# Patient Record
Sex: MALE | Race: WHITE | NOT HISPANIC OR LATINO | Employment: UNEMPLOYED | URBAN - METROPOLITAN AREA
[De-identification: names, ages, dates, MRNs, and addresses within clinical notes are randomized per-mention and may not be internally consistent; named-entity substitution may affect disease eponyms.]

---

## 2017-01-05 ENCOUNTER — ALLSCRIPTS OFFICE VISIT (OUTPATIENT)
Dept: OTHER | Facility: OTHER | Age: 42
End: 2017-01-05

## 2017-03-30 ENCOUNTER — HOSPITAL ENCOUNTER (OUTPATIENT)
Dept: NON INVASIVE DIAGNOSTICS | Facility: CLINIC | Age: 42
Discharge: HOME | End: 2017-03-30
Payer: MEDICARE

## 2017-03-30 ENCOUNTER — ALLSCRIPTS OFFICE VISIT (OUTPATIENT)
Dept: OTHER | Facility: OTHER | Age: 42
End: 2017-03-30

## 2017-03-30 ENCOUNTER — HOSPITAL ENCOUNTER (OUTPATIENT)
Dept: RADIOLOGY | Facility: CLINIC | Age: 42
Discharge: HOME | End: 2017-03-30
Payer: MEDICARE

## 2017-03-30 DIAGNOSIS — Z86.718 PERSONAL HISTORY OF OTHER VENOUS THROMBOSIS AND EMBOLISM: ICD-10-CM

## 2017-03-30 DIAGNOSIS — M79.601 PAIN OF RIGHT ARM: ICD-10-CM

## 2017-03-30 PROCEDURE — 73080 X-RAY EXAM OF ELBOW: CPT

## 2017-03-30 PROCEDURE — 93971 EXTREMITY STUDY: CPT

## 2017-03-31 ENCOUNTER — GENERIC CONVERSION - ENCOUNTER (OUTPATIENT)
Dept: OTHER | Facility: OTHER | Age: 42
End: 2017-03-31

## 2017-05-30 DIAGNOSIS — I82.409 ACUTE EMBOLISM AND THROMBOSIS OF DEEP VEIN OF LOWER EXTREMITY (HCC): ICD-10-CM

## 2017-05-30 DIAGNOSIS — Z86.718 PERSONAL HISTORY OF OTHER VENOUS THROMBOSIS AND EMBOLISM: ICD-10-CM

## 2017-08-09 ENCOUNTER — GENERIC CONVERSION - ENCOUNTER (OUTPATIENT)
Dept: OTHER | Facility: OTHER | Age: 42
End: 2017-08-09

## 2017-11-04 ENCOUNTER — ALLSCRIPTS OFFICE VISIT (OUTPATIENT)
Dept: OTHER | Facility: OTHER | Age: 42
End: 2017-11-04

## 2017-11-04 ENCOUNTER — APPOINTMENT (OUTPATIENT)
Dept: LAB | Facility: CLINIC | Age: 42
End: 2017-11-04
Payer: COMMERCIAL

## 2017-11-04 DIAGNOSIS — Z94.0 HISTORY OF KIDNEY TRANSPLANT: ICD-10-CM

## 2017-11-04 DIAGNOSIS — I10 ESSENTIAL (PRIMARY) HYPERTENSION: ICD-10-CM

## 2017-11-04 DIAGNOSIS — Z86.718 PERSONAL HISTORY OF OTHER VENOUS THROMBOSIS AND EMBOLISM (CODE): ICD-10-CM

## 2017-11-04 LAB
ALBUMIN SERPL BCP-MCNC: 3.5 G/DL (ref 3.5–5)
ALP SERPL-CCNC: 93 U/L (ref 46–116)
ALT SERPL W P-5'-P-CCNC: 27 U/L (ref 12–78)
ANION GAP SERPL CALCULATED.3IONS-SCNC: 8 MMOL/L (ref 4–13)
AST SERPL W P-5'-P-CCNC: 23 U/L (ref 5–45)
BACTERIA UR QL AUTO: ABNORMAL /HPF
BASOPHILS # BLD AUTO: 0.04 THOUSANDS/ΜL (ref 0–0.1)
BASOPHILS NFR BLD AUTO: 1 % (ref 0–1)
BILIRUB SERPL-MCNC: 0.37 MG/DL (ref 0.2–1)
BILIRUB UR QL STRIP: NEGATIVE
BUN SERPL-MCNC: 32 MG/DL (ref 5–25)
CALCIUM SERPL-MCNC: 9.2 MG/DL (ref 8.3–10.1)
CHLORIDE SERPL-SCNC: 106 MMOL/L (ref 100–108)
CHOLEST SERPL-MCNC: 207 MG/DL (ref 50–200)
CLARITY UR: CLEAR
CO2 SERPL-SCNC: 22 MMOL/L (ref 21–32)
COLOR UR: YELLOW
CREAT SERPL-MCNC: 1.49 MG/DL (ref 0.6–1.3)
EOSINOPHIL # BLD AUTO: 0.17 THOUSAND/ΜL (ref 0–0.61)
EOSINOPHIL NFR BLD AUTO: 4 % (ref 0–6)
ERYTHROCYTE [DISTWIDTH] IN BLOOD BY AUTOMATED COUNT: 12.2 % (ref 11.6–15.1)
EST. AVERAGE GLUCOSE BLD GHB EST-MCNC: 114 MG/DL
GFR SERPL CREATININE-BSD FRML MDRD: 56 ML/MIN/1.73SQ M
GLUCOSE P FAST SERPL-MCNC: 88 MG/DL (ref 65–99)
GLUCOSE UR STRIP-MCNC: NEGATIVE MG/DL
HBA1C MFR BLD: 5.6 % (ref 4.2–6.3)
HCT VFR BLD AUTO: 42.2 % (ref 36.5–49.3)
HDLC SERPL-MCNC: 50 MG/DL (ref 40–60)
HGB BLD-MCNC: 14.4 G/DL (ref 12–17)
HGB UR QL STRIP.AUTO: ABNORMAL
HYALINE CASTS #/AREA URNS LPF: ABNORMAL /LPF
INR PPP: 1.32 (ref 0.86–1.16)
KETONES UR STRIP-MCNC: NEGATIVE MG/DL
LDLC SERPL CALC-MCNC: 138 MG/DL (ref 0–100)
LEUKOCYTE ESTERASE UR QL STRIP: NEGATIVE
LYMPHOCYTES # BLD AUTO: 1.78 THOUSANDS/ΜL (ref 0.6–4.47)
LYMPHOCYTES NFR BLD AUTO: 40 % (ref 14–44)
MCH RBC QN AUTO: 32.8 PG (ref 26.8–34.3)
MCHC RBC AUTO-ENTMCNC: 34.1 G/DL (ref 31.4–37.4)
MCV RBC AUTO: 96 FL (ref 82–98)
MONOCYTES # BLD AUTO: 0.64 THOUSAND/ΜL (ref 0.17–1.22)
MONOCYTES NFR BLD AUTO: 15 % (ref 4–12)
NEUTROPHILS # BLD AUTO: 1.76 THOUSANDS/ΜL (ref 1.85–7.62)
NEUTS SEG NFR BLD AUTO: 40 % (ref 43–75)
NITRITE UR QL STRIP: NEGATIVE
NON-SQ EPI CELLS URNS QL MICRO: ABNORMAL /HPF
NRBC BLD AUTO-RTO: 0 /100 WBCS
PH UR STRIP.AUTO: 6 [PH] (ref 4.5–8)
PLATELET # BLD AUTO: 249 THOUSANDS/UL (ref 149–390)
PMV BLD AUTO: 11 FL (ref 8.9–12.7)
POTASSIUM SERPL-SCNC: 4.5 MMOL/L (ref 3.5–5.3)
PROT SERPL-MCNC: 7.7 G/DL (ref 6.4–8.2)
PROT UR STRIP-MCNC: ABNORMAL MG/DL
PROTHROMBIN TIME: 16.5 SECONDS (ref 12.1–14.4)
RBC # BLD AUTO: 4.39 MILLION/UL (ref 3.88–5.62)
RBC #/AREA URNS AUTO: ABNORMAL /HPF
SODIUM SERPL-SCNC: 136 MMOL/L (ref 136–145)
SP GR UR STRIP.AUTO: 1.02 (ref 1–1.03)
TRIGL SERPL-MCNC: 95 MG/DL
TSH SERPL DL<=0.05 MIU/L-ACNC: 2.11 UIU/ML (ref 0.36–3.74)
UROBILINOGEN UR QL STRIP.AUTO: 1 E.U./DL
WBC # BLD AUTO: 4.4 THOUSAND/UL (ref 4.31–10.16)
WBC #/AREA URNS AUTO: ABNORMAL /HPF

## 2017-11-04 PROCEDURE — 80061 LIPID PANEL: CPT

## 2017-11-04 PROCEDURE — 80197 ASSAY OF TACROLIMUS: CPT

## 2017-11-04 PROCEDURE — 85025 COMPLETE CBC W/AUTO DIFF WBC: CPT

## 2017-11-04 PROCEDURE — 80053 COMPREHEN METABOLIC PANEL: CPT

## 2017-11-04 PROCEDURE — 85610 PROTHROMBIN TIME: CPT

## 2017-11-04 PROCEDURE — 84443 ASSAY THYROID STIM HORMONE: CPT

## 2017-11-04 PROCEDURE — 81001 URINALYSIS AUTO W/SCOPE: CPT

## 2017-11-04 PROCEDURE — 36415 COLL VENOUS BLD VENIPUNCTURE: CPT

## 2017-11-04 PROCEDURE — 83036 HEMOGLOBIN GLYCOSYLATED A1C: CPT

## 2017-11-07 NOTE — PROGRESS NOTES
Assessment  1  Skin lesion (709 9) (L98 9)   2  Lower extremity deep venous thrombosis (453 40) (I82 409)   3  History of DVT of lower extremity (V12 51) (Z86 718)   4  Kidney replaced by transplant (V42 0) (Z94 0)   5  Hypertension (401 9) (I10)    Plan  History of DVT of lower extremity, Hypertension    · (1) PT WITH INR; Status:Active; Requested CBO:[id number];   Hypertension    · (1) CBC/PLT/DIFF; Status:Active; Requested HVH:[id number];    · (1) COMPREHENSIVE METABOLIC PANEL; Status:Active; Requested [id number];    · (1) HEMOGLOBIN A1C; Status:Active; Requested IDT:[id number];    · (1) LIPID PANEL, FASTING; Status:Active; Requested ICE:[id number];    · (1) TSH; Status:Active; Requested JKZ:[id number];    · (1) URINALYSIS w URINE C/S REFLEX (will reflex a microscopy if leukocytes, occult blood, or  nitrites are not within normal limits); Status:Active; Requested ZMW:06HLL1049;   Kidney replaced by transplant    · (1)  TACROLIMUS; Status:Active; Requested JXA:[id number];    · 1 - Teofilo Carrera MD  (Nephrology) Co-Management  *  Status: Active  Requested for: 72XLQ5651  Care Summary provided  : Yes   · Follow-up visit in 1 year Evaluation and Treatment  Follow-up  Status: Hold For - Scheduling   Requested for: 64MNA1276  Skin lesion    · 1 - Caitlyn Barba MD  (Dermatology) Co-Management  *  Status: Hold For - Scheduling  Requested  for: 47WJY3374  Care Summary provided  : Yes    Discussion/Summary  Discussion Summary:   Skin lesions on leg venous stasis versus psoriatic versus vasculitis  Will get screening labs referral to Dermatology Nephrology and follow up based on his lab reports  Medication SE Review and Pt Understands Tx: Possible side effects of new medications were reviewed with the patient/guardian today  The treatment plan was reviewed with the patient/guardian   The patient/guardian understands and agrees with the treatment plan      Chief Complaint  Chief Complaint Free Text Note Form: Skin lesions on legs      History of Present Illness  HPI: He has had two irritated itchy patches on left lower leg for at least 6 months  They are not getting worse  No pain or drainage  He now has insurance and he would like them checked out  He 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 chronically he checks his pro times at home  There is no documentation  He is status post kidney transplant the has been lost to follow-up from Nephrology he is on tacrolimus  He feels well he is working at a physical job he occasionally has some numbness of his hands but otherwise no complaints  No fever chills appetite and weight have been stable no dizziness syncope chest pain shortness palpitation nausea vomiting no trouble with urination bowel movements no bloody urine   Hypertension (Follow-Up): The patient presents for follow-up of hypertension secondary to renovascular disease  The patient states he has been doing well with his blood pressure control since the last visit  He has no comorbid illnesses  He has no significant interval events  Symptoms: The patient is currently asymptomatic  Blood pressure control has been good  Disease Management: the patient is doing well with his blood pressure goals  Review of Systems  Complete-Male:   Constitutional: No fever or chills, feels well, no tiredness, no recent weight gain or weight loss  Eyes: No complaints of eye pain, no red eyes, no discharge from eyes, no itchy eyes  ENT: no complaints of earache, no hearing loss, no nosebleeds, no nasal discharge, no sore throat, no hoarseness  Cardiovascular: No complaints of slow heart rate, no fast heart rate, no chest pain, no palpitations, no leg claudication, no lower extremity  Respiratory: No complaints of shortness of breath, no wheezing, no cough, no SOB on exertion, no orthopnea or PND  Gastrointestinal: No complaints of abdominal pain, no constipation, no nausea or vomiting, no diarrhea or bloody stools     Genitourinary: No complaints of dysuria, no incontinence, no hesitancy, no nocturia, no genital lesion, no testicular pain  Musculoskeletal: No complaints of arthralgia, no myalgias, no joint swelling or stiffness, no limb pain or swelling  Integumentary: as noted in HPI  Neurological: tingling-- and-- Intermittent of his hands, but-- as noted in HPI  Psychiatric: Is not suicidal, no sleep disturbances, no anxiety or depression, no change in personality, no emotional problems  Endocrine: No complaints of proptosis, no hot flashes, no muscle weakness, no erectile dysfunction, no deepening of the voice, no feelings of weakness  Hematologic/Lymphatic: No complaints of swollen glands, no swollen glands in the neck, does not bleed easily, no easy bruising  ROS Reviewed:   ROS reviewed  Active Problems  1  Acute URI (465 9) (J06 9)   2  Arm pain, right (729 5) (M79 601)   3  Depression (311) (F32 9)   4  History of DVT of lower extremity (V12 51) (Z86 718)   5  Hypertension (401 9) (I10)   6  Kidney replaced by transplant (V42 0) (Z94 0)   7  Left knee pain (719 46) (M25 562)   8  Lower extremity deep venous thrombosis (453 40) (I82 409)   9  Viral URI with cough (465 9) (J06 9,B97 89)    Past Medical History  1  History of kidney disease (V13 09) (R69 512)  Active Problems And Past Medical History Reviewed: The active problems and past medical history were reviewed and updated today  Surgical History  1  History of Renal Transplant  Surgical History Reviewed: The surgical history was reviewed and updated today  Family History  Father    1  Family history of Deep vein thrombophlebitis of leg  Grandfather    2  Family history of Deep vein thrombophlebitis of leg   3  Family history of cardiac disorder (V17 49) (Z82 49)   4  Family history of Malignant neoplasm of colon, unspecified part of colon  Family History Reviewed: The family history was reviewed and updated today         Social History   · Former smoker (G89 64) (X12 394)  Social History Reviewed: The social history was reviewed and updated today  Current Meds   1  Adderall XR 20 MG Oral Capsule Extended Release 24 Hour; TAKE 1 CAPSULE DAILY FOR ADHD; Therapy: (Recorded:18Oaa6853) to Recorded   2  DilTIAZem  MG Oral Capsule Extended Release 24 Hour; 1 BID; Last Rx:39Wky2724 Ordered   3  Mycophenolate Mofetil 250 MG Oral Capsule; 2 AM and 1 PM; Last Rx:86Cht4136 Ordered   4  Sertraline HCl - 100 MG Oral Tablet; TAKE 1 TABLET TWICE DAILY; Last Rx:24Mod0965 Ordered   5  Tacrolimus 1 MG Oral Capsule; 1 BID; Last Rx:35Ipo1385 Ordered   6  Vitamin D 2000 UNIT Oral Capsule; take 1 capsule daily; Therapy: (Recorded:14Fbk1527) to Recorded   7  Warfarin Sodium 2 MG Oral Tablet; TAKE 1 TABLET DAILY AS DIRECTED according to blood results    Requested for: 13UVP2408; Last Rx:14Otn3681 Ordered   8  Warfarin Sodium 5 MG Oral Tablet; TAKE ONE OR TWO TABLETS BY MOUTH DAILY; Therapy: 39SOG6823 to (Last Rx:69Brz2357)  Requested for: 52Wue2003 Ordered  Medication List Reviewed: The medication list was reviewed and updated today  Allergies  1  Penicillins    Vitals  Vital Signs    Recorded: 57GIO8962 09:37AM   Temperature 98 1 F, Oral   Heart Rate 78   Systolic 840   Diastolic 88   Height 5 ft 6 in   Weight 172 lb 8 oz   BMI Calculated 27 84   BSA Calculated 1 88   O2 Saturation 97     Physical Exam    Constitutional   General appearance: No acute distress, well appearing and well nourished  Eyes   Conjunctiva and lids: No swelling, erythema, or discharge  Pupils and irises: Equal, round and reactive to light  Ears, Nose, Mouth, and Throat   External inspection of ears and nose: Normal     Otoscopic examination: Tympanic membrance translucent with normal light reflex  Canals patent without erythema  Nasal mucosa, septum, and turbinates: Normal without edema or erythema  Oropharynx: Normal with no erythema, edema, exudate or lesions  Pulmonary   Respiratory effort: No increased work of breathing or signs of respiratory distress  Auscultation of lungs: Clear to auscultation, equal breath sounds bilaterally, no wheezes, no rales, no rhonci  Cardiovascular   Palpation of heart: Normal PMI, no thrills  Auscultation of heart: Normal rate and rhythm, normal S1 and S2, without murmurs  Examination of extremities for edema and/or varicosities: Normal     Carotid pulses: Normal     Abdomen   Abdomen: Non-tender, no masses  Liver and spleen: No hepatomegaly or splenomegaly  Lymphatic   Palpation of lymph nodes in neck: No lymphadenopathy  Musculoskeletal   Gait and station: Normal     Digits and nails: Normal without clubbing or cyanosis  Inspection/palpation of joints, bones, and muscles: Normal     Skin   Skin and subcutaneous tissue: Abnormal  -- Two well demarcated plaque-like lesions 3 centimeters posterior left lower leg with some scaling some redness some slight hemorrhagic appearance  Neurologic   Cranial nerves: Cranial nerves 2-12 intact  Reflexes: 2+ and symmetric  Sensation: No sensory loss      Psychiatric   Orientation to person, place and time: Normal     Mood and affect: Normal          Signatures   Electronically signed by : Uvaldo Vaughn, [facility]; Nov 4 2017 10:26AM EST                       (Author)    Electronically signed by : CONSTANTINE Chaidez ; Nov 6 2017 11:21AM EST

## 2017-11-08 LAB — TACROLIMUS BLD LC/MS/MS-MCNC: 1.6 NG/ML (ref 2–20)

## 2017-11-09 ENCOUNTER — GENERIC CONVERSION - ENCOUNTER (OUTPATIENT)
Dept: OTHER | Facility: OTHER | Age: 42
End: 2017-11-09

## 2017-11-14 ENCOUNTER — ALLSCRIPTS OFFICE VISIT (OUTPATIENT)
Dept: OTHER | Facility: OTHER | Age: 42
End: 2017-11-14

## 2017-11-15 NOTE — PROGRESS NOTES
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)   2  Screening for skin condition (V82 0) (Z13 89)   3  Acne vulgaris (706 1) (L70 0)   4  H/O immunosuppressive therapy (V87 46) (Z92 25)    Active Problems  Problems    1  Acute URI (465 9) (J06 9)   2  Arm pain, right (729 5) (M79 601)   3  Depression (311) (F32 9)   4  Flu vaccine need (V04 81) (Z23)   5  History of DVT of lower extremity (V12 51) (Z86 718)   6  Hypertension (401 9) (I10)   7  Kidney replaced by transplant (V42 0) (Z94 0)   8  Left knee pain (719 46) (M25 562)   9  Lower extremity deep venous thrombosis (453 40) (I82 409)   10  Skin lesion (709 9) (L98 9)   11  Viral URI with cough (465 9) (J06 9,B97 89)    Past Medical History  Problems    1  H/O immunosuppressive therapy (V87 46) (Z92 25)   2  History of kidney disease (V13 09) (F50 330)    The past medical history was reviewed  Surgical History  Problems    1  History of Renal Transplant    Surgical History reviewed      Family History  Father    1  Family history of Deep vein thrombophlebitis of leg  Grandfather    2  Family history of Deep vein thrombophlebitis of leg   3  Family history of cardiac disorder (V17 49) (Z82 49)   4  Family history of Malignant neoplasm of colon, unspecified part of colon  Family History Reviewed- Derm:   Family History was reviewed      Social History  Problems    · Former smoker (T06 63) (B39 823)  The social history was reviewed      Current Meds   1  Adderall XR 20 MG Oral Capsule Extended Release 24 Hour; TAKE 1 CAPSULE DAILY FOR ADHD; Therapy: (Recorded:99Map2262) to Recorded   2   DilTIAZem CD 120 MG Oral Capsule Extended Release 24 Hour; 1 BID; Last Rx:57Qow4901 Ordered   3  Mycophenolate Mofetil 250 MG Oral Capsule; 2 AM and 1 PM; Last Rx:12Ino0313 Ordered   4  Sertraline HCl - 100 MG Oral Tablet; TAKE 1 TABLET TWICE DAILY; Last Rx:42Ast8010 Ordered   5  Tacrolimus 1 MG Oral Capsule; 1 BID; Last Rx:69Zwt9264 Ordered   6  Vitamin D 2000 UNIT Oral Capsule; take 1 capsule daily; Therapy: (Recorded:83Twp6179) to Recorded   7  Warfarin Sodium 2 MG Oral Tablet; TAKE 1 TABLET DAILY AS DIRECTED according to blood results  Requested for: 98GJN6927; Last Rx:19Sou7577 Ordered   8  Warfarin Sodium 5 MG Oral Tablet; TAKE ONE OR TWO TABLETS BY MOUTH DAILY; Therapy: 19YRD1087 to (Last Rx:84Qqq1215)  Requested for: 38Kwr0241 Ordered    Review of Systems   Constitutional: Denies constitutional symptoms  Eyes: Denies eye symptoms  ENT:  denies ear symptoms, nasal symptoms, mouth or throat symptoms  Cardiovascular: Denies cardiovascular symptoms  Respiratory: Denies respiratory symptoms  Gastrointestinal: Denies gastrointestinal symptoms  Musculoskeletal: Denies musculoskeletal symptoms  Integumentary: Denies symptoms other than stated above  Neurological: Denies neurologic symptoms  Psychiatric: Denies psychiatric symptoms  Endocrine: Denies endocrine symptoms  Hematologic/Lymphatic: Denies hematologic symptoms  Allergies  Medication    1  Penicillins    Physical Exam   Constitutional  General appearance: Appears healthy and well developed  Lymphatic  No visible disturbance  Musculoskeletal  Digits and nails: No clubbing, cyanosis or edema  Cutaneous and nail exam normal    Skin  Scalp skin texture and hair distribution: Normal skin texture on scalp, normal hair distribution  Head: Normal turgor, no rashes, no lesions  Neck: Normal turgor, no rashes, no lesions  Chest: Normal turgor, no rashes, no lesions  Abdomen: Normal turgor, no rashes, no lesions     Back: Abnormal    Right upper extremity: Normal turgor, no rashes, no lesions  Left upper extremity: Normal turgor, no rashes, no lesions  Right lower extremity: Abnormal    Left lower extremity: Abnormal    Neuro/Psych  Alert and oriented x 3  Displays comfort and cooperation during encounterl  Affect is normal    Finding On his back is extensive erythematous papules inflammatory and occasional pustules  In addition on his left lower leg this scaling erythematous well-demarcated plaques noted with some scaling erythema peripherally nothing else remarkable on complete exam       Plan  Psoriasiform dermatitis    · Betamethasone Dipropionate Aug 0 05 % External Ointment; apply sparingly toaffected area(s) twice daily  lower leg   · Follow-up visit in 1 month Evaluation and Treatment  Follow-up  Status: Hold For -Scheduling  Requested for: 37HYK1709    Discussion/Summary   Assessment #1: Psoriasiform dermatitis  Care Plan:  Possibility of a contact reaction versus psoriasis will go ahead and treat this with topical steroids and re-evaluate in about a month patient advise not to use Neosporin on the area consider a use test of this in the future  Assessment #2: Acne  Care Plan:  Appears to probably related to his immunosuppressive therapy discussed use of benzoyl peroxide wash  Assessment #3: Screening for dermatologic disorders/history of immunosuppressive therapy  Care Plan:  No other concerns noted sun protection recommended yearly follow-up recommended  Future Appointments    Date/Time Provider Specialty Site   01/22/2018 09:30 AM CONSTANTINE Grant  Nephrology [address]   12/20/2017 09:05 AM CONSTANTINE Mckeon   Dermatology [facility]       Signatures   Electronically signed by : CONSTANTINE Mccain ; Nov 14 2017 12:07PM EST                       (Author)

## 2017-12-16 ENCOUNTER — HOSPITAL ENCOUNTER (INPATIENT)
Facility: HOSPITAL | Age: 42
LOS: 2 days | Discharge: HOME | DRG: 469 | End: 2017-12-19
Attending: EMERGENCY MEDICINE | Admitting: GENERAL PRACTICE
Payer: COMMERCIAL

## 2017-12-16 DIAGNOSIS — N18.9 ACUTE KIDNEY INJURY SUPERIMPOSED ON CKD (HCC): ICD-10-CM

## 2017-12-16 DIAGNOSIS — N39.0 UTI (URINARY TRACT INFECTION): Primary | ICD-10-CM

## 2017-12-16 DIAGNOSIS — Z94.0 TRANSPLANTED KIDNEY: ICD-10-CM

## 2017-12-16 DIAGNOSIS — N17.9 ACUTE KIDNEY INJURY SUPERIMPOSED ON CKD (HCC): ICD-10-CM

## 2017-12-16 DIAGNOSIS — N17.9 AKI (ACUTE KIDNEY INJURY) (HCC): ICD-10-CM

## 2017-12-16 DIAGNOSIS — N30.91 HEMORRHAGIC CYSTITIS: ICD-10-CM

## 2017-12-16 LAB
ANION GAP SERPL CALCULATED.3IONS-SCNC: 10 MMOL/L (ref 4–13)
BACTERIA UR QL AUTO: ABNORMAL /HPF
BASOPHILS # BLD AUTO: 0.06 THOUSANDS/ΜL (ref 0–0.1)
BASOPHILS NFR BLD AUTO: 1 % (ref 0–1)
BILIRUB UR QL STRIP: ABNORMAL
BUN SERPL-MCNC: 26 MG/DL (ref 5–25)
CALCIUM SERPL-MCNC: 9.6 MG/DL (ref 8.3–10.1)
CHLORIDE SERPL-SCNC: 103 MMOL/L (ref 100–108)
CLARITY UR: ABNORMAL
CO2 SERPL-SCNC: 23 MMOL/L (ref 21–32)
COLOR UR: ABNORMAL
CREAT SERPL-MCNC: 1.96 MG/DL (ref 0.6–1.3)
EOSINOPHIL # BLD AUTO: 0.22 THOUSAND/ΜL (ref 0–0.61)
EOSINOPHIL NFR BLD AUTO: 2 % (ref 0–6)
ERYTHROCYTE [DISTWIDTH] IN BLOOD BY AUTOMATED COUNT: 12 % (ref 11.6–15.1)
FINE GRAN CASTS URNS QL MICRO: ABNORMAL /LPF
GFR SERPL CREATININE-BSD FRML MDRD: 40 ML/MIN/1.73SQ M
GLUCOSE SERPL-MCNC: 93 MG/DL (ref 65–140)
GLUCOSE UR STRIP-MCNC: NEGATIVE MG/DL
HCT VFR BLD AUTO: 40.4 % (ref 36.5–49.3)
HGB BLD-MCNC: 13.4 G/DL (ref 12–17)
HGB UR QL STRIP.AUTO: ABNORMAL
INR PPP: 3.03 (ref 0.86–1.16)
KETONES UR STRIP-MCNC: ABNORMAL MG/DL
LEUKOCYTE ESTERASE UR QL STRIP: NEGATIVE
LYMPHOCYTES # BLD AUTO: 1.56 THOUSANDS/ΜL (ref 0.6–4.47)
LYMPHOCYTES NFR BLD AUTO: 16 % (ref 14–44)
MCH RBC QN AUTO: 31 PG (ref 26.8–34.3)
MCHC RBC AUTO-ENTMCNC: 33.2 G/DL (ref 31.4–37.4)
MCV RBC AUTO: 94 FL (ref 82–98)
MONOCYTES # BLD AUTO: 1.37 THOUSAND/ΜL (ref 0.17–1.22)
MONOCYTES NFR BLD AUTO: 14 % (ref 4–12)
NEUTROPHILS # BLD AUTO: 6.79 THOUSANDS/ΜL (ref 1.85–7.62)
NEUTS SEG NFR BLD AUTO: 68 % (ref 43–75)
NITRITE UR QL STRIP: POSITIVE
NON-SQ EPI CELLS URNS QL MICRO: ABNORMAL /HPF
NRBC BLD AUTO-RTO: 0 /100 WBCS
OTHER STN SPEC: ABNORMAL
PH UR STRIP.AUTO: 5.5 [PH] (ref 4.5–8)
PLATELET # BLD AUTO: 227 THOUSANDS/UL (ref 149–390)
PMV BLD AUTO: 9.5 FL (ref 8.9–12.7)
POTASSIUM SERPL-SCNC: 4 MMOL/L (ref 3.5–5.3)
PROT UR STRIP-MCNC: >=300 MG/DL
PROTHROMBIN TIME: 32.3 SECONDS (ref 12.1–14.4)
RBC # BLD AUTO: 4.32 MILLION/UL (ref 3.88–5.62)
RBC #/AREA URNS AUTO: ABNORMAL /HPF
SODIUM SERPL-SCNC: 136 MMOL/L (ref 136–145)
SP GR UR STRIP.AUTO: >=1.03 (ref 1–1.03)
UROBILINOGEN UR QL STRIP.AUTO: 0.2 E.U./DL
WBC # BLD AUTO: 10.02 THOUSAND/UL (ref 4.31–10.16)
WBC #/AREA URNS AUTO: ABNORMAL /HPF

## 2017-12-16 PROCEDURE — 96374 THER/PROPH/DIAG INJ IV PUSH: CPT

## 2017-12-16 PROCEDURE — 36415 COLL VENOUS BLD VENIPUNCTURE: CPT | Performed by: EMERGENCY MEDICINE

## 2017-12-16 PROCEDURE — 87086 URINE CULTURE/COLONY COUNT: CPT | Performed by: EMERGENCY MEDICINE

## 2017-12-16 PROCEDURE — 85025 COMPLETE CBC W/AUTO DIFF WBC: CPT | Performed by: EMERGENCY MEDICINE

## 2017-12-16 PROCEDURE — 85610 PROTHROMBIN TIME: CPT | Performed by: EMERGENCY MEDICINE

## 2017-12-16 PROCEDURE — 80048 BASIC METABOLIC PNL TOTAL CA: CPT | Performed by: EMERGENCY MEDICINE

## 2017-12-16 PROCEDURE — 81001 URINALYSIS AUTO W/SCOPE: CPT | Performed by: EMERGENCY MEDICINE

## 2017-12-16 RX ORDER — BETAMETHASONE DIPROPIONATE 0.5 MG/G
2 OINTMENT TOPICAL
COMMUNITY
Start: 2017-11-14 | End: 2019-06-17 | Stop reason: HOSPADM

## 2017-12-16 RX ORDER — CHOLECALCIFEROL (VITAMIN D3) 25 MCG
2000 CAPSULE ORAL DAILY
COMMUNITY

## 2017-12-16 RX ORDER — MYCOPHENOLATE MOFETIL 250 MG/1
250 CAPSULE ORAL 2 TIMES DAILY
COMMUNITY
End: 2018-07-06 | Stop reason: SDUPTHER

## 2017-12-16 RX ORDER — TACROLIMUS 1 MG/1
1 CAPSULE ORAL 2 TIMES DAILY
COMMUNITY
End: 2018-07-06 | Stop reason: SDUPTHER

## 2017-12-16 RX ORDER — SERTRALINE HYDROCHLORIDE 100 MG/1
100 TABLET, FILM COATED ORAL DAILY
COMMUNITY
Start: 2014-11-13 | End: 2018-06-04 | Stop reason: SDUPTHER

## 2017-12-16 RX ORDER — DILTIAZEM HYDROCHLORIDE 120 MG/1
120 TABLET, FILM COATED ORAL 2 TIMES DAILY
COMMUNITY
End: 2018-07-06 | Stop reason: SDUPTHER

## 2017-12-16 RX ORDER — DEXTROAMPHETAMINE SACCHARATE, AMPHETAMINE ASPARTATE MONOHYDRATE, DEXTROAMPHETAMINE SULFATE AND AMPHETAMINE SULFATE 5; 5; 5; 5 MG/1; MG/1; MG/1; MG/1
40 CAPSULE, EXTENDED RELEASE ORAL DAILY
COMMUNITY
End: 2018-10-08 | Stop reason: ALTCHOICE

## 2017-12-16 RX ORDER — WARFARIN SODIUM 5 MG/1
5 TABLET ORAL DAILY
COMMUNITY
Start: 2013-04-09 | End: 2018-03-01

## 2017-12-16 RX ADMIN — SODIUM CHLORIDE 1000 ML: 0.9 INJECTION, SOLUTION INTRAVENOUS at 23:39

## 2017-12-16 RX ADMIN — CEFTRIAXONE 1000 MG: 1 INJECTION, SOLUTION INTRAVENOUS at 23:36

## 2017-12-16 NOTE — LETTER
December 19, 2017     Patient: Luis Moore   YOB: 1975   Date of Visit: 12/16/2017       To Whom It May Concern: It is my medical opinion that Luis Moore may return to full duty immediately with no restrictions  If you have any questions or concerns, please don't hesitate to call           Sincerely,        Yessi Ferrara [facility] Internal Medicine

## 2017-12-17 ENCOUNTER — APPOINTMENT (INPATIENT)
Dept: CT IMAGING | Facility: HOSPITAL | Age: 42
DRG: 469 | End: 2017-12-17
Payer: COMMERCIAL

## 2017-12-17 PROBLEM — I10 HTN (HYPERTENSION): Status: ACTIVE | Noted: 2017-12-17

## 2017-12-17 PROBLEM — N18.9 ACUTE KIDNEY INJURY SUPERIMPOSED ON CKD (HCC): Status: ACTIVE | Noted: 2017-12-17

## 2017-12-17 PROBLEM — N39.0 UTI (URINARY TRACT INFECTION): Status: ACTIVE | Noted: 2017-12-17

## 2017-12-17 PROBLEM — Z94.0 HISTORY OF RENAL TRANSPLANT: Status: ACTIVE | Noted: 2017-12-17

## 2017-12-17 PROBLEM — N17.9 ACUTE KIDNEY INJURY SUPERIMPOSED ON CKD (HCC): Status: ACTIVE | Noted: 2017-12-17

## 2017-12-17 LAB
ANION GAP SERPL CALCULATED.3IONS-SCNC: 7 MMOL/L (ref 4–13)
APTT PPP: 60 SECONDS (ref 23–35)
BUN SERPL-MCNC: 23 MG/DL (ref 5–25)
CALCIUM SERPL-MCNC: 9.4 MG/DL (ref 8.3–10.1)
CHLORIDE SERPL-SCNC: 106 MMOL/L (ref 100–108)
CO2 SERPL-SCNC: 25 MMOL/L (ref 21–32)
CREAT SERPL-MCNC: 1.86 MG/DL (ref 0.6–1.3)
ERYTHROCYTE [DISTWIDTH] IN BLOOD BY AUTOMATED COUNT: 12.2 % (ref 11.6–15.1)
GFR SERPL CREATININE-BSD FRML MDRD: 43 ML/MIN/1.73SQ M
GLUCOSE SERPL-MCNC: 100 MG/DL (ref 65–140)
HCT VFR BLD AUTO: 40.3 % (ref 36.5–49.3)
HGB BLD-MCNC: 13.1 G/DL (ref 12–17)
INR PPP: 3.36 (ref 0.86–1.16)
MAGNESIUM SERPL-MCNC: 2.3 MG/DL (ref 1.6–2.6)
MCH RBC QN AUTO: 31.2 PG (ref 26.8–34.3)
MCHC RBC AUTO-ENTMCNC: 32.5 G/DL (ref 31.4–37.4)
MCV RBC AUTO: 96 FL (ref 82–98)
PLATELET # BLD AUTO: 201 THOUSANDS/UL (ref 149–390)
PMV BLD AUTO: 9.5 FL (ref 8.9–12.7)
POTASSIUM SERPL-SCNC: 3.6 MMOL/L (ref 3.5–5.3)
PROTHROMBIN TIME: 35.1 SECONDS (ref 12.1–14.4)
RBC # BLD AUTO: 4.2 MILLION/UL (ref 3.88–5.62)
SODIUM SERPL-SCNC: 138 MMOL/L (ref 136–145)
WBC # BLD AUTO: 7.12 THOUSAND/UL (ref 4.31–10.16)

## 2017-12-17 PROCEDURE — 85610 PROTHROMBIN TIME: CPT | Performed by: PHYSICIAN ASSISTANT

## 2017-12-17 PROCEDURE — 85027 COMPLETE CBC AUTOMATED: CPT | Performed by: PHYSICIAN ASSISTANT

## 2017-12-17 PROCEDURE — 83735 ASSAY OF MAGNESIUM: CPT | Performed by: PHYSICIAN ASSISTANT

## 2017-12-17 PROCEDURE — 36415 COLL VENOUS BLD VENIPUNCTURE: CPT | Performed by: PHYSICIAN ASSISTANT

## 2017-12-17 PROCEDURE — 85730 THROMBOPLASTIN TIME PARTIAL: CPT | Performed by: PHYSICIAN ASSISTANT

## 2017-12-17 PROCEDURE — 74176 CT ABD & PELVIS W/O CONTRAST: CPT

## 2017-12-17 PROCEDURE — 80048 BASIC METABOLIC PNL TOTAL CA: CPT | Performed by: PHYSICIAN ASSISTANT

## 2017-12-17 PROCEDURE — 99285 EMERGENCY DEPT VISIT HI MDM: CPT

## 2017-12-17 RX ORDER — DILTIAZEM HYDROCHLORIDE 60 MG/1
120 CAPSULE, EXTENDED RELEASE ORAL 2 TIMES DAILY
Status: DISCONTINUED | OUTPATIENT
Start: 2017-12-17 | End: 2017-12-19 | Stop reason: HOSPADM

## 2017-12-17 RX ORDER — ECHINACEA PURPUREA EXTRACT 125 MG
1 TABLET ORAL
Status: DISCONTINUED | OUTPATIENT
Start: 2017-12-17 | End: 2017-12-19 | Stop reason: HOSPADM

## 2017-12-17 RX ORDER — FLUTICASONE PROPIONATE 50 MCG
1 SPRAY, SUSPENSION (ML) NASAL DAILY
Status: DISCONTINUED | OUTPATIENT
Start: 2017-12-17 | End: 2017-12-19 | Stop reason: HOSPADM

## 2017-12-17 RX ORDER — ACETAMINOPHEN 325 MG/1
650 TABLET ORAL EVERY 6 HOURS PRN
Status: DISCONTINUED | OUTPATIENT
Start: 2017-12-17 | End: 2017-12-19 | Stop reason: HOSPADM

## 2017-12-17 RX ORDER — SODIUM CHLORIDE 9 MG/ML
100 INJECTION, SOLUTION INTRAVENOUS CONTINUOUS
Status: DISCONTINUED | OUTPATIENT
Start: 2017-12-17 | End: 2017-12-19

## 2017-12-17 RX ORDER — DEXTROAMPHETAMINE SACCHARATE, AMPHETAMINE ASPARTATE, DEXTROAMPHETAMINE SULFATE AND AMPHETAMINE SULFATE 2.5; 2.5; 2.5; 2.5 MG/1; MG/1; MG/1; MG/1
20 TABLET ORAL DAILY
Status: DISCONTINUED | OUTPATIENT
Start: 2017-12-17 | End: 2017-12-19 | Stop reason: HOSPADM

## 2017-12-17 RX ORDER — METOPROLOL TARTRATE 5 MG/5ML
5 INJECTION INTRAVENOUS EVERY 6 HOURS PRN
Status: DISCONTINUED | OUTPATIENT
Start: 2017-12-17 | End: 2017-12-19 | Stop reason: HOSPADM

## 2017-12-17 RX ORDER — MYCOPHENOLATE MOFETIL 250 MG/1
250 CAPSULE ORAL 2 TIMES DAILY
Status: DISCONTINUED | OUTPATIENT
Start: 2017-12-17 | End: 2017-12-18

## 2017-12-17 RX ORDER — TACROLIMUS 0.5 MG/1
1 CAPSULE ORAL 2 TIMES DAILY
Status: DISCONTINUED | OUTPATIENT
Start: 2017-12-17 | End: 2017-12-19 | Stop reason: HOSPADM

## 2017-12-17 RX ADMIN — CEFTRIAXONE 1000 MG: 1 INJECTION, SOLUTION INTRAVENOUS at 22:50

## 2017-12-17 RX ADMIN — MYCOPHENOLATE MOFETIL 250 MG: 250 CAPSULE ORAL at 03:12

## 2017-12-17 RX ADMIN — ACETAMINOPHEN 650 MG: 325 TABLET ORAL at 22:50

## 2017-12-17 RX ADMIN — SODIUM CHLORIDE 100 ML/HR: 0.9 INJECTION, SOLUTION INTRAVENOUS at 22:50

## 2017-12-17 RX ADMIN — MYCOPHENOLATE MOFETIL 250 MG: 250 CAPSULE ORAL at 15:48

## 2017-12-17 RX ADMIN — SERTRALINE HYDROCHLORIDE 100 MG: 100 TABLET ORAL at 08:34

## 2017-12-17 RX ADMIN — SODIUM CHLORIDE 75 ML/HR: 0.9 INJECTION, SOLUTION INTRAVENOUS at 02:34

## 2017-12-17 RX ADMIN — SODIUM CHLORIDE 100 ML/HR: 0.9 INJECTION, SOLUTION INTRAVENOUS at 13:17

## 2017-12-17 RX ADMIN — TACROLIMUS 1 MG: 0.5 CAPSULE ORAL at 15:48

## 2017-12-17 RX ADMIN — DILTIAZEM HYDROCHLORIDE 120 MG: 60 CAPSULE, EXTENDED RELEASE ORAL at 16:46

## 2017-12-17 RX ADMIN — DEXTROAMPHETAMINE SACCHARATE, AMPHETAMINE ASPARTATE, DEXTROAMPHETAMINE SULFATE, AND AMPHETAMINE SULFATE 20 MG: 2.5; 2.5; 2.5; 2.5 TABLET ORAL at 08:34

## 2017-12-17 RX ADMIN — FLUTICASONE PROPIONATE 1 SPRAY: 50 SPRAY, METERED NASAL at 10:29

## 2017-12-17 RX ADMIN — TACROLIMUS 1 MG: 0.5 CAPSULE ORAL at 03:12

## 2017-12-17 NOTE — PLAN OF CARE
CARDIOVASCULAR - ADULT     Maintains optimal cardiac output and hemodynamic stability Progressing     Absence of cardiac dysrhythmias or at baseline rhythm Progressing        GENITOURINARY - ADULT     Maintains or returns to baseline urinary function Progressing     Absence of urinary retention Progressing        INFECTION - ADULT     Absence or prevention of progression during hospitalization Progressing     Absence of fever/infection during neutropenic period Progressing        PAIN - ADULT     Verbalizes/displays adequate comfort level or baseline comfort level Progressing        RESPIRATORY - ADULT     Achieves optimal ventilation and oxygenation Progressing        SAFETY ADULT     Patient will remain free of falls Progressing     Maintain or return to baseline ADL function Progressing     Maintain or return mobility status to optimal level Progressing

## 2017-12-17 NOTE — ED PROVIDER NOTES
History  Chief Complaint   Patient presents with    Blood in Urine     Pt states he has had blood in urine that started yesterday  Pt denies any pain but states he had a kidney transplant in 2009  Pt also c/o cold symptoms that started this past Wednesday with possible fever  HPI    Prior to Admission Medications   Prescriptions Last Dose Informant Patient Reported? Taking? Cholecalciferol (VITAMIN D-3) 1000 units CAPS   Yes Yes   Sig: Take 2,000 Units by mouth daily   amphetamine-dextroamphetamine (ADDERALL XR, 20MG,) 20 MG 24 hr capsule   Yes Yes   Sig: Take 40 mg by mouth daily   betamethasone, augmented, (DIPROLENE) 0 05 % ointment   Yes Yes   Sig: Apply 2 application topically   diltiazem (CARDIZEM) 120 MG tablet   Yes Yes   Sig: Take 120 mg by mouth 2 (two) times a day   mycophenolate (CELLCEPT) 250 mg capsule   Yes Yes   Sig: Take 250 mg by mouth 2 (two) times a day Take 2 in the AM, 1 in the PM    sertraline (ZOLOFT) 100 mg tablet   Yes Yes   Sig: Take 100 mg by mouth daily   tacrolimus (PROGRAF) 1 mg capsule   Yes Yes   Sig: Take 1 mg by mouth 2 (two) times a day   warfarin (COUMADIN) 5 mg tablet   Yes Yes   Sig: Take 5 mg by mouth daily Pt takes up to 2 per day      Facility-Administered Medications: None       Past Medical History:   Diagnosis Date    ADD (attention deficit disorder)     Depression     Hypertension     Nephropathy, IgA        Past Surgical History:   Procedure Laterality Date    NEPHRECTOMY TRANSPLANTED ORGAN Left 2009       History reviewed  No pertinent family history  I have reviewed and agree with the history as documented      Social History   Substance Use Topics    Smoking status: Former Smoker    Smokeless tobacco: Never Used    Alcohol use Yes      Comment: Occasionally        Review of Systems    Physical Exam  ED Triage Vitals [12/16/17 2151]   Temperature Pulse Respirations Blood Pressure SpO2   98 6 °F (37 °C) 99 20 141/96 98 %      Temp Source Heart Rate Source Patient Position - Orthostatic VS BP Location FiO2 (%)   Oral Monitor Sitting Right arm --      Pain Score       3           Orthostatic Vital Signs  Vitals:    12/16/17 2151 12/16/17 2341   BP: 141/96 136/89   Pulse: 99 86   Patient Position - Orthostatic VS: Sitting Lying       Physical Exam   Constitutional: He is oriented to person, place, and time  He appears well-developed and well-nourished  No distress  HENT:   Head: Normocephalic and atraumatic  Nose: Rhinorrhea present  Mouth/Throat: Oropharynx is clear and moist    Eyes: Conjunctivae are normal  Pupils are equal, round, and reactive to light  Neck: Normal range of motion  No tracheal deviation present  Cardiovascular: Normal rate, regular rhythm, normal heart sounds and intact distal pulses  Pulmonary/Chest: Effort normal and breath sounds normal  No respiratory distress  Abdominal: Soft  Bowel sounds are normal  He exhibits no distension  There is no tenderness  There is no CVA tenderness  Neurological: He is alert and oriented to person, place, and time  GCS eye subscore is 4  GCS verbal subscore is 5  GCS motor subscore is 6  Skin: Skin is warm and dry  Psychiatric: He has a normal mood and affect  His behavior is normal    Nursing note and vitals reviewed        ED Medications  Medications   sodium chloride 0 9 % bolus 1,000 mL (1,000 mL Intravenous New Bag 12/16/17 2339)   cefTRIAXone (ROCEPHIN) IVPB (premix) 1,000 mg (1,000 mg Intravenous New Bag 12/16/17 2336)       Diagnostic Studies  Results Reviewed     Procedure Component Value Units Date/Time    Urine Microscopic [[id number]]  (Abnormal) Collected:  12/16/17 2231    Lab Status:  Final result Specimen:  Urine from Urine, Clean Catch Updated:  12/16/17 2308     RBC, UA 30-50 (A) /hpf      WBC, UA 20-30 (A) /hpf      Epithelial Cells None Seen /hpf      Bacteria, UA Moderate (A) /hpf      Fine granular casts 3-5 /lpf      OTHER OBSERVATIONS Yeast Cells Present Urine culture [[id number]] Collected:  12/16/17 2231    Lab Status: In process Specimen:  Urine from Urine, Clean Catch Updated:  12/16/17 2305    Protime-INR [[id number]]  (Abnormal) Collected:  12/16/17 2230    Lab Status:  Final result Specimen:  Blood from Arm, Right Updated:  12/16/17 2303     Protime 32 3 (H) seconds      INR 3 03 (H)    Basic metabolic panel [[id number]]  (Abnormal) Collected:  12/16/17 2230    Lab Status:  Final result Specimen:  Blood from Arm, Right Updated:  12/16/17 2257     Sodium 136 mmol/L      Potassium 4 0 mmol/L      Chloride 103 mmol/L      CO2 23 mmol/L      Anion Gap 10 mmol/L      BUN 26 (H) mg/dL      Creatinine 1 96 (H) mg/dL      Glucose 93 mg/dL      Calcium 9 6 mg/dL      eGFR 40 ml/min/1 73sq m     Narrative:         National Kidney Disease Education Program recommendations are as follows:  GFR calculation is accurate only with a steady state creatinine  Chronic Kidney disease less than 60 ml/min/1 73 sq  meters  Kidney failure less than 15 ml/min/1 73 sq  meters      UA w Reflex to Microscopic w Reflex to Culture [[id number]]  (Abnormal) Collected:  12/16/17 2231    Lab Status:  Final result Specimen:  Urine from Urine, Clean Catch Updated:  12/16/17 2248     Color, UA Nika     Clarity, UA Cloudy     Specific Gravity, UA >=1 030     pH, UA 5 5     Leukocytes, UA Negative     Nitrite, UA Positive (A)     Protein, UA >=300 (A) mg/dl      Glucose, UA Negative mg/dl      Ketones, UA Trace (A) mg/dl      Urobilinogen, UA 0 2 E U /dl      Bilirubin, UA Small (A)     Blood, UA Large (A)    CBC and differential [[id number]]  (Abnormal) Collected:  12/16/17 2230    Lab Status:  Final result Specimen:  Blood from Arm, Right Updated:  12/16/17 2248     WBC 10 02 Thousand/uL      RBC 4 32 Million/uL      Hemoglobin 13 4 g/dL      Hematocrit 40 4 %      MCV 94 fL      MCH 31 0 pg      MCHC 33 2 g/dL      RDW 12 0 %      MPV 9 5 fL      Platelets 424 Thousands/uL      nRBC 0 /100 WBCs Neutrophils Relative 68 %      Lymphocytes Relative 16 %      Monocytes Relative 14 (H) %      Eosinophils Relative 2 %      Basophils Relative 1 %      Neutrophils Absolute 6 79 Thousands/µL      Lymphocytes Absolute 1 56 Thousands/µL      Monocytes Absolute 1 37 (H) Thousand/µL      Eosinophils Absolute 0 22 Thousand/µL      Basophils Absolute 0 06 Thousands/µL                  No orders to display              Procedures  Procedures       Phone Contacts  ED Phone Contact    ED Course  ED Course                                MDM  Number of Diagnoses or Management Options  FRANCES (acute kidney injury) [facility]): new and requires workup  Hemorrhagic cystitis: new and requires workup  Transplanted kidney: new and requires workup  UTI (urinary tract infection): new and requires workup  Diagnosis management comments: This is a 59-year-old male who presents here today with hematuria  He states it started mildly yesterday and has been progressively worsening  He denies any blood clots, and states it is dark blood throughout his strain  He has no dysuria, frequency, decreased urine output, abdominal distension, sensation of being unable to fully empty his bladder  He had a nephrectomy in 2009 after he had IgA nephropathy from Henoch-Schonlein purpura, and has not had any complications since then  The transplant was done at [facility] in [facility], and he had followed by a nephrologist at [facility] for several years, with his last appointment about six months ago  He is scheduled to see a new nephrologist in this area in the near future, but does not remember who the doctor is  He states his baseline creatinine is about 1 5-1 6, with a GFR around 60  He states he has had several occasions of where he gets sick and develops hematuria  He states only on one occasion was due to a UTI, and says the other times he was told that he was dehydrated that cause the blood in his urine    He states usually he will get admitted overnight to get rehydrated  He has been blowing his nose frequently due to nasal congestion, and occasionally has a small amount of blood with that  He has no gross epistaxis  He has no other bleeding  He has a nonproductive cough but no trouble breathing  He has no fevers, nausea, vomiting, diarrhea, other infectious symptoms  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, and states his last INR was checked several weeks ago and was within the therapeutic range  ROS: Otherwise negative, unless stated as above  He is well-appearing, in no acute distress  His exam is unremarkable  Differential includes UTI with hemorrhagic cystitis, bleeding secondary to supratherapeutic INR, dehydration secondary to his viral URI and resulting underlying hematuria similar to prior episodes  I am not concerned about underlying pneumonia  We will check his urine and lab work to evaluate  Review of old records shows his GFR usually in the 50-60 range  His last tacrolimus level was checked 11/4 and was slightly low at 1 6  His last INR was checked the same date, and was subtherapeutic at 1 32  His urine is concerning for UTI  His creatinine and elevated from baseline  We will give him fluids and antibiotics, and admit him for further management  He has an allergy to penicillins, and says he gets a rash, but has taken cephalosporins before without complications  We will therefore treat him with Rocephin  I updated the patient on findings and plan of care, and he expresses understanding with this plan         Amount and/or Complexity of Data Reviewed  Clinical lab tests: reviewed and ordered  Decide to obtain previous medical records or to obtain history from someone other than the patient: yes  Review and summarize past medical records: yes  Discuss the patient with other providers: yes      CritCare Time    Disposition  Final diagnoses:   UTI (urinary tract infection)   Hemorrhagic cystitis   Transplanted kidney   FRANCES (acute kidney injury) ([facility] [address] )     Time reflects when diagnosis was documented in both MDM as applicable and the Disposition within this note     Time User Action Codes Description Comment    12/16/2017 11:14 PM Shen-Tesoriero Harpreet Screen Add [N39 0] UTI (urinary tract infection)     12/16/2017 11:14 PM Shen-Tesoriero Harpreet Screen Add [N30 91] Hemorrhagic cystitis     12/16/2017 11:14 PM Shen-Tesoriero Harpreet Screen Add [Z94 0] Transplanted kidney     12/16/2017 11:14 PM Shen-Tesoriero Harpreet Screen Add [N17 9] FRANCES (acute kidney injury) [facility])       ED Disposition     ED Disposition Condition Comment    Admit  Case was discussed with Matthew Sigala and the patient's admission status was agreed to be Admission Status: observation status to the service of Dr Ambar Brumfield  Follow-up Information    None       Patient's Medications   Discharge Prescriptions    No medications on file     No discharge procedures on file      ED Provider  Electronically Signed by           Betty Saravia MD  12/17/17 0666

## 2017-12-17 NOTE — H&P
History and Physical - [facility]    Patient Information: Nicolas Caba 38 y o  male MRN: [id number]  Unit/Bed#: ED 08 Encounter: [id number]  Admitting Physician: William Sahu PA-C  PCP: Antonio Ash MD  Date of Admission:  12/17/17    Assessment/Plan:    Hospital Problem List:     Principal Problem:    UTI (urinary tract infection)  Active Problems:    HTN (hypertension)    Acute kidney injury superimposed on CKD ([facility] [address] )    History of renal transplant      Plan for the Primary Problem(s):  · UTI with hx of renal transplant with FRANCES on CKD  · IV rocephin, awaiting UC, IVF hydration, avoid nephrotoxic meds, neph consult, neph diet    Plan for Additional Problems:   · HTN  · PRN lopressor, monitor vitals  · Hx of DVT  · Hold coumadin, hold given hematuria and INR is 3 03    VTE Prophylaxis: Pharmacologic VTE Prophylaxis contraindicated due to hematuria  / sequential compression device   Code Status: full  POLST: POLST form is not discussed and not completed at this time  Anticipated Length of Stay:  Patient will be admitted on an Inpatient basis with an anticipated length of stay of  > 2 midnights  Justification for Hospital Stay: IV abx and IVF    Total Time for Visit, including Counseling / Coordination of Care: 1 hour  Greater than 50% of this total time spent on direct patient counseling and coordination of care  Chief Complaint:   Hematuria x two days    History of Present Illness:    Nicolas Caba is a 38 y o  male who presents with PMHx of IgA nephropathy s/p renal transplant, DVT on coumadin, HTN presenting with hematuria x two days  Admits to nasal congestion as well  Denies any other systemic sx at this time  Review of Systems:    Review of Systems   Constitutional: Negative for chills and fever  HENT: Negative for congestion  Eyes: Negative for visual disturbance  Respiratory: Negative for cough, shortness of breath and wheezing      Cardiovascular: Negative for chest pain, palpitations and leg swelling  Gastrointestinal: Negative for abdominal distention, abdominal pain, constipation, diarrhea, nausea and vomiting  Genitourinary: Positive for hematuria  Musculoskeletal: Negative for gait problem  Skin: Negative for color change  Neurological: Negative for dizziness, seizures, syncope, weakness, light-headedness, numbness and headaches  Psychiatric/Behavioral: Negative for confusion  Past Medical and Surgical History:     Past Medical History:   Diagnosis Date    ADD (attention deficit disorder)     Depression     Hypertension     Nephropathy, IgA        Past Surgical History:   Procedure Laterality Date    NEPHRECTOMY TRANSPLANTED ORGAN Left 2009       Meds/Allergies:    Prior to Admission medications    Medication Sig Start Date End Date Taking? Authorizing Provider   amphetamine-dextroamphetamine (ADDERALL XR, 20MG,) 20 MG 24 hr capsule Take 40 mg by mouth daily   Yes Historical Provider, MD   betamethasone, augmented, (DIPROLENE) 0 05 % ointment Apply 2 application topically 26/70/09  Yes Historical Provider, MD   Cholecalciferol (VITAMIN D-3) 1000 units CAPS Take 2,000 Units by mouth daily   Yes Historical Provider, MD   diltiazem (CARDIZEM) 120 MG tablet Take 120 mg by mouth 2 (two) times a day   Yes Historical Provider, MD   mycophenolate (CELLCEPT) 250 mg capsule Take 250 mg by mouth 2 (two) times a day Take 2 in the AM, 1 in the PM    Yes Historical Provider, MD   sertraline (ZOLOFT) 100 mg tablet Take 100 mg by mouth daily 11/13/14  Yes Historical Provider, MD   tacrolimus (PROGRAF) 1 mg capsule Take 1 mg by mouth 2 (two) times a day   Yes Historical Provider, MD   warfarin (COUMADIN) 5 mg tablet Take 5 mg by mouth daily Pt takes up to 2 per day 4/9/13  Yes Historical Provider, MD     nurse med rec    Allergies:    Allergies   Allergen Reactions    Penicillins Rash       Social History:     Marital Status: /Civil Union   Occupation: unknown  Patient Pre-hospital Living Situation: unknown  Patient Pre-hospital Level of Mobility: full  Patient Pre-hospital Diet Restrictions: renal  Substance Use History:   History   Alcohol Use    Yes     Comment: Occasionally     History   Smoking Status    Former Smoker   Smokeless Tobacco    Never Used     History   Drug Use    Types: Marijuana       Family History:    History reviewed  No pertinent family history  Physical Exam:     Vitals:   Blood Pressure: 136/89 (12/16/17 2341)  Pulse: 86 (12/16/17 2341)  Temperature: 98 6 °F (37 °C) (12/16/17 2151)  Temp Source: Oral (12/16/17 2151)  Respirations: 20 (12/16/17 2341)  Height: 5' 6" (167 6 cm) (12/16/17 2151)  Weight - Scale: 77 1 kg (170 lb) (12/16/17 2151)  SpO2: 97 % (12/16/17 2341)    Physical Exam   Constitutional: He is oriented to person, place, and time  He appears well-developed and well-nourished  No distress  HENT:   Head: Normocephalic and atraumatic  Mouth/Throat: Oropharynx is clear and moist  No oropharyngeal exudate  Eyes: Conjunctivae are normal  Right eye exhibits no discharge  Left eye exhibits no discharge  No scleral icterus  Neck: Neck supple  Cardiovascular: Normal rate, regular rhythm, normal heart sounds and intact distal pulses  Exam reveals no gallop and no friction rub  No murmur heard  Pulmonary/Chest: Breath sounds normal  No respiratory distress  He has no wheezes  He has no rales  He exhibits no tenderness  Abdominal: Soft  Bowel sounds are normal  He exhibits no distension and no mass  There is no tenderness  There is no rebound and no guarding  Musculoskeletal: Normal range of motion  He exhibits no edema, tenderness or deformity  Neurological: He is alert and oriented to person, place, and time  No cranial nerve deficit  Coordination normal    Skin: Skin is warm and dry  No rash noted  He is not diaphoretic  No erythema  No pallor  Psychiatric: He has a normal mood and affect   His behavior is normal    Nursing note and vitals reviewed  Additional Data:     Lab Results: I have personally reviewed pertinent reports  Results from last 7 days  Lab Units 12/16/17  2230   WBC Thousand/uL 10 02   HEMOGLOBIN g/dL 13 4   HEMATOCRIT % 40 4   PLATELETS Thousands/uL 227   NEUTROS PCT % 68   LYMPHS PCT % 16   MONOS PCT % 14*   EOS PCT % 2       Results from last 7 days  Lab Units 12/16/17  2230   SODIUM mmol/L 136   POTASSIUM mmol/L 4 0   CHLORIDE mmol/L 103   CO2 mmol/L 23   BUN mg/dL 26*   CREATININE mg/dL 1 96*   CALCIUM mg/dL 9 6   GLUCOSE RANDOM mg/dL 93       Results from last 7 days  Lab Units 12/16/17  2230   INR  3 03*       Imaging: I have personally reviewed pertinent reports  No results found  EKG, Pathology, and Other Studies Reviewed on Admission:   · BMP, CBC, UA    Allscripts / Epic Records Reviewed: No     ** Please Note: This note has been constructed using a voice recognition system   **

## 2017-12-17 NOTE — PLAN OF CARE
Problem: CARDIOVASCULAR - ADULT  Goal: Maintains optimal cardiac output and hemodynamic stability  INTERVENTIONS:  - Monitor I/O, vital signs and rhythm  - Monitor for S/S and trends of decreased cardiac output i e  bleeding, hypotension  - Administer and titrate ordered vasoactive medications to optimize hemodynamic stability  - Assess quality of pulses, skin color and temperature  - Assess for signs of decreased coronary artery perfusion - ex   Angina  - Instruct patient to report change in severity of symptoms   Outcome: Progressing    Goal: Absence of cardiac dysrhythmias or at baseline rhythm  INTERVENTIONS:  - Continuous cardiac monitoring, monitor vital signs, obtain 12 lead EKG if indicated  - Administer antiarrhythmic and heart rate control medications as ordered  - Monitor electrolytes and administer replacement therapy as ordered   Outcome: Progressing      Problem: RESPIRATORY - ADULT  Goal: Achieves optimal ventilation and oxygenation  INTERVENTIONS:  - Assess for changes in respiratory status  - Assess for changes in mentation and behavior  - Position to facilitate oxygenation and minimize respiratory effort  - Oxygen administration by appropriate delivery method based on oxygen saturation (per order) or ABGs  - Initiate smoking cessation education as indicated  - Encourage broncho-pulmonary hygiene including cough, deep breathe, Incentive Spirometry  - Assess the need for suctioning and aspirate as needed  - Assess and instruct to report SOB or any respiratory difficulty  - Respiratory Therapy support as indicated   Outcome: Progressing      Problem: GENITOURINARY - ADULT  Goal: Maintains or returns to baseline urinary function  INTERVENTIONS:  - Assess urinary function  - Encourage oral fluids to ensure adequate hydration  - Administer IV fluids as ordered to ensure adequate hydration  - Administer ordered medications as needed  - Offer frequent toileting  - Follow urinary retention protocol if ordered   Outcome: Progressing    Goal: Absence of urinary retention  INTERVENTIONS:  - Assess patient's ability to void and empty bladder  - Monitor I/O  - Bladder scan as needed  - Discuss with physician/AP medications to alleviate retention as needed  - Discuss catheterization for long term situations as appropriate   Outcome: Progressing      Problem: PAIN - ADULT  Goal: Verbalizes/displays adequate comfort level or baseline comfort level  Interventions:  - Encourage patient to monitor pain and request assistance  - Assess pain using appropriate pain scale  - Administer analgesics based on type and severity of pain and evaluate response  - Implement non-pharmacological measures as appropriate and evaluate response  - Consider cultural and social influences on pain and pain management  - Notify physician/advanced practitioner if interventions unsuccessful or patient reports new pain   Outcome: Progressing      Problem: INFECTION - ADULT  Goal: Absence or prevention of progression during hospitalization  INTERVENTIONS:  - Assess and monitor for signs and symptoms of infection  - Monitor lab/diagnostic results  - Monitor all insertion sites, i e  indwelling lines, tubes, and drains  - Monitor endotracheal (as able) and nasal secretions for changes in amount and color  - [place] appropriate cooling/warming therapies per order  - Administer medications as ordered  - Instruct and encourage patient and family to use good hand hygiene technique  - Identify and instruct in appropriate isolation precautions for identified infection/condition   Outcome: Progressing    Goal: Absence of fever/infection during neutropenic period  INTERVENTIONS:  - Monitor WBC  - Implement neutropenic guidelines   Outcome: Progressing      Problem: SAFETY ADULT  Goal: Patient will remain free of falls  INTERVENTIONS:  - Assess patient frequently for physical needs  -  Identify cognitive and physical deficits and behaviors that affect risk of falls   -  [place] fall precautions as indicated by assessment   - Educate patient/family on patient safety including physical limitations  - Instruct patient to call for assistance with activity based on assessment  - Modify environment to reduce risk of injury  - Consider OT/PT consult to assist with strengthening/mobility   Outcome: Progressing    Goal: Maintain or return to baseline ADL function  INTERVENTIONS:  -  Assess patient's ability to carry out ADLs; assess patient's baseline for ADL function and identify physical deficits which impact ability to perform ADLs (bathing, care of mouth/teeth, toileting, grooming, dressing, etc )  - Assess/evaluate cause of self-care deficits   - Assess range of motion  - Assess patient's mobility; develop plan if impaired  - Assess patient's need for assistive devices and provide as appropriate  - Encourage maximum independence but intervene and supervise when necessary  ¯ Involve family in performance of ADLs  ¯ Assess for home care needs following discharge   ¯ Request OT consult to assist with ADL evaluation and planning for discharge  ¯ Provide patient education as appropriate   Outcome: Progressing    Goal: Maintain or return mobility status to optimal level  INTERVENTIONS:  - Assess patient's baseline mobility status (ambulation, transfers, stairs, etc )    - Identify cognitive and physical deficits and behaviors that affect mobility  - Identify mobility aids required to assist with transfers and/or ambulation (gait belt, sit-to-stand, lift, walker, cane, etc )  - [place] fall precautions as indicated by assessment  - Record patient progress and toleration of activity level on Mobility SBAR; progress patient to next Phase/Stage  - Instruct patient to call for assistance with activity based on assessment  - Request Rehabilitation consult to assist with strengthening/weightbearing, etc    Outcome: Progressing

## 2017-12-17 NOTE — CASE MANAGEMENT
[address] [facility] in the [facility] by Mohit Mayfield for 2017  Network Utilization Review Department  Phone: [phone]; Fax [phone]  ATTENTION: The Network Utilization Review Department is now centralized for our 7 Facilities  Make a note that we have a new phone and fax numbers for our Department  Please call with any questions or concerns to [phone] and carefully follow the prompts so that you are directed to the right person  All voicemails are confidential  Fax any determinations, approvals, denials, and requests for initial or continue stay review clinical to [phone]  Due to HIGH CALL volume, it would be easier if you could please send faxed requests to expedite your requests and in part, help us provide discharge notifications faster  Initial Clinical Review    Admission: Date/Time/Statement: Was observation on 12/16/17 @ [phone] and now INPATIENT on 12/17/17 @ 0107     Orders Placed This Encounter   Procedures    Inpatient Admission     Standing Status:   Standing     Number of Occurrences:   1     Order Specific Question:   Admitting Physician     Answer:   Ham Ahuja [[id number]]     Order Specific Question:   Level of Care     Answer:   Med Surg [16]     Order Specific Question:   Estimated length of stay     Answer:   More than 2 Midnights     Order Specific Question:   Certification     Answer:   I certify that inpatient services are medically necessary for this patient for a duration of greater than two midnights  See H&P and MD Progress Notes for additional information about the patient's course of treatment       ED: Date/Time/Mode of Arrival:   ED Arrival Information     Expected Arrival Acuity Means of Arrival Escorted By Service Admission Type    - 12/16/2017 21:30 Urgent Walk-In Self General Medicine Urgent    Arrival Complaint    BLOOD IN URINE        Chief Complaint:   Chief Complaint   Patient presents with    Blood in Urine     Pt states he has had blood in urine that started yesterday  Pt denies any pain but states he had a kidney transplant in 2009  Pt also c/o cold symptoms that started this past Wednesday with possible fever  History of Illness: Taisha Gross is a 38 y o  male who presents with PMHx of IgA nephropathy s/p renal transplant, DVT on coumadin, HTN presenting with hematuria x two days  Admits to nasal congestion as well  Denies any other systemic sx at this time  ED Vital Signs:   ED Triage Vitals [12/16/17 2151]   Temperature Pulse Respirations Blood Pressure SpO2   98 6 °F (37 °C) 99 20 141/96 98 %      Temp Source Heart Rate Source Patient Position - Orthostatic VS BP Location FiO2 (%)   Oral Monitor Sitting Right arm --      Pain Score       3        Wt Readings from Last 1 Encounters:   12/16/17 77 1 kg (170 lb)     Vital Signs (abnormal):   12/17/17 1341  --  89  18   141/111  100 %  None (Room air)  Lying   12/17/17 1316  --  79  18   143/103  98 %  None (Room air)  Lying   12/17/17 1315  --  79  --   143/103  99 %  --  --     Abnormal Labs:    BUN 26  Creat 1 96, 1 86  PT/INR 32 3/3 03, 35 1/3 36  Color, UA  Nika    Clarity, UA  Cloudy    Specific [place], UA 1 003 - 1 030 >=1 030    pH, UA 4 5 - 8 0 5 5    Leukocytes, UA Negative  Negative    Nitrite, UA Negative  Positive     Protein, UA Negative mg/dl >=300     Glucose, UA Negative mg/dl  Negative    Ketones, UA Negative mg/dl Trace     Urobilinogen, UA 0 2, 1 0 E U /dl E U /dl 0 2    Bilirubin, UA Negative Small     Blood, UA Negative Large       Urine culture pending    Diagnostic Test Results:     CT abd, pelvis -    1  Prominent left lower quadrant renal transplant with mild perinephric inflammatory changes  No hydronephrosis or calculi, findings concerning for pyelonephritis  2   Atrophic native kidneys with nonobstructing 3 mm left midpole calculus    3   Mild bladder wall thickening possible sequela of cystitis      ED Treatment: Medication Administration from 12/16/2017 2130 to 12/17/2017 1353    Date/Time Order Dose Route Action   12/16/2017 2339 sodium chloride 0 9 % bolus 1,000 mL 1,000 mL Intravenous New Bag   12/16/2017 2336 cefTRIAXone (ROCEPHIN) IVPB (premix) 1,000 mg 1,000 mg Intravenous New Bag   12/17/2017 0834 amphetamine-dextroamphetamine (ADDERALL) tablet 20 mg 20 mg Oral Given   12/17/2017 0312 mycophenolate (CELLCEPT) capsule 250 mg 250 mg Oral Given   12/17/2017 0834 sertraline (ZOLOFT) tablet 100 mg 100 mg Oral Given   12/17/2017 0312 tacrolimus (PROGRAF) capsule 1 mg 1 mg Oral Given   12/17/2017 1317 sodium chloride 0 9 % infusion 100 mL/hr Intravenous New Bag   12/17/2017 1110 sodium chloride 0 9 % infusion 100 mL/hr Intravenous Rate/Dose Change   12/17/2017 0234 sodium chloride 0 9 % infusion 75 mL/hr Intravenous New Bag   12/17/2017 1029 fluticasone (FLONASE) 50 mcg/act nasal spray 1 spray 1 spray Each Nare Given        Past Medical/Surgical History: Active Ambulatory Problems     Diagnosis Date Noted    No Active Ambulatory Problems     Resolved Ambulatory Problems     Diagnosis Date Noted    No Resolved Ambulatory Problems     Past Medical History:   Diagnosis Date    ADD (attention deficit disorder)     Depression     Hypertension     Nephropathy, IgA      Admitting Diagnosis: Blood in urine [R31 9]    Age/Sex: 38 y o  male    Assessment/Plan: Principal Problem:    UTI (urinary tract infection)  Active Problems:    HTN (hypertension)    Acute kidney injury superimposed on CKD (HCC)    History of renal transplant      Plan for the Primary Problem(s):  · UTI with hx of renal transplant with FRANCES on CKD  ? IV rocephin, awaiting UC, IVF hydration, avoid nephrotoxic meds, neph consult, neph diet     Plan for Additional Problems:   · HTN  ? PRN lopressor, monitor vitals  · Hx of DVT  ?  Hold coumadin, hold given hematuria and INR is 3 03     VTE Prophylaxis: Pharmacologic VTE Prophylaxis contraindicated due to hematuria  / sequential compression device   Code Status: full  Anticipated Length of Stay:  Patient will be admitted on an Inpatient basis with an anticipated length of stay of  > 2 midnights     Justification for Hospital Stay: IV abx and IVF    Admission Orders:  Scheduled Meds:   amphetamine-dextroamphetamine 20 mg Oral Daily   cefTRIAXone 1,000 mg Intravenous Q24H   diltiazem 120 mg Oral BID   fluticasone 1 spray Each Nare Daily   mycophenolate 250 mg Oral BID   sertraline 100 mg Oral Daily   tacrolimus 1 mg Oral BID     Continuous Infusions:   sodium chloride 100 mL/hr Last Rate: 100 mL/hr (12/17/17 1317)     PRN Meds:   acetaminophen    metoprolol    sodium chloride     SCDs  Diet renal no dialysis  Cons Nephrology

## 2017-12-18 PROBLEM — I12.9 HYPERTENSIVE CKD (CHRONIC KIDNEY DISEASE): Status: ACTIVE | Noted: 2017-12-18

## 2017-12-18 PROBLEM — N18.30 STAGE 3 CHRONIC KIDNEY DISEASE (HCC): Status: ACTIVE | Noted: 2017-12-18

## 2017-12-18 LAB
ALBUMIN SERPL BCP-MCNC: 2.9 G/DL (ref 3.5–5)
ALP SERPL-CCNC: 92 U/L (ref 46–116)
ALT SERPL W P-5'-P-CCNC: 59 U/L (ref 12–78)
ANION GAP SERPL CALCULATED.3IONS-SCNC: 8 MMOL/L (ref 4–13)
AST SERPL W P-5'-P-CCNC: 66 U/L (ref 5–45)
BACTERIA UR CULT: NORMAL
BACTERIA UR QL AUTO: ABNORMAL /HPF
BILIRUB SERPL-MCNC: 0.3 MG/DL (ref 0.2–1)
BILIRUB UR QL STRIP: NEGATIVE
BUN SERPL-MCNC: 19 MG/DL (ref 5–25)
CALCIUM SERPL-MCNC: 9.4 MG/DL (ref 8.3–10.1)
CHLORIDE SERPL-SCNC: 107 MMOL/L (ref 100–108)
CLARITY UR: ABNORMAL
CO2 SERPL-SCNC: 25 MMOL/L (ref 21–32)
COLOR UR: YELLOW
CREAT SERPL-MCNC: 1.41 MG/DL (ref 0.6–1.3)
CREAT UR-MCNC: 89.2 MG/DL
ERYTHROCYTE [DISTWIDTH] IN BLOOD BY AUTOMATED COUNT: 12 % (ref 11.6–15.1)
GFR SERPL CREATININE-BSD FRML MDRD: 60 ML/MIN/1.73SQ M
GLUCOSE SERPL-MCNC: 106 MG/DL (ref 65–140)
GLUCOSE UR STRIP-MCNC: NEGATIVE MG/DL
HCT VFR BLD AUTO: 43.5 % (ref 36.5–49.3)
HGB BLD-MCNC: 14.2 G/DL (ref 12–17)
HGB UR QL STRIP.AUTO: ABNORMAL
INR PPP: 2.47 (ref 0.86–1.16)
KETONES UR STRIP-MCNC: NEGATIVE MG/DL
LEUKOCYTE ESTERASE UR QL STRIP: NEGATIVE
MCH RBC QN AUTO: 31.4 PG (ref 26.8–34.3)
MCHC RBC AUTO-ENTMCNC: 32.6 G/DL (ref 31.4–37.4)
MCV RBC AUTO: 96 FL (ref 82–98)
MICROALBUMIN UR-MCNC: 587 MG/L (ref 0–20)
MICROALBUMIN/CREAT 24H UR: 658 MG/G CREATININE (ref 0–30)
NITRITE UR QL STRIP: NEGATIVE
NON-SQ EPI CELLS URNS QL MICRO: ABNORMAL /HPF
PH UR STRIP.AUTO: 6 [PH] (ref 4.5–8)
PLATELET # BLD AUTO: 206 THOUSANDS/UL (ref 149–390)
PMV BLD AUTO: 9.4 FL (ref 8.9–12.7)
POTASSIUM SERPL-SCNC: 3.9 MMOL/L (ref 3.5–5.3)
PROT SERPL-MCNC: 7.3 G/DL (ref 6.4–8.2)
PROT UR STRIP-MCNC: ABNORMAL MG/DL
PROTHROMBIN TIME: 27.5 SECONDS (ref 12.1–14.4)
RBC # BLD AUTO: 4.52 MILLION/UL (ref 3.88–5.62)
RBC #/AREA URNS AUTO: ABNORMAL /HPF
SODIUM 24H UR-SCNC: 162 MOL/L
SODIUM SERPL-SCNC: 140 MMOL/L (ref 136–145)
SP GR UR STRIP.AUTO: 1.02 (ref 1–1.03)
UROBILINOGEN UR QL STRIP.AUTO: 0.2 E.U./DL
UUN 24H UR-MCNC: 519 MG/DL
WBC # BLD AUTO: 6.14 THOUSAND/UL (ref 4.31–10.16)
WBC #/AREA URNS AUTO: ABNORMAL /HPF

## 2017-12-18 PROCEDURE — 84300 ASSAY OF URINE SODIUM: CPT | Performed by: INTERNAL MEDICINE

## 2017-12-18 PROCEDURE — 85610 PROTHROMBIN TIME: CPT | Performed by: INTERNAL MEDICINE

## 2017-12-18 PROCEDURE — 85027 COMPLETE CBC AUTOMATED: CPT | Performed by: INTERNAL MEDICINE

## 2017-12-18 PROCEDURE — 82570 ASSAY OF URINE CREATININE: CPT | Performed by: INTERNAL MEDICINE

## 2017-12-18 PROCEDURE — 81001 URINALYSIS AUTO W/SCOPE: CPT | Performed by: INTERNAL MEDICINE

## 2017-12-18 PROCEDURE — 84540 ASSAY OF URINE/UREA-N: CPT | Performed by: INTERNAL MEDICINE

## 2017-12-18 PROCEDURE — 80053 COMPREHEN METABOLIC PANEL: CPT | Performed by: INTERNAL MEDICINE

## 2017-12-18 PROCEDURE — 82043 UR ALBUMIN QUANTITATIVE: CPT | Performed by: INTERNAL MEDICINE

## 2017-12-18 RX ORDER — MYCOPHENOLATE MOFETIL 250 MG/1
500 CAPSULE ORAL DAILY
Status: DISCONTINUED | OUTPATIENT
Start: 2017-12-19 | End: 2017-12-19 | Stop reason: HOSPADM

## 2017-12-18 RX ORDER — BUTALBITAL, ACETAMINOPHEN AND CAFFEINE 50; 325; 40 MG/1; MG/1; MG/1
1 TABLET ORAL ONCE
Status: COMPLETED | OUTPATIENT
Start: 2017-12-18 | End: 2017-12-18

## 2017-12-18 RX ORDER — MYCOPHENOLATE MOFETIL 250 MG/1
250 CAPSULE ORAL
Status: DISCONTINUED | OUTPATIENT
Start: 2017-12-18 | End: 2017-12-19 | Stop reason: HOSPADM

## 2017-12-18 RX ADMIN — SERTRALINE HYDROCHLORIDE 100 MG: 100 TABLET ORAL at 08:38

## 2017-12-18 RX ADMIN — SODIUM CHLORIDE 100 ML/HR: 0.9 INJECTION, SOLUTION INTRAVENOUS at 18:20

## 2017-12-18 RX ADMIN — TACROLIMUS 1 MG: 0.5 CAPSULE ORAL at 07:01

## 2017-12-18 RX ADMIN — TACROLIMUS 1 MG: 0.5 CAPSULE ORAL at 17:24

## 2017-12-18 RX ADMIN — SODIUM CHLORIDE 100 ML/HR: 0.9 INJECTION, SOLUTION INTRAVENOUS at 08:39

## 2017-12-18 RX ADMIN — CEFTRIAXONE 1000 MG: 1 INJECTION, SOLUTION INTRAVENOUS at 22:00

## 2017-12-18 RX ADMIN — DEXTROAMPHETAMINE SACCHARATE, AMPHETAMINE ASPARTATE, DEXTROAMPHETAMINE SULFATE, AND AMPHETAMINE SULFATE 20 MG: 2.5; 2.5; 2.5; 2.5 TABLET ORAL at 08:38

## 2017-12-18 RX ADMIN — MYCOPHENOLATE MOFETIL 250 MG: 250 CAPSULE ORAL at 07:01

## 2017-12-18 RX ADMIN — DILTIAZEM HYDROCHLORIDE 120 MG: 60 CAPSULE, EXTENDED RELEASE ORAL at 07:02

## 2017-12-18 RX ADMIN — BUTALBITAL, ACETAMINOPHEN, AND CAFFEINE 1 TABLET: 50; 325; 40 TABLET ORAL at 07:01

## 2017-12-18 RX ADMIN — MYCOPHENOLATE MOFETIL 250 MG: 250 CAPSULE ORAL at 22:00

## 2017-12-18 RX ADMIN — FLUTICASONE PROPIONATE 1 SPRAY: 50 SPRAY, METERED NASAL at 09:43

## 2017-12-18 RX ADMIN — DILTIAZEM HYDROCHLORIDE 120 MG: 60 CAPSULE, EXTENDED RELEASE ORAL at 17:24

## 2017-12-18 NOTE — ASSESSMENT & PLAN NOTE
· Nephrology following  · Continue home regiment of immunosuppression medication  · Per nephrology pending tacrolimus, urine lytes, monitor I&O's

## 2017-12-18 NOTE — CONSULTS
REFERRING PHYSICIAN: Lis Gregorio PA-C    REASON FOR THE REFERRAL / CONSULTATION:  Further management of immunosuppression medication in renal transplant  DATE OF CONSULTATION / SERVICE:  12/18/2017    ADMISSION DIAGNOSIS: UTI (urinary tract infection)     CHIEF COMPLAINT     I have blood in my urine    HPI     This is a 55-year-old male with a past medical history of renal transplant admitted with gross hematuria which started 2 days before admission  Nephrology were consulted for further management of renal transplant and immunosuppression medications  Patient had a past medical history of IgA nephropathy leading to ESRD and has received 9 months of peritoneal dialysis before he received living unrelated renal transplant in 2009  Marietta Black Patient was also been followed by nephrologist at [facility] and was last seen on 5/24/2017  Upon review of old medical record patient's baseline creatinine is also between 1 4-1 5  Patient said that he is taking tacrolimus 1 mg PO BID along with  mg in the morning and 250 at night  Patient is not on prednisone/steroid  Patient was admitted with gross hematuria and was diagnosed with UTI on admission and currently also receiving ceftriaxone  Patient also a history of hypertension which is well controlled with diltiazem  Patient also a history of ADHD and also receiving Adderall    Patient currently denies nausea, vomiting, SOB, chest pain, abdominal pain, constipation, diarrhea or rash today      PAST MEDICAL HISTORY     Past Medical History:   Diagnosis Date    ADD (attention deficit disorder)     Depression     Hypertension     Nephropathy, IgA        PAST SURGICAL HISTORY     Past Surgical History:   Procedure Laterality Date    NEPHRECTOMY TRANSPLANTED ORGAN Left 2009       ALLERGIES     Allergies   Allergen Reactions    Penicillins Rash       SOCIAL HISTORY     History   Alcohol Use    Yes     Comment: Occasionally     History   Drug Use    Types: Marijuana     History   Smoking Status    Former Smoker    Years: 4 00   Smokeless Tobacco    Never Used       FAMILY HISTORY     History reviewed  No pertinent family history  CURRENT MEDICATIONS       Current Facility-Administered Medications:     acetaminophen (TYLENOL) tablet 650 mg, 650 mg, Oral, Q6H PRN, JEREMY Perez-C, 650 mg at 12/17/17 2250    amphetamine-dextroamphetamine (ADDERALL) tablet 20 mg, 20 mg, Oral, Daily, Vedia Hand Labar, PA-C, 20 mg at 12/18/17 8712    cefTRIAXone (ROCEPHIN) IVPB (premix) 1,000 mg, 1,000 mg, Intravenous, Q24H, William Sahu PA-C, Last Rate: 100 mL/hr at 12/17/17 2250, 1,000 mg at 12/17/17 2250    diltiazem (CARDIZEM SR) 12 hr capsule 120 mg, 120 mg, Oral, BID, Vedia Hand Labar, PA-C, 120 mg at 12/18/17 0702    fluticasone (FLONASE) 50 mcg/act nasal spray 1 spray, 1 spray, Each Nare, Daily, Shireen Mohan MD, 1 spray at 12/18/17 0943    metoprolol (LOPRESSOR) injection 5 mg, 5 mg, Intravenous, Q6H PRN, William Sahu PA-C    mycophenolate (CELLCEPT) capsule 250 mg, 250 mg, Oral, HS, MD Barney Peterson ON 12/19/2017] mycophenolate (CELLCEPT) capsule 500 mg, 500 mg, Oral, Daily, Radha Helms MD    sertraline (ZOLOFT) tablet 100 mg, 100 mg, Oral, Daily, Vedia Hand Labar, PA-C, 100 mg at 12/18/17 1681    sodium chloride (OCEAN) 0 65 % nasal spray 1 spray, 1 spray, Each Nare, Q1H PRN, Shireen Mohan MD    sodium chloride 0 9 % infusion, 100 mL/hr, Intravenous, Continuous, Shireen Mohan MD, Last Rate: 100 mL/hr at 12/18/17 0839, 100 mL/hr at 12/18/17 0839    tacrolimus (PROGRAF) capsule 1 mg, 1 mg, Oral, BID, Vedia Hand Labar, PA-C, 1 mg at 12/18/17 1492    REVIEW OF SYSTEMS     Complete 10 point review of systems were obtained and discussed in length with the patient  Complete review of systems were negative / unremarkable except mentioned in the HPI section       LAB RESULTS          Results from last 7 days  Lab Units 12/18/17  1512 12/17/17  0516 12/16/17  2230   WBC Thousand/uL 6 14 7 12 10 02   HEMOGLOBIN g/dL 14 2 13 1 13 4   HEMATOCRIT % 43 5 40 3 40 4   PLATELETS Thousands/uL 206 201 227   SODIUM mmol/L 140 138 136   POTASSIUM mmol/L 3 9 3 6 4 0   CHLORIDE mmol/L 107 106 103   CO2 mmol/L 25 25 23   BUN mg/dL 19 23 26*   CREATININE mg/dL 1 41* 1 86* 1 96*   EGFR ml/min/1 73sq m 60 43 40   CALCIUM mg/dL 9 4 9 4 9 6   MAGNESIUM mg/dL  --  2 3  --    ALBUMIN g/dL 2 9*  --   --    TOTAL PROTEIN g/dL 7 3  --   --    GLUCOSE RANDOM mg/dL 106 100 93       I have personally reviewed the old medical records and patient's previously known baseline creatinine level is ~ 1 4-1 5    RADIOLOGY RESULTS     Results for orders placed during the hospital encounter of 12/16/17   CT abdomen pelvis wo contrast:  I have personally reviewed the images of CT scan along with radiology report    Narrative CT ABDOMEN AND PELVIS WITHOUT IV CONTRAST    INDICATION:  Hematuria with history of renal transplant  COMPARISON: None  TECHNIQUE:  CT examination of the abdomen and pelvis was performed without intravenous contrast   Reformatted images were created in axial, sagittal, and coronal planes  Radiation dose length product (DLP) for this visit:  607 mGy-cm   This examination, like all CT scans performed in the [facility], was performed utilizing techniques to minimize radiation dose exposure, including the use of iterative   reconstruction and automated exposure control  Enteric contrast was administered  FINDINGS:    ABDOMEN    LOWER CHEST:  No significant abnormalities identified in the lower chest     LIVER/BILIARY TREE:  Unremarkable  GALLBLADDER:  No calcified gallstones  No pericholecystic inflammatory change  SPLEEN:  Unremarkable  PANCREAS:  Unremarkable  ADRENAL GLANDS:  Unremarkable  KIDNEYS/URETERS:  Atrophy of the native kidneys identified with no hydronephrosis or perinephric collections    Nonobstructing 3 mm calculus in the left mid pole identified as are several acquired left-sided renal cysts  Transplant kidney in the left lower quadrant is prominent in size with no hydronephrosis or radiopaque calculi  There are however mild inflammatory changes surrounding the kidney for which pyelonephritis should be excluded  STOMACH AND BOWEL:  Unremarkable  APPENDIX:  No findings to suggest appendicitis  ABDOMINOPELVIC CAVITY:  No ascites or free intraperitoneal air  No lymphadenopathy  VESSELS:  Unremarkable for patient's age  PELVIS    REPRODUCTIVE ORGANS:  Unremarkable for patient's age  URINARY BLADDER:  Mild bladder wall thickening with no significant pericystic inflammation  ABDOMINAL WALL/INGUINAL REGIONS:  Tiny fat-containing inguinal hernias with postoperative changes in the ventral abdominal wall and left lower quadrant  OSSEOUS STRUCTURES:  Multilevel spinal dysraphism of the thoracolumbar spine and sacrum  No destructive lytic or blastic lesions  Impression 1  Prominent left lower quadrant renal transplant with mild perinephric inflammatory changes  No hydronephrosis or calculi, findings concerning for pyelonephritis  2   Atrophic native kidneys with nonobstructing 3 mm left midpole calculus  3   Mild bladder wall thickening possible sequela of cystitis        Workstation performed: [id number]         OBJECTIVE     Current Weight: Weight - Scale: 77 1 kg (170 lb)  Vitals:    12/18/17 0500   BP: 131/94   Pulse: 75   Resp: 18   Temp: 97 7 °F (36 5 °C)   SpO2: 98%       Intake/Output Summary (Last 24 hours) at 12/18/17 1105  Last data filed at 12/17/17 2245   Gross per 24 hour   Intake           1782 5 ml   Output                0 ml   Net           1782 5 ml       PHYSICAL EXAMINATION     GEN:  Awake and not in acute distress  RS:  Bilateral equal air entry, no wheezing  CVS:  S1-S2 heard  Abdomen:  Soft, nontender, non distended, positive bowel sounds  Extremities:  No edema, skin is warm on touch  CNS:  Awake and follows commands  HEENT:  Pink conjunctiva, no JVD, head is atraumatic  Psychiatric:  Normal mood and affect, not suicidal  Musculoskeletal:  No gross bony deformity seen in hands  Lymph Node:  No palpable cervical lymphadenopathy    PLAN / RECOMMENDATIONS      1  Renal transplant  Patient has received living unrelated renal transplant in 2009 and previously followed by nephrologist at [facility]  Currently patient's renal function is at baseline and will plan to continue home regimen of immunosuppression medication including tacrolimus 1 mg PO BID along with  mg in the morning and 250 mg at night  Plan to check tacrolimus level with a m  Labs  2   CKD stage 2-3  Upon review of old medical records patient's baseline creatinine is between 1 4-1 5  Current creatinine is 1 41 which is at baseline  Patient was admitted with UTI and pending final results of urine culture  Will check urine lytes today to evaluate proteinuria  Continue transplant immunosuppression medications as mentioned able  Plan to recheck renal function again in the morning  Follow strict I's & O's     3   Hypertension in chronic kidney disease and renal transplant  Overnight patient's blood pressure has remained controlled and at goal and plan to monitor hypertension with diltiazem 120 mg PO BID today  Thank you for the consultation to participate in patient's care  I have personally discussed my plan with the referring physician  Avis Hernandez MD  Nephrology  12/18/2017        Portions of the record may have been created with voice recognition software  Occasional wrong word or "sound a like" substitutions may have occurred due to the inherent limitations of voice recognition software  Read the chart carefully and recognize, using context, where substitutions have occurred

## 2017-12-18 NOTE — PROGRESS NOTES
Progress Note - Ben Free 1975, 38 y o  male MRN: [id number]    Unit/Bed#: -01 Encounter: [id number]    Primary Care Provider: Erika Camacho MD   Date and time admitted to hospital: 2017  9:46 PM    History of renal transplant   Assessment & Plan    · Nephrology following  · Continue home regiment of immunosuppression medication  · Per nephrology pending tacrolimus level, urine lytes, monitor I&O's  Acute kidney injury superimposed on CKD [facility])   Assessment & Plan    · Noted on admission creatinine 1 86  · Review of records, creatinine appears to be at baseline today  · Nephrology following  HTN (hypertension)   Assessment & Plan    · Blood pressure is acceptable at this time  · Continue diltiazem  Continue to monitor  * UTI (urinary tract infection)   Assessment & Plan    · Present on admission  Possible cystitis, ?pyelonephitis  · Continue Rocephin pending urine culture and sensitivity  VTE Pharmacologic Prophylaxis:   Pharmacologic: Contraindicated with hematuria  Mechanical: Mechanical VTE prophylaxis in place  Patient Centered Rounds: I have performed bedside rounds with nursing staff today  Discussions with Specialists or Other Care Team Provider: Neprology, PN, CM  Education and Discussions with Family / Patient: Patient  Time Spent for Care: 45 minutes  More than 50% of total time spent on counseling and coordination of care as described above  Current Length of Stay: 1 day(s)  Current Patient Status: Inpatient   Certification Statement: The patient will continue to require additional inpatient hospital stay due to Further management for UTI  Pending urine culture  Discharge Plan: 24 hours  Code Status: Level 1 - Full Code    Subjective:  Denies fever chills       Objective:   Vitals:   Temp (24hrs), Av 9 °F (36 6 °C), Min:97 7 °F (36 5 °C), Max:98 2 °F (36 8 °C)    HR:  [75-90] 75  Resp:  [18] 18  BP: (122-132)/(82-94) 131/94  SpO2:  [95 %-98 %] 98 %  Body mass index is 27 44 kg/m²  Input and Output Summary (last 24 hours): Intake/Output Summary (Last 24 hours) at 12/18/17 1455  Last data filed at 12/18/17 1300   Gross per 24 hour   Intake           2402 5 ml   Output              550 ml   Net           1852 5 ml       Physical Exam:     Physical Exam   Constitutional: He is oriented to person, place, and time  He appears well-developed and well-nourished  HENT:   Head: Normocephalic and atraumatic  Neck: Normal range of motion  Neck supple  Cardiovascular: Normal rate, regular rhythm and normal heart sounds  No murmur heard  Pulmonary/Chest: Effort normal and breath sounds normal  No respiratory distress  He exhibits no tenderness  Abdominal: Soft  Bowel sounds are normal  There is no tenderness (suprabubic mild tenderness  )  Musculoskeletal: Normal range of motion  He exhibits no edema or deformity  Neurological: He is alert and oriented to person, place, and time  Skin: Skin is warm and dry  Psychiatric: He has a normal mood and affect  Nursing note and vitals reviewed  Additional Data:   Labs:    Results from last 7 days  Lab Units 12/18/17  0510  12/16/17  2230   WBC Thousand/uL 6 14  < > 10 02   HEMOGLOBIN g/dL 14 2  < > 13 4   HEMATOCRIT % 43 5  < > 40 4   PLATELETS Thousands/uL 206  < > 227   NEUTROS PCT %  --   --  68   LYMPHS PCT %  --   --  16   MONOS PCT %  --   --  14*   EOS PCT %  --   --  2   < > = values in this interval not displayed  Results from last 7 days  Lab Units 12/18/17  0510   SODIUM mmol/L 140   POTASSIUM mmol/L 3 9   CHLORIDE mmol/L 107   CO2 mmol/L 25   BUN mg/dL 19   CREATININE mg/dL 1 41*   CALCIUM mg/dL 9 4   TOTAL PROTEIN g/dL 7 3   BILIRUBIN TOTAL mg/dL 0 30   ALK PHOS U/L 92   ALT U/L 59   AST U/L 66*   GLUCOSE RANDOM mg/dL 106       Results from last 7 days  Lab Units 12/18/17  0510   INR  2 47*       * I Have Reviewed All Lab Data Listed Above    * Additional Pertinent Lab Tests Reviewed: Rudyingapril 66 Admission Reviewed    Imaging:    Imaging Reports Reviewed Today Include:     Cultures:   Blood Culture: No results found for: BLOODCX  Urine Culture:   Lab Results   Component Value Date    URINECX No Growth <1000 cfu/mL 12/16/2017     Sputum Culture: No components found for: SPUTUMCX  Wound Culture: No results found for: WOUNDCULT    Last 24 Hours Medication List:     amphetamine-dextroamphetamine 20 mg Oral Daily   cefTRIAXone 1,000 mg Intravenous Q24H   diltiazem 120 mg Oral BID   fluticasone 1 spray Each Nare Daily   mycophenolate 250 mg Oral HS   [START ON 12/19/2017] mycophenolate 500 mg Oral Daily   sertraline 100 mg Oral Daily   tacrolimus 1 mg Oral BID        Today, Patient Was Seen By: JONATAN Das    ** Please Note: Dragon 360 Dictation voice to text software may have been used in the creation of this document   **

## 2017-12-18 NOTE — ASSESSMENT & PLAN NOTE
· Noted on admission creatinine 1 86  · Review of records the creatinine appears to be at baseline  · Nephrology following

## 2017-12-19 VITALS
BODY MASS INDEX: 27.32 KG/M2 | TEMPERATURE: 97.5 F | HEIGHT: 66 IN | OXYGEN SATURATION: 95 % | WEIGHT: 170 LBS | HEART RATE: 64 BPM | RESPIRATION RATE: 18 BRPM | DIASTOLIC BLOOD PRESSURE: 85 MMHG | SYSTOLIC BLOOD PRESSURE: 126 MMHG

## 2017-12-19 PROBLEM — Z86.718 HISTORY OF DVT (DEEP VEIN THROMBOSIS): Chronic | Status: ACTIVE | Noted: 2017-12-19

## 2017-12-19 LAB
ANION GAP SERPL CALCULATED.3IONS-SCNC: 9 MMOL/L (ref 4–13)
BASOPHILS # BLD AUTO: 0.06 THOUSANDS/ΜL (ref 0–0.1)
BASOPHILS NFR BLD AUTO: 1 % (ref 0–1)
BUN SERPL-MCNC: 16 MG/DL (ref 5–25)
CALCIUM SERPL-MCNC: 9.6 MG/DL (ref 8.3–10.1)
CHLORIDE SERPL-SCNC: 106 MMOL/L (ref 100–108)
CO2 SERPL-SCNC: 25 MMOL/L (ref 21–32)
CREAT SERPL-MCNC: 1.29 MG/DL (ref 0.6–1.3)
EOSINOPHIL # BLD AUTO: 0.31 THOUSAND/ΜL (ref 0–0.61)
EOSINOPHIL NFR BLD AUTO: 4 % (ref 0–6)
ERYTHROCYTE [DISTWIDTH] IN BLOOD BY AUTOMATED COUNT: 11.9 % (ref 11.6–15.1)
GFR SERPL CREATININE-BSD FRML MDRD: 67 ML/MIN/1.73SQ M
GLUCOSE SERPL-MCNC: 108 MG/DL (ref 65–140)
HCT VFR BLD AUTO: 43.8 % (ref 36.5–49.3)
HGB BLD-MCNC: 14.5 G/DL (ref 12–17)
INR PPP: 1.47 (ref 0.86–1.16)
LYMPHOCYTES # BLD AUTO: 1.65 THOUSANDS/ΜL (ref 0.6–4.47)
LYMPHOCYTES NFR BLD AUTO: 22 % (ref 14–44)
MCH RBC QN AUTO: 31.3 PG (ref 26.8–34.3)
MCHC RBC AUTO-ENTMCNC: 33.1 G/DL (ref 31.4–37.4)
MCV RBC AUTO: 94 FL (ref 82–98)
MONOCYTES # BLD AUTO: 0.79 THOUSAND/ΜL (ref 0.17–1.22)
MONOCYTES NFR BLD AUTO: 10 % (ref 4–12)
NEUTROPHILS # BLD AUTO: 4.8 THOUSANDS/ΜL (ref 1.85–7.62)
NEUTS SEG NFR BLD AUTO: 63 % (ref 43–75)
NRBC BLD AUTO-RTO: 0 /100 WBCS
PLATELET # BLD AUTO: 228 THOUSANDS/UL (ref 149–390)
PMV BLD AUTO: 9.4 FL (ref 8.9–12.7)
POTASSIUM SERPL-SCNC: 3.7 MMOL/L (ref 3.5–5.3)
PROTHROMBIN TIME: 18.2 SECONDS (ref 12.1–14.4)
RBC # BLD AUTO: 4.64 MILLION/UL (ref 3.88–5.62)
SODIUM SERPL-SCNC: 140 MMOL/L (ref 136–145)
TACROLIMUS BLD-MCNC: 4.2 NG/ML (ref 2–20)
WBC # BLD AUTO: 7.63 THOUSAND/UL (ref 4.31–10.16)

## 2017-12-19 PROCEDURE — 80048 BASIC METABOLIC PNL TOTAL CA: CPT | Performed by: INTERNAL MEDICINE

## 2017-12-19 PROCEDURE — 85610 PROTHROMBIN TIME: CPT | Performed by: INTERNAL MEDICINE

## 2017-12-19 PROCEDURE — 80197 ASSAY OF TACROLIMUS: CPT | Performed by: INTERNAL MEDICINE

## 2017-12-19 PROCEDURE — 85025 COMPLETE CBC W/AUTO DIFF WBC: CPT | Performed by: INTERNAL MEDICINE

## 2017-12-19 RX ORDER — CEPHALEXIN 500 MG/1
500 CAPSULE ORAL EVERY 6 HOURS SCHEDULED
Status: DISCONTINUED | OUTPATIENT
Start: 2017-12-19 | End: 2017-12-19

## 2017-12-19 RX ORDER — CIPROFLOXACIN 500 MG/1
500 TABLET, FILM COATED ORAL EVERY 12 HOURS SCHEDULED
Qty: 8 TABLET | Refills: 0 | Status: SHIPPED | OUTPATIENT
Start: 2017-12-19 | End: 2017-12-19

## 2017-12-19 RX ORDER — CIPROFLOXACIN 500 MG/1
500 TABLET, FILM COATED ORAL EVERY 12 HOURS SCHEDULED
Status: DISCONTINUED | OUTPATIENT
Start: 2017-12-19 | End: 2017-12-19 | Stop reason: HOSPADM

## 2017-12-19 RX ORDER — CIPROFLOXACIN 500 MG/1
500 TABLET, FILM COATED ORAL EVERY 12 HOURS SCHEDULED
Qty: 8 TABLET | Refills: 0 | Status: SHIPPED | OUTPATIENT
Start: 2017-12-19 | End: 2017-12-23

## 2017-12-19 RX ORDER — BUTALBITAL, ACETAMINOPHEN AND CAFFEINE 50; 325; 40 MG/1; MG/1; MG/1
1 TABLET ORAL ONCE
Status: COMPLETED | OUTPATIENT
Start: 2017-12-19 | End: 2017-12-19

## 2017-12-19 RX ADMIN — FLUTICASONE PROPIONATE 1 SPRAY: 50 SPRAY, METERED NASAL at 09:06

## 2017-12-19 RX ADMIN — MYCOPHENOLATE MOFETIL 500 MG: 250 CAPSULE ORAL at 09:06

## 2017-12-19 RX ADMIN — DEXTROAMPHETAMINE SACCHARATE, AMPHETAMINE ASPARTATE, DEXTROAMPHETAMINE SULFATE, AND AMPHETAMINE SULFATE 20 MG: 2.5; 2.5; 2.5; 2.5 TABLET ORAL at 09:05

## 2017-12-19 RX ADMIN — BUTALBITAL, ACETAMINOPHEN, AND CAFFEINE 1 TABLET: 50; 325; 40 TABLET ORAL at 05:01

## 2017-12-19 RX ADMIN — SODIUM CHLORIDE 100 ML/HR: 0.9 INJECTION, SOLUTION INTRAVENOUS at 05:02

## 2017-12-19 RX ADMIN — SERTRALINE HYDROCHLORIDE 100 MG: 100 TABLET ORAL at 09:06

## 2017-12-19 RX ADMIN — TACROLIMUS 1 MG: 0.5 CAPSULE ORAL at 11:43

## 2017-12-19 RX ADMIN — DILTIAZEM HYDROCHLORIDE 120 MG: 60 CAPSULE, EXTENDED RELEASE ORAL at 06:54

## 2017-12-19 NOTE — ASSESSMENT & PLAN NOTE
· Patient's renal function is baseline, creatinine on discharge 1 29    Patient likely has hypertensive CKD, with a history of renal transplant due to IgA nephropathy from Henoch-Schonlein purpura diagnosis at age 14

## 2017-12-19 NOTE — PROGRESS NOTES
Progress Note - Sharan Aguirre 1975, 38 y o  male MRN: [id number]    Unit/Bed#: -01 Encounter: [id number]    Primary Care Provider: Chaz Hernandez MD   Date and time admitted to hospital: 12/16/2017  9:46 PM        * UTI (urinary tract infection)   Assessment & Plan    · Present on admission with hematuria  Imaging showing bladder wall thickening consistent with cystitis, and possible pyelonephritis of the transplanted kidney on the left  Patient has responded well to IV ceftriaxone  Discussed with Nephrology, okay to complete course with ciprofloxacin x7 days total treatment  · Urine culture negative  · Urinalysis (+) blood, protein, nitrites, protein, bacteria, yeast         Stage 3 chronic kidney disease   Assessment & Plan    · Patient's renal function is baseline, creatinine on discharge 1 29  Patient likely has hypertensive CKD, with a history of renal transplant due to IgA nephropathy from Henoch-Schonlein purpura diagnosis at age 14        HTN (hypertension)   Assessment & Plan    · Blood pressure is controlled at this time, continue home diltiazem on discharge        History of renal transplant   Assessment & Plan    · Patient is immunosuppressed at baseline due to renal transplant, which occurred in 2009  Tacrolimus level is pending, Health follow up on the level with his PCP        History of DVT (deep vein thrombosis)   Assessment & Plan    · Patient is on chronic anticoagulation in the form of Coumadin  He had slightly supratherapeutic INR on admission, Coumadin was held  He has not had any curtis hematuria, hemoglobin/hematocrit have remained stable  This point, do recommend restarting his Coumadin, he generally takes 10 mg daily  He is able to check INR at home, and will check this on Thursday to follow up with Dr Agustin Phan on results  Advised on potential interaction with ciprofloxacin              Consultations During Hospital Stay:  · Nephrology    Procedures Performed:     · CT abdomen pelvis 12/17:  1  Prominent left lower quadrant renal transplant with mild perinephric inflammatory changes  No hydronephrosis or calculi, findings concerning for pyelonephritis  2   Atrophic native kidneys with nonobstructing 3 mm left midpole calculus  3   Mild bladder wall thickening possible sequela of cystitis  · Urine chemistry:  Creatinine 89 2, microalbumin/creatinine ratio 658, random urine microalbumin 587, urine sodium 162, urine urea nitrogen 519   · Urine culture (-), Urinalysis  Results for Luna Herrera (MRN [id number]) as of 12/19/2017 15:13   Ref  Range 12/16/2017 22:31 12/18/2017 11:24   Color, UA Unknown Nika Yellow   Clarity, UA Unknown Cloudy Cloudy   Specific Gravity, UA Latest Ref Range: 1 003 - 1 030  >=1 030 1 025   Glucose, UA Latest Ref Range: Negative mg/dl Negative Negative   Ketones, UA Latest Ref Range: Negative mg/dl Trace (A) Negative   Blood, UA Latest Ref Range: Negative  Large (A) Large (A)   Nitrite, UA Latest Ref Range: Negative  Positive (A) Negative   Leukocytes, UA Latest Ref Range: Negative  Negative Negative   pH, UA Latest Ref Range: 4 5 - 8 0  5 5 6 0   Protein, UA Latest Ref Range: Negative mg/dl >=300 (A) 100 (2+) (A)   Bilirubin, UA Latest Ref Range: Negative  Small (A) Negative   Urobilinogen, UA Latest Ref Range: 0 2, 1 0 E U /dl E U /dl 0 2 0 2   RBC, UA Latest Ref Range: None Seen, 0-5 /hpf 30-50 (A) Innumerable (A)   WBC, UA Latest Ref Range: None Seen, 0-5, 5-55, 5-65 /hpf 20-30 (A) 4-10 (A)   Bacteria, UA Latest Ref Range: None Seen, Occasional /hpf Moderate (A) None Seen   Fine granular casts Latest Units: /lpf 3-5    OTHER OBSERVATIONS Unknown Yeast Cells Present    ·     Significant Findings / Test Results:     · As above    Incidental Findings:   · As above     Test Results Pending at Discharge (will require follow up):    · Tacrolimus level     Outpatient Tests Requested:  · INR on Thursday 12/21  · BMP per Nephrology/renal transplant doctor    Complications:  None    Reason for Admission:  Hematuria    Hospital Course:     Nicolas Caba is a 38 y o  male patient who originally presented to the hospital on 12/16/2017 due to reported hematuria x2 days  Patient reports recent upper respiratory symptoms which started Wednesday 12/13  By Friday he reports he developed hematuria, in which he reports his urine was dark brown  He denies any fevers or chills, flank pain, suprapubic discomfort, nephrotoxin agents  Patient was admitted for the above reason, please see the above for details of each diagnosis, workup, and plan  He responded well IV fluids and IV ceftriaxone  He received 3 days of IV ceftriaxone will complete 4 days total with oral ciprofloxacin  I did recommend repeat urinalysis about 3-4 days after he has completed course of and weeks to ensure complete resolution of hematuria  Patient will follow up with Dr Marla Magana for INR check on Thursday  He has an appointment to establish care with a local nephrologist, Dr Kyree De La Cruz in January  He will continue follow up with his renal transplant physician  All the patient's questions and concerns were answered to the best my ability at time of discharge  He is discharged in stable condition and comfortable with the above plan  Condition at Discharge: good     Discharge Day Visit / Exam:     Subjective:  Patient seen sitting up in bed, appearing comfortable  He reports no complaints at this time, denies further episodes of hematuria  Denies any episodes of flank pain, suprapubic discomfort, dysuria, or other systemic urologic symptoms  He does report continued congestion which was ongoing since 12/13    Vitals: Blood Pressure: 126/85 (12/19/17 0700)  Pulse: 64 (12/19/17 0700)  Temperature: 97 5 °F (36 4 °C) (12/19/17 0700)  Temp Source: Oral (12/19/17 0700)  Respirations: 18 (12/19/17 0700)  Height: 5' 6" (167 6 cm) (12/16/17 2151)  Weight - Scale: 77 1 kg (170 lb) (12/16/17 2151)  SpO2: 95 % (12/19/17 0700)  Exam:   Physical Exam   Constitutional: Vital signs are normal  He appears well-developed and well-nourished  No distress  Cardiovascular: Normal rate, regular rhythm, S1 normal, S2 normal, normal heart sounds and intact distal pulses  No murmur heard  Pulmonary/Chest: Effort normal and breath sounds normal  No respiratory distress  He has no wheezes  He has no rhonchi  He has no rales  Abdominal: Soft  Bowel sounds are normal  He exhibits mass (Left lower quadrant, consistent with transplanted kidney)  He exhibits no distension  There is no tenderness  There is no CVA tenderness  Musculoskeletal: He exhibits no edema  Skin: Skin is warm and dry  He is not diaphoretic  Nursing note and vitals reviewed  Discussion with Family:  No family members present my evaluation, he politely declined to have me call his wife    Discharge instructions/Information to patient and family:   See after visit summary for information provided to patient and family  Provisions for Follow-Up Care:  See after visit summary for information related to follow-up care and any pertinent home health orders  Disposition:     Home    For Discharges to [facility] SNF:   · Not Applicable to this Patient - Not Applicable to this Patient    Planned Readmission:  None     Discharge Statement:  I spent approximately 25 minutes discharging the patient  This time was spent on the day of discharge  I had direct contact with the patient on the day of discharge  Greater than 50% of the total time was spent examining patient, answering all patient questions, arranging and discussing plan of care with patient as well as directly providing post-discharge instructions  Additional time then spent on discharge activities  Discharge Medications:  See after visit summary for reconciled discharge medications provided to patient and family        ** Please Note: This note has been constructed using a voice recognition system **

## 2017-12-19 NOTE — PROGRESS NOTES
NEPHROLOGY PROGRESS NOTE    Patient: Germania Taylor               Sex: male          DOA: 12/16/2017  9:46 PM   YOB: 1975        Age:  38 y o         LOS:  LOS: 2 days     REASON FOR THE CONSULTATION:  Further management of immunosuppression medication in renal transplant  HPI      This is a 53-year-old male initially admitted for further management of UTI  SUBJECTIVE     - breathing has remained stable overnight and also found to be afebrile and nonoliguric  Patient denies nausea / vomiting / headache / dizziness / SOB / chest pain today    - Reviewed last 24 hrs events     CURRENT MEDICATIONS       Current Facility-Administered Medications:     acetaminophen (TYLENOL) tablet 650 mg, 650 mg, Oral, Q6H PRN, Ollie Pdailla PA-C, 650 mg at 12/17/17 2250    amphetamine-dextroamphetamine (ADDERALL) tablet 20 mg, 20 mg, Oral, Daily, Femi Christian PA-C, 20 mg at 12/19/17 0905    ciprofloxacin (CIPRO) tablet 500 mg, 500 mg, Oral, Q12H BRIEN, Natacha Alvarez PA-C    diltiazem (CARDIZEM SR) 12 hr capsule 120 mg, 120 mg, Oral, BID, Ollie Padilla PA-C, 120 mg at 12/19/17 0654    fluticasone (FLONASE) 50 mcg/act nasal spray 1 spray, 1 spray, Each Nare, Daily, Bessie Varela MD, 1 spray at 12/19/17 0906    metoprolol (LOPRESSOR) injection 5 mg, 5 mg, Intravenous, Q6H PRN, Ollie Padilla PA-C    mycophenolate (CELLCEPT) capsule 250 mg, 250 mg, Oral, HS, Bjorn Portillo MD, 250 mg at 12/18/17 2200    mycophenolate (CELLCEPT) capsule 500 mg, 500 mg, Oral, Daily, Bjorn Portillo MD, 500 mg at 12/19/17 8288    sertraline (ZOLOFT) tablet 100 mg, 100 mg, Oral, Daily, Ollie Padilla PA-C, 100 mg at 12/19/17 7659    sodium chloride (OCEAN) 0 65 % nasal spray 1 spray, 1 spray, Each Nare, Q1H PRN, Bessie Varela MD    tacrolimus (PROGRAF) capsule 1 mg, 1 mg, Oral, BID, Femi Christian PA-C, 1 mg at 12/19/17 1143    OBJECTIVE     Current Weight: Weight - Scale: 77 1 kg (170 lb)  Vitals:    12/19/17 0700 BP: 126/85   Pulse: 64   Resp: 18   Temp: 97 5 °F (36 4 °C)   SpO2: 95%       Intake/Output Summary (Last 24 hours) at 12/19/17 1542  Last data filed at 12/19/17 1359   Gross per 24 hour   Intake          2638 33 ml   Output             2750 ml   Net          -111 67 ml       PHYSICAL EXAMINATION     GEN:  Awake and not in acute distress  RS:  Bilateral equal air entry, no wheezing  CVS:  S1-S2 heard  Abdomen:  Soft, nontender, positive bowel sounds  Extremities:  No edema, skin is warm on touch  CNS:  Awake and follows commands  HEENT:  Pink conjunctiva, no JVD, head is atraumatic  Psychiatric:  Not suicidal  Musculoskeletal:  No gross bony deformity seen in hands  Lymph Node:  No palpable cervical lymphadenopathy    LAB RESULTS       Results from last 7 days  Lab Units 12/19/17  0432 12/18/17  0510 12/17/17  0516 12/16/17  2230   WBC Thousand/uL 7 63 6 14 7 12 10 02   HEMOGLOBIN g/dL 14 5 14 2 13 1 13 4   HEMATOCRIT % 43 8 43 5 40 3 40 4   PLATELETS Thousands/uL 228 206 201 227   SODIUM mmol/L 140 140 138 136   POTASSIUM mmol/L 3 7 3 9 3 6 4 0   CHLORIDE mmol/L 106 107 106 103   CO2 mmol/L 25 25 25 23   BUN mg/dL 16 19 23 26*   CREATININE mg/dL 1 29 1 41* 1 86* 1 96*   EGFR ml/min/1 73sq m 67 60 43 40   CALCIUM mg/dL 9 6 9 4 9 4 9 6   MAGNESIUM mg/dL  --   --  2 3  --    ALBUMIN g/dL  --  2 9*  --   --    TOTAL PROTEIN g/dL  --  7 3  --   --    GLUCOSE RANDOM mg/dL 108 106 100 93       I have personally reviewed the old medical records and patient's previously known baseline creatinine level is ~ 1 4-1 5    RADIOLOGY RESULTS      Results for orders placed during the hospital encounter of 12/16/17   CT abdomen pelvis wo contrast    Narrative CT ABDOMEN AND PELVIS WITHOUT IV CONTRAST    INDICATION:  Hematuria with history of renal transplant  COMPARISON: None      TECHNIQUE:  CT examination of the abdomen and pelvis was performed without intravenous contrast   Reformatted images were created in axial, sagittal, and coronal planes  Radiation dose length product (DLP) for this visit:  607 mGy-cm   This examination, like all CT scans performed in the [facility], was performed utilizing techniques to minimize radiation dose exposure, including the use of iterative   reconstruction and automated exposure control  Enteric contrast was administered  FINDINGS:    ABDOMEN    LOWER CHEST:  No significant abnormalities identified in the lower chest     LIVER/BILIARY TREE:  Unremarkable  GALLBLADDER:  No calcified gallstones  No pericholecystic inflammatory change  SPLEEN:  Unremarkable  PANCREAS:  Unremarkable  ADRENAL GLANDS:  Unremarkable  KIDNEYS/URETERS:  Atrophy of the native kidneys identified with no hydronephrosis or perinephric collections  Nonobstructing 3 mm calculus in the left mid pole identified as are several acquired left-sided renal cysts  Transplant kidney in the left lower quadrant is prominent in size with no hydronephrosis or radiopaque calculi  There are however mild inflammatory changes surrounding the kidney for which pyelonephritis should be excluded  STOMACH AND BOWEL:  Unremarkable  APPENDIX:  No findings to suggest appendicitis  ABDOMINOPELVIC CAVITY:  No ascites or free intraperitoneal air  No lymphadenopathy  VESSELS:  Unremarkable for patient's age  PELVIS    REPRODUCTIVE ORGANS:  Unremarkable for patient's age  URINARY BLADDER:  Mild bladder wall thickening with no significant pericystic inflammation  ABDOMINAL WALL/INGUINAL REGIONS:  Tiny fat-containing inguinal hernias with postoperative changes in the ventral abdominal wall and left lower quadrant  OSSEOUS STRUCTURES:  Multilevel spinal dysraphism of the thoracolumbar spine and sacrum  No destructive lytic or blastic lesions  Impression 1  Prominent left lower quadrant renal transplant with mild perinephric inflammatory changes    No hydronephrosis or calculi, findings concerning for pyelonephritis  2   Atrophic native kidneys with nonobstructing 3 mm left midpole calculus  3   Mild bladder wall thickening possible sequela of cystitis  Workstation performed: [id number]         PLAN / RECOMMENDATIONS      1  Renal transplant  Patient has received living unrelated renal transplant in 2009 and previously followed by nephrologist at [facility]  Clara Sheppard Patient's renal function overnight has remained at baseline and will continue tacrolimus 1 mg PO BID along with  mg in the morning and 250 at night  Pending tacrolimus level from today morning  2   CKD stage 2-3  Upon review of old medical record patient's baseline creatinine between 1 4-1 5  Overnight patient was found to be nonoliguric and renal function has remained stable with current creatinine of 1 29  Patient's current renal function is below to previously known baseline creatinine  Continue immunosuppression medication as mentioned above  Monitor renal function and strict Is&Os while in the hospital     3   Hypertension and chronic kidney disease and renal transplant  Overnight patient's blood pressure has remained controlled and at goal and plan to monitor hypertension with diltiazem 120 mg PO BID today  4   Dysuria  Patient was admitted with dysuria     Urine culture showed no growth so far  Patient has received ceftriaxone on admission  Since patient has renal transplant we will treat as a complicated urinary tract infection and will consider total 7 day course of antibiotic  Okay to discharge patient on oral Cipro 500 mg PO BID  Clara Sheppard Disposition:  Stable from renal standpoint for discharge    Plan was also d/w Fernando Denise MD  Nephrology  12/19/2017        Portions of the record may have been created with voice recognition software  Occasional wrong word or "sound a like" substitutions may have occurred due to the inherent limitations of voice recognition software   Read the chart carefully and recognize, using context, where substitutions have occurred

## 2017-12-19 NOTE — ASSESSMENT & PLAN NOTE
· Patient is on chronic anticoagulation in the form of Coumadin  He had slightly supratherapeutic INR on admission, Coumadin was held  He has not had any curtis hematuria, hemoglobin/hematocrit have remained stable  This point, do recommend restarting his Coumadin, he generally takes 10 mg daily  He is able to check INR at home, and will check this on Thursday to follow up with Dr Lionel Moreno on results  Advised on potential interaction with ciprofloxacin

## 2017-12-19 NOTE — DISCHARGE INSTRUCTIONS
Please complete your medications as directed even if you feel better sooner than expected  If you feel you develop an allergic reaction, seek evaluation by a health care provider  Please be aware that ciprofloxacin does interact with Coumadin, and consider coulee cause supratherapeutic INR  For this reason I do recommend that you have a repeat INR check on Thursday 12/21

## 2017-12-19 NOTE — DISCHARGE SUMMARY
Please see progress note from earlier today 12/19/2017 which is discharge summary  Discharge Summary - [address] [facility]    Patient Information: Terrance Junior 38 y o  male MRN: [id number]  Unit/Bed#: -01 Encounter: [id number]    Discharging Physician / Practitioner: Dneisha Mejia PA-C  PCP: Parth Crow MD  Admission Date: 12/16/2017  Discharge Date: 12/19/17      * UTI (urinary tract infection)   Assessment & Plan     · Present on admission with hematuria  Imaging showing bladder wall thickening consistent with cystitis, and possible pyelonephritis of the transplanted kidney on the left  Patient has responded well to IV ceftriaxone  Discussed with Nephrology, okay to complete course with ciprofloxacin x7 days total treatment  · Urine culture negative  · Urinalysis (+) blood, protein, nitrites, protein, bacteria, yeast        Stage 3 chronic kidney disease   Assessment & Plan     · Patient's renal function is baseline, creatinine on discharge 1 29  Patient likely has hypertensive CKD, with a history of renal transplant due to IgA nephropathy from Henoch-Schonlein purpura diagnosis at age 14       HTN (hypertension)   Assessment & Plan     · Blood pressure is controlled at this time, continue home diltiazem on discharge       History of renal transplant   Assessment & Plan     · Patient is immunosuppressed at baseline due to renal transplant, which occurred in 2009  Tacrolimus level is pending, Health follow up on the level with his PCP       History of DVT (deep vein thrombosis)   Assessment & Plan     · Patient is on chronic anticoagulation in the form of Coumadin  He had slightly supratherapeutic INR on admission, Coumadin was held  He has not had any curtis hematuria, hemoglobin/hematocrit have remained stable  This point, do recommend restarting his Coumadin, he generally takes 10 mg daily    He is able to check INR at home, and will check this on Thursday to follow up with Dr Laney Elise on results  Advised on potential interaction with ciprofloxacin              Consultations During Hospital Stay:  · Nephrology     Procedures Performed:      · CT abdomen pelvis 12/17:  1   Prominent left lower quadrant renal transplant with mild perinephric inflammatory changes   No hydronephrosis or calculi, findings concerning for pyelonephritis  2   Atrophic native kidneys with nonobstructing 3 mm left midpole calculus  3   Mild bladder wall thickening possible sequela of cystitis  · Urine chemistry:  Creatinine 89 2, microalbumin/creatinine ratio 658, random urine microalbumin 587, urine sodium 162, urine urea nitrogen 519   · Urine culture (-), Urinalysis  Results for Constantin Mcneil (MRN [id number]) as of 12/19/2017 15:13    Ref   Range 12/16/2017 22:31 12/18/2017 11:24   Color, UA Unknown Nika Yellow   Clarity, UA Unknown Cloudy Cloudy   Specific Gravity, UA Latest Ref Range: 1 003 - 1 030  >=1 030 1 025   Glucose, UA Latest Ref Range: Negative mg/dl Negative Negative   Ketones, UA Latest Ref Range: Negative mg/dl Trace (A) Negative   Blood, UA Latest Ref Range: Negative  Large (A) Large (A)   Nitrite, UA Latest Ref Range: Negative  Positive (A) Negative   Leukocytes, UA Latest Ref Range: Negative  Negative Negative   pH, UA Latest Ref Range: 4 5 - 8 0  5 5 6 0   Protein, UA Latest Ref Range: Negative mg/dl >=300 (A) 100 (2+) (A)   Bilirubin, UA Latest Ref Range: Negative  Small (A) Negative   Urobilinogen, UA Latest Ref Range: 0 2, 1 0 E U /dl E U /dl 0 2 0 2   RBC, UA Latest Ref Range: None Seen, 0-5 /hpf 30-50 (A) Innumerable (A)   WBC, UA Latest Ref Range: None Seen, 0-5, 5-55, 5-65 /hpf 20-30 (A) 4-10 (A)   Bacteria, UA Latest Ref Range: None Seen, Occasional /hpf Moderate (A) None Seen   Fine granular casts Latest Units: /lpf 3-5     OTHER OBSERVATIONS Unknown Yeast Cells Present     ·       Significant Findings / Test Results:      · As above     Incidental Findings:   · As above      Test Results Pending at Discharge (will require follow up): · Tacrolimus level     Outpatient Tests Requested:  · INR on Thursday 12/21  · BMP per Nephrology/renal transplant doctor     Complications:  None     Reason for Admission:  Hematuria     Hospital Course:      Germania Taylor is a 38 y o  male patient who originally presented to the hospital on 12/16/2017 due to reported hematuria x2 days  Patient reports recent upper respiratory symptoms which started Wednesday 12/13  By Friday he reports he developed hematuria, in which he reports his urine was dark brown  He denies any fevers or chills, flank pain, suprapubic discomfort, nephrotoxin agents      Patient was admitted for the above reason, please see the above for details of each diagnosis, workup, and plan  He responded well IV fluids and IV ceftriaxone  He received 3 days of IV ceftriaxone will complete 4 days total with oral ciprofloxacin  I did recommend repeat urinalysis about 3-4 days after he has completed course of and weeks to ensure complete resolution of hematuria      Patient will follow up with Dr Charles Veliz for INR check on Thursday  He has an appointment to establish care with a local nephrologist, Dr Igor Luke in January  He will continue follow up with his renal transplant physician      All the patient's questions and concerns were answered to the best my ability at time of discharge  He is discharged in stable condition and comfortable with the above plan      Condition at Discharge: good      Discharge Day Visit / Exam:      Subjective:  Patient seen sitting up in bed, appearing comfortable  He reports no complaints at this time, denies further episodes of hematuria  Denies any episodes of flank pain, suprapubic discomfort, dysuria, or other systemic urologic symptoms  He does report continued congestion which was ongoing since 12/13    Vitals: Blood Pressure: 126/85 (12/19/17 0700)  Pulse: 64 (12/19/17 0700)  Temperature: 97 5 °F (36 4 °C) (12/19/17 0700)  Temp Source: Oral (12/19/17 0700)  Respirations: 18 (12/19/17 0700)  Height: 5' 6" (167 6 cm) (12/16/17 2151)  Weight - Scale: 77 1 kg (170 lb) (12/16/17 2151)  SpO2: 95 % (12/19/17 0700)  Exam:   Physical Exam   Constitutional: Vital signs are normal  He appears well-developed and well-nourished  No distress  Cardiovascular: Normal rate, regular rhythm, S1 normal, S2 normal, normal heart sounds and intact distal pulses  No murmur heard  Pulmonary/Chest: Effort normal and breath sounds normal  No respiratory distress  He has no wheezes  He has no rhonchi  He has no rales  Abdominal: Soft  Bowel sounds are normal  He exhibits mass (Left lower quadrant, consistent with transplanted kidney)  He exhibits no distension  There is no tenderness  There is no CVA tenderness  Musculoskeletal: He exhibits no edema  Skin: Skin is warm and dry  He is not diaphoretic  Nursing note and vitals reviewed         Discussion with Family:  No family members present my evaluation, he politely declined to have me call his wife     Discharge instructions/Information to patient and family:   See after visit summary for information provided to patient and family        Provisions for Follow-Up Care:  See after visit summary for information related to follow-up care and any pertinent home health orders        Disposition:      Home     For Discharges to [facility] SNF:   · Not Applicable to this Patient - Not Applicable to this Patient     Planned Readmission:  None     Discharge Statement:  I spent approximately 25 minutes discharging the patient  This time was spent on the day of discharge  I had direct contact with the patient on the day of discharge  Greater than 50% of the total time was spent examining patient, answering all patient questions, arranging and discussing plan of care with patient as well as directly providing post-discharge instructions    Additional time then spent on discharge activities      Discharge Medications:  See after visit summary for reconciled discharge medications provided to patient and family        ** Please Note: This note has been constructed using a voice recognition system **

## 2017-12-19 NOTE — ASSESSMENT & PLAN NOTE
· Present on admission with hematuria  Imaging showing bladder wall thickening consistent with cystitis, and possible pyelonephritis of the transplanted kidney on the left  Patient has responded well to IV ceftriaxone  Discussed with Nephrology, okay to complete course with ciprofloxacin x7 days total treatment  · Urine culture negative    · Urinalysis (+) blood, protein, nitrites, protein, bacteria, yeast

## 2017-12-19 NOTE — ASSESSMENT & PLAN NOTE
· Patient is immunosuppressed at baseline due to renal transplant, which occurred in 2009    Tacrolimus level is pending, Health follow up on the level with his PCP

## 2017-12-20 ENCOUNTER — ALLSCRIPTS OFFICE VISIT (OUTPATIENT)
Dept: OTHER | Facility: OTHER | Age: 42
End: 2017-12-20

## 2017-12-20 ENCOUNTER — GENERIC CONVERSION - ENCOUNTER (OUTPATIENT)
Dept: OTHER | Facility: OTHER | Age: 42
End: 2017-12-20

## 2017-12-21 NOTE — PROGRESS NOTES
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 Derm:      Assessment #1: Psoriasiform dermatitis/stasis dermatitis  Care Plan:      Overall improved continue same treatment may need to consider support stockings if this becomes more of an issue  Assessment #2: Herpes simplex infection  Care Plan:      Hopefully will resolve without intervention at this time if it continues not to heal patient is to call if he continues to get frequent outbreaks probably need to consider suppressive therapy in the face of his immunosuppression  Assessment #3: Acne  Care Plan:      Again not advised him to use benzoyl peroxide wash will re-evaluate as needed  Assessment #4: Screening for dermatologic disorders  Care Plan: Will continue to monitor him yearly because of history of immunosuppression no other problems noted at this time  Chief Complaint   Chief Complaint Free Text Note Form: follow up for stasis dermatitis      History of Present Illness   HPI: 75-year-old male with history of psoriasiform dermatitis and stasis issues with history of kidney transplant presents for follow-up  Patient notes improvement with the leg with a topical steroids given to him previously  However patient has been in the hospital for several days with a urinary tract infection and is also developing cold sores which he does get intermittently   Has not treated the acne as we discussed previously was not sure will we had told him what to use      Review of Systems   Complete Male Dermatology [address] [place] [address] 14- Est Patient:      Constitutional: Denies constitutional symptoms  Eyes: Denies eye symptoms  ENT:  denies ear symptoms, nasal symptoms, mouth or throat symptoms  Cardiovascular: Denies cardiovascular symptoms  Respiratory: Denies respiratory symptoms  Gastrointestinal: Denies gastrointestinal symptoms  Musculoskeletal: Denies musculoskeletal symptoms  Integumentary: Denies skin, hair and nail symptoms  Neurological: Denies neurologic symptoms  Psychiatric: Denies psychiatric symptoms  Endocrine: Denies endocrine symptoms  Hematologic/Lymphatic: Denies hematologic symptoms  Active Problems   1  Acne vulgaris (706 1) (L70 0)   2  Acute URI (465 9) (J06 9)   3  Arm pain, right (729 5) (M79 601)   4  Depression (311) (F32 9)   5  Flu vaccine need (V04 81) (Z23)   6  History of DVT of lower extremity (V12 51) (Z86 718)   7  Hypertension (401 9) (I10)   8  Kidney replaced by transplant (V42 0) (Z94 0)   9  Left knee pain (719 46) (M25 562)   10  Lower extremity deep venous thrombosis (453 40) (I82 409)   11  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)   12  Screening for skin condition (V82 0) (Z13 89)   13  Skin lesion (709 9) (L98 9)   14  Viral URI with cough (465 9) (J06 9,B97 89)    Past Medical History   1  H/O immunosuppressive therapy (V87 46) (Z92 25)   2  History of kidney disease (V13 09) (H16 606)  Past Medical History Reviewed- Derm:    The past medical history was reviewed  Surgical History   1  History of Renal Transplant  Surgical History Reviewed [address] [place] [address] [address]- Derm:    Surgical History reviewed      Family History   Father    1  Family history of Deep vein thrombophlebitis of leg  Grandfather    2  Family history of Deep vein thrombophlebitis of leg   3  Family history of cardiac disorder (V17 49) (Z82 49)   4  Family history of Malignant neoplasm of colon, unspecified part of colon  Family History Reviewed- Derm:    Family History was reviewed      Social History    · Former smoker (B10 21) (A33 171)  Social History Reviewed [facility]- Derm: The social history was reviewed      Current Meds    1  Adderall XR 20 MG Oral Capsule Extended Release 24 Hour; TAKE 1 CAPSULE DAILY     FOR ADHD; Therapy: (Recorded:64Nry5799) to Recorded   2  Betamethasone Dipropionate Aug 0 05 % External Ointment; apply sparingly to affected     area(s) twice daily  lower leg; Therapy: 13BWE1588 to (Last Rx:14Nov2017)  Requested for: 59XRZ7087 Ordered   3  DilTIAZem  MG Oral Capsule Extended Release 24 Hour; 1 BID; Last     Rx:87Sin1046 Ordered   4  Mycophenolate Mofetil 250 MG Oral Capsule; 2 AM and 1 PM; Last Rx:36Adj0081     Ordered   5  Sertraline HCl - 100 MG Oral Tablet; TAKE 1 TABLET TWICE DAILY; Last Rx:61Nyl4996     Ordered   6  Tacrolimus 1 MG Oral Capsule; 1 BID; Last Rx:24Ijj5248 Ordered   7  Vitamin D 2000 UNIT Oral Capsule; take 1 capsule daily; Therapy: (Recorded:06Ypm6828) to Recorded   8  Warfarin Sodium 2 MG Oral Tablet; TAKE 1 TABLET DAILY AS DIRECTED according to     blood results  Requested for: 06ZIP3494; Last Rx:29Evx8485 Ordered   9  Warfarin Sodium 5 MG Oral Tablet; TAKE ONE OR TWO TABLETS BY MOUTH DAILY; Therapy: 86IER6587 to (Last Rx:95Amo0874)  Requested for: 58Hkm4252 Ordered  Medication List Reviewed: The medication list was reviewed and updated today  Allergies   1  Penicillins    Physical Exam        Constitutional      General appearance: Appears healthy and well developed  Lymphatic      No visible disturbance  Musculoskeletal      Digits and nails: No clubbing, cyanosis or edema  Cutaneous and nail exam normal        Skin      Scalp skin texture and hair distribution: Normal skin texture on scalp, normal hair distribution         Head: Abnormal        Neck: Normal turgor, no rashes, no lesions  Chest: Normal turgor, no rashes, no lesions  Back: Abnormal        Right lower extremity: Abnormal        Left lower extremity: Abnormal        Neuro/Psych      Alert and oriented x 3  Displays comfort and cooperation during encounterl  Affect is normal        Finding Scaling erythema markedly diminished still a lot of hemosiderin pigmentation noted crusted areas noted on the lips papules pustules noted on the back nothing else remarkable at this point  Future Appointments      Date/Time Provider Specialty Site   01/22/2018 09:30 AM CONSTANTINE Barrera  Nephrology [address]   12/27/2018 09:05 AM CONSTANTINE Hong   Dermatology [facility]     Signatures    Electronically signed by : CONSTANTINE Pedraza ; Dec 20 2017  9:01AM EST                       (Author)

## 2018-01-08 NOTE — CASE MANAGEMENT
Notification of Discharge  This is a Notification of Discharge from our facility [address]  Please be advised that this patient has been discharge from our facility  Below you will find the admission and discharge date and time including the patients disposition  PRESENTATION DATE: 12/16/2017  9:46 PM  IP ADMISSION DATE: 12/17/17 0107  DISCHARGE DATE: 12/19/2017  4:50 PM  DISPOSITION: Home/Self Care    [facility] in the [facility] by Mohit Mayfield for 2017  Network Utilization Review Department  Phone: [phone]; Fax [phone]  ATTENTION: The Network Utilization Review Department is now centralized for our 7 Facilities  Make a note that we have a new phone and fax numbers for our Department  Please call with any questions or concerns to [phone] and carefully follow the prompts so that you are directed to the right person  All voicemails are confidential  Fax any determinations, approvals, denials, and requests for initial or continue stay review clinical to [phone]  Due to HIGH CALL volume, it would be easier if you could please send faxed requests to expedite your requests and in part, help us provide discharge notifications faster

## 2018-01-13 VITALS
OXYGEN SATURATION: 97 % | SYSTOLIC BLOOD PRESSURE: 112 MMHG | TEMPERATURE: 98.1 F | HEART RATE: 97 BPM | DIASTOLIC BLOOD PRESSURE: 90 MMHG | WEIGHT: 173.5 LBS

## 2018-01-13 NOTE — RESULT NOTES
Verified Results  * XR ELBOW 3+ VIEW RIGHT [id number] 02:17PM Tia Blank Order Number: [id number]     Test Name Result Flag Reference   XR ELBOW 3+ VW RIGHT (Report)     RIGHT ELBOW     INDICATION: Right elbow pain  COMPARISON: None     VIEWS: 4     IMAGES: 3     FINDINGS:     There is no acute fracture or dislocation  There is no joint effusion  No degenerative changes  No lytic or blastic lesions are seen  Soft tissues are unremarkable  IMPRESSION:     No acute osseous abnormality         Workstation performed: [id number]     Signed by:   Chelsey Coleman MD   3/31/17

## 2018-01-14 VITALS
HEART RATE: 96 BPM | BODY MASS INDEX: 27.8 KG/M2 | TEMPERATURE: 98.1 F | DIASTOLIC BLOOD PRESSURE: 94 MMHG | HEIGHT: 66 IN | WEIGHT: 173 LBS | SYSTOLIC BLOOD PRESSURE: 128 MMHG | OXYGEN SATURATION: 98 %

## 2018-01-14 VITALS
WEIGHT: 172.5 LBS | OXYGEN SATURATION: 97 % | BODY MASS INDEX: 27.72 KG/M2 | SYSTOLIC BLOOD PRESSURE: 126 MMHG | TEMPERATURE: 98.1 F | DIASTOLIC BLOOD PRESSURE: 88 MMHG | HEIGHT: 66 IN | HEART RATE: 78 BPM

## 2018-01-22 ENCOUNTER — ALLSCRIPTS OFFICE VISIT (OUTPATIENT)
Dept: OTHER | Facility: OTHER | Age: 43
End: 2018-01-22

## 2018-01-23 NOTE — MISCELLANEOUS
History of Present Illness  TCM Communication [facility]: The patient is being contacted for follow-up after hospitalization  Hospital records were reviewed  He was hospitalized at [facility]  The date of admission: 12/16/17, date of discharge: 12/19/17  Diagnosis: hematuria  He was discharged to home  Medications reviewed and updated today  He scheduled a follow up appointment  Communication performed and completed by      Active Problems    1  Acne vulgaris (706 1) (L70 0)   2  Acute URI (465 9) (J06 9)   3  Arm pain, right (729 5) (M79 601)   4  Depression (311) (F32 9)   5  Flu vaccine need (V04 81) (Z23)   6  Herpes simplex type 1 infection (054 9) (B00 9)   7  History of DVT of lower extremity (V12 51) (Z86 718)   8  Hypertension (401 9) (I10)   9  Kidney replaced by transplant (V42 0) (Z94 0)   10  Left knee pain (719 46) (M25 562)   11  Lower extremity deep venous thrombosis (453 40) (I82 409)   12  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)   13  Screening for skin condition (V82 0) (Z13 89)   14  Skin lesion (709 9) (L98 9)   15  Viral URI with cough (465 9) (J06 9,B97 89)    Past Medical History    1  H/O immunosuppressive therapy (V87 46) (Z92 25)   2  History of kidney disease (V13 09) (T18 896)    Surgical History    1  History of Renal Transplant    Family History  Father    1  Family history of Deep vein thrombophlebitis of leg  Grandfather    2  Family history of Deep vein thrombophlebitis of leg   3  Family history of cardiac disorder (V17 49) (Z82 49)   4  Family history of Malignant neoplasm of colon, unspecified part of colon    Social History    · Former smoker (M30 07) (J79 796)    Current Meds   1  Adderall XR 20 MG Oral Capsule Extended Release 24 Hour   (Amphetamine-Dextroamphet ER); TAKE 1 CAPSULE DAILY FOR ADHD; Therapy: (Recorded:54Ytb8949) to Recorded   2  Benzoyl Peroxide Wash 10 % External Liquid; [address] AFFECTED AREA WITH CLEANSER   ONCE DAILY;    Therapy: 21Fcd4838 to (Last Rx:97Wrs5632) Ordered   3  Betamethasone Dipropionate Aug 0 05 % External Ointment; apply sparingly to affected   area(s) twice daily  lower leg; Therapy: 92WWB0609 to (Last Rx:96Lsz8999)  Requested for: 36Jtc1463 Ordered   4  DilTIAZem  MG Oral Capsule Extended Release 24 Hour; 1 BID; Last   Rx:78Izt9209 Ordered   5  Mycophenolate Mofetil 250 MG Oral Capsule; 2 AM and 1 PM; Last Rx:63Khs7880   Ordered   6  Sertraline HCl - 100 MG Oral Tablet; TAKE 1 TABLET TWICE DAILY; Last Rx:40Tln2485   Ordered   7  Tacrolimus 1 MG Oral Capsule; 1 BID; Last Rx:34Mxx3694 Ordered   8  Vitamin D 2000 UNIT Oral Capsule; take 1 capsule daily; Therapy: (Recorded:39Kid2051) to Recorded   9  Warfarin Sodium 2 MG Oral Tablet (Coumadin); TAKE 1 TABLET DAILY AS   DIRECTED according to blood results  Requested for: 09HHL1018; Last Rx:19Uso5113   Ordered   10  Warfarin Sodium 5 MG Oral Tablet; TAKE ONE OR TWO TABLETS BY MOUTH DAILY; Therapy: 13UWX6786 to (Last Rx:69Zol3166)  Requested for: 99Utw2209 Ordered    Allergies    1  Penicillins    Future Appointments    Date/Time Provider Specialty Site   01/22/2018 09:30 AM CONSTANTINE Powell  Nephrology  [address] [facility]   02/26/2018 08:45 AM Óscar Castano MD Urology [address] [facility]   12/27/2018 09:05 AM CONSTANTINE Sanchez   Dermatology [facility]     Signatures   Electronically signed by : CONSTANTINE Gongora ; Dec 26 2017 12:41PM EST

## 2018-01-24 ENCOUNTER — ANTICOAG VISIT (OUTPATIENT)
Dept: INTERNAL MEDICINE CLINIC | Facility: CLINIC | Age: 43
End: 2018-01-24

## 2018-01-24 NOTE — MISCELLANEOUS
Provider Comments  Provider Comments:   1/22/18 PATIENT MISSED HIS APPOINTMENT    Kyung [facility] 239FF      Signatures   Electronically signed by : CONSTANTINE Evangelista ; Jan 22 2018  9:57AM EST                       (Author)

## 2018-03-01 ENCOUNTER — HOSPITAL ENCOUNTER (OUTPATIENT)
Dept: NON INVASIVE DIAGNOSTICS | Facility: CLINIC | Age: 43
Discharge: HOME | End: 2018-03-01
Payer: COMMERCIAL

## 2018-03-01 ENCOUNTER — OFFICE VISIT (OUTPATIENT)
Dept: INTERNAL MEDICINE CLINIC | Facility: CLINIC | Age: 43
End: 2018-03-01
Payer: COMMERCIAL

## 2018-03-01 ENCOUNTER — TELEPHONE (OUTPATIENT)
Dept: INTERNAL MEDICINE CLINIC | Facility: CLINIC | Age: 43
End: 2018-03-01

## 2018-03-01 VITALS
HEART RATE: 87 BPM | OXYGEN SATURATION: 98 % | DIASTOLIC BLOOD PRESSURE: 88 MMHG | HEIGHT: 66 IN | WEIGHT: 184.8 LBS | SYSTOLIC BLOOD PRESSURE: 102 MMHG | TEMPERATURE: 97 F | BODY MASS INDEX: 29.7 KG/M2

## 2018-03-01 DIAGNOSIS — Z86.718 HISTORY OF DVT (DEEP VEIN THROMBOSIS): Chronic | ICD-10-CM

## 2018-03-01 DIAGNOSIS — M79.605 LEG PAIN, POSTERIOR, LEFT: ICD-10-CM

## 2018-03-01 DIAGNOSIS — M79.605 LEG PAIN, POSTERIOR, LEFT: Primary | ICD-10-CM

## 2018-03-01 DIAGNOSIS — I82.442 ACUTE DEEP VEIN THROMBOSIS (DVT) OF TIBIAL VEIN OF LEFT LOWER EXTREMITY (HCC): ICD-10-CM

## 2018-03-01 PROCEDURE — 99214 OFFICE O/P EST MOD 30 MIN: CPT | Performed by: PHYSICIAN ASSISTANT

## 2018-03-01 PROCEDURE — 93970 EXTREMITY STUDY: CPT

## 2018-03-01 NOTE — PROGRESS NOTES
Assessment/Plan:      Left leg pain with history of DVT in a patient who was not taking his Coumadin  Patient will be sent for stat Doppler of the left leg  Depending on the result patient should either restart his Coumadin or he will  Possibly be started on  Lovenox bridge  the case was discussed with Dr Segundo Summers who agrees with the assessment and plan    No problem-specific Assessment & Plan notes found for this encounter  Diagnoses and all orders for this visit:    Leg pain, posterior, left  -     VAS lower limb venous duplex study, unilateral/limited; Future          Subjective:      Patient ID: Yolanda Arriaga is a 38 y o  male  Pt comes in with progressively worsening leg pain in his left calf  He takes Coumadin for a DVT, but for the last 1-2 weeks hasn't been taking it  He admits he hasn't been very compliant  He usually checks his INR at home and hasn't done that either  He has bneen having some loose stools for the last 3-4 days  He had n/v   Earlier in the week but this has resolved  Knee Pain      Urticaria   Associated symptoms include diarrhea  Pertinent negatives include no cough, fever, shortness of breath or vomiting  The following portions of the patient's history were reviewed and updated as appropriate: allergies, current medications, past family history, past medical history, past social history, past surgical history and problem list     Review of Systems   Constitutional: Negative for appetite change and fever  Respiratory: Negative for cough, chest tightness and shortness of breath  Cardiovascular: Positive for leg swelling  Negative for chest pain and palpitations  Posterior left calf pain   Gastrointestinal: Positive for diarrhea  Negative for abdominal pain, nausea and vomiting  Musculoskeletal: Positive for gait problem           Objective:      /88   Pulse 87   Temp (!) 97 °F (36 1 °C) (Tympanic)   Ht 5' 6" (1 676 m)   Wt 83 8 kg (184 lb 12 8 oz)   SpO2 98%   BMI 29 83 kg/m²          Physical Exam   Constitutional: He appears well-developed and well-nourished  HENT:   Head: Normocephalic and atraumatic  Right Ear: External ear normal    Left Ear: External ear normal    Cardiovascular: Normal rate, regular rhythm and normal heart sounds  Pulmonary/Chest: Effort normal and breath sounds normal  No respiratory distress  Abdominal: Soft  Bowel sounds are normal  There is no tenderness  Musculoskeletal:   Patient has tenderness to palpation over the posterior left calf, Homans sign is positive

## 2018-03-01 NOTE — TELEPHONE ENCOUNTER
diana spoke w/ dr Nikki Pinto and she said pt needs to be seen today    I contacted pt and notified, he was transferred to [facility] to schedule w/ either dr Nikki Pinto or a PA

## 2018-03-01 NOTE — TELEPHONE ENCOUNTER
Pt called and said he stopped his coumadin 1-2 weeks ago due to insurance change and switching jobs he wasn't able to fill    Today he started w/ left lower leg pain and swelling, giving message to dr Josselin Paige (gave to [facility]) please advise

## 2018-03-02 ENCOUNTER — TELEPHONE (OUTPATIENT)
Dept: INTERNAL MEDICINE CLINIC | Facility: CLINIC | Age: 43
End: 2018-03-02

## 2018-03-02 PROCEDURE — 93970 EXTREMITY STUDY: CPT | Performed by: SURGERY

## 2018-03-02 NOTE — TELEPHONE ENCOUNTER
Spoke w/ pt, he said pharm said they wouldn't have it until today    He said he is going to pick it up today

## 2018-03-02 NOTE — TELEPHONE ENCOUNTER
pt called and said that [facility] is going in and out with there power was considered if it was ok to wait until tomorrow to get Xarelto but In the task Quimbyruben Schroeder said he should not be without an blood thinner over the weekend, so I told pt to go to pharmacy whenever they have there power back

## 2018-03-02 NOTE — TELEPHONE ENCOUNTER
Please call him and find out if it's definitely covered (or call his pharm) AND if he's going to get this today? He shouldn't be without blood thinner over the weekend    B/c if it's not covered  We need to start him on Coumadin

## 2018-03-05 ENCOUNTER — ANTICOAG VISIT (OUTPATIENT)
Dept: INTERNAL MEDICINE CLINIC | Facility: CLINIC | Age: 43
End: 2018-03-05

## 2018-03-05 ENCOUNTER — TELEPHONE (OUTPATIENT)
Dept: INTERNAL MEDICINE CLINIC | Facility: CLINIC | Age: 43
End: 2018-03-05

## 2018-03-05 DIAGNOSIS — I82.409 DEEP VEIN THROMBOSIS (DVT) OF LOWER EXTREMITY, UNSPECIFIED CHRONICITY, UNSPECIFIED LATERALITY, UNSPECIFIED VEIN (HCC): Primary | ICD-10-CM

## 2018-03-05 NOTE — TELEPHONE ENCOUNTER
PATIENT WAS HER LAST WEEK AND DIAGNOSISED WITH A DVT  WANTS TO KNOW HOW HE MADE OUT WITH HIS MEDICATION  XARELTO NOT APPROVED, SO STARTED THE COUMADIN   TAKES 10MG PER DAY   PLEASE CALL HIM WITH QUESTIONS

## 2018-03-06 ENCOUNTER — APPOINTMENT (OUTPATIENT)
Dept: LAB | Facility: HOSPITAL | Age: 43
End: 2018-03-06
Payer: COMMERCIAL

## 2018-03-06 ENCOUNTER — ANTICOAG VISIT (OUTPATIENT)
Dept: INTERNAL MEDICINE CLINIC | Facility: CLINIC | Age: 43
End: 2018-03-06

## 2018-03-06 ENCOUNTER — TELEPHONE (OUTPATIENT)
Dept: INTERNAL MEDICINE CLINIC | Facility: CLINIC | Age: 43
End: 2018-03-06

## 2018-03-06 DIAGNOSIS — I82.409 DEEP VEIN THROMBOSIS (DVT) OF LOWER EXTREMITY, UNSPECIFIED CHRONICITY, UNSPECIFIED LATERALITY, UNSPECIFIED VEIN (HCC): ICD-10-CM

## 2018-03-06 LAB
INR PPP: 2.3 (ref 0.86–1.16)
INR PPP: 2.3 (ref 0.86–1.16)
PROTHROMBIN TIME: 26 SECONDS (ref 12.1–14.4)

## 2018-03-06 PROCEDURE — 85610 PROTHROMBIN TIME: CPT

## 2018-03-06 PROCEDURE — 36415 COLL VENOUS BLD VENIPUNCTURE: CPT

## 2018-03-06 NOTE — TELEPHONE ENCOUNTER
----- Message from Cordell Zhang MD sent at 3/6/2018  4:41 PM EST -----  Please inform Ifra to let pt know to cont same dose of warfarin and rpt inr in 2 weeks

## 2018-03-22 ENCOUNTER — TELEPHONE (OUTPATIENT)
Dept: INTERNAL MEDICINE CLINIC | Facility: CLINIC | Age: 43
End: 2018-03-22

## 2018-03-22 ENCOUNTER — APPOINTMENT (OUTPATIENT)
Dept: LAB | Facility: HOSPITAL | Age: 43
End: 2018-03-22
Payer: COMMERCIAL

## 2018-03-22 ENCOUNTER — ANTICOAG VISIT (OUTPATIENT)
Dept: INTERNAL MEDICINE CLINIC | Facility: CLINIC | Age: 43
End: 2018-03-22

## 2018-03-22 DIAGNOSIS — Z79.01 ANTICOAGULATED: Primary | ICD-10-CM

## 2018-03-22 DIAGNOSIS — I82.409 ACUTE EMBOLISM AND THROMBOSIS OF DEEP VEIN OF LOWER EXTREMITY (HCC): ICD-10-CM

## 2018-03-22 DIAGNOSIS — Z86.718 PERSONAL HISTORY OF OTHER VENOUS THROMBOSIS AND EMBOLISM: ICD-10-CM

## 2018-03-22 LAB
INR PPP: 3.91 (ref 0.86–1.16)
PROTHROMBIN TIME: 39.6 SECONDS (ref 12.1–14.4)

## 2018-03-22 PROCEDURE — 36415 COLL VENOUS BLD VENIPUNCTURE: CPT

## 2018-03-22 PROCEDURE — 85610 PROTHROMBIN TIME: CPT

## 2018-03-22 NOTE — TELEPHONE ENCOUNTER
----- Message from Soledad Mota PA-C sent at 3/22/2018  6:56 PM EDT -----  Hold Coumadin recheck March 24

## 2018-03-23 ENCOUNTER — TELEPHONE (OUTPATIENT)
Dept: INTERNAL MEDICINE CLINIC | Facility: CLINIC | Age: 43
End: 2018-03-23

## 2018-03-23 NOTE — TELEPHONE ENCOUNTER
Pt recently had a blood clot in his leg about a month ago  Saw dr Gayle Crowell regarding this  Needs form filled out saying that he is ok to go back to work since he is feeling better  Wants to go back to work on Monday if possible  would a PA be able to sign this?  Please advise     Call pt when form ready or if any questions   [phone]

## 2018-03-23 NOTE — TELEPHONE ENCOUNTER
Did someone write him a work note to say he should not work until a specific time? I do not see a work note in his chart anywhere  Please ask him if Dr Palak Alexander asked him not to work until some prescribed timeframe    He may need a visit for this

## 2018-03-26 ENCOUNTER — OFFICE VISIT (OUTPATIENT)
Dept: INTERNAL MEDICINE CLINIC | Facility: CLINIC | Age: 43
End: 2018-03-26
Payer: COMMERCIAL

## 2018-03-26 ENCOUNTER — TELEPHONE (OUTPATIENT)
Dept: INTERNAL MEDICINE CLINIC | Facility: CLINIC | Age: 43
End: 2018-03-26

## 2018-03-26 ENCOUNTER — LAB (OUTPATIENT)
Dept: LAB | Facility: HOSPITAL | Age: 43
End: 2018-03-26
Payer: COMMERCIAL

## 2018-03-26 VITALS
SYSTOLIC BLOOD PRESSURE: 110 MMHG | HEART RATE: 76 BPM | DIASTOLIC BLOOD PRESSURE: 90 MMHG | RESPIRATION RATE: 14 BRPM | BODY MASS INDEX: 29.06 KG/M2 | HEIGHT: 66 IN | WEIGHT: 180.8 LBS

## 2018-03-26 DIAGNOSIS — Z79.01 ANTICOAGULATED: ICD-10-CM

## 2018-03-26 DIAGNOSIS — I82.419 DEEP VEIN THROMBOSIS (DVT) OF FEMORAL VEIN, UNSPECIFIED CHRONICITY, UNSPECIFIED LATERALITY (HCC): Primary | ICD-10-CM

## 2018-03-26 LAB
INR PPP: 1.19 (ref 0.86–1.16)
PROTHROMBIN TIME: 15.3 SECONDS (ref 12.1–14.4)

## 2018-03-26 PROCEDURE — 99213 OFFICE O/P EST LOW 20 MIN: CPT | Performed by: PHYSICIAN ASSISTANT

## 2018-03-26 PROCEDURE — 36415 COLL VENOUS BLD VENIPUNCTURE: CPT

## 2018-03-26 PROCEDURE — 85610 PROTHROMBIN TIME: CPT

## 2018-03-26 RX ORDER — WARFARIN SODIUM 10 MG/1
TABLET ORAL
Status: ON HOLD | COMMUNITY
End: 2019-06-21 | Stop reason: SDUPTHER

## 2018-03-26 NOTE — TELEPHONE ENCOUNTER
----- Message from Fidel Macedo PA-C sent at 3/26/2018  1:41 PM EDT -----  Coumadin 10 mg today and tomorrow that 9 mg per day recheck on April 30

## 2018-03-26 NOTE — PROGRESS NOTES
Assessment/Plan:  He feels fine his form was filled out so he can go back to work       Diagnoses and all orders for this visit:    Deep vein thrombosis (DVT) of femoral vein, unspecified chronicity, unspecified laterality ([facility] [address] )    Other orders  -     warfarin (COUMADIN) 10 mg tablet; Take by mouth daily              Subjective:      Patient ID: Cesar Vasquez is a 38 y o  male  He had leg pain a month ago  Found to have recurrent DVT  He has a previous history of DVT and poor compliance with Coumadin  He no longer has leg pain he is taking his medication he needs a paper filled out so he can go back to work  History of renal transplant followed by Nephrology doing well        The following portions of the patient's history were reviewed and updated as appropriate:   He has a past medical history of ADD (attention deficit disorder); Depression; H/O immunosuppressive therapy; History of kidney disease; Hypertension; and Nephropathy, IgA ,   does not have any pertinent problems on file  ,   has a past surgical history that includes Nephrectomy transplanted organ (Left, 2009)  ,  family history includes Deep vein thrombosis in his father and paternal grandfather; Transient ischemic attack in his mother  ,   reports that he has quit smoking  He quit after 4 00 years of use  He has never used smokeless tobacco  He reports that he drinks alcohol  He reports that he uses drugs, including Marijuana  ,  is allergic to penicillins     Current Outpatient Prescriptions   Medication Sig Dispense Refill    betamethasone, augmented, (DIPROLENE) 0 05 % ointment Apply 2 application topically      Cholecalciferol (VITAMIN D-3) 1000 units CAPS Take 2,000 Units by mouth daily      diltiazem (CARDIZEM) 120 MG tablet Take 120 mg by mouth 2 (two) times a day      mycophenolate (CELLCEPT) 250 mg capsule Take 250 mg by mouth 2 (two) times a day Take 2 in the AM, 1 in the PM       rivaroxaban (XARELTO) 15 mg tablet 1 tab BID for 21 days, then 20 mg daily 42 tablet 0    rivaroxaban (XARELTO) 20 mg tablet Start 20 mg daily after you have taken 15 mg Bid for 21 days 30 tablet 5    sertraline (ZOLOFT) 100 mg tablet Take 100 mg by mouth daily      tacrolimus (PROGRAF) 1 mg capsule Take 1 mg by mouth 2 (two) times a day      warfarin (COUMADIN) 10 mg tablet Take by mouth daily      amphetamine-dextroamphetamine (ADDERALL XR, 20MG,) 20 MG 24 hr capsule Take 40 mg by mouth daily      Benzoyl Peroxide (benzoyl peroxide) 10 % LIQD Apply topically daily       No current facility-administered medications for this visit  Review of Systems   Constitutional: Negative for activity change, appetite change, chills, diaphoresis, fatigue, fever and unexpected weight change  HENT: Negative for congestion, dental problem, drooling, ear discharge, ear pain, facial swelling, hearing loss, nosebleeds, postnasal drip, rhinorrhea, sinus pain, sinus pressure, sneezing, sore throat, tinnitus, trouble swallowing and voice change  Eyes: Negative for photophobia, pain, discharge, redness, itching and visual disturbance  Respiratory: Negative for apnea, cough, choking, chest tightness, shortness of breath, wheezing and stridor  Cardiovascular: Negative for chest pain, palpitations and leg swelling  Gastrointestinal: Negative for abdominal distention, abdominal pain, anal bleeding, blood in stool, constipation, diarrhea, nausea, rectal pain and vomiting  Endocrine: Negative for cold intolerance, heat intolerance, polydipsia, polyphagia and polyuria  Genitourinary: Negative for decreased urine volume, difficulty urinating, dysuria, enuresis, flank pain, frequency, genital sores, hematuria and urgency  Musculoskeletal: Negative for arthralgias, back pain, gait problem, joint swelling, myalgias, neck pain and neck stiffness  Skin: Negative for color change, pallor, rash and wound     Neurological: Negative for dizziness, tremors, seizures, syncope, facial asymmetry, speech difficulty, weakness, light-headedness, numbness and headaches  Hematological: Negative for adenopathy  Does not bruise/bleed easily  Psychiatric/Behavioral: Negative for agitation, behavioral problems, confusion, decreased concentration, dysphoric mood, hallucinations, self-injury, sleep disturbance and suicidal ideas  The patient is not nervous/anxious and is not hyperactive  Objective:  Vitals:    03/26/18 0803   BP: 110/90   BP Location: Left arm   Patient Position: Sitting   Pulse: 76   Resp: 14   Weight: 82 kg (180 lb 12 8 oz)   Height: 5' 6" (1 676 m)     Body mass index is 29 18 kg/m²  Physical Exam   Constitutional: He is oriented to person, place, and time  He appears well-developed  HENT:   Head: Normocephalic  Right Ear: External ear normal    Left Ear: External ear normal    Mouth/Throat: Oropharynx is clear and moist    Eyes: Conjunctivae are normal  Pupils are equal, round, and reactive to light  Neck: Neck supple  No thyromegaly present  Cardiovascular: Normal rate, regular rhythm, normal heart sounds and intact distal pulses  Pulmonary/Chest: Effort normal and breath sounds normal    Abdominal: Soft  Bowel sounds are normal    Musculoskeletal: Normal range of motion  Neurological: He is alert and oriented to person, place, and time  He has normal reflexes  Skin: Skin is warm and dry

## 2018-03-26 NOTE — TELEPHONE ENCOUNTER
----- Message from Martha Pedraza PA-C sent at 3/26/2018  1:41 PM EDT -----  Coumadin 10 mg today and tomorrow that 9 mg per day recheck on April 30

## 2018-04-12 DIAGNOSIS — I82.4Y9 DEEP VEIN THROMBOSIS (DVT) OF PROXIMAL LOWER EXTREMITY, UNSPECIFIED CHRONICITY, UNSPECIFIED LATERALITY (HCC): Primary | ICD-10-CM

## 2018-04-12 RX ORDER — WARFARIN SODIUM 5 MG/1
TABLET ORAL
Qty: 90 TABLET | Refills: 0 | Status: SHIPPED | OUTPATIENT
Start: 2018-04-12 | End: 2018-05-24 | Stop reason: SDUPTHER

## 2018-05-24 DIAGNOSIS — I82.4Y9 DEEP VEIN THROMBOSIS (DVT) OF PROXIMAL LOWER EXTREMITY, UNSPECIFIED CHRONICITY, UNSPECIFIED LATERALITY (HCC): ICD-10-CM

## 2018-05-24 RX ORDER — WARFARIN SODIUM 5 MG/1
TABLET ORAL
Qty: 90 TABLET | Refills: 0 | Status: SHIPPED | OUTPATIENT
Start: 2018-05-24 | End: 2018-07-25 | Stop reason: SDUPTHER

## 2018-06-04 DIAGNOSIS — F41.9 ANXIETY: Primary | ICD-10-CM

## 2018-06-04 RX ORDER — SERTRALINE HYDROCHLORIDE 100 MG/1
100 TABLET, FILM COATED ORAL DAILY
Qty: 7 TABLET | Refills: 0 | Status: SHIPPED | OUTPATIENT
Start: 2018-06-04 | End: 2018-06-13 | Stop reason: SDUPTHER

## 2018-06-04 NOTE — TELEPHONE ENCOUNTER
Pt cld stating he is in between physcs right now and has an appt with his new on 6/13 and is out of his ZOLOFT  He wants to know if Dr Ulises Antony can presribe a weeks supply for him because the old dr will not due to not seeing him in over 6 months  Please call and advise [phone]

## 2018-06-07 ENCOUNTER — HOSPITAL ENCOUNTER (EMERGENCY)
Facility: HOSPITAL | Age: 43
Discharge: HOME | End: 2018-06-07
Attending: EMERGENCY MEDICINE | Admitting: EMERGENCY MEDICINE
Payer: COMMERCIAL

## 2018-06-07 ENCOUNTER — APPOINTMENT (EMERGENCY)
Dept: RADIOLOGY | Facility: HOSPITAL | Age: 43
End: 2018-06-07
Payer: COMMERCIAL

## 2018-06-07 VITALS
RESPIRATION RATE: 18 BRPM | SYSTOLIC BLOOD PRESSURE: 163 MMHG | HEIGHT: 66 IN | OXYGEN SATURATION: 98 % | TEMPERATURE: 98 F | BODY MASS INDEX: 28.93 KG/M2 | WEIGHT: 180 LBS | HEART RATE: 78 BPM | DIASTOLIC BLOOD PRESSURE: 116 MMHG

## 2018-06-07 DIAGNOSIS — S68.119A FINGERTIP AMPUTATION: Primary | ICD-10-CM

## 2018-06-07 LAB
INR PPP: 1.37 (ref 0.86–1.17)
PROTHROMBIN TIME: 16.7 SECONDS (ref 11.8–14.2)

## 2018-06-07 PROCEDURE — 36415 COLL VENOUS BLD VENIPUNCTURE: CPT | Performed by: EMERGENCY MEDICINE

## 2018-06-07 PROCEDURE — 73130 X-RAY EXAM OF HAND: CPT

## 2018-06-07 PROCEDURE — 85610 PROTHROMBIN TIME: CPT | Performed by: EMERGENCY MEDICINE

## 2018-06-07 PROCEDURE — 99284 EMERGENCY DEPT VISIT MOD MDM: CPT

## 2018-06-07 RX ORDER — ACETAMINOPHEN 325 MG/1
650 TABLET ORAL EVERY 6 HOURS PRN
Qty: 30 TABLET | Refills: 0 | Status: SHIPPED | OUTPATIENT
Start: 2018-06-07 | End: 2018-06-12

## 2018-06-07 RX ORDER — CLINDAMYCIN HYDROCHLORIDE 150 MG/1
450 CAPSULE ORAL EVERY 6 HOURS SCHEDULED
Qty: 63 CAPSULE | Refills: 0 | Status: SHIPPED | OUTPATIENT
Start: 2018-06-07 | End: 2018-06-14

## 2018-06-07 RX ORDER — ACETAMINOPHEN 325 MG/1
975 TABLET ORAL ONCE
Status: COMPLETED | OUTPATIENT
Start: 2018-06-07 | End: 2018-06-07

## 2018-06-07 RX ORDER — CLINDAMYCIN HYDROCHLORIDE 150 MG/1
450 CAPSULE ORAL EVERY 6 HOURS SCHEDULED
Qty: 63 CAPSULE | Refills: 0 | Status: SHIPPED | OUTPATIENT
Start: 2018-06-07 | End: 2018-06-07

## 2018-06-07 RX ADMIN — ACETAMINOPHEN 975 MG: 325 TABLET ORAL at 18:39

## 2018-06-07 NOTE — DISCHARGE INSTRUCTIONS
Finger Amputation   WHAT YOU NEED TO KNOW:   Finger amputation is when part of a finger is removed from your hand  DISCHARGE INSTRUCTIONS:   Follow up with your bone or hand specialist within 2 days:  Write down any questions you have so you remember to ask them in your follow-up visits  How to care for your injury:  Follow your treatment plan to help prevent an infection or further tissue loss  · Wound care: If you do not see a bone or hand specialist within 2 days, you will need to change your bandage and clean your wound as directed  · Splints: You may need to wear a splint to support your finger while it heals  Wear the splint as directed  Medicines:   · Pain medicine: You may be given pain medicine to take away or decrease pain  Do not wait until the pain is severe before you take your medicine  · Antibiotics: This medicine will help fight or prevent an infection  Take your antibiotics until they are gone, even if you feel better  · Take your medicine as directed:  Call your bone or hand specialist if you think your medicine is not helping or if you have side effects  Tell him if you are taking any vitamins, herbs, or other medicines  Keep a list of the medicines you take  Include the medicine given to you today  Bring the list or the pill bottles to follow-up visits  Return to the emergency department if:   · Your wound drains pus (thick white or yellow fluid)  · Your wound starts to bleed and does not stop even after you apply pressure  · Your wound bleeds more than usual     · Your pain is severe even after you take your pain medicine  · You have a fever  © 2017 [address] [employer] Information is for End User's use only and may not be sold, redistributed or otherwise used for commercial purposes  All illustrations and images included in CareNotes® are the copyrighted property of [employer]  or [employer]it [employer]    The above information is an  only  It is not intended as medical advice for individual conditions or treatments  Talk to your doctor, nurse or pharmacist before following any medical regimen to see if it is safe and effective for you

## 2018-06-07 NOTE — ED NOTES
Left hand:  3rd finger tip severed, 4th finger tip lacerated  Bleeding controlled at present  Patient has 4 sutures to left thumb from previous laceration injury yesterday       Uriel Voss RN  06/07/18 4449

## 2018-06-07 NOTE — ED PROVIDER NOTES
History  Chief Complaint   Patient presents with    Hand Injury - Major     circular saw to left hand, pt on coumadin     HPI    Patient is a pleasant 40-year-old male that reports to the emergency department with a finger tip amputation of the left middle finger  He did this with a circular saw  Patient does take Coumadin  No other injuries  Bleeding is currently controlled  This is down to the bone  He is up-to-date on tetanus and had the shot yesterday  Medical decision makin-year-old male, injury to left middle finger, will image  Prior to Admission Medications   Prescriptions Last Dose Informant Patient Reported? Taking?    Benzoyl Peroxide (benzoyl peroxide) 10 % LIQD  Self Yes No   Sig: Apply topically daily   Cholecalciferol (VITAMIN D-3) 1000 units CAPS  Self Yes No   Sig: Take 2,000 Units by mouth daily   amphetamine-dextroamphetamine (ADDERALL XR, 20MG,) 20 MG 24 hr capsule  Self Yes No   Sig: Take 40 mg by mouth daily   betamethasone, augmented, (DIPROLENE) 0 05 % ointment  Self Yes No   Sig: Apply 2 application topically   diltiazem (CARDIZEM) 120 MG tablet  Self Yes No   Sig: Take 120 mg by mouth 2 (two) times a day   mycophenolate (CELLCEPT) 250 mg capsule  Self Yes No   Sig: Take 250 mg by mouth 2 (two) times a day Take 2 in the AM, 1 in the PM    rivaroxaban (XARELTO) 15 mg tablet  Self No No   Si tab BID for 21 days, then 20 mg daily   rivaroxaban (XARELTO) 20 mg tablet  Self No No   Sig: Start 20 mg daily after you have taken 15 mg Bid for 21 days   tacrolimus (PROGRAF) 1 mg capsule  Self Yes No   Sig: Take 1 mg by mouth 2 (two) times a day   warfarin (COUMADIN) 10 mg tablet  Self Yes No   Sig: Take by mouth daily   warfarin (COUMADIN) 5 mg tablet   No No   Sig: TAKE ONE OR TWO TABLETS BY MOUTH DAILY      Facility-Administered Medications: None       Past Medical History:   Diagnosis Date    ADD (attention deficit disorder)     Depression     H/O immunosuppressive therapy     History of kidney disease     Hypertension     Nephropathy, IgA        Past Surgical History:   Procedure Laterality Date    NEPHRECTOMY TRANSPLANTED ORGAN Left 2009       Family History   Problem Relation Age of Onset    Deep vein thrombosis Father     Deep vein thrombosis Paternal Grandfather     Transient ischemic attack Mother      I have reviewed and agree with the history as documented  Social History   Substance Use Topics    Smoking status: Former Smoker     Years: 4 00    Smokeless tobacco: Never Used    Alcohol use No      Comment: Occasionally        Review of Systems   Musculoskeletal: Positive for arthralgias  Skin: Positive for wound  All other systems reviewed and are negative  Physical Exam  Physical Exam   Constitutional: He is oriented to person, place, and time  He appears well-developed and well-nourished  HENT:   Head: Normocephalic and atraumatic  Eyes: EOM are normal  Pupils are equal, round, and reactive to light  Neck: Normal range of motion  Neck supple  Cardiovascular: Normal rate, regular rhythm and normal heart sounds  No murmur heard  Pulmonary/Chest: Effort normal and breath sounds normal  No respiratory distress  He has no wheezes  Abdominal: Soft  Bowel sounds are normal  He exhibits no distension  There is no tenderness  Musculoskeletal: Normal range of motion  He exhibits tenderness and deformity  He exhibits no edema  Neurological: He is alert and oriented to person, place, and time  No cranial nerve deficit  Coordination normal    Skin: Skin is warm and dry  He is not diaphoretic  No erythema  Psychiatric: He has a normal mood and affect  His behavior is normal    Nursing note and vitals reviewed        Vital Signs  ED Triage Vitals [06/07/18 1736]   Temperature Pulse Respirations Blood Pressure SpO2   98 °F (36 7 °C) 87 18 152/94 96 %      Temp Source Heart Rate Source Patient Position - Orthostatic VS BP Location FiO2 (%)   Oral Monitor Sitting Right arm --      Pain Score       6           Vitals:    06/07/18 1736 06/07/18 1846   BP: 152/94 (!) 163/116   Pulse: 87 78   Patient Position - Orthostatic VS: Sitting Sitting       Visual Acuity      ED Medications  Medications   acetaminophen (TYLENOL) tablet 975 mg (975 mg Oral Given 6/7/18 1839)       Diagnostic Studies  Results Reviewed     Procedure Component Value Units Date/Time    Protime-INR [[id number]]  (Abnormal) Collected:  06/07/18 1844    Lab Status:  Final result Specimen:  Blood from Arm, Right Updated:  06/07/18 1911     Protime 16 7 (H) seconds      INR 1 37 (H)                 XR hand 3+ vw left   Final Result by Balta Urias DO (06/08 9953)      Traumatic partial amputation of the distal phalanx of the 3rd finger  Workstation performed: [id number]                    Procedures  Procedures       Phone Contacts  ED Phone Contact    ED Course  ED Course as of Palomo 15 2018   Thu Jun 07, 2018   1900 Spoke with Jorge Redman, will splint the finger, keflex, followup with ora MATHIAS  CritCare Time    Disposition  Final diagnoses:   Fingertip amputation     Time reflects when diagnosis was documented in both MDM as applicable and the Disposition within this note     Time User Action Codes Description Comment    6/7/2018  7:02 PM Santana Sevilla Fingertip amputation       ED Disposition     ED Disposition Condition Comment    Discharge  Terrance Junior discharge to home/self care      Condition at discharge: Good        Follow-up Information     Follow up With Specialties Details Juan Ross MD Orthopedic Surgery In 2 days  [address]  [phone]            Discharge Medication List as of 6/7/2018  7:08 PM      START taking these medications    Details   acetaminophen (TYLENOL) 325 mg tablet Take 2 tablets (650 mg total) by mouth every 6 (six) hours as needed for mild pain for up to 5 days, Starting Thu 6/7/2018, Until Tue 6/12/2018, Print      clindamycin (CLEOCIN) 150 mg capsule Take 3 capsules (450 mg total) by mouth every 6 (six) hours for 7 days, Starting Thu 6/7/2018, Until Thu 6/14/2018, Normal         CONTINUE these medications which have NOT CHANGED    Details   amphetamine-dextroamphetamine (ADDERALL XR, 20MG,) 20 MG 24 hr capsule Take 40 mg by mouth daily, Historical Med      Benzoyl Peroxide (benzoyl peroxide) 10 % LIQD Apply topically daily, Starting Wed 12/20/2017, Historical Med      betamethasone, augmented, (DIPROLENE) 0 05 % ointment Apply 2 application topically, Starting Tue 11/14/2017, Historical Med      Cholecalciferol (VITAMIN D-3) 1000 units CAPS Take 2,000 Units by mouth daily, Historical Med      diltiazem (CARDIZEM) 120 MG tablet Take 120 mg by mouth 2 (two) times a day, Historical Med      mycophenolate (CELLCEPT) 250 mg capsule Take 250 mg by mouth 2 (two) times a day Take 2 in the AM, 1 in the PM , Historical Med      !! rivaroxaban (XARELTO) 15 mg tablet 1 tab BID for 21 days, then 20 mg daily, Normal      !! rivaroxaban (XARELTO) 20 mg tablet Start 20 mg daily after you have taken 15 mg Bid for 21 days, Normal      tacrolimus (PROGRAF) 1 mg capsule Take 1 mg by mouth 2 (two) times a day, Historical Med      !! warfarin (COUMADIN) 10 mg tablet Take by mouth daily, Historical Med      !! warfarin (COUMADIN) 5 mg tablet TAKE ONE OR TWO TABLETS BY MOUTH DAILY, Normal      sertraline (ZOLOFT) 100 mg tablet Take 1 tablet (100 mg total) by mouth daily, Starting Mon 6/4/2018, Normal       !! - Potential duplicate medications found  Please discuss with provider  No discharge procedures on file      ED Provider  Electronically Signed by           Kasi Newton MD  06/15/18 2018

## 2018-06-12 ENCOUNTER — OFFICE VISIT (OUTPATIENT)
Dept: OBGYN CLINIC | Facility: HOSPITAL | Age: 43
End: 2018-06-12
Payer: COMMERCIAL

## 2018-06-12 VITALS
WEIGHT: 189 LBS | DIASTOLIC BLOOD PRESSURE: 105 MMHG | SYSTOLIC BLOOD PRESSURE: 140 MMHG | HEART RATE: 86 BPM | HEIGHT: 66 IN | BODY MASS INDEX: 30.37 KG/M2

## 2018-06-12 DIAGNOSIS — S68.119A TRAUMATIC AMPUTATION OF FINGERTIP, INITIAL ENCOUNTER: Primary | ICD-10-CM

## 2018-06-12 PROCEDURE — 99203 OFFICE O/P NEW LOW 30 MIN: CPT | Performed by: ORTHOPAEDIC SURGERY

## 2018-06-12 NOTE — PROGRESS NOTES
38 y o male presenting to the office for follow up on partial amputation of his left middle finger distal phalanx  Patient states that he was using a table saw in an unsafe manner when this occurred  He has been doing daily dressing changes, and notes improvement in appearance since the injury  He also has a slight injury to the long finger, which he has a bandage covering  He states that the left thumb was hurt using a chainsaw on 6/6/18 and was evaluated and stitched at [facility]  He is currently on abx therapy  He denies any other acute or associated complaints       Review of Systems  Review of systems negative unless otherwise specified in HPI    Past Medical History  Past Medical History:   Diagnosis Date    ADD (attention deficit disorder)     Depression     H/O immunosuppressive therapy     History of kidney disease     Hypertension     Nephropathy, IgA        Past Surgical History  Past Surgical History:   Procedure Laterality Date    NEPHRECTOMY TRANSPLANTED ORGAN Left 2009       Current Medications  Current Outpatient Prescriptions on File Prior to Visit   Medication Sig Dispense Refill    acetaminophen (TYLENOL) 325 mg tablet Take 2 tablets (650 mg total) by mouth every 6 (six) hours as needed for mild pain for up to 5 days 30 tablet 0    amphetamine-dextroamphetamine (ADDERALL XR, 20MG,) 20 MG 24 hr capsule Take 40 mg by mouth daily      Benzoyl Peroxide (benzoyl peroxide) 10 % LIQD Apply topically daily      betamethasone, augmented, (DIPROLENE) 0 05 % ointment Apply 2 application topically      Cholecalciferol (VITAMIN D-3) 1000 units CAPS Take 2,000 Units by mouth daily      clindamycin (CLEOCIN) 150 mg capsule Take 3 capsules (450 mg total) by mouth every 6 (six) hours for 7 days 63 capsule 0    diltiazem (CARDIZEM) 120 MG tablet Take 120 mg by mouth 2 (two) times a day      mycophenolate (CELLCEPT) 250 mg capsule Take 250 mg by mouth 2 (two) times a day Take 2 in the AM, 1 in the PM       rivaroxaban (XARELTO) 15 mg tablet 1 tab BID for 21 days, then 20 mg daily 42 tablet 0    rivaroxaban (XARELTO) 20 mg tablet Start 20 mg daily after you have taken 15 mg Bid for 21 days 30 tablet 5    sertraline (ZOLOFT) 100 mg tablet Take 1 tablet (100 mg total) by mouth daily 7 tablet 0    tacrolimus (PROGRAF) 1 mg capsule Take 1 mg by mouth 2 (two) times a day      warfarin (COUMADIN) 10 mg tablet Take by mouth daily      warfarin (COUMADIN) 5 mg tablet TAKE ONE OR TWO TABLETS BY MOUTH DAILY 90 tablet 0     No current facility-administered medications on file prior to visit  Recent Labs [facility])    0  Lab Value Date/Time   HCT 43 8 12/19/2017 0432   HGB 14 5 12/19/2017 0432   WBC 7 63 12/19/2017 0432   INR 1 37 (H) 06/07/2018 1844   GLUCOSE 108 12/19/2017 0432   HGBA1C 5 6 11/04/2017 1023         Physical exam  · General: Awake, Alert, Oriented  · Eyes: Pupils equal, round and reactive to light  · Heart: regular rate and rhythm  · Lungs: No audible wheezing  · Abdomen: soft  left  Long finger  · Skin/incision/alignment/Palpation: partial amputation of the distal phalanx of the long finger without involvement of the nail bed  The exposed tissue is vascularized and there is no appearance of necrotic tissue or erythema  · Range of motion: patient has normal ROM in the fingers and thumb  · Sensation: intact diffusely  · Motor: intact diffusely  · Good capillary refill    Imaging  None needed    Procedure  Application of xeroform dressing    Assessment/Plan:   42 y o male with partial amputation of left long finger distal phalanx  - continue with xeroform dressings at this time  - finish abx therapy  - follow up in 1 week for re-evaluation

## 2018-06-13 DIAGNOSIS — F41.9 ANXIETY: ICD-10-CM

## 2018-06-13 RX ORDER — SERTRALINE HYDROCHLORIDE 100 MG/1
100 TABLET, FILM COATED ORAL DAILY
Qty: 14 TABLET | Refills: 0 | Status: SHIPPED | OUTPATIENT
Start: 2018-06-13 | End: 2019-06-17 | Stop reason: HOSPADM

## 2018-06-13 NOTE — TELEPHONE ENCOUNTER
DR Niru Vogt   OFFICE  CALLED  CAN'T BE  SEEN  TILL NEXT  WEEK  ONLY WORKING ONCE  A  WEEK  WANTS  TO KNOW  IF    [address]  GIVE  HIM ANOTHER  RX  FOR  ZOLOFT 100 MG ONCE  A  DAY  WANTS  TO KNOW IF  WE  CAN  GIVE  HIM ANOTHER  WK    TILL HIS  APPT   [facility]  [place]  ANY QUESTIONS  CALL PT   ALSO  DR Niru Vogt OFFICE   [id number] [address]

## 2018-06-21 ENCOUNTER — OFFICE VISIT (OUTPATIENT)
Dept: OBGYN CLINIC | Facility: CLINIC | Age: 43
End: 2018-06-21
Payer: COMMERCIAL

## 2018-06-21 VITALS
BODY MASS INDEX: 29.89 KG/M2 | SYSTOLIC BLOOD PRESSURE: 128 MMHG | DIASTOLIC BLOOD PRESSURE: 88 MMHG | HEIGHT: 66 IN | WEIGHT: 186 LBS | HEART RATE: 76 BPM

## 2018-06-21 DIAGNOSIS — S68.119S: Primary | ICD-10-CM

## 2018-06-21 PROCEDURE — 99213 OFFICE O/P EST LOW 20 MIN: CPT | Performed by: ORTHOPAEDIC SURGERY

## 2018-06-21 NOTE — PROGRESS NOTES
38 y o male presents to the office for follow up of partial amputation of his distal phalanx left long finger due to a table saw on 06/07/2018  Review of Systems  Review of systems negative unless otherwise specified in HPI    Past Medical History  Past Medical History:   Diagnosis Date    ADD (attention deficit disorder)     Depression     H/O immunosuppressive therapy     History of kidney disease     Hypertension     Nephropathy, IgA        Past Surgical History  Past Surgical History:   Procedure Laterality Date    NEPHRECTOMY TRANSPLANTED ORGAN Left 2009       Current Medications  Current Outpatient Prescriptions on File Prior to Visit   Medication Sig Dispense Refill    amphetamine-dextroamphetamine (ADDERALL XR, 20MG,) 20 MG 24 hr capsule Take 40 mg by mouth daily      Benzoyl Peroxide (benzoyl peroxide) 10 % LIQD Apply topically daily      betamethasone, augmented, (DIPROLENE) 0 05 % ointment Apply 2 application topically      Cholecalciferol (VITAMIN D-3) 1000 units CAPS Take 2,000 Units by mouth daily      diltiazem (CARDIZEM) 120 MG tablet Take 120 mg by mouth 2 (two) times a day      mycophenolate (CELLCEPT) 250 mg capsule Take 250 mg by mouth 2 (two) times a day Take 2 in the AM, 1 in the PM       rivaroxaban (XARELTO) 15 mg tablet 1 tab BID for 21 days, then 20 mg daily 42 tablet 0    rivaroxaban (XARELTO) 20 mg tablet Start 20 mg daily after you have taken 15 mg Bid for 21 days 30 tablet 5    sertraline (ZOLOFT) 100 mg tablet Take 1 tablet (100 mg total) by mouth daily 14 tablet 0    tacrolimus (PROGRAF) 1 mg capsule Take 1 mg by mouth 2 (two) times a day      warfarin (COUMADIN) 10 mg tablet Take by mouth daily      warfarin (COUMADIN) 5 mg tablet TAKE ONE OR TWO TABLETS BY MOUTH DAILY 90 tablet 0     No current facility-administered medications on file prior to visit          Recent Labs [facility])    0  Lab Value Date/Time   HCT 43 8 12/19/2017 0432 HGB 14 5 12/19/2017 0432   WBC 7 63 12/19/2017 0432   INR 1 37 (H) 06/07/2018 1844   GLUCOSE 108 12/19/2017 0432   HGBA1C 5 6 11/04/2017 1023         Physical exam  · General: Awake, Alert, Oriented  · Eyes: Pupils equal, round and reactive to light  · Heart: regular rate and rhythm  · Lungs: No audible wheezing  · Abdomen: soft  left hand      Imaging      Procedure      Assessment/Plan:   38 y o male with partial amputation of distal phalanx of left long finger

## 2018-06-22 ENCOUNTER — TELEPHONE (OUTPATIENT)
Dept: INTERNAL MEDICINE CLINIC | Facility: CLINIC | Age: 43
End: 2018-06-22

## 2018-06-24 PROBLEM — S68.119S: Status: ACTIVE | Noted: 2018-06-24

## 2018-06-27 ENCOUNTER — OFFICE VISIT (OUTPATIENT)
Dept: NEPHROLOGY | Facility: CLINIC | Age: 43
End: 2018-06-27
Payer: COMMERCIAL

## 2018-06-27 ENCOUNTER — TELEPHONE (OUTPATIENT)
Dept: INTERNAL MEDICINE CLINIC | Facility: CLINIC | Age: 43
End: 2018-06-27

## 2018-06-27 VITALS
TEMPERATURE: 97.8 F | BODY MASS INDEX: 29.89 KG/M2 | HEIGHT: 66 IN | DIASTOLIC BLOOD PRESSURE: 90 MMHG | SYSTOLIC BLOOD PRESSURE: 120 MMHG | RESPIRATION RATE: 16 BRPM | HEART RATE: 88 BPM | WEIGHT: 186 LBS

## 2018-06-27 DIAGNOSIS — N18.9 ACUTE KIDNEY INJURY SUPERIMPOSED ON CKD (HCC): ICD-10-CM

## 2018-06-27 DIAGNOSIS — N17.9 ACUTE KIDNEY INJURY SUPERIMPOSED ON CKD (HCC): Primary | ICD-10-CM

## 2018-06-27 DIAGNOSIS — N18.30 STAGE 3 CHRONIC KIDNEY DISEASE (HCC): ICD-10-CM

## 2018-06-27 DIAGNOSIS — N17.9 ACUTE KIDNEY INJURY SUPERIMPOSED ON CKD (HCC): ICD-10-CM

## 2018-06-27 DIAGNOSIS — Z86.718 HISTORY OF DVT (DEEP VEIN THROMBOSIS): Chronic | ICD-10-CM

## 2018-06-27 DIAGNOSIS — I10 ESSENTIAL HYPERTENSION: ICD-10-CM

## 2018-06-27 DIAGNOSIS — N18.9 ACUTE KIDNEY INJURY SUPERIMPOSED ON CKD (HCC): Primary | ICD-10-CM

## 2018-06-27 DIAGNOSIS — Z94.0 HISTORY OF RENAL TRANSPLANT: Primary | ICD-10-CM

## 2018-06-27 PROCEDURE — 99244 OFF/OP CNSLTJ NEW/EST MOD 40: CPT | Performed by: INTERNAL MEDICINE

## 2018-06-27 NOTE — ASSESSMENT & PLAN NOTE
As per last blood test in December kidney function is quite well with GFR of 64  Tacrolimus to was normal also  I will repeat blood test at this point  Discussed everything at length with him  Advised to avoid any nephrotoxic medicine    Hydration also discussed with him

## 2018-06-27 NOTE — PROGRESS NOTES
NEPHROLOGY OFFICE CONSULT  Sharan Aguirre 38 y o  male MRN: [id number]    Encounter: [id number] 6/27/2018    REASON FOR VISIT: Sharan Aguirre is a 38 y o male who was referred by Chaz Hernandez MD for evaluation of Consult and Kidney Transplant    HPI:    Therese Eaton came in today for evaluation and management of kidney transplant  24-year-old gentleman was a kidney transplant 2009  He had IgA nephropathy since he was 15  He remains related stable until 2008  He went on dialysis in home peritoneal dialysis but he got transplant within 9 months from his brother  He did very well  He does get recurrent UTI  In December he was hospitalized with dehydration  He was seen by Dr Joy Valerio at that time hydration improved him  He does not have any more blood test after that    He is feeling quite well  No acute complaint today        REVIEW OF SYSTEMS:    Review of Systems   Constitutional: Negative for activity change, appetite change, fatigue and unexpected weight change  HENT: Negative for congestion, ear discharge, sinus pain and sinus pressure  Eyes: Negative for photophobia, pain and visual disturbance  Respiratory: Negative for apnea, choking, chest tightness, shortness of breath and wheezing  Cardiovascular: Positive for leg swelling  Negative for chest pain and palpitations  Gastrointestinal: Negative for abdominal distention, abdominal pain, blood in stool, diarrhea, nausea and vomiting  Endocrine: Negative for heat intolerance and polyphagia  Genitourinary: Negative for difficulty urinating, flank pain and urgency  Musculoskeletal: Positive for arthralgias  Negative for back pain, gait problem, joint swelling, neck pain and neck stiffness  Skin: Negative for color change and wound  Allergic/Immunologic: Negative for food allergies and immunocompromised state  Neurological: Negative for seizures, facial asymmetry and weakness  Hematological: Negative for adenopathy  Does not bruise/bleed easily  Psychiatric/Behavioral: Negative for self-injury and suicidal ideas           PAST MEDICAL HISTORY:  Past Medical History:   Diagnosis Date    ADD (attention deficit disorder)     Depression     [facility]     H/O immunosuppressive therapy     History of kidney disease     Hypertension     Nephropathy, IgA        PAST SURGICAL HISTORY:  Past Surgical History:   Procedure Laterality Date    NEPHRECTOMY TRANSPLANTED ORGAN         SOCIAL HISTORY:  History   Alcohol Use No     Comment: Occasionally     History   Drug Use    Types: Marijuana     History   Smoking Status    Former Smoker    Years: 4 00   Smokeless Tobacco    Never Used       FAMILY HISTORY:  Family History   Problem Relation Age of Onset    Deep vein thrombosis Father     Deep vein thrombosis Paternal Grandfather     Transient ischemic attack Mother        MEDICATIONS:    Current Outpatient Prescriptions:     Cholecalciferol (VITAMIN D-3) 1000 units CAPS, Take 2,000 Units by mouth daily, Disp: , Rfl:     diltiazem (CARDIZEM) 120 MG tablet, Take 120 mg by mouth 2 (two) times a day, Disp: , Rfl:     mycophenolate (CELLCEPT) 250 mg capsule, Take 250 mg by mouth 2 (two) times a day Take 2 in the AM, 1 in the PM , Disp: , Rfl:     sertraline (ZOLOFT) 100 mg tablet, Take 1 tablet (100 mg total) by mouth daily, Disp: 14 tablet, Rfl: 0    tacrolimus (PROGRAF) 1 mg capsule, Take 1 mg by mouth 2 (two) times a day, Disp: , Rfl:     warfarin (COUMADIN) 5 mg tablet, TAKE ONE OR TWO TABLETS BY MOUTH DAILY, Disp: 90 tablet, Rfl: 0    amphetamine-dextroamphetamine (ADDERALL XR, 20MG,) 20 MG 24 hr capsule, Take 40 mg by mouth daily, Disp: , Rfl:     Benzoyl Peroxide (benzoyl peroxide) 10 % LIQD, Apply topically daily, Disp: , Rfl:     betamethasone, augmented, (DIPROLENE) 0 05 % ointment, Apply 2 application topically, Disp: , Rfl:     rivaroxaban (XARELTO) 15 mg tablet, 1 tab BID for 21 days, then 20 mg daily, Disp: 42 tablet, Rfl: 0    rivaroxaban (XARELTO) 20 mg tablet, Start 20 mg daily after you have taken 15 mg Bid for 21 days, Disp: 30 tablet, Rfl: 5    warfarin (COUMADIN) 10 mg tablet, Take by mouth daily, Disp: , Rfl:     PHYSICAL EXAM:  Vitals:    06/27/18 1420   BP: 120/90   BP Location: Right arm   Patient Position: Sitting   Pulse: 88   Resp: 16   Temp: 97 8 °F (36 6 °C)   TempSrc: Oral   Weight: 84 4 kg (186 lb)   Height: 5' 6" (1 676 m)     Body mass index is 30 02 kg/m²  Physical Exam   Constitutional: He is oriented to person, place, and time  He appears well-developed  No distress  HENT:   Head: Normocephalic  Mouth/Throat: Oropharynx is clear and moist    Eyes: Conjunctivae are normal  No scleral icterus  Neck: Neck supple  No JVD present  Cardiovascular: Normal rate and normal heart sounds  Pulmonary/Chest: Effort normal  He has no wheezes  Abdominal: Soft  There is no tenderness  Musculoskeletal: Normal range of motion  He exhibits no edema  Neurological: He is alert and oriented to person, place, and time  Skin: Skin is warm  No rash noted  Psychiatric: He has a normal mood and affect  His behavior is normal        LAB RESULTS:  Results for orders placed or performed during the hospital encounter of 06/07/18   Protime-INR   Result Value Ref Range    Protime 16 7 (H) 11 8 - 14 2 seconds    INR 1 37 (H) 0 86 - 1 17       ASSESSMENT and PLAN:    History of renal transplant  As per last blood test in December kidney function is quite well with GFR of 64  Tacrolimus to was normal also  I will repeat blood test at this point  Discussed everything at length with him  Advised to avoid any nephrotoxic medicine  Hydration also discussed with him    History of DVT (deep vein thrombosis)  He does have recurrent DVT and on Coumadin at this point    He may have family history as his father also had a history of DVT    HTN (hypertension)  Blood pressure is reasonably well controlled with diastolic in range of 90  This is his 1st visit and I will continue to monitor him    I will see him back in 3 months again will repeat blood test now and in 3 months  He is quite aware of what need to be done and I discussed that with him again  Thank you very much for the consultation I will monitor the patient with you      Portions of the record may have been created with voice recognition software  Occasional wrong word or "sound a like" substitutions may have occurred due to the inherent limitations of voice recognition software  Read the chart carefully and recognize, using context, where substitutions have occurred  If you have any questions, please contact the dictating provider

## 2018-06-27 NOTE — ASSESSMENT & PLAN NOTE
He does have recurrent DVT and on Coumadin at this point    He may have family history as his father also had a history of DVT

## 2018-06-27 NOTE — LETTER
June 27, 2018     Cole Figueroa MD  [address] [place] [id number]    Patient: Mercedes Romero   YOB: 1975   Date of Visit: 6/27/2018       Dear Dr Lionel Moreno:    Thank you for referring Mercedes Romero to me for evaluation  Below are my notes for this consultation  If you have questions, please do not hesitate to call me  I look forward to following your patient along with you  Sincerely,        Sheila Alejo MD        CC: No Recipients  Sheila Alejo MD  6/27/2018  3:30 PM  Sign at close encounter  [address] [facility] 38 y o  male MRN: [id number]    Encounter: [id number] 6/27/2018    REASON FOR VISIT: Mercedes Romero is a 38 y o male who was referred by Cole Figueroa MD for evaluation of Consult and Kidney Transplant    HPI:    Brandy Cornell came in today for evaluation and management of kidney transplant  39-year-old gentleman was a kidney transplant 2009  He had IgA nephropathy since he was 15  He remains related stable until 2008  He went on dialysis in home peritoneal dialysis but he got transplant within 9 months from his brother  He did very well  He does get recurrent UTI  In December he was hospitalized with dehydration  He was seen by Dr Fredy Ramon at that time hydration improved him  He does not have any more blood test after that    He is feeling quite well  No acute complaint today        REVIEW OF SYSTEMS:    Review of Systems   Constitutional: Negative for activity change, appetite change, fatigue and unexpected weight change  HENT: Negative for congestion, ear discharge, sinus pain and sinus pressure  Eyes: Negative for photophobia, pain and visual disturbance  Respiratory: Negative for apnea, choking, chest tightness, shortness of breath and wheezing  Cardiovascular: Positive for leg swelling  Negative for chest pain and palpitations  Gastrointestinal: Negative for abdominal distention, abdominal pain, blood in stool, diarrhea, nausea and vomiting  Endocrine: Negative for heat intolerance and polyphagia  Genitourinary: Negative for difficulty urinating, flank pain and urgency  Musculoskeletal: Positive for arthralgias  Negative for back pain, gait problem, joint swelling, neck pain and neck stiffness  Skin: Negative for color change and wound  Allergic/Immunologic: Negative for food allergies and immunocompromised state  Neurological: Negative for seizures, facial asymmetry and weakness  Hematological: Negative for adenopathy  Does not bruise/bleed easily  Psychiatric/Behavioral: Negative for self-injury and suicidal ideas           PAST MEDICAL HISTORY:  Past Medical History:   Diagnosis Date    ADD (attention deficit disorder)     Depression     [facility])     H/O immunosuppressive therapy     History of kidney disease     Hypertension     Nephropathy, IgA        PAST SURGICAL HISTORY:  Past Surgical History:   Procedure Laterality Date    NEPHRECTOMY TRANSPLANTED ORGAN         SOCIAL HISTORY:  History   Alcohol Use No     Comment: Occasionally     History   Drug Use    Types: Marijuana     History   Smoking Status    Former Smoker    Years: 4 00   Smokeless Tobacco    Never Used       FAMILY HISTORY:  Family History   Problem Relation Age of Onset    Deep vein thrombosis Father     Deep vein thrombosis Paternal Grandfather     Transient ischemic attack Mother        MEDICATIONS:    Current Outpatient Prescriptions:     Cholecalciferol (VITAMIN D-3) 1000 units CAPS, Take 2,000 Units by mouth daily, Disp: , Rfl:     diltiazem (CARDIZEM) 120 MG tablet, Take 120 mg by mouth 2 (two) times a day, Disp: , Rfl:     mycophenolate (CELLCEPT) 250 mg capsule, Take 250 mg by mouth 2 (two) times a day Take 2 in the AM, 1 in the PM , Disp: , Rfl:     sertraline (ZOLOFT) 100 mg tablet, Take 1 tablet (100 mg total) by mouth daily, Disp: 14 tablet, Rfl: 0    tacrolimus (PROGRAF) 1 mg capsule, Take 1 mg by mouth 2 (two) times a day, Disp: , Rfl:     warfarin (COUMADIN) 5 mg tablet, TAKE ONE OR TWO TABLETS BY MOUTH DAILY, Disp: 90 tablet, Rfl: 0    amphetamine-dextroamphetamine (ADDERALL XR, 20MG,) 20 MG 24 hr capsule, Take 40 mg by mouth daily, Disp: , Rfl:     Benzoyl Peroxide (benzoyl peroxide) 10 % LIQD, Apply topically daily, Disp: , Rfl:     betamethasone, augmented, (DIPROLENE) 0 05 % ointment, Apply 2 application topically, Disp: , Rfl:     rivaroxaban (XARELTO) 15 mg tablet, 1 tab BID for 21 days, then 20 mg daily, Disp: 42 tablet, Rfl: 0    rivaroxaban (XARELTO) 20 mg tablet, Start 20 mg daily after you have taken 15 mg Bid for 21 days, Disp: 30 tablet, Rfl: 5    warfarin (COUMADIN) 10 mg tablet, Take by mouth daily, Disp: , Rfl:     PHYSICAL EXAM:  Vitals:    06/27/18 1420   BP: 120/90   BP Location: Right arm   Patient Position: Sitting   Pulse: 88   Resp: 16   Temp: 97 8 °F (36 6 °C)   TempSrc: Oral   Weight: 84 4 kg (186 lb)   Height: 5' 6" (1 676 m)     Body mass index is 30 02 kg/m²  Physical Exam   Constitutional: He is oriented to person, place, and time  He appears well-developed  No distress  HENT:   Head: Normocephalic  Mouth/Throat: Oropharynx is clear and moist    Eyes: Conjunctivae are normal  No scleral icterus  Neck: Neck supple  No JVD present  Cardiovascular: Normal rate and normal heart sounds  Pulmonary/Chest: Effort normal  He has no wheezes  Abdominal: Soft  There is no tenderness  Musculoskeletal: Normal range of motion  He exhibits no edema  Neurological: He is alert and oriented to person, place, and time  Skin: Skin is warm  No rash noted  Psychiatric: He has a normal mood and affect   His behavior is normal        LAB RESULTS:  Results for orders placed or performed during the hospital encounter of 06/07/18   Protime-INR   Result Value Ref Range    Protime 16 7 (H) 11 8 - 14 2 seconds    INR 1 37 (H) 0 86 - 1 17       ASSESSMENT and PLAN:    History of renal transplant  As per last blood test in December kidney function is quite well with GFR of 64  Tacrolimus to was normal also  I will repeat blood test at this point  Discussed everything at length with him  Advised to avoid any nephrotoxic medicine  Hydration also discussed with him    History of DVT (deep vein thrombosis)  He does have recurrent DVT and on Coumadin at this point  He may have family history as his father also had a history of DVT    HTN (hypertension)  Blood pressure is reasonably well controlled with diastolic in range of 90  This is his 1st visit and I will continue to monitor him    I will see him back in 3 months again will repeat blood test now and in 3 months  He is quite aware of what need to be done and I discussed that with him again  Thank you very much for the consultation I will monitor the patient with you      Portions of the record may have been created with voice recognition software  Occasional wrong word or "sound a like" substitutions may have occurred due to the inherent limitations of voice recognition software  Read the chart carefully and recognize, using context, where substitutions have occurred  If you have any questions, please contact the dictating provider

## 2018-06-27 NOTE — ASSESSMENT & PLAN NOTE
Blood pressure is reasonably well controlled with diastolic in range of 90    This is his 1st visit and I will continue to monitor him

## 2018-06-27 NOTE — TELEPHONE ENCOUNTER
Patient has an appt today and he needs an order for Dr Pricila Neville Nephrologist  To be put into the system  Appt today @ 2:00

## 2018-07-05 ENCOUNTER — OFFICE VISIT (OUTPATIENT)
Dept: OBGYN CLINIC | Facility: HOSPITAL | Age: 43
End: 2018-07-05
Payer: COMMERCIAL

## 2018-07-05 VITALS
WEIGHT: 182.4 LBS | HEIGHT: 66 IN | BODY MASS INDEX: 29.32 KG/M2 | DIASTOLIC BLOOD PRESSURE: 92 MMHG | HEART RATE: 64 BPM | SYSTOLIC BLOOD PRESSURE: 128 MMHG

## 2018-07-05 DIAGNOSIS — S68.119S: Primary | ICD-10-CM

## 2018-07-05 PROCEDURE — 99211 OFF/OP EST MAY X REQ PHY/QHP: CPT | Performed by: PHYSICIAN ASSISTANT

## 2018-07-06 ENCOUNTER — TELEPHONE (OUTPATIENT)
Dept: NEPHROLOGY | Facility: CLINIC | Age: 43
End: 2018-07-06

## 2018-07-06 DIAGNOSIS — I10 ESSENTIAL HYPERTENSION: ICD-10-CM

## 2018-07-06 DIAGNOSIS — Z94.0 KIDNEY TRANSPLANTED: Primary | ICD-10-CM

## 2018-07-06 NOTE — TELEPHONE ENCOUNTER
RUSTY NEEDS REFILLS FOR; MYCOPHENOLATE 250 MG; TAKE; 2 TABLETS IN THE AM AND 1 TABLET IN THE PM; 90 DAY SUPPLY; TACROLIMUS 1 MG; TAKE 1 CAPSULE IN THE AM AND 1 CAPSULE IN THE PM; 90 DAY SUPPLY; DITIAZEM 120 MG; TAKE 1 TABLET IN THE AM AND 1 TABLET IN THE PM; 90 DAY SUPPLY' SEND TO [facility] IN [place] [phone]

## 2018-07-09 RX ORDER — TACROLIMUS 1 MG/1
1 CAPSULE ORAL 2 TIMES DAILY
Qty: 60 CAPSULE | Refills: 5 | Status: SHIPPED | OUTPATIENT
Start: 2018-07-09 | End: 2018-12-31 | Stop reason: SDUPTHER

## 2018-07-09 RX ORDER — MYCOPHENOLATE MOFETIL 250 MG/1
250 CAPSULE ORAL 2 TIMES DAILY
Qty: 60 CAPSULE | Refills: 5 | Status: SHIPPED | OUTPATIENT
Start: 2018-07-09 | End: 2018-07-10 | Stop reason: SDUPTHER

## 2018-07-09 RX ORDER — DILTIAZEM HYDROCHLORIDE 120 MG/1
120 TABLET, FILM COATED ORAL 2 TIMES DAILY
Qty: 60 TABLET | Refills: 5 | Status: SHIPPED | OUTPATIENT
Start: 2018-07-09 | End: 2018-12-31 | Stop reason: SDUPTHER

## 2018-07-10 DIAGNOSIS — Z94.0 KIDNEY TRANSPLANTED: ICD-10-CM

## 2018-07-10 RX ORDER — MYCOPHENOLATE MOFETIL 250 MG/1
CAPSULE ORAL
Qty: 60 CAPSULE | Refills: 0 | Status: SHIPPED | OUTPATIENT
Start: 2018-07-10 | End: 2018-08-02 | Stop reason: SDUPTHER

## 2018-07-10 NOTE — PROGRESS NOTES
38 y o male presenting to the office for 1 month follow-up status post partial amputation of his left long finger distal phalanx  Patient has been protecting the distal aspect of his finger tip while he does his regular work activities  He denies any wound dehiscence, drainage, erythema, or any concerns at this time  He denies any pain in the finger tip at this time  Patient denies any other acute or associated complaints  Review of Systems  Review of systems negative unless otherwise specified in HPI    Past Medical History  Past Medical History:   Diagnosis Date    ADD (attention deficit disorder)     Depression     [facility]     H/O immunosuppressive therapy     History of kidney disease     Hypertension     Nephropathy, IgA        Past Surgical History  Past Surgical History:   Procedure Laterality Date    NEPHRECTOMY TRANSPLANTED ORGAN         Current Medications  Current Outpatient Prescriptions on File Prior to Visit   Medication Sig Dispense Refill    amphetamine-dextroamphetamine (ADDERALL XR, 20MG,) 20 MG 24 hr capsule Take 40 mg by mouth daily      Benzoyl Peroxide (benzoyl peroxide) 10 % LIQD Apply topically daily      betamethasone, augmented, (DIPROLENE) 0 05 % ointment Apply 2 application topically      Cholecalciferol (VITAMIN D-3) 1000 units CAPS Take 2,000 Units by mouth daily      rivaroxaban (XARELTO) 15 mg tablet 1 tab BID for 21 days, then 20 mg daily 42 tablet 0    rivaroxaban (XARELTO) 20 mg tablet Start 20 mg daily after you have taken 15 mg Bid for 21 days 30 tablet 5    sertraline (ZOLOFT) 100 mg tablet Take 1 tablet (100 mg total) by mouth daily 14 tablet 0    warfarin (COUMADIN) 10 mg tablet Take by mouth daily      warfarin (COUMADIN) 5 mg tablet TAKE ONE OR TWO TABLETS BY MOUTH DAILY 90 tablet 0     No current facility-administered medications on file prior to visit          Recent Labs [facility])    0  Lab Value Date/Time   HCT 43 8 12/19/2017 0432   HGB 14 5 12/19/2017 0432   WBC 7 63 12/19/2017 0432   INR 1 37 (H) 06/07/2018 1844   GLUCOSE 108 12/19/2017 0432   HGBA1C 5 6 11/04/2017 1023         Physical exam  · General: Awake, Alert, Oriented  · Eyes: Pupils equal, round and reactive to light  · Heart: regular rate and rhythm  · Lungs: No audible wheezing  · Abdomen: soft  left hand  · Skin/incision/alignment/Palpation:  Slight sensitivity to palpation of the amputation site  Patient has well-healing amputation site at this time  There is no extra granular tissue, ingrown nail appearance, erythema, or drainage    · Range of motion:  Patient has full range of motion left long finger  · Sensation:  Patient has full sensation even at amputation site  · Motor:  Full Motor capabilities in the left long  · Good capillary refill    Imaging  None needed    Procedure  None needed    Assessment/Plan:   42 y o male 4 weeks status post partial amputation of the left long finger distal phalanx  -patient to continue to protect the until everything has fully healed scar down  -patient to maintain nail care to prevent any ingrown nails  -patient follow-up in 4 weeks for repeat evaluation

## 2018-07-25 DIAGNOSIS — I82.4Y9 DEEP VEIN THROMBOSIS (DVT) OF PROXIMAL LOWER EXTREMITY, UNSPECIFIED CHRONICITY, UNSPECIFIED LATERALITY (HCC): ICD-10-CM

## 2018-07-25 RX ORDER — WARFARIN SODIUM 5 MG/1
TABLET ORAL
Qty: 90 TABLET | Refills: 0 | Status: SHIPPED | OUTPATIENT
Start: 2018-07-25 | End: 2018-08-25 | Stop reason: SDUPTHER

## 2018-07-26 ENCOUNTER — OFFICE VISIT (OUTPATIENT)
Dept: OBGYN CLINIC | Facility: CLINIC | Age: 43
End: 2018-07-26
Payer: COMMERCIAL

## 2018-07-26 VITALS — DIASTOLIC BLOOD PRESSURE: 76 MMHG | HEART RATE: 87 BPM | SYSTOLIC BLOOD PRESSURE: 111 MMHG

## 2018-07-26 DIAGNOSIS — S68.119S: Primary | ICD-10-CM

## 2018-07-26 PROCEDURE — 99213 OFFICE O/P EST LOW 20 MIN: CPT | Performed by: ORTHOPAEDIC SURGERY

## 2018-07-26 NOTE — PROGRESS NOTES
38 y o male 6 week follow-up for left long finger distal phalanx partial amputation  He has been doing well since last visit  There is no drainage  He has been performing nail care   He does endorse some sensitivity to the distal aspect of the long finger distal phalanx    Review of Systems  Review of systems negative unless otherwise specified in HPI    Past Medical History  Past Medical History:   Diagnosis Date    ADD (attention deficit disorder)     Depression     [facility] [address] [facility]     H/O immunosuppressive therapy     History of kidney disease     Hypertension     Nephropathy, IgA        Past Surgical History  Past Surgical History:   Procedure Laterality Date    NEPHRECTOMY TRANSPLANTED ORGAN         Current Medications  Current Outpatient Prescriptions on File Prior to Visit   Medication Sig Dispense Refill    amphetamine-dextroamphetamine (ADDERALL XR, 20MG,) 20 MG 24 hr capsule Take 40 mg by mouth daily      Benzoyl Peroxide (benzoyl peroxide) 10 % LIQD Apply topically daily      betamethasone, augmented, (DIPROLENE) 0 05 % ointment Apply 2 application topically      Cholecalciferol (VITAMIN D-3) 1000 units CAPS Take 2,000 Units by mouth daily      diltiazem (CARDIZEM) 120 MG tablet Take 1 tablet (120 mg total) by mouth 2 (two) times a day 60 tablet 5    mycophenolate (CELLCEPT) 250 mg capsule Take 2 in the AM, 1 in the PM  60 capsule 0    rivaroxaban (XARELTO) 15 mg tablet 1 tab BID for 21 days, then 20 mg daily 42 tablet 0    rivaroxaban (XARELTO) 20 mg tablet Start 20 mg daily after you have taken 15 mg Bid for 21 days 30 tablet 5    sertraline (ZOLOFT) 100 mg tablet Take 1 tablet (100 mg total) by mouth daily 14 tablet 0    tacrolimus (PROGRAF) 1 mg capsule Take 1 capsule (1 mg total) by mouth 2 (two) times a day 60 capsule 5    warfarin (COUMADIN) 10 mg tablet Take by mouth daily      warfarin (COUMADIN) 5 mg tablet TAKE ONE OR TWO TABLETS BY MOUTH DAILY 90 tablet 0     No current facility-administered medications on file prior to visit  Recent Labs [facility])    0  Lab Value Date/Time   HCT 43 8 12/19/2017 0432   HGB 14 5 12/19/2017 0432   WBC 7 63 12/19/2017 0432   INR 1 37 (H) 06/07/2018 1844   GLUCOSE 108 12/19/2017 0432   HGBA1C 5 6 11/04/2017 1023         Physical exam  · General: Awake, Alert, Oriented  · Eyes: Pupils equal, round and reactive to light  · Heart: regular rate and rhythm  · Lungs: No audible wheezing  · Abdomen: soft  Left left upper extremity  FDS and FDP of the left long finger is intact  Sensation is intact to the radial and ulnar aspect of the long finger  There is well-healing over the distal phalanx    Imaging  No new imaging    Procedure  None    Assessment/Plan:   38 y o male left hand long finger distal phalanx partial amputation that is healing well  Plan is as follows:    Weightbearing as tolerated left hand  Continue desensitization exercises  Follow-up p r n

## 2018-08-02 DIAGNOSIS — Z94.0 KIDNEY TRANSPLANTED: ICD-10-CM

## 2018-08-02 RX ORDER — MYCOPHENOLATE MOFETIL 250 MG/1
CAPSULE ORAL
Qty: 60 CAPSULE | Refills: 0 | Status: SHIPPED | OUTPATIENT
Start: 2018-08-02 | End: 2018-08-27 | Stop reason: SDUPTHER

## 2018-08-25 DIAGNOSIS — I82.4Y9 DEEP VEIN THROMBOSIS (DVT) OF PROXIMAL LOWER EXTREMITY, UNSPECIFIED CHRONICITY, UNSPECIFIED LATERALITY (HCC): ICD-10-CM

## 2018-08-27 DIAGNOSIS — Z94.0 KIDNEY TRANSPLANTED: ICD-10-CM

## 2018-08-27 RX ORDER — WARFARIN SODIUM 5 MG/1
TABLET ORAL
Qty: 90 TABLET | Refills: 0 | Status: SHIPPED | OUTPATIENT
Start: 2018-08-27 | End: 2018-09-24 | Stop reason: SDUPTHER

## 2018-08-27 RX ORDER — MYCOPHENOLATE MOFETIL 250 MG/1
CAPSULE ORAL
Qty: 60 CAPSULE | Refills: 1 | Status: SHIPPED | OUTPATIENT
Start: 2018-08-27 | End: 2018-10-22 | Stop reason: SDUPTHER

## 2018-09-14 ENCOUNTER — TELEPHONE (OUTPATIENT)
Dept: INTERNAL MEDICINE CLINIC | Facility: CLINIC | Age: 43
End: 2018-09-14

## 2018-09-14 NOTE — TELEPHONE ENCOUNTER
Called wanted to let us know that the company has not received a INR for this pt 4/30/2017 wants to know if the pt is still using this for his at home INR   [phone]([id number])

## 2018-09-21 ENCOUNTER — OFFICE VISIT (OUTPATIENT)
Dept: INTERNAL MEDICINE CLINIC | Facility: CLINIC | Age: 43
End: 2018-09-21
Payer: COMMERCIAL

## 2018-09-21 VITALS
SYSTOLIC BLOOD PRESSURE: 130 MMHG | HEIGHT: 66 IN | HEART RATE: 82 BPM | WEIGHT: 176.6 LBS | DIASTOLIC BLOOD PRESSURE: 110 MMHG | TEMPERATURE: 97.4 F | BODY MASS INDEX: 28.38 KG/M2 | OXYGEN SATURATION: 96 %

## 2018-09-21 DIAGNOSIS — I82.4Z1 DEEP VEIN THROMBOSIS (DVT) OF DISTAL VEIN OF RIGHT LOWER EXTREMITY, UNSPECIFIED CHRONICITY (HCC): ICD-10-CM

## 2018-09-21 DIAGNOSIS — I10 ESSENTIAL HYPERTENSION: ICD-10-CM

## 2018-09-21 DIAGNOSIS — L50.9 HIVES: ICD-10-CM

## 2018-09-21 DIAGNOSIS — L08.9 SKIN INFECTION: ICD-10-CM

## 2018-09-21 DIAGNOSIS — I12.9 HYPERTENSIVE CHRONIC KIDNEY DISEASE, UNSPECIFIED CKD STAGE: Primary | ICD-10-CM

## 2018-09-21 PROBLEM — I82.409 DVT (DEEP VENOUS THROMBOSIS) (HCC): Status: ACTIVE | Noted: 2018-09-21

## 2018-09-21 PROCEDURE — 99214 OFFICE O/P EST MOD 30 MIN: CPT | Performed by: PHYSICIAN ASSISTANT

## 2018-09-21 PROCEDURE — 3008F BODY MASS INDEX DOCD: CPT | Performed by: PHYSICIAN ASSISTANT

## 2018-09-21 RX ORDER — DOXYCYCLINE HYCLATE 100 MG/1
100 CAPSULE ORAL EVERY 12 HOURS SCHEDULED
Qty: 20 CAPSULE | Refills: 0 | Status: SHIPPED | OUTPATIENT
Start: 2018-09-21 | End: 2018-10-01

## 2018-09-21 NOTE — PROGRESS NOTES
Assessment/Plan:  Scabbed wound left leg appears to be a resolving insect bite  Slightly raised not tender slightly indurated will cover for MRSA check labs on him 2 week follow-up       Diagnoses and all orders for this visit:    Hypertensive chronic kidney disease, unspecified CKD stage  -     doxycycline hyclate (VIBRAMYCIN) 100 mg capsule; Take 1 capsule (100 mg total) by mouth every 12 (twelve) hours for 10 days  -     CBC and differential; Future  -     Protime-INR; Future  -     Comprehensive metabolic panel; Future  -     Hemoglobin A1C; Future  -     Lipid panel; Future  -     TSH, 3rd generation; Future  -     UA w Reflex to Microscopic w Reflex to Culture    Essential hypertension  -     doxycycline hyclate (VIBRAMYCIN) 100 mg capsule; Take 1 capsule (100 mg total) by mouth every 12 (twelve) hours for 10 days  -     CBC and differential; Future  -     Protime-INR; Future  -     Comprehensive metabolic panel; Future  -     Hemoglobin A1C; Future  -     Lipid panel; Future  -     TSH, 3rd generation; Future  -     UA w Reflex to Microscopic w Reflex to Culture    Deep vein thrombosis (DVT) of distal vein of right lower extremity, unspecified chronicity (HCC)  -     doxycycline hyclate (VIBRAMYCIN) 100 mg capsule; Take 1 capsule (100 mg total) by mouth every 12 (twelve) hours for 10 days  -     CBC and differential; Future  -     Protime-INR; Future  -     Comprehensive metabolic panel; Future  -     Hemoglobin A1C; Future  -     Lipid panel; Future  -     TSH, 3rd generation; Future  -     UA w Reflex to Microscopic w Reflex to Culture    Skin infection  -     doxycycline hyclate (VIBRAMYCIN) 100 mg capsule; Take 1 capsule (100 mg total) by mouth every 12 (twelve) hours for 10 days  -     CBC and differential; Future  -     Protime-INR; Future  -     Comprehensive metabolic panel; Future  -     Hemoglobin A1C; Future  -     Lipid panel; Future  -     TSH, 3rd generation;  Future  -     UA w Reflex to Microscopic w Reflex to Culture    Hives        No problem-specific Assessment & Plan notes found for this encounter  Subjective:      Patient ID: Sony Payne is a 38 y o  male  He has had several reddened spots on his lower legs 1 in his left his medial shin which is a bit low larger with some surrounding redness and a necrotic center he recalls no injury or sting  No itchiness no fever no drainage  He has a history of kidney transplant on anti-rejection drugs he normally feels well active ambulatory working every day doing physical work with no problems has a history of a chronically swollen left leg previous DVT on Coumadin        The following portions of the patient's history were reviewed and updated as appropriate:   He has a past medical history of ADD (attention deficit disorder); Depression; DVT (deep venous thrombosis) ([address] ); H/O immunosuppressive therapy; History of kidney disease; Hypertension; and Nephropathy, IgA ,   does not have any pertinent problems on file  ,   has a past surgical history that includes Nephrectomy transplanted organ ,  family history includes Deep vein thrombosis in his father and paternal grandfather; Transient ischemic attack in his mother  ,   reports that he has quit smoking  He quit after 4 00 years of use  He has never used smokeless tobacco  He reports that he uses drugs, including Marijuana  He reports that he does not drink alcohol ,  is allergic to penicillins     Current Outpatient Prescriptions   Medication Sig Dispense Refill    Cholecalciferol (VITAMIN D-3) 1000 units CAPS Take 2,000 Units by mouth daily      diltiazem (CARDIZEM) 120 MG tablet Take 1 tablet (120 mg total) by mouth 2 (two) times a day 60 tablet 5    mycophenolate (CELLCEPT) 250 mg capsule Take 2 in the AM, 1 in the PM  60 capsule 1    sertraline (ZOLOFT) 100 mg tablet Take 1 tablet (100 mg total) by mouth daily 14 tablet 0    tacrolimus (PROGRAF) 1 mg capsule Take 1 capsule (1 mg total) by mouth 2 (two) times a day 60 capsule 5    warfarin (COUMADIN) 10 mg tablet Take by mouth daily      warfarin (COUMADIN) 5 mg tablet TAKE ONE OR TWO TABLETS BY MOUTH DAILY 90 tablet 0    amphetamine-dextroamphetamine (ADDERALL XR, 20MG,) 20 MG 24 hr capsule Take 40 mg by mouth daily      Benzoyl Peroxide (benzoyl peroxide) 10 % LIQD Apply topically daily      betamethasone, augmented, (DIPROLENE) 0 05 % ointment Apply 2 application topically      doxycycline hyclate (VIBRAMYCIN) 100 mg capsule Take 1 capsule (100 mg total) by mouth every 12 (twelve) hours for 10 days 20 capsule 0     No current facility-administered medications for this visit  Review of Systems   Constitutional: Negative for activity change, appetite change, chills, diaphoresis, fatigue, fever and unexpected weight change  HENT: Negative for congestion, dental problem, drooling, ear discharge, ear pain, facial swelling, hearing loss, nosebleeds, postnasal drip, rhinorrhea, sinus pain, sinus pressure, sneezing, sore throat, tinnitus, trouble swallowing and voice change  Eyes: Negative for photophobia, pain, discharge, redness, itching and visual disturbance  Respiratory: Negative for apnea, cough, choking, chest tightness, shortness of breath, wheezing and stridor  Cardiovascular: Positive for leg swelling  Negative for chest pain and palpitations  Gastrointestinal: Negative for abdominal distention, abdominal pain, anal bleeding, blood in stool, constipation, diarrhea, nausea, rectal pain and vomiting  Endocrine: Negative for cold intolerance, heat intolerance, polydipsia, polyphagia and polyuria  Genitourinary: Negative for decreased urine volume, difficulty urinating, dysuria, enuresis, flank pain, frequency, genital sores, hematuria and urgency  Musculoskeletal: Negative for arthralgias, back pain, gait problem, joint swelling, myalgias, neck pain and neck stiffness  Skin: Positive for wound   Negative for color change, pallor and rash  Neurological: Negative for dizziness, tremors, seizures, syncope, facial asymmetry, speech difficulty, weakness, light-headedness, numbness and headaches  Hematological: Negative for adenopathy  Does not bruise/bleed easily  Psychiatric/Behavioral: Negative for agitation, behavioral problems, confusion, decreased concentration, dysphoric mood, hallucinations, self-injury, sleep disturbance and suicidal ideas  The patient is not nervous/anxious and is not hyperactive  Objective:  Vitals:    09/21/18 1127   BP: (!) 130/110   BP Location: Left arm   Patient Position: Sitting   Pulse: 82   Temp: (!) 97 4 °F (36 3 °C)   TempSrc: Tympanic   SpO2: 96%   Weight: 80 1 kg (176 lb 9 6 oz)   Height: 5' 6" (1 676 m)     Body mass index is 28 5 kg/m²  Physical Exam   Constitutional: He is oriented to person, place, and time  He appears well-developed  HENT:   Head: Normocephalic  Right Ear: External ear normal    Left Ear: External ear normal    Mouth/Throat: Oropharynx is clear and moist    Eyes: Conjunctivae are normal  Pupils are equal, round, and reactive to light  Neck: Neck supple  No thyromegaly present  Cardiovascular: Normal rate, regular rhythm, normal heart sounds and intact distal pulses  Pulmonary/Chest: Effort normal and breath sounds normal  No respiratory distress  He exhibits no tenderness  Abdominal: Soft  Bowel sounds are normal  He exhibits no distension and no mass  Musculoskeletal: Normal range of motion  He exhibits edema (1+ left)  Neurological: He is alert and oriented to person, place, and time  He has normal reflexes  Skin: Skin is warm and dry  No rash noted  There is erythema (His several 1 cm dark reddened spots on his lower legs with 1 larger 1 3 cm diameter of redness slightly indurated in the center with a scab)  No pallor

## 2018-09-24 DIAGNOSIS — I82.4Y9 DEEP VEIN THROMBOSIS (DVT) OF PROXIMAL LOWER EXTREMITY, UNSPECIFIED CHRONICITY, UNSPECIFIED LATERALITY (HCC): ICD-10-CM

## 2018-09-24 RX ORDER — WARFARIN SODIUM 5 MG/1
TABLET ORAL
Qty: 90 TABLET | Refills: 0 | Status: SHIPPED | OUTPATIENT
Start: 2018-09-24 | End: 2018-10-23 | Stop reason: SDUPTHER

## 2018-09-26 ENCOUNTER — TELEPHONE (OUTPATIENT)
Dept: INTERNAL MEDICINE CLINIC | Facility: CLINIC | Age: 43
End: 2018-09-26

## 2018-09-26 ENCOUNTER — TRANSCRIBE ORDERS (OUTPATIENT)
Dept: LAB | Facility: HOSPITAL | Age: 43
End: 2018-09-26

## 2018-09-26 ENCOUNTER — APPOINTMENT (OUTPATIENT)
Dept: LAB | Facility: HOSPITAL | Age: 43
End: 2018-09-26
Attending: INTERNAL MEDICINE
Payer: COMMERCIAL

## 2018-09-26 DIAGNOSIS — I82.4Z1 DEEP VEIN THROMBOSIS (DVT) OF DISTAL VEIN OF RIGHT LOWER EXTREMITY, UNSPECIFIED CHRONICITY (HCC): ICD-10-CM

## 2018-09-26 DIAGNOSIS — Z94.0 KIDNEY REPLACED BY TRANSPLANT: Primary | ICD-10-CM

## 2018-09-26 DIAGNOSIS — I12.9 HYPERTENSIVE CHRONIC KIDNEY DISEASE, UNSPECIFIED CKD STAGE: ICD-10-CM

## 2018-09-26 DIAGNOSIS — Z94.0 HISTORY OF RENAL TRANSPLANT: ICD-10-CM

## 2018-09-26 DIAGNOSIS — L08.9 SKIN INFECTION: ICD-10-CM

## 2018-09-26 DIAGNOSIS — I10 ESSENTIAL HYPERTENSION: ICD-10-CM

## 2018-09-26 DIAGNOSIS — Z94.0 KIDNEY REPLACED BY TRANSPLANT: ICD-10-CM

## 2018-09-26 LAB
ALBUMIN SERPL BCP-MCNC: 3.4 G/DL (ref 3.5–5)
ALP SERPL-CCNC: 85 U/L (ref 46–116)
ALT SERPL W P-5'-P-CCNC: 23 U/L (ref 12–78)
ANION GAP SERPL CALCULATED.3IONS-SCNC: 5 MMOL/L (ref 4–13)
AST SERPL W P-5'-P-CCNC: 19 U/L (ref 5–45)
BACTERIA UR QL AUTO: ABNORMAL /HPF
BASOPHILS # BLD AUTO: 0.06 THOUSANDS/ΜL (ref 0–0.1)
BASOPHILS NFR BLD AUTO: 1 % (ref 0–1)
BILIRUB SERPL-MCNC: 0.5 MG/DL (ref 0.2–1)
BILIRUB UR QL STRIP: NEGATIVE
BUN SERPL-MCNC: 22 MG/DL (ref 5–25)
CALCIUM SERPL-MCNC: 9.4 MG/DL (ref 8.3–10.1)
CHLORIDE SERPL-SCNC: 108 MMOL/L (ref 100–108)
CHOLEST SERPL-MCNC: 191 MG/DL (ref 50–200)
CLARITY UR: CLEAR
CO2 SERPL-SCNC: 26 MMOL/L (ref 21–32)
COLOR UR: YELLOW
CREAT SERPL-MCNC: 1.39 MG/DL (ref 0.6–1.3)
CREAT UR-MCNC: 274 MG/DL
EOSINOPHIL # BLD AUTO: 0.19 THOUSAND/ΜL (ref 0–0.61)
EOSINOPHIL NFR BLD AUTO: 3 % (ref 0–6)
ERYTHROCYTE [DISTWIDTH] IN BLOOD BY AUTOMATED COUNT: 12.7 % (ref 11.6–15.1)
EST. AVERAGE GLUCOSE BLD GHB EST-MCNC: 117 MG/DL
GFR SERPL CREATININE-BSD FRML MDRD: 61 ML/MIN/1.73SQ M
GLUCOSE P FAST SERPL-MCNC: 93 MG/DL (ref 65–99)
GLUCOSE UR STRIP-MCNC: NEGATIVE MG/DL
HBA1C MFR BLD: 5.7 % (ref 4.2–6.3)
HCT VFR BLD AUTO: 45 % (ref 36.5–49.3)
HDLC SERPL-MCNC: 34 MG/DL (ref 40–60)
HGB BLD-MCNC: 14.9 G/DL (ref 12–17)
HGB UR QL STRIP.AUTO: ABNORMAL
IMM GRANULOCYTES # BLD AUTO: 0.02 THOUSAND/UL (ref 0–0.2)
IMM GRANULOCYTES NFR BLD AUTO: 0 % (ref 0–2)
INR PPP: 1.4 (ref 0.86–1.17)
KETONES UR STRIP-MCNC: NEGATIVE MG/DL
LDLC SERPL CALC-MCNC: 121 MG/DL (ref 0–100)
LEUKOCYTE ESTERASE UR QL STRIP: NEGATIVE
LYMPHOCYTES # BLD AUTO: 1.51 THOUSANDS/ΜL (ref 0.6–4.47)
LYMPHOCYTES NFR BLD AUTO: 25 % (ref 14–44)
MCH RBC QN AUTO: 31.7 PG (ref 26.8–34.3)
MCHC RBC AUTO-ENTMCNC: 33.1 G/DL (ref 31.4–37.4)
MCV RBC AUTO: 96 FL (ref 82–98)
MONOCYTES # BLD AUTO: 0.55 THOUSAND/ΜL (ref 0.17–1.22)
MONOCYTES NFR BLD AUTO: 9 % (ref 4–12)
NEUTROPHILS # BLD AUTO: 3.83 THOUSANDS/ΜL (ref 1.85–7.62)
NEUTS SEG NFR BLD AUTO: 62 % (ref 43–75)
NITRITE UR QL STRIP: NEGATIVE
NON-SQ EPI CELLS URNS QL MICRO: ABNORMAL /HPF
NONHDLC SERPL-MCNC: 157 MG/DL
NRBC BLD AUTO-RTO: 0 /100 WBCS
PH UR STRIP.AUTO: 5.5 [PH] (ref 4.5–8)
PHOSPHATE SERPL-MCNC: 1.9 MG/DL (ref 2.7–4.5)
PLATELET # BLD AUTO: 224 THOUSANDS/UL (ref 149–390)
PMV BLD AUTO: 10.2 FL (ref 8.9–12.7)
POTASSIUM SERPL-SCNC: 4.5 MMOL/L (ref 3.5–5.3)
PROT SERPL-MCNC: 7.4 G/DL (ref 6.4–8.2)
PROT UR STRIP-MCNC: ABNORMAL MG/DL
PROT UR-MCNC: 81 MG/DL
PROT/CREAT UR: 0.3 MG/G{CREAT} (ref 0–0.1)
PROTHROMBIN TIME: 17 SECONDS (ref 11.8–14.2)
PTH-INTACT SERPL-MCNC: 98.1 PG/ML (ref 18.4–80.1)
RBC # BLD AUTO: 4.7 MILLION/UL (ref 3.88–5.62)
RBC #/AREA URNS AUTO: ABNORMAL /HPF
SODIUM SERPL-SCNC: 139 MMOL/L (ref 136–145)
SP GR UR STRIP.AUTO: >=1.03 (ref 1–1.03)
TACROLIMUS BLD-MCNC: 1.3 NG/ML (ref 2–20)
TRIGL SERPL-MCNC: 178 MG/DL
TSH SERPL DL<=0.05 MIU/L-ACNC: 1.55 UIU/ML (ref 0.36–3.74)
UROBILINOGEN UR QL STRIP.AUTO: 0.2 E.U./DL
WBC # BLD AUTO: 6.16 THOUSAND/UL (ref 4.31–10.16)
WBC #/AREA URNS AUTO: ABNORMAL /HPF

## 2018-09-26 PROCEDURE — 82570 ASSAY OF URINE CREATININE: CPT | Performed by: INTERNAL MEDICINE

## 2018-09-26 PROCEDURE — 84156 ASSAY OF PROTEIN URINE: CPT | Performed by: INTERNAL MEDICINE

## 2018-09-26 PROCEDURE — 80197 ASSAY OF TACROLIMUS: CPT

## 2018-09-26 PROCEDURE — 84443 ASSAY THYROID STIM HORMONE: CPT

## 2018-09-26 PROCEDURE — 85610 PROTHROMBIN TIME: CPT

## 2018-09-26 PROCEDURE — 80061 LIPID PANEL: CPT

## 2018-09-26 PROCEDURE — 83970 ASSAY OF PARATHORMONE: CPT

## 2018-09-26 PROCEDURE — 85025 COMPLETE CBC W/AUTO DIFF WBC: CPT

## 2018-09-26 PROCEDURE — 36415 COLL VENOUS BLD VENIPUNCTURE: CPT

## 2018-09-26 PROCEDURE — 84100 ASSAY OF PHOSPHORUS: CPT

## 2018-09-26 PROCEDURE — 80053 COMPREHEN METABOLIC PANEL: CPT

## 2018-09-26 PROCEDURE — 83036 HEMOGLOBIN GLYCOSYLATED A1C: CPT

## 2018-09-26 PROCEDURE — 81001 URINALYSIS AUTO W/SCOPE: CPT | Performed by: PHYSICIAN ASSISTANT

## 2018-09-26 NOTE — TELEPHONE ENCOUNTER
Pt's INR is 1 40   Need to ask pt how he is taking his coumadin or is he holding the coumadin for procedure, etc    LVM asking pt to return call

## 2018-09-27 ENCOUNTER — TELEPHONE (OUTPATIENT)
Dept: INTERNAL MEDICINE CLINIC | Facility: CLINIC | Age: 43
End: 2018-09-27

## 2018-09-27 NOTE — TELEPHONE ENCOUNTER
I called pt yesterday and again today, no ans    LVM asking pt to return call to discuss whether or not he is taking coumadin and if yes to confirm his dosing   Also mentioned that level may not be therapeutic

## 2018-09-28 ENCOUNTER — ANTICOAG VISIT (OUTPATIENT)
Dept: INTERNAL MEDICINE CLINIC | Facility: CLINIC | Age: 43
End: 2018-09-28

## 2018-09-28 NOTE — PROGRESS NOTES
Called pt per dr Kaye Oliveira to increase the dose from 10mg to 12 5mg and recheck on Friday 10/05/2018

## 2018-09-28 NOTE — TELEPHONE ENCOUNTER
Please tell him to increase the dose of the warfarin from 10 mg to 12 5 mg daily and to repeat the INR in 1 week

## 2018-10-02 ENCOUNTER — TELEPHONE (OUTPATIENT)
Dept: INTERNAL MEDICINE CLINIC | Facility: CLINIC | Age: 43
End: 2018-10-02

## 2018-10-05 ENCOUNTER — OFFICE VISIT (OUTPATIENT)
Dept: INTERNAL MEDICINE CLINIC | Facility: CLINIC | Age: 43
End: 2018-10-05
Payer: COMMERCIAL

## 2018-10-05 ENCOUNTER — ANTICOAG VISIT (OUTPATIENT)
Dept: INTERNAL MEDICINE CLINIC | Facility: CLINIC | Age: 43
End: 2018-10-05

## 2018-10-05 ENCOUNTER — APPOINTMENT (OUTPATIENT)
Dept: LAB | Facility: CLINIC | Age: 43
End: 2018-10-05
Payer: COMMERCIAL

## 2018-10-05 ENCOUNTER — TELEPHONE (OUTPATIENT)
Dept: INTERNAL MEDICINE CLINIC | Facility: CLINIC | Age: 43
End: 2018-10-05

## 2018-10-05 VITALS
HEIGHT: 66 IN | HEART RATE: 69 BPM | DIASTOLIC BLOOD PRESSURE: 92 MMHG | BODY MASS INDEX: 28.12 KG/M2 | OXYGEN SATURATION: 96 % | WEIGHT: 175 LBS | SYSTOLIC BLOOD PRESSURE: 128 MMHG

## 2018-10-05 DIAGNOSIS — N18.9 ACUTE KIDNEY INJURY SUPERIMPOSED ON CKD (HCC): ICD-10-CM

## 2018-10-05 DIAGNOSIS — I82.4Y9 DEEP VEIN THROMBOSIS (DVT) OF PROXIMAL LOWER EXTREMITY, UNSPECIFIED CHRONICITY, UNSPECIFIED LATERALITY (HCC): ICD-10-CM

## 2018-10-05 DIAGNOSIS — I10 ESSENTIAL HYPERTENSION: ICD-10-CM

## 2018-10-05 DIAGNOSIS — N17.9 ACUTE KIDNEY INJURY SUPERIMPOSED ON CKD (HCC): ICD-10-CM

## 2018-10-05 DIAGNOSIS — Z23 NEED FOR INFLUENZA VACCINATION: ICD-10-CM

## 2018-10-05 DIAGNOSIS — D69.9 ANTICOAGULANT DISORDER (HCC): Primary | ICD-10-CM

## 2018-10-05 DIAGNOSIS — L08.9 SKIN INFECTION: Primary | ICD-10-CM

## 2018-10-05 DIAGNOSIS — D69.9 ANTICOAGULANT DISORDER (HCC): ICD-10-CM

## 2018-10-05 LAB
INR PPP: 1.35 (ref 0.86–1.17)
PROTHROMBIN TIME: 16.8 SECONDS (ref 11.8–14.2)

## 2018-10-05 PROCEDURE — 85610 PROTHROMBIN TIME: CPT

## 2018-10-05 PROCEDURE — 90471 IMMUNIZATION ADMIN: CPT

## 2018-10-05 PROCEDURE — 99214 OFFICE O/P EST MOD 30 MIN: CPT | Performed by: PHYSICIAN ASSISTANT

## 2018-10-05 PROCEDURE — 90662 IIV NO PRSV INCREASED AG IM: CPT

## 2018-10-05 PROCEDURE — 36415 COLL VENOUS BLD VENIPUNCTURE: CPT

## 2018-10-05 RX ORDER — DOXYCYCLINE HYCLATE 100 MG/1
100 CAPSULE ORAL EVERY 12 HOURS SCHEDULED
Qty: 20 CAPSULE | Refills: 0 | Status: SHIPPED | OUTPATIENT
Start: 2018-10-05 | End: 2018-10-15

## 2018-10-05 NOTE — PROGRESS NOTES
Assessment/Plan:  His skin lesion left lower leg is slowly healing  Still appears to be a venomous bug bite  Redness is smaller and demarcated central scab remains induration is less 10 more days of doxycycline blood pressure is under control I reviewed all of his labs he seeing Nephrology in 2 weeks  His Coumadin is being monitored  He feels well he is active working every day       Diagnoses and all orders for this visit:    Skin infection  -     doxycycline hyclate (VIBRAMYCIN) 100 mg capsule; Take 1 capsule (100 mg total) by mouth every 12 (twelve) hours for 10 days    Essential hypertension    Deep vein thrombosis (DVT) of proximal lower extremity, unspecified chronicity, unspecified laterality (HCC)    Acute kidney injury superimposed on CKD ([address] )    Need for influenza vaccination  -     influenza vaccine, [id number], high-dose, PF 0 5 mL, for patients 65 yr+ (FLUZONE HIGH-DOSE)        No problem-specific Assessment & Plan notes found for this encounter  Subjective:      Patient ID: Savannah Topete is a 38 y o  male  He is back for follow-up of a skin infection left leg which appears to be improving still has a scab and some redness  He also has a psoriasiform rash of his legs followed by Dermatology for this  Overall he feels well working every day history of renal transplant on immunosuppressive on Coumadin for DVT  Currently no fever has a good appetite normally active        The following portions of the patient's history were reviewed and updated as appropriate:   He has a past medical history of ADD (attention deficit disorder); Depression; DVT (deep venous thrombosis) ([address] ); H/O immunosuppressive therapy; History of kidney disease; Hypertension; and Nephropathy, IgA ,   does not have any pertinent problems on file  ,   has a past surgical history that includes Nephrectomy transplanted organ ,  family history includes Deep vein thrombosis in his father and paternal grandfather; Transient ischemic attack in his mother  ,   reports that he has quit smoking  He quit after 4 00 years of use  He has never used smokeless tobacco  He reports that he uses drugs, including Marijuana  He reports that he does not drink alcohol ,  is allergic to penicillins     Current Outpatient Prescriptions   Medication Sig Dispense Refill    amphetamine-dextroamphetamine (ADDERALL XR, 20MG,) 20 MG 24 hr capsule Take 40 mg by mouth daily      Benzoyl Peroxide (benzoyl peroxide) 10 % LIQD Apply topically daily      betamethasone, augmented, (DIPROLENE) 0 05 % ointment Apply 2 application topically      Cholecalciferol (VITAMIN D-3) 1000 units CAPS Take 2,000 Units by mouth daily      diltiazem (CARDIZEM) 120 MG tablet Take 1 tablet (120 mg total) by mouth 2 (two) times a day 60 tablet 5    mycophenolate (CELLCEPT) 250 mg capsule Take 2 in the AM, 1 in the PM  60 capsule 1    sertraline (ZOLOFT) 100 mg tablet Take 1 tablet (100 mg total) by mouth daily 14 tablet 0    tacrolimus (PROGRAF) 1 mg capsule Take 1 capsule (1 mg total) by mouth 2 (two) times a day 60 capsule 5    warfarin (COUMADIN) 10 mg tablet Take by mouth daily      warfarin (COUMADIN) 5 mg tablet TAKE ONE OR TWO TABLETS BY MOUTH DAILY 90 tablet 0    doxycycline hyclate (VIBRAMYCIN) 100 mg capsule Take 1 capsule (100 mg total) by mouth every 12 (twelve) hours for 10 days 20 capsule 0     No current facility-administered medications for this visit  Review of Systems   Constitutional: Negative for activity change, appetite change, chills, diaphoresis, fatigue, fever and unexpected weight change  HENT: Negative for congestion, dental problem, drooling, ear discharge, ear pain, facial swelling, hearing loss, nosebleeds, postnasal drip, rhinorrhea, sinus pain, sinus pressure, sneezing, sore throat, tinnitus, trouble swallowing and voice change  Eyes: Negative for photophobia, pain, discharge, redness, itching and visual disturbance     Respiratory: Negative for apnea, cough, choking, chest tightness, shortness of breath, wheezing and stridor  Cardiovascular: Negative for chest pain, palpitations and leg swelling  Gastrointestinal: Negative for abdominal distention, abdominal pain, anal bleeding, blood in stool, constipation, diarrhea, nausea, rectal pain and vomiting  Endocrine: Negative for cold intolerance, heat intolerance, polydipsia, polyphagia and polyuria  Genitourinary: Negative for decreased urine volume, difficulty urinating, dysuria, enuresis, flank pain, frequency, genital sores, hematuria and urgency  Musculoskeletal: Negative for arthralgias, back pain, gait problem, joint swelling, myalgias, neck pain and neck stiffness  Skin: Positive for color change, rash and wound  Negative for pallor  Neurological: Negative for dizziness, tremors, seizures, syncope, facial asymmetry, speech difficulty, weakness, light-headedness, numbness and headaches  Hematological: Negative for adenopathy  Does not bruise/bleed easily  Psychiatric/Behavioral: Negative for agitation, behavioral problems, confusion, decreased concentration, dysphoric mood, hallucinations, self-injury, sleep disturbance and suicidal ideas  The patient is not nervous/anxious and is not hyperactive  Objective:  Vitals:    10/05/18 1026   BP: 128/92   BP Location: Left arm   Patient Position: Sitting   Pulse: 69   SpO2: 96%   Weight: 79 4 kg (175 lb)   Height: 5' 6" (1 676 m)     Body mass index is 28 25 kg/m²  Physical Exam   Constitutional: He is oriented to person, place, and time  He appears well-developed  HENT:   Head: Normocephalic  Right Ear: External ear normal    Left Ear: External ear normal    Mouth/Throat: Oropharynx is clear and moist    Eyes: Pupils are equal, round, and reactive to light  Conjunctivae are normal    Neck: Neck supple  No thyromegaly present     Cardiovascular: Normal rate, regular rhythm, normal heart sounds and intact distal pulses  Pulmonary/Chest: Effort normal and breath sounds normal    Abdominal: Soft  Bowel sounds are normal    Musculoskeletal: Normal range of motion  Neurological: He is alert and oriented to person, place, and time  He has normal reflexes  Skin: Skin is warm and dry  Rash (3 cm circular lesion left lower leg mid medially  Slight induration 1 cm scab formation in the center  Scattered crusty hyper pigmented plaque lesions of his lower legs) noted  There is erythema

## 2018-10-05 NOTE — TELEPHONE ENCOUNTER
----- Message from Brianna Chiang MD sent at 10/5/2018  5:09 PM EDT -----  Please take warfarin 15 mg daily and repeat INR in a week

## 2018-10-05 NOTE — TELEPHONE ENCOUNTER
Advised Dr Jose Carlos Rojas already spoke to patient and he was only taking 10 mg so she stated to keep patient on the 12 5mg

## 2018-10-08 ENCOUNTER — OFFICE VISIT (OUTPATIENT)
Dept: NEPHROLOGY | Facility: CLINIC | Age: 43
End: 2018-10-08
Payer: COMMERCIAL

## 2018-10-08 VITALS
BODY MASS INDEX: 28.45 KG/M2 | HEIGHT: 66 IN | DIASTOLIC BLOOD PRESSURE: 80 MMHG | SYSTOLIC BLOOD PRESSURE: 120 MMHG | WEIGHT: 177 LBS | TEMPERATURE: 97.7 F | RESPIRATION RATE: 16 BRPM | HEART RATE: 80 BPM

## 2018-10-08 DIAGNOSIS — I82.4Z1 DEEP VEIN THROMBOSIS (DVT) OF DISTAL VEIN OF RIGHT LOWER EXTREMITY, UNSPECIFIED CHRONICITY (HCC): ICD-10-CM

## 2018-10-08 DIAGNOSIS — N18.30 STAGE 3 CHRONIC KIDNEY DISEASE (HCC): ICD-10-CM

## 2018-10-08 DIAGNOSIS — I10 ESSENTIAL HYPERTENSION: ICD-10-CM

## 2018-10-08 DIAGNOSIS — Z94.0 HISTORY OF RENAL TRANSPLANT: Primary | ICD-10-CM

## 2018-10-08 PROCEDURE — 99213 OFFICE O/P EST LOW 20 MIN: CPT | Performed by: INTERNAL MEDICINE

## 2018-10-08 NOTE — PATIENT INSTRUCTIONS
Chronic Kidney Disease   AMBULATORY CARE:   Chronic kidney disease (CKD)  is the gradual and permanent loss of kidney function  Normally, the kidneys remove fluid, chemicals, and waste from your blood  These wastes are turned into urine by your kidneys  CKD may worsen over time and lead to kidney failure  Common symptoms include the following:   · Changes in how often you need to urinate    · Swelling in your arms, legs, or feet    · Shortness of breath    · Fatigue or weakness    · Bad or bitter taste in your mouth    · Nausea, vomiting, or loss of appetite  Seek care immediately if:   · You are confused and very drowsy  · You have a seizure  · You have shortness of breath  Contact your healthcare provider if:   · You suddenly gain or lose more weight than your healthcare provider has told you is okay  · You have itchy skin or a rash  · You urinate more or less than you normally do  · You have blood in your urine  · You have nausea and repeated vomiting  · You have fatigue or muscle weakness  · You have hiccups that will not stop  · You have questions or concerns about your condition or care  Treatment for CKD:  Medicines may be given to decrease blood pressure and get rid of extra fluid  You may also receive medicine to manage health conditions that may occur with CKD  Dialysis is a treatment to remove chemicals and waste from your blood when your kidneys can no longer do this  Surgery may be needed to create an arteriovenous fistula (AVF) in your arm or insert a catheter into your abdomen so that you can receive dialysis  A kidney transplant may be done if your CKD becomes severe  Manage CKD:   · Maintain a healthy weight  Ask your healthcare provider how much you should weigh  Ask him to help you create a weight loss plan if you are overweight  · Exercise 30 to 60 minutes a day, 4 to 7 times a week, or as directed  Ask about the best exercise plan for you   Regular exercise can help you manage CKD, high blood pressure, and diabetes  · Follow your healthcare provider's advice about what to eat and drink  He may tell you to eat food low in sodium (salt), potassium, phosphorus, or protein  You may need to see a dietitian if you need help planning meals  Ask how much liquid to drink each day and which liquids are best for you  · Limit alcohol  Ask how much alcohol is safe for you to drink  A drink of alcohol is 12 ounces of beer, 5 ounces of wine, or 1½ ounces of liquor  · Do not smoke  Nicotine and other chemicals in cigarettes and cigars can cause lung and kidney damage  Ask your healthcare provider for information if you currently smoke and need help to quit  E-cigarettes or smokeless tobacco still contain nicotine  Talk to your healthcare provider before you use these products  · Ask your healthcare provider if you need vaccines  Infections such as pneumonia, influenza, and hepatitis can be more harmful or more likely to occur in a person who has CKD  Vaccines reduce your risk of infection with these viruses  Follow up with your healthcare provider as directed:  Write down your questions so you remember to ask them during your visits  © 2017 [address] [employer] Information is for End User's use only and may not be sold, redistributed or otherwise used for commercial purposes  All illustrations and images included in CareNotes® are the copyrighted property of [employer]  or [employer]  The above information is an  only  It is not intended as medical advice for individual conditions or treatments  Talk to your doctor, nurse or pharmacist before following any medical regimen to see if it is safe and effective for you

## 2018-10-08 NOTE — LETTER
October 8, 2018     Clarke Ellis MD  [address] [place] [id number]    Patient: Rosalinda Gamez   YOB: 1975   Date of Visit: 10/8/2018       Dear Dr Jerson Esquivel:    Thank you for referring Rosalinda Gamez to me for evaluation  Below are my notes for this consultation  If you have questions, please do not hesitate to call me  I look forward to following your patient along with you  Sincerely,        Vania Lindo MD        CC: No Recipients  Vania Lindo MD  10/8/2018 12:32 PM  Sign at close encounter  [address] [facility] 38 y o  male MRN: [id number]    Encounter: [id number] 10/8/2018    REASON FOR VISIT: Rosalinda Gamez is a 38 y o  male who is here on 10/8/2018 for Follow-up and Kidney Transplant    HPI:    Eric Chery came in today for follow-up of kidney transplant  He is doing well  Taking medicine regularly  No acute complaint  He does allergies which causing with stays again some nausea but overall he is well        REVIEW OF SYSTEMS:    Review of Systems   Constitutional: Negative for activity change and fatigue  HENT: Negative for congestion, ear discharge, sinus pressure and sneezing  Eyes: Negative for photophobia, pain and visual disturbance  Respiratory: Negative for apnea, cough, choking, chest tightness and shortness of breath  Cardiovascular: Positive for leg swelling  Negative for chest pain and palpitations  Gastrointestinal: Negative for abdominal distention, blood in stool, constipation, diarrhea and nausea  Endocrine: Negative for heat intolerance and polyphagia  Genitourinary: Negative for decreased urine volume, difficulty urinating, flank pain and urgency  Musculoskeletal: Negative for arthralgias, back pain, neck pain and neck stiffness  Skin: Negative for color change and wound  Allergic/Immunologic: Negative for food allergies and immunocompromised state  Neurological: Negative for seizures and facial asymmetry  Hematological: Negative for adenopathy  Does not bruise/bleed easily  Psychiatric/Behavioral: Negative for agitation, self-injury and suicidal ideas           PAST MEDICAL HISTORY:  Past Medical History:   Diagnosis Date    ADD (attention deficit disorder)     Depression     DVT (deep venous thrombosis) (HCC)     H/O immunosuppressive therapy     History of kidney disease     Hypertension     Nephropathy, IgA        PAST SURGICAL HISTORY:  Past Surgical History:   Procedure Laterality Date    NEPHRECTOMY TRANSPLANTED ORGAN         SOCIAL HISTORY:  History   Alcohol Use No     Comment: Occasionally     History   Drug Use    Types: Marijuana     History   Smoking Status    Former Smoker    Years: 4 00   Smokeless Tobacco    Never Used       FAMILY HISTORY:  Family History   Problem Relation Age of Onset    Deep vein thrombosis Father     Deep vein thrombosis Paternal Grandfather     Transient ischemic attack Mother        MEDICATIONS:    Current Outpatient Prescriptions:     Benzoyl Peroxide (benzoyl peroxide) 10 % LIQD, Apply topically daily, Disp: , Rfl:     betamethasone, augmented, (DIPROLENE) 0 05 % ointment, Apply 2 application topically, Disp: , Rfl:     Cholecalciferol (VITAMIN D-3) 1000 units CAPS, Take 2,000 Units by mouth daily, Disp: , Rfl:     diltiazem (CARDIZEM) 120 MG tablet, Take 1 tablet (120 mg total) by mouth 2 (two) times a day, Disp: 60 tablet, Rfl: 5    doxycycline hyclate (VIBRAMYCIN) 100 mg capsule, Take 1 capsule (100 mg total) by mouth every 12 (twelve) hours for 10 days, Disp: 20 capsule, Rfl: 0    mycophenolate (CELLCEPT) 250 mg capsule, Take 2 in the AM, 1 in the PM , Disp: 60 capsule, Rfl: 1    sertraline (ZOLOFT) 100 mg tablet, Take 1 tablet (100 mg total) by mouth daily, Disp: 14 tablet, Rfl: 0    tacrolimus (PROGRAF) 1 mg capsule, Take 1 capsule (1 mg total) by mouth 2 (two) times a day, Disp: 60 capsule, Rfl: 5    warfarin (COUMADIN) 10 mg tablet, Take by mouth daily, Disp: , Rfl:     warfarin (COUMADIN) 5 mg tablet, TAKE ONE OR TWO TABLETS BY MOUTH DAILY, Disp: 90 tablet, Rfl: 0    PHYSICAL EXAM:  Vitals:    10/08/18 1050   BP: 120/80   BP Location: Right arm   Patient Position: Sitting   Pulse: 80   Resp: 16   Temp: 97 7 °F (36 5 °C)   TempSrc: Tympanic   Weight: 80 3 kg (177 lb)   Height: 5' 6" (1 676 m)     Body mass index is 28 57 kg/m²  Physical Exam   Constitutional: He is oriented to person, place, and time  He appears well-developed  No distress  HENT:   Head: Normocephalic  Mouth/Throat: Oropharynx is clear and moist    Eyes: Pupils are equal, round, and reactive to light  Conjunctivae are normal  No scleral icterus  Neck: Normal range of motion  Neck supple  No JVD present  Cardiovascular: Normal rate, regular rhythm and normal heart sounds  No murmur heard  Pulmonary/Chest: Effort normal and breath sounds normal  He has no wheezes  He has no rales  Abdominal: Soft  Bowel sounds are normal  There is no tenderness  Musculoskeletal: Normal range of motion  He exhibits edema  Neurological: He is alert and oriented to person, place, and time  Skin: Skin is warm  No rash noted  Psychiatric: He has a normal mood and affect  His behavior is normal        LAB RESULTS:  Results for orders placed or performed in visit on 10/05/18   Protime-INR   Result Value Ref Range    Protime 16 8 (H) 11 8 - 14 2 seconds    INR 1 35 (H) 0 86 - 1 17       ASSESSMENT and PLAN:      History of renal transplant  His creatinine is stable but tacrolimus level is low  I will recheck the level before I change the dose  Stage 3 chronic kidney disease  Stable at this point    HTN (hypertension)  Very well control        I will see him back in 3 months  He will check the blood above tacrolimus now and in 3 months will also get blood test for the kidney disease in 3 months  Portions of the record may have been created with voice recognition software  Occasional wrong word or "sound a like" substitutions may have occurred due to the inherent limitations of voice recognition software  Read the chart carefully and recognize, using context, where substitutions have occurred  If you have any questions, please contact the dictating provider

## 2018-10-08 NOTE — ASSESSMENT & PLAN NOTE
His creatinine is stable but tacrolimus level is low  I will recheck the level before I change the dose

## 2018-10-08 NOTE — PROGRESS NOTES
NEPHROLOGY OFFICE FOLLOW UP  Laura Keating 38 y o  male MRN: [id number]    Encounter: [id number] 10/8/2018    REASON FOR VISIT: Laura Keating is a 38 y o  male who is here on 10/8/2018 for Follow-up and Kidney Transplant    HPI:    Alicia Waters came in today for follow-up of kidney transplant  He is doing well  Taking medicine regularly  No acute complaint  He does allergies which causing with stays again some nausea but overall he is well        REVIEW OF SYSTEMS:    Review of Systems   Constitutional: Negative for activity change and fatigue  HENT: Negative for congestion, ear discharge, sinus pressure and sneezing  Eyes: Negative for photophobia, pain and visual disturbance  Respiratory: Negative for apnea, cough, choking, chest tightness and shortness of breath  Cardiovascular: Positive for leg swelling  Negative for chest pain and palpitations  Gastrointestinal: Negative for abdominal distention, blood in stool, constipation, diarrhea and nausea  Endocrine: Negative for heat intolerance and polyphagia  Genitourinary: Negative for decreased urine volume, difficulty urinating, flank pain and urgency  Musculoskeletal: Negative for arthralgias, back pain, neck pain and neck stiffness  Skin: Negative for color change and wound  Allergic/Immunologic: Negative for food allergies and immunocompromised state  Neurological: Negative for seizures and facial asymmetry  Hematological: Negative for adenopathy  Does not bruise/bleed easily  Psychiatric/Behavioral: Negative for agitation, self-injury and suicidal ideas           PAST MEDICAL HISTORY:  Past Medical History:   Diagnosis Date    ADD (attention deficit disorder)     Depression     [facility])     H/O immunosuppressive therapy     History of kidney disease     Hypertension     Nephropathy, IgA        PAST SURGICAL HISTORY:  Past Surgical History:   Procedure Laterality Date    NEPHRECTOMY TRANSPLANTED ORGAN SOCIAL HISTORY:  History   Alcohol Use No     Comment: Occasionally     History   Drug Use    Types: Marijuana     History   Smoking Status    Former Smoker    Years: 4 00   Smokeless Tobacco    Never Used       FAMILY HISTORY:  Family History   Problem Relation Age of Onset    Deep vein thrombosis Father     Deep vein thrombosis Paternal Grandfather     Transient ischemic attack Mother        MEDICATIONS:    Current Outpatient Prescriptions:     Benzoyl Peroxide (benzoyl peroxide) 10 % LIQD, Apply topically daily, Disp: , Rfl:     betamethasone, augmented, (DIPROLENE) 0 05 % ointment, Apply 2 application topically, Disp: , Rfl:     Cholecalciferol (VITAMIN D-3) 1000 units CAPS, Take 2,000 Units by mouth daily, Disp: , Rfl:     diltiazem (CARDIZEM) 120 MG tablet, Take 1 tablet (120 mg total) by mouth 2 (two) times a day, Disp: 60 tablet, Rfl: 5    doxycycline hyclate (VIBRAMYCIN) 100 mg capsule, Take 1 capsule (100 mg total) by mouth every 12 (twelve) hours for 10 days, Disp: 20 capsule, Rfl: 0    mycophenolate (CELLCEPT) 250 mg capsule, Take 2 in the AM, 1 in the PM , Disp: 60 capsule, Rfl: 1    sertraline (ZOLOFT) 100 mg tablet, Take 1 tablet (100 mg total) by mouth daily, Disp: 14 tablet, Rfl: 0    tacrolimus (PROGRAF) 1 mg capsule, Take 1 capsule (1 mg total) by mouth 2 (two) times a day, Disp: 60 capsule, Rfl: 5    warfarin (COUMADIN) 10 mg tablet, Take by mouth daily, Disp: , Rfl:     warfarin (COUMADIN) 5 mg tablet, TAKE ONE OR TWO TABLETS BY MOUTH DAILY, Disp: 90 tablet, Rfl: 0    PHYSICAL EXAM:  Vitals:    10/08/18 1050   BP: 120/80   BP Location: Right arm   Patient Position: Sitting   Pulse: 80   Resp: 16   Temp: 97 7 °F (36 5 °C)   TempSrc: Tympanic   Weight: 80 3 kg (177 lb)   Height: 5' 6" (1 676 m)     Body mass index is 28 57 kg/m²  Physical Exam   Constitutional: He is oriented to person, place, and time  He appears well-developed  No distress     HENT:   Head: Normocephalic  Mouth/Throat: Oropharynx is clear and moist    Eyes: Pupils are equal, round, and reactive to light  Conjunctivae are normal  No scleral icterus  Neck: Normal range of motion  Neck supple  No JVD present  Cardiovascular: Normal rate, regular rhythm and normal heart sounds  No murmur heard  Pulmonary/Chest: Effort normal and breath sounds normal  He has no wheezes  He has no rales  Abdominal: Soft  Bowel sounds are normal  There is no tenderness  Musculoskeletal: Normal range of motion  He exhibits edema  Neurological: He is alert and oriented to person, place, and time  Skin: Skin is warm  No rash noted  Psychiatric: He has a normal mood and affect  His behavior is normal        LAB RESULTS:  Results for orders placed or performed in visit on 10/05/18   Protime-INR   Result Value Ref Range    Protime 16 8 (H) 11 8 - 14 2 seconds    INR 1 35 (H) 0 86 - 1 17       ASSESSMENT and PLAN:      History of renal transplant  His creatinine is stable but tacrolimus level is low  I will recheck the level before I change the dose  Stage 3 chronic kidney disease  Stable at this point    HTN (hypertension)  Very well control        I will see him back in 3 months  He will check the blood above tacrolimus now and in 3 months will also get blood test for the kidney disease in 3 months  Portions of the record may have been created with voice recognition software  Occasional wrong word or "sound a like" substitutions may have occurred due to the inherent limitations of voice recognition software  Read the chart carefully and recognize, using context, where substitutions have occurred  If you have any questions, please contact the dictating provider

## 2018-10-10 ENCOUNTER — APPOINTMENT (OUTPATIENT)
Dept: LAB | Facility: HOSPITAL | Age: 43
End: 2018-10-10
Attending: INTERNAL MEDICINE
Payer: COMMERCIAL

## 2018-10-10 DIAGNOSIS — Z94.0 HISTORY OF RENAL TRANSPLANT: Primary | ICD-10-CM

## 2018-10-10 PROCEDURE — 80197 ASSAY OF TACROLIMUS: CPT

## 2018-10-10 PROCEDURE — 36415 COLL VENOUS BLD VENIPUNCTURE: CPT

## 2018-10-11 LAB — TACROLIMUS BLD-MCNC: 2.6 NG/ML (ref 2–20)

## 2018-10-20 ENCOUNTER — HOSPITAL ENCOUNTER (EMERGENCY)
Facility: HOSPITAL | Age: 43
Discharge: HOME | End: 2018-10-20
Attending: EMERGENCY MEDICINE | Admitting: EMERGENCY MEDICINE
Payer: COMMERCIAL

## 2018-10-20 VITALS
RESPIRATION RATE: 20 BRPM | BODY MASS INDEX: 27.32 KG/M2 | HEIGHT: 66 IN | DIASTOLIC BLOOD PRESSURE: 89 MMHG | HEART RATE: 87 BPM | SYSTOLIC BLOOD PRESSURE: 132 MMHG | TEMPERATURE: 98 F | WEIGHT: 170 LBS | OXYGEN SATURATION: 98 %

## 2018-10-20 DIAGNOSIS — S61.211A LACERATION OF LEFT INDEX FINGER: Primary | ICD-10-CM

## 2018-10-20 PROCEDURE — 99282 EMERGENCY DEPT VISIT SF MDM: CPT

## 2018-10-20 RX ORDER — LIDOCAINE HYDROCHLORIDE 10 MG/ML
10 INJECTION, SOLUTION EPIDURAL; INFILTRATION; INTRACAUDAL; PERINEURAL ONCE
Status: COMPLETED | OUTPATIENT
Start: 2018-10-20 | End: 2018-10-20

## 2018-10-20 RX ORDER — DOXYCYCLINE HYCLATE 100 MG/1
100 CAPSULE ORAL EVERY 12 HOURS SCHEDULED
Qty: 14 CAPSULE | Refills: 0 | Status: SHIPPED | OUTPATIENT
Start: 2018-10-20 | End: 2018-10-27

## 2018-10-20 RX ORDER — HYDROCODONE BITARTRATE AND ACETAMINOPHEN 5; 325 MG/1; MG/1
1 TABLET ORAL EVERY 6 HOURS PRN
Qty: 10 TABLET | Refills: 0 | Status: SHIPPED | OUTPATIENT
Start: 2018-10-20 | End: 2018-10-23

## 2018-10-20 RX ADMIN — LIDOCAINE HYDROCHLORIDE 10 ML: 10 INJECTION, SOLUTION EPIDURAL; INFILTRATION; INTRACAUDAL; PERINEURAL at 16:25

## 2018-10-20 RX ADMIN — Medication 1 APPLICATION: at 16:25

## 2018-10-20 NOTE — DISCHARGE INSTRUCTIONS
Please return if you worsening or other concerning symptoms otherwise follow up as instructed as discussed    Finger Laceration   WHAT YOU NEED TO KNOW:   A finger laceration is a deep cut in your skin  It is often caused by a sharp object, such as a knife, or blunt force to your finger  Your blood vessels, bones, joints, tendons, or nerves may also be injured  DISCHARGE INSTRUCTIONS:   Return to the emergency department if:   · Your wound comes apart  · Blood soaks through your bandage  · You have severe pain in your finger or hand  · Your finger is pale and cold  · You have sudden trouble moving your finger  · Your swelling suddenly gets worse  · You have red streaks on your skin coming from your wound  Contact your healthcare provider or hand specialist if:   · You have new numbness or tingling  · Your finger feels warm, looks swollen or red, and is draining pus  · You have a fever  · You have questions or concerns about your condition or care  Medicines: You may  need any of the following:  · Antibiotics  help prevent a bacterial infection  · Acetaminophen  decreases pain and fever  It is available without a doctor's order  Ask how much to take and how often to take it  Follow directions  Read the labels of all other medicines you are using to see if they also contain acetaminophen, or ask your doctor or pharmacist  Acetaminophen can cause liver damage if not taken correctly  Do not use more than 4 grams (4,000 milligrams) total of acetaminophen in one day  · Prescription pain medicine  may be given  Ask your healthcare provider how to take this medicine safely  Some prescription pain medicines contain acetaminophen  Do not take other medicines that contain acetaminophen without talking to your healthcare provider  Too much acetaminophen may cause liver damage  Prescription pain medicine may cause constipation   Ask your healthcare provider how to prevent or treat constipation  · Take your medicine as directed  Contact your healthcare provider if you think your medicine is not helping or if you have side effects  Tell him or her if you are allergic to any medicine  Keep a list of the medicines, vitamins, and herbs you take  Include the amounts, and when and why you take them  Bring the list or the pill bottles to follow-up visits  Carry your medicine list with you in case of an emergency  Self-care:   · Apply ice  on your finger for 15 to 20 minutes every hour or as directed  Use an ice pack, or put crushed ice in a plastic bag  Cover it with a towel before you apply it to your skin  Ice helps prevent tissue damage and decreases swelling and pain  · Elevate  your hand above the level of your heart as often as you can  This will help decrease swelling and pain  Prop your hand on pillows or blankets to keep it elevated comfortably  · Wear your splint as directed  A splint will decrease movement and stress on your wound  The splint may help your wound heal faster  Ask your healthcare provider how to apply and remove a splint  · Apply ointments to decrease scarring  Do not apply ointments until your healthcare provider says it is okay  You may need to wait until your wound is healed  Ask which ointment to buy and how often to use it  Wound care:   · Do not get your wound wet until your healthcare provider says it is okay  Do not soak your hand in water  Do not go swimming until your healthcare provider says it is okay  When your healthcare provider says it is okay, carefully wash around the wound with soap and water  Let soap and water run over your wound  Gently pat the area dry or allow it to air dry  · Change your bandages when they get wet, dirty, or after washing  Apply new, clean bandages as directed  Do not apply elastic bandages or tape too tightly  Do not put powders or lotions on your wound  · Apply antibiotic ointment as directed    Your healthcare provider may give you antibiotic ointment to put over your wound if you have stitches  If you have Strips-Strips over your wound, let them dry up and fall off on their own  If they do not fall off within 14 days, gently remove them  If you have glue over your wound, do not remove or pick at it  If your glue comes off, do not replace it with glue that you have at home  · Check your wound every day for signs of infection  Signs of infection include swelling, redness, or pus  Follow up with your healthcare provider or hand specialist in 2 days:  Write down your questions so you remember to ask them during your visits  © 2017 [employer] [employer] Information is for End User's use only and may not be sold, redistributed or otherwise used for commercial purposes  All illustrations and images included in CareNotes® are the copyrighted property of [employer]  or [employer]  The above information is an  only  It is not intended as medical advice for individual conditions or treatments  Talk to your doctor, nurse or pharmacist before following any medical regimen to see if it is safe and effective for you
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
## 2018-10-22 DIAGNOSIS — Z94.0 KIDNEY TRANSPLANTED: ICD-10-CM

## 2018-10-22 RX ORDER — MYCOPHENOLATE MOFETIL 250 MG/1
CAPSULE ORAL
Qty: 60 CAPSULE | Refills: 1 | Status: SHIPPED | OUTPATIENT
Start: 2018-10-22 | End: 2019-02-18 | Stop reason: SDUPTHER

## 2018-10-23 DIAGNOSIS — I82.4Y9 DEEP VEIN THROMBOSIS (DVT) OF PROXIMAL LOWER EXTREMITY, UNSPECIFIED CHRONICITY, UNSPECIFIED LATERALITY (HCC): ICD-10-CM

## 2018-10-23 RX ORDER — WARFARIN SODIUM 5 MG/1
TABLET ORAL
Qty: 90 TABLET | Refills: 0 | Status: SHIPPED | OUTPATIENT
Start: 2018-10-23 | End: 2018-12-22 | Stop reason: SDUPTHER

## 2018-12-05 ENCOUNTER — TELEPHONE (OUTPATIENT)
Dept: INTERNAL MEDICINE CLINIC | Facility: CLINIC | Age: 43
End: 2018-12-05

## 2018-12-22 DIAGNOSIS — I82.4Y9 DEEP VEIN THROMBOSIS (DVT) OF PROXIMAL LOWER EXTREMITY, UNSPECIFIED CHRONICITY, UNSPECIFIED LATERALITY (HCC): ICD-10-CM

## 2018-12-26 RX ORDER — WARFARIN SODIUM 5 MG/1
TABLET ORAL
Qty: 180 TABLET | Refills: 1 | Status: SHIPPED | OUTPATIENT
Start: 2018-12-26 | End: 2019-02-26

## 2018-12-31 DIAGNOSIS — Z94.0 KIDNEY TRANSPLANTED: ICD-10-CM

## 2018-12-31 DIAGNOSIS — I10 ESSENTIAL HYPERTENSION: ICD-10-CM

## 2018-12-31 RX ORDER — TACROLIMUS 1 MG/1
1 CAPSULE ORAL 2 TIMES DAILY
Qty: 60 CAPSULE | Refills: 5 | Status: ON HOLD | OUTPATIENT
Start: 2018-12-31 | End: 2020-01-28 | Stop reason: SDUPTHER

## 2018-12-31 RX ORDER — DILTIAZEM HYDROCHLORIDE 120 MG/1
120 TABLET, FILM COATED ORAL 2 TIMES DAILY
Qty: 60 TABLET | Refills: 2 | Status: SHIPPED | OUTPATIENT
Start: 2018-12-31 | End: 2019-04-05 | Stop reason: SDUPTHER

## 2018-12-31 RX ORDER — TACROLIMUS 1 MG/1
1 CAPSULE ORAL
COMMUNITY
Start: 2014-06-17 | End: 2018-12-31

## 2019-01-25 ENCOUNTER — TELEPHONE (OUTPATIENT)
Dept: OTHER | Facility: HOSPITAL | Age: 44
End: 2019-01-25

## 2019-01-25 ENCOUNTER — APPOINTMENT (OUTPATIENT)
Dept: LAB | Facility: HOSPITAL | Age: 44
End: 2019-01-25
Attending: INTERNAL MEDICINE
Payer: COMMERCIAL

## 2019-01-25 DIAGNOSIS — Z94.0 HISTORY OF RENAL TRANSPLANT: ICD-10-CM

## 2019-01-25 LAB
ANION GAP SERPL CALCULATED.3IONS-SCNC: 10 MMOL/L (ref 4–13)
BACTERIA UR QL AUTO: ABNORMAL /HPF
BASOPHILS # BLD AUTO: 0.06 THOUSANDS/ΜL (ref 0–0.1)
BASOPHILS NFR BLD AUTO: 1 % (ref 0–1)
BILIRUB UR QL STRIP: NEGATIVE
BUN SERPL-MCNC: 32 MG/DL (ref 5–25)
CALCIUM SERPL-MCNC: 9.5 MG/DL (ref 8.3–10.1)
CHLORIDE SERPL-SCNC: 105 MMOL/L (ref 100–108)
CLARITY UR: CLEAR
CO2 SERPL-SCNC: 25 MMOL/L (ref 21–32)
COLOR UR: YELLOW
CREAT SERPL-MCNC: 1.82 MG/DL (ref 0.6–1.3)
CREAT UR-MCNC: 187 MG/DL
EOSINOPHIL # BLD AUTO: 0.41 THOUSAND/ΜL (ref 0–0.61)
EOSINOPHIL NFR BLD AUTO: 6 % (ref 0–6)
ERYTHROCYTE [DISTWIDTH] IN BLOOD BY AUTOMATED COUNT: 12.2 % (ref 11.6–15.1)
GFR SERPL CREATININE-BSD FRML MDRD: 44 ML/MIN/1.73SQ M
GLUCOSE P FAST SERPL-MCNC: 73 MG/DL (ref 65–99)
GLUCOSE UR STRIP-MCNC: NEGATIVE MG/DL
HCT VFR BLD AUTO: 43 % (ref 36.5–49.3)
HGB BLD-MCNC: 14.7 G/DL (ref 12–17)
HGB UR QL STRIP.AUTO: ABNORMAL
IMM GRANULOCYTES # BLD AUTO: 0.02 THOUSAND/UL (ref 0–0.2)
IMM GRANULOCYTES NFR BLD AUTO: 0 % (ref 0–2)
KETONES UR STRIP-MCNC: NEGATIVE MG/DL
LEUKOCYTE ESTERASE UR QL STRIP: NEGATIVE
LYMPHOCYTES # BLD AUTO: 2.11 THOUSANDS/ΜL (ref 0.6–4.47)
LYMPHOCYTES NFR BLD AUTO: 30 % (ref 14–44)
MCH RBC QN AUTO: 32.7 PG (ref 26.8–34.3)
MCHC RBC AUTO-ENTMCNC: 34.2 G/DL (ref 31.4–37.4)
MCV RBC AUTO: 96 FL (ref 82–98)
MONOCYTES # BLD AUTO: 0.82 THOUSAND/ΜL (ref 0.17–1.22)
MONOCYTES NFR BLD AUTO: 12 % (ref 4–12)
NEUTROPHILS # BLD AUTO: 3.65 THOUSANDS/ΜL (ref 1.85–7.62)
NEUTS SEG NFR BLD AUTO: 51 % (ref 43–75)
NITRITE UR QL STRIP: NEGATIVE
NON-SQ EPI CELLS URNS QL MICRO: ABNORMAL /HPF
NRBC BLD AUTO-RTO: 0 /100 WBCS
PH UR STRIP.AUTO: 6 [PH] (ref 4.5–8)
PHOSPHATE SERPL-MCNC: 2.5 MG/DL (ref 2.7–4.5)
PLATELET # BLD AUTO: 206 THOUSANDS/UL (ref 149–390)
PMV BLD AUTO: 10.8 FL (ref 8.9–12.7)
POTASSIUM SERPL-SCNC: 4.4 MMOL/L (ref 3.5–5.3)
PROT UR STRIP-MCNC: ABNORMAL MG/DL
PROT UR-MCNC: 56 MG/DL
PROT/CREAT UR: 0.3 MG/G{CREAT} (ref 0–0.1)
PTH-INTACT SERPL-MCNC: 118.6 PG/ML (ref 18.4–80.1)
RBC # BLD AUTO: 4.49 MILLION/UL (ref 3.88–5.62)
RBC #/AREA URNS AUTO: ABNORMAL /HPF
SODIUM SERPL-SCNC: 140 MMOL/L (ref 136–145)
SP GR UR STRIP.AUTO: 1.02 (ref 1–1.03)
UROBILINOGEN UR QL STRIP.AUTO: 0.2 E.U./DL
WBC # BLD AUTO: 7.07 THOUSAND/UL (ref 4.31–10.16)
WBC #/AREA URNS AUTO: ABNORMAL /HPF

## 2019-01-25 PROCEDURE — 84100 ASSAY OF PHOSPHORUS: CPT

## 2019-01-25 PROCEDURE — 80197 ASSAY OF TACROLIMUS: CPT

## 2019-01-25 PROCEDURE — 36415 COLL VENOUS BLD VENIPUNCTURE: CPT

## 2019-01-25 PROCEDURE — 84156 ASSAY OF PROTEIN URINE: CPT

## 2019-01-25 PROCEDURE — 82570 ASSAY OF URINE CREATININE: CPT

## 2019-01-25 PROCEDURE — 81001 URINALYSIS AUTO W/SCOPE: CPT

## 2019-01-25 PROCEDURE — 85025 COMPLETE CBC W/AUTO DIFF WBC: CPT

## 2019-01-25 PROCEDURE — 80048 BASIC METABOLIC PNL TOTAL CA: CPT

## 2019-01-25 PROCEDURE — 83970 ASSAY OF PARATHORMONE: CPT

## 2019-01-26 LAB — TACROLIMUS BLD-MCNC: 3.5 NG/ML (ref 2–20)

## 2019-01-31 ENCOUNTER — TELEPHONE (OUTPATIENT)
Dept: NEPHROLOGY | Facility: CLINIC | Age: 44
End: 2019-01-31

## 2019-01-31 ENCOUNTER — OFFICE VISIT (OUTPATIENT)
Dept: NEPHROLOGY | Facility: CLINIC | Age: 44
End: 2019-01-31
Payer: COMMERCIAL

## 2019-01-31 VITALS
HEART RATE: 80 BPM | SYSTOLIC BLOOD PRESSURE: 110 MMHG | TEMPERATURE: 98.1 F | WEIGHT: 190 LBS | RESPIRATION RATE: 16 BRPM | HEIGHT: 66 IN | BODY MASS INDEX: 30.53 KG/M2 | DIASTOLIC BLOOD PRESSURE: 80 MMHG

## 2019-01-31 DIAGNOSIS — N17.9 ACUTE KIDNEY INJURY SUPERIMPOSED ON CKD (HCC): Primary | ICD-10-CM

## 2019-01-31 DIAGNOSIS — N18.9 ACUTE KIDNEY INJURY SUPERIMPOSED ON CKD (HCC): Primary | ICD-10-CM

## 2019-01-31 DIAGNOSIS — N18.30 STAGE 3 CHRONIC KIDNEY DISEASE (HCC): ICD-10-CM

## 2019-01-31 DIAGNOSIS — R10.32 LEFT LOWER QUADRANT PAIN: ICD-10-CM

## 2019-01-31 DIAGNOSIS — Z94.0 HISTORY OF RENAL TRANSPLANT: ICD-10-CM

## 2019-01-31 DIAGNOSIS — I10 ESSENTIAL HYPERTENSION: ICD-10-CM

## 2019-01-31 PROCEDURE — 99214 OFFICE O/P EST MOD 30 MIN: CPT | Performed by: INTERNAL MEDICINE

## 2019-01-31 RX ORDER — DEXTROAMPHETAMINE SACCHARATE, AMPHETAMINE ASPARTATE, DEXTROAMPHETAMINE SULFATE AND AMPHETAMINE SULFATE 7.5; 7.5; 7.5; 7.5 MG/1; MG/1; MG/1; MG/1
30 TABLET ORAL DAILY
COMMUNITY
End: 2019-06-17 | Stop reason: HOSPADM

## 2019-01-31 NOTE — LETTER
January 31, 2019     Brianna Chiang MD  [address] [facility] [address]    Patient: Sheldon Pinto   YOB: 1975   Date of Visit: 1/31/2019       Dear Dr Angie Garcia:    Thank you for referring Sheldon Pinto to me for evaluation  Below are my notes for this consultation  If you have questions, please do not hesitate to call me  I look forward to following your patient along with you  Sincerely,        Abdirizak Bowers MD        CC: No Recipients  Abdirizak Bowers MD  1/31/2019 11:32 AM  Sign at close encounter  [address] 37 y o  male MRN: [id number]    Encounter: [id number] 1/31/2019    REASON FOR VISIT: Sheldon Pinto is a 37 y o  male who is here on 1/31/2019 for Hypertension; Chronic Kidney Disease; Kidney Transplant; and Frequent UTI       HPI:    Alix Kelley came in today for follow-up of kidney transplant  He is complaining of left lower quadrant pain almost for 2 months  Denies any nausea vomiting diarrhea  Pain comes and go  Denies any urinary complaint  He is complaining of feeling thirst   No fever no chills  Denies taking any new medication  Recently had injury on his thumb but denies taking nonsteroidal pain killer        REVIEW OF SYSTEMS:    Review of Systems   Constitutional: Negative for activity change and fatigue  HENT: Negative for congestion and ear discharge  Eyes: Negative for photophobia and pain  Respiratory: Negative for apnea, cough, choking and chest tightness  Cardiovascular: Negative for chest pain and palpitations  Gastrointestinal: Positive for abdominal pain  Negative for abdominal distention, blood in stool, constipation, diarrhea and nausea  Endocrine: Negative for heat intolerance and polyphagia  Genitourinary: Negative for flank pain and urgency  Musculoskeletal: Negative for neck pain and neck stiffness  Skin: Negative for color change and wound     Allergic/Immunologic: Negative for food allergies and immunocompromised state  Neurological: Negative for seizures and facial asymmetry  Hematological: Negative for adenopathy  Does not bruise/bleed easily  Psychiatric/Behavioral: Negative for self-injury and suicidal ideas           PAST MEDICAL HISTORY:  Past Medical History:   Diagnosis Date    ADD (attention deficit disorder)     Depression     DVT (deep venous thrombosis) (HCC)     H/O immunosuppressive therapy     History of kidney disease     Hypertension     Nephropathy, IgA        PAST SURGICAL HISTORY:  Past Surgical History:   Procedure Laterality Date    NEPHRECTOMY TRANSPLANTED ORGAN         SOCIAL HISTORY:  History   Alcohol Use No     Comment: Occasionally     History   Drug Use    Types: Marijuana     History   Smoking Status    Former Smoker    Years: 4 00   Smokeless Tobacco    Never Used       FAMILY HISTORY:  Family History   Problem Relation Age of Onset    Deep vein thrombosis Father     Deep vein thrombosis Paternal Grandfather     Transient ischemic attack Mother        MEDICATIONS:    Current Outpatient Prescriptions:     amphetamine-dextroamphetamine (ADDERALL, 30MG,) 30 MG tablet, Take 30 mg by mouth daily, Disp: , Rfl:     Benzoyl Peroxide (benzoyl peroxide) 10 % LIQD, Apply topically daily, Disp: , Rfl:     betamethasone, augmented, (DIPROLENE) 0 05 % ointment, Apply 2 application topically, Disp: , Rfl:     Cholecalciferol (VITAMIN D-3) 1000 units CAPS, Take 2,000 Units by mouth daily, Disp: , Rfl:     diltiazem (CARDIZEM) 120 MG tablet, Take 1 tablet (120 mg total) by mouth 2 (two) times a day, Disp: 60 tablet, Rfl: 2    mycophenolate (CELLCEPT) 250 mg capsule, Take 2 in the AM, 1 in the PM , Disp: 60 capsule, Rfl: 1    sertraline (ZOLOFT) 100 mg tablet, Take 1 tablet (100 mg total) by mouth daily (Patient taking differently: Take 200 mg by mouth daily  ), Disp: 14 tablet, Rfl: 0    tacrolimus (PROGRAF) 1 mg capsule, Take 1 capsule (1 mg total) by mouth 2 (two) times a day, Disp: 60 capsule, Rfl: 5    warfarin (COUMADIN) 10 mg tablet, Take by mouth daily, Disp: , Rfl:     warfarin (COUMADIN) 5 mg tablet, TAKE ONE OR TWO TABLETS BY MOUTH DAILY, Disp: 180 tablet, Rfl: 1    PHYSICAL EXAM:  Vitals:    01/31/19 1004   BP: 110/80   BP Location: Right arm   Patient Position: Sitting   Pulse: 80   Resp: 16   Temp: 98 1 °F (36 7 °C)   Weight: 86 2 kg (190 lb)   Height: 5' 6" (1 676 m)     Body mass index is 30 67 kg/m²  Physical Exam   Constitutional: He is oriented to person, place, and time  He appears well-developed  No distress  HENT:   Head: Normocephalic  Mouth/Throat: Oropharynx is clear and moist    Eyes: Conjunctivae are normal  No scleral icterus  Neck: Neck supple  No JVD present  Cardiovascular: Normal rate and normal heart sounds  No murmur heard  Pulmonary/Chest: Effort normal  He has no wheezes  Abdominal: Soft  Bowel sounds are normal  There is no tenderness  Musculoskeletal: Normal range of motion  He exhibits no edema or deformity  Neurological: He is alert and oriented to person, place, and time  Skin: Skin is warm  No rash noted  Psychiatric: He has a normal mood and affect   His behavior is normal        LAB RESULTS:  Results for orders placed or performed in visit on 01/25/19   Protein / creatinine ratio, urine   Result Value Ref Range    Creatinine, Ur 187 0 mg/dL    Protein Urine Random 56 mg/dL    Prot/Creat Ratio, Ur 0 30 (H) 0 00 - 0 10   UA (URINE) with reflex to Microscopic   Result Value Ref Range    Color, UA Yellow     Clarity, UA Clear     Specific Gravity, UA 1 025 1 003 - 1 030    pH, UA 6 0 4 5 - 8 0    Leukocytes, UA Negative Negative    Nitrite, UA Negative Negative    Protein, UA Trace (A) Negative mg/dl    Glucose, UA Negative Negative mg/dl    Ketones, UA Negative Negative mg/dl    Urobilinogen, UA 0 2 0 2, 1 0 E U /dl E U /dl    Bilirubin, UA Negative Negative    Blood, UA Large (A) Negative   PTH, intact   Result Value Ref Range     6 (H) 18 4 - 80 1 pg/mL   Basic metabolic panel   Result Value Ref Range    Sodium 140 136 - 145 mmol/L    Potassium 4 4 3 5 - 5 3 mmol/L    Chloride 105 100 - 108 mmol/L    CO2 25 21 - 32 mmol/L    ANION GAP 10 4 - 13 mmol/L    BUN 32 (H) 5 - 25 mg/dL    Creatinine 1 82 (H) 0 60 - 1 30 mg/dL    Glucose, Fasting 73 65 - 99 mg/dL    Calcium 9 5 8 3 - 10 1 mg/dL    eGFR 44 ml/min/1 73sq m   CBC and differential   Result Value Ref Range    WBC 7 07 4 31 - 10 16 Thousand/uL    RBC 4 49 3 88 - 5 62 Million/uL    Hemoglobin 14 7 12 0 - 17 0 g/dL    Hematocrit 43 0 36 5 - 49 3 %    MCV 96 82 - 98 fL    MCH 32 7 26 8 - 34 3 pg    MCHC 34 2 31 4 - 37 4 g/dL    RDW 12 2 11 6 - 15 1 %    MPV 10 8 8 9 - 12 7 fL    Platelets 763 148 - 974 Thousands/uL    nRBC 0 /100 WBCs    Neutrophils Relative 51 43 - 75 %    Immat GRANS % 0 0 - 2 %    Lymphocytes Relative 30 14 - 44 %    Monocytes Relative 12 4 - 12 %    Eosinophils Relative 6 0 - 6 %    Basophils Relative 1 0 - 1 %    Neutrophils Absolute 3 65 1 85 - 7 62 Thousands/µL    Immature Grans Absolute 0 02 0 00 - 0 20 Thousand/uL    Lymphocytes Absolute 2 11 0 60 - 4 47 Thousands/µL    Monocytes Absolute 0 82 0 17 - 1 22 Thousand/µL    Eosinophils Absolute 0 41 0 00 - 0 61 Thousand/µL    Basophils Absolute 0 06 0 00 - 0 10 Thousands/µL   Phosphorus   Result Value Ref Range    Phosphorus 2 5 (L) 2 7 - 4 5 mg/dL   Tacrolimus level   Result Value Ref Range    TACROLIMUS 3 5 2 0 - 20 0 ng/mL   Urine Microscopic   Result Value Ref Range    RBC, UA 10-20 (A) None Seen, 0-5 /hpf    WBC, UA 1-2 (A) None Seen, 0-5, 5-55, 5-65 /hpf    Epithelial Cells Occasional None Seen, Occasional /hpf    Bacteria, UA None Seen None Seen, Occasional /hpf       ASSESSMENT and PLAN:      Acute kidney injury superimposed on CKD (HCC)  Kidney function is deteriorating  He does have kidney transplant and has pain in the same site    I will send him to transplant nephrologist for further evaluation management  I will get urinary study to assess and volume status  I discussed with transplant nephrologist Dr Rain Maurice who advised him to check his CMV virus titer as well as BK virus titer which I will order he will need close monitoring I discussed everything at length with him  Advised to drink more liquid    HTN (hypertension)  Very well control at this point    History of renal transplant  He does have IgA nephropathy  His kidney transplant does not appear to be tender on left lower quadrant  Left lower quadrant pain  Other than kidney transplant I do not feel anything  Will order CT scan of the abdomen to rule out any other pathology      Discussed everything at length with the patient  Also discussed with transplant nephrologist   He will be seeing him less than a month  Patient will get repeat blood test and urine test and if he still worsening he may need to see transplant nephrology early        Portions of the record may have been created with voice recognition software  Occasional wrong word or "sound a like" substitutions may have occurred due to the inherent limitations of voice recognition software  Read the chart carefully and recognize, using context, where substitutions have occurred  If you have any questions, please contact the dictating provider

## 2019-01-31 NOTE — PATIENT INSTRUCTIONS
Chronic Kidney Disease   AMBULATORY CARE:   Chronic kidney disease (CKD)  is the gradual and permanent loss of kidney function  Normally, the kidneys remove fluid, chemicals, and waste from your blood  These wastes are turned into urine by your kidneys  CKD may worsen over time and lead to kidney failure  Common symptoms include the following:   · Changes in how often you need to urinate    · Swelling in your arms, legs, or feet    · Shortness of breath    · Fatigue or weakness    · Bad or bitter taste in your mouth    · Nausea, vomiting, or loss of appetite  Seek care immediately if:   · You are confused and very drowsy  · You have a seizure  · You have shortness of breath  Contact your healthcare provider if:   · You suddenly gain or lose more weight than your healthcare provider has told you is okay  · You have itchy skin or a rash  · You urinate more or less than you normally do  · You have blood in your urine  · You have nausea and repeated vomiting  · You have fatigue or muscle weakness  · You have hiccups that will not stop  · You have questions or concerns about your condition or care  Treatment for CKD:  Medicines may be given to decrease blood pressure and get rid of extra fluid  You may also receive medicine to manage health conditions that may occur with CKD  Dialysis is a treatment to remove chemicals and waste from your blood when your kidneys can no longer do this  Surgery may be needed to create an arteriovenous fistula (AVF) in your arm or insert a catheter into your abdomen so that you can receive dialysis  A kidney transplant may be done if your CKD becomes severe  Manage CKD:   · Maintain a healthy weight  Ask your healthcare provider how much you should weigh  Ask him to help you create a weight loss plan if you are overweight  · Exercise 30 to 60 minutes a day, 4 to 7 times a week, or as directed  Ask about the best exercise plan for you   Regular exercise can help you manage CKD, high blood pressure, and diabetes  · Follow your healthcare provider's advice about what to eat and drink  He may tell you to eat food low in sodium (salt), potassium, phosphorus, or protein  You may need to see a dietitian if you need help planning meals  Ask how much liquid to drink each day and which liquids are best for you  · Limit alcohol  Ask how much alcohol is safe for you to drink  A drink of alcohol is 12 ounces of beer, 5 ounces of wine, or 1½ ounces of liquor  · Do not smoke  Nicotine and other chemicals in cigarettes and cigars can cause lung and kidney damage  Ask your healthcare provider for information if you currently smoke and need help to quit  E-cigarettes or smokeless tobacco still contain nicotine  Talk to your healthcare provider before you use these products  · Ask your healthcare provider if you need vaccines  Infections such as pneumonia, influenza, and hepatitis can be more harmful or more likely to occur in a person who has CKD  Vaccines reduce your risk of infection with these viruses  Follow up with your healthcare provider as directed:  Write down your questions so you remember to ask them during your visits  © 2017 [address] [employer] Information is for End User's use only and may not be sold, redistributed or otherwise used for commercial purposes  All illustrations and images included in CareNotes® are the copyrighted property of [employer]  or [employer]  The above information is an  only  It is not intended as medical advice for individual conditions or treatments  Talk to your doctor, nurse or pharmacist before following any medical regimen to see if it is safe and effective for you

## 2019-01-31 NOTE — TELEPHONE ENCOUNTER
Per Dr Braydon Mendoza, I called and spoke to the patient and told him that [facility] called his [address] and his CT Abdomen Pelvis WO Contrast has been approved until 4/1/2019  I also told the patient that Dr Braydno Mendoza ordered 2 more labs for him to have done, and I told the patient that I would mail out the lab order to him  I also told the patient that Dr Braydon Mendoza did speak to Dr Stephanie Dugan and according to Dr Stephanie Dugan the patient can keep his 2/26/2019 appointment, unless something comes back in the blood work, and then at that point Dr Allison Diego office will call the patient to get his appointment moved up  Dr Braydon Mendoza also said that he will also call the patient about his lab results  Patient understood and was OK with it

## 2019-01-31 NOTE — PROGRESS NOTES
NEPHROLOGY OFFICE FOLLOW UP  Rahul Calhoun 37 y o  male MRN: [id number]    Encounter: [id number] 1/31/2019    REASON FOR VISIT: Rahul Calhoun is a 37 y o  male who is here on 1/31/2019 for Hypertension; Chronic Kidney Disease; Kidney Transplant; and Frequent UTI       HPI:    Laney Reid came in today for follow-up of kidney transplant  He is complaining of left lower quadrant pain almost for 2 months  Denies any nausea vomiting diarrhea  Pain comes and go  Denies any urinary complaint  He is complaining of feeling thirst   No fever no chills  Denies taking any new medication  Recently had injury on his thumb but denies taking nonsteroidal pain killer        REVIEW OF SYSTEMS:    Review of Systems   Constitutional: Negative for activity change and fatigue  HENT: Negative for congestion and ear discharge  Eyes: Negative for photophobia and pain  Respiratory: Negative for apnea, cough, choking and chest tightness  Cardiovascular: Negative for chest pain and palpitations  Gastrointestinal: Positive for abdominal pain  Negative for abdominal distention, blood in stool, constipation, diarrhea and nausea  Endocrine: Negative for heat intolerance and polyphagia  Genitourinary: Negative for flank pain and urgency  Musculoskeletal: Negative for neck pain and neck stiffness  Skin: Negative for color change and wound  Allergic/Immunologic: Negative for food allergies and immunocompromised state  Neurological: Negative for seizures and facial asymmetry  Hematological: Negative for adenopathy  Does not bruise/bleed easily  Psychiatric/Behavioral: Negative for self-injury and suicidal ideas           PAST MEDICAL HISTORY:  Past Medical History:   Diagnosis Date    ADD (attention deficit disorder)     Depression     [facility])     H/O immunosuppressive therapy     History of kidney disease     Hypertension     Nephropathy, IgA        PAST SURGICAL HISTORY:  Past Surgical History:   Procedure Laterality Date    NEPHRECTOMY TRANSPLANTED ORGAN         SOCIAL HISTORY:  History   Alcohol Use No     Comment: Occasionally     History   Drug Use    Types: Marijuana     History   Smoking Status    Former Smoker    Years: 4 00   Smokeless Tobacco    Never Used       FAMILY HISTORY:  Family History   Problem Relation Age of Onset    Deep vein thrombosis Father     Deep vein thrombosis Paternal Grandfather     Transient ischemic attack Mother        MEDICATIONS:    Current Outpatient Prescriptions:     amphetamine-dextroamphetamine (ADDERALL, 30MG,) 30 MG tablet, Take 30 mg by mouth daily, Disp: , Rfl:     Benzoyl Peroxide (benzoyl peroxide) 10 % LIQD, Apply topically daily, Disp: , Rfl:     betamethasone, augmented, (DIPROLENE) 0 05 % ointment, Apply 2 application topically, Disp: , Rfl:     Cholecalciferol (VITAMIN D-3) 1000 units CAPS, Take 2,000 Units by mouth daily, Disp: , Rfl:     diltiazem (CARDIZEM) 120 MG tablet, Take 1 tablet (120 mg total) by mouth 2 (two) times a day, Disp: 60 tablet, Rfl: 2    mycophenolate (CELLCEPT) 250 mg capsule, Take 2 in the AM, 1 in the PM , Disp: 60 capsule, Rfl: 1    sertraline (ZOLOFT) 100 mg tablet, Take 1 tablet (100 mg total) by mouth daily (Patient taking differently: Take 200 mg by mouth daily  ), Disp: 14 tablet, Rfl: 0    tacrolimus (PROGRAF) 1 mg capsule, Take 1 capsule (1 mg total) by mouth 2 (two) times a day, Disp: 60 capsule, Rfl: 5    warfarin (COUMADIN) 10 mg tablet, Take by mouth daily, Disp: , Rfl:     warfarin (COUMADIN) 5 mg tablet, TAKE ONE OR TWO TABLETS BY MOUTH DAILY, Disp: 180 tablet, Rfl: 1    PHYSICAL EXAM:  Vitals:    01/31/19 1004   BP: 110/80   BP Location: Right arm   Patient Position: Sitting   Pulse: 80   Resp: 16   Temp: 98 1 °F (36 7 °C)   Weight: 86 2 kg (190 lb)   Height: 5' 6" (1 676 m)     Body mass index is 30 67 kg/m²  Physical Exam   Constitutional: He is oriented to person, place, and time   He appears well-developed  No distress  HENT:   Head: Normocephalic  Mouth/Throat: Oropharynx is clear and moist    Eyes: Conjunctivae are normal  No scleral icterus  Neck: Neck supple  No JVD present  Cardiovascular: Normal rate and normal heart sounds  No murmur heard  Pulmonary/Chest: Effort normal  He has no wheezes  Abdominal: Soft  Bowel sounds are normal  There is no tenderness  Musculoskeletal: Normal range of motion  He exhibits no edema or deformity  Neurological: He is alert and oriented to person, place, and time  Skin: Skin is warm  No rash noted  Psychiatric: He has a normal mood and affect   His behavior is normal        LAB RESULTS:  Results for orders placed or performed in visit on 01/25/19   Protein / creatinine ratio, urine   Result Value Ref Range    Creatinine, Ur 187 0 mg/dL    Protein Urine Random 56 mg/dL    Prot/Creat Ratio, Ur 0 30 (H) 0 00 - 0 10   UA (URINE) with reflex to Microscopic   Result Value Ref Range    Color, UA Yellow     Clarity, UA Clear     Specific Gravity, UA 1 025 1 003 - 1 030    pH, UA 6 0 4 5 - 8 0    Leukocytes, UA Negative Negative    Nitrite, UA Negative Negative    Protein, UA Trace (A) Negative mg/dl    Glucose, UA Negative Negative mg/dl    Ketones, UA Negative Negative mg/dl    Urobilinogen, UA 0 2 0 2, 1 0 E U /dl E U /dl    Bilirubin, UA Negative Negative    Blood, UA Large (A) Negative   PTH, intact   Result Value Ref Range     6 (H) 18 4 - 80 1 pg/mL   Basic metabolic panel   Result Value Ref Range    Sodium 140 136 - 145 mmol/L    Potassium 4 4 3 5 - 5 3 mmol/L    Chloride 105 100 - 108 mmol/L    CO2 25 21 - 32 mmol/L    ANION GAP 10 4 - 13 mmol/L    BUN 32 (H) 5 - 25 mg/dL    Creatinine 1 82 (H) 0 60 - 1 30 mg/dL    Glucose, Fasting 73 65 - 99 mg/dL    Calcium 9 5 8 3 - 10 1 mg/dL    eGFR 44 ml/min/1 73sq m   CBC and differential   Result Value Ref Range    WBC 7 07 4 31 - 10 16 Thousand/uL    RBC 4 49 3 88 - 5 62 Million/uL Hemoglobin 14 7 12 0 - 17 0 g/dL    Hematocrit 43 0 36 5 - 49 3 %    MCV 96 82 - 98 fL    MCH 32 7 26 8 - 34 3 pg    MCHC 34 2 31 4 - 37 4 g/dL    RDW 12 2 11 6 - 15 1 %    MPV 10 8 8 9 - 12 7 fL    Platelets 060 061 - 488 Thousands/uL    nRBC 0 /100 WBCs    Neutrophils Relative 51 43 - 75 %    Immat GRANS % 0 0 - 2 %    Lymphocytes Relative 30 14 - 44 %    Monocytes Relative 12 4 - 12 %    Eosinophils Relative 6 0 - 6 %    Basophils Relative 1 0 - 1 %    Neutrophils Absolute 3 65 1 85 - 7 62 Thousands/µL    Immature Grans Absolute 0 02 0 00 - 0 20 Thousand/uL    Lymphocytes Absolute 2 11 0 60 - 4 47 Thousands/µL    Monocytes Absolute 0 82 0 17 - 1 22 Thousand/µL    Eosinophils Absolute 0 41 0 00 - 0 61 Thousand/µL    Basophils Absolute 0 06 0 00 - 0 10 Thousands/µL   Phosphorus   Result Value Ref Range    Phosphorus 2 5 (L) 2 7 - 4 5 mg/dL   Tacrolimus level   Result Value Ref Range    TACROLIMUS 3 5 2 0 - 20 0 ng/mL   Urine Microscopic   Result Value Ref Range    RBC, UA 10-20 (A) None Seen, 0-5 /hpf    WBC, UA 1-2 (A) None Seen, 0-5, 5-55, 5-65 /hpf    Epithelial Cells Occasional None Seen, Occasional /hpf    Bacteria, UA None Seen None Seen, Occasional /hpf       ASSESSMENT and PLAN:      Acute kidney injury superimposed on CKD (HCC)  Kidney function is deteriorating  He does have kidney transplant and has pain in the same site  I will send him to transplant nephrologist for further evaluation management  I will get urinary study to assess and volume status  I discussed with transplant nephrologist Dr Lazaro Tilley who advised him to check his CMV virus titer as well as BK virus titer which I will order he will need close monitoring I discussed everything at length with him  Advised to drink more liquid    HTN (hypertension)  Very well control at this point    History of renal transplant  He does have IgA nephropathy  His kidney transplant does not appear to be tender on left lower quadrant      Left lower quadrant pain  Other than kidney transplant I do not feel anything  Will order CT scan of the abdomen to rule out any other pathology      Discussed everything at length with the patient  Also discussed with transplant nephrologist   He will be seeing him less than a month  Patient will get repeat blood test and urine test and if he still worsening he may need to see transplant nephrology early        Portions of the record may have been created with voice recognition software  Occasional wrong word or "sound a like" substitutions may have occurred due to the inherent limitations of voice recognition software  Read the chart carefully and recognize, using context, where substitutions have occurred  If you have any questions, please contact the dictating provider

## 2019-01-31 NOTE — ASSESSMENT & PLAN NOTE
He does have IgA nephropathy  His kidney transplant does not appear to be tender on left lower quadrant

## 2019-01-31 NOTE — ASSESSMENT & PLAN NOTE
Kidney function is deteriorating  He does have kidney transplant and has pain in the same site  I will send him to transplant nephrologist for further evaluation management  I will get urinary study to assess and volume status  I discussed with transplant nephrologist Dr Teto Alvarez who advised him to check his CMV virus titer as well as BK virus titer which I will order he will need close monitoring I discussed everything at length with him    Advised to drink more liquid

## 2019-01-31 NOTE — ASSESSMENT & PLAN NOTE
Other than kidney transplant I do not feel anything    Will order CT scan of the abdomen to rule out any other pathology

## 2019-02-07 ENCOUNTER — HOSPITAL ENCOUNTER (OUTPATIENT)
Dept: CT IMAGING | Facility: HOSPITAL | Age: 44
Discharge: HOME | End: 2019-02-07
Attending: INTERNAL MEDICINE
Payer: COMMERCIAL

## 2019-02-07 DIAGNOSIS — Z94.0 HISTORY OF RENAL TRANSPLANT: ICD-10-CM

## 2019-02-07 DIAGNOSIS — R10.32 LEFT LOWER QUADRANT PAIN: ICD-10-CM

## 2019-02-07 DIAGNOSIS — N17.9 ACUTE KIDNEY INJURY SUPERIMPOSED ON CKD (HCC): ICD-10-CM

## 2019-02-07 DIAGNOSIS — N18.9 ACUTE KIDNEY INJURY SUPERIMPOSED ON CKD (HCC): ICD-10-CM

## 2019-02-07 PROCEDURE — 74176 CT ABD & PELVIS W/O CONTRAST: CPT

## 2019-02-11 ENCOUNTER — TELEPHONE (OUTPATIENT)
Dept: NEPHROLOGY | Facility: CLINIC | Age: 44
End: 2019-02-11

## 2019-02-18 ENCOUNTER — TELEPHONE (OUTPATIENT)
Dept: NEPHROLOGY | Facility: CLINIC | Age: 44
End: 2019-02-18

## 2019-02-18 DIAGNOSIS — Z94.0 KIDNEY TRANSPLANTED: ICD-10-CM

## 2019-02-18 RX ORDER — MYCOPHENOLATE MOFETIL 250 MG/1
CAPSULE ORAL
Qty: 60 CAPSULE | Refills: 1 | Status: SHIPPED | OUTPATIENT
Start: 2019-02-18 | End: 2019-04-11 | Stop reason: SDUPTHER

## 2019-02-18 NOTE — TELEPHONE ENCOUNTER
Rx was printed off from [facility] Rx request for Mycophenolate 250mg Capsule 60 qu  Patient takes 2 in the AM and 1 in the Evening  Please send Rx to [facility] at phone # [phone] and fax# [phone]

## 2019-02-22 ENCOUNTER — TELEPHONE (OUTPATIENT)
Dept: INTERNAL MEDICINE CLINIC | Facility: CLINIC | Age: 44
End: 2019-02-22

## 2019-02-22 DIAGNOSIS — Z94.0 KIDNEY TRANSPLANTED: Primary | ICD-10-CM

## 2019-02-23 ENCOUNTER — APPOINTMENT (OUTPATIENT)
Dept: LAB | Facility: HOSPITAL | Age: 44
End: 2019-02-23
Attending: INTERNAL MEDICINE
Payer: COMMERCIAL

## 2019-02-23 ENCOUNTER — TRANSCRIBE ORDERS (OUTPATIENT)
Dept: ADMINISTRATIVE | Facility: HOSPITAL | Age: 44
End: 2019-02-23

## 2019-02-23 DIAGNOSIS — N17.9 ACUTE RENAL FAILURE, UNSPECIFIED ACUTE RENAL FAILURE TYPE (HCC): Primary | ICD-10-CM

## 2019-02-23 DIAGNOSIS — N17.9 ACUTE RENAL FAILURE, UNSPECIFIED ACUTE RENAL FAILURE TYPE (HCC): ICD-10-CM

## 2019-02-23 DIAGNOSIS — Z94.0 HISTORY OF RENAL TRANSPLANT: ICD-10-CM

## 2019-02-23 LAB
ANION GAP SERPL CALCULATED.3IONS-SCNC: 8 MMOL/L (ref 4–13)
BACTERIA UR QL AUTO: ABNORMAL /HPF
BASOPHILS # BLD AUTO: 0.05 THOUSANDS/ΜL (ref 0–0.1)
BASOPHILS NFR BLD AUTO: 1 % (ref 0–1)
BILIRUB UR QL STRIP: NEGATIVE
BUN SERPL-MCNC: 25 MG/DL (ref 5–25)
CALCIUM SERPL-MCNC: 9.8 MG/DL (ref 8.3–10.1)
CHLORIDE SERPL-SCNC: 104 MMOL/L (ref 100–108)
CHLORIDE UR-SCNC: 178 MMOL/L (ref 10–330)
CLARITY UR: CLEAR
CO2 SERPL-SCNC: 25 MMOL/L (ref 21–32)
COLOR UR: YELLOW
CREAT SERPL-MCNC: 1.55 MG/DL (ref 0.6–1.3)
CREAT UR-MCNC: 174 MG/DL
EOSINOPHIL # BLD AUTO: 0.45 THOUSAND/ΜL (ref 0–0.61)
EOSINOPHIL NFR BLD AUTO: 6 % (ref 0–6)
ERYTHROCYTE [DISTWIDTH] IN BLOOD BY AUTOMATED COUNT: 12 % (ref 11.6–15.1)
GFR SERPL CREATININE-BSD FRML MDRD: 53 ML/MIN/1.73SQ M
GLUCOSE P FAST SERPL-MCNC: 87 MG/DL (ref 65–99)
GLUCOSE UR STRIP-MCNC: NEGATIVE MG/DL
HCT VFR BLD AUTO: 44.8 % (ref 36.5–49.3)
HGB BLD-MCNC: 15.3 G/DL (ref 12–17)
HGB UR QL STRIP.AUTO: ABNORMAL
IMM GRANULOCYTES # BLD AUTO: 0.03 THOUSAND/UL (ref 0–0.2)
IMM GRANULOCYTES NFR BLD AUTO: 0 % (ref 0–2)
KETONES UR STRIP-MCNC: NEGATIVE MG/DL
LEUKOCYTE ESTERASE UR QL STRIP: NEGATIVE
LYMPHOCYTES # BLD AUTO: 1.89 THOUSANDS/ΜL (ref 0.6–4.47)
LYMPHOCYTES NFR BLD AUTO: 26 % (ref 14–44)
MCH RBC QN AUTO: 32.1 PG (ref 26.8–34.3)
MCHC RBC AUTO-ENTMCNC: 34.2 G/DL (ref 31.4–37.4)
MCV RBC AUTO: 94 FL (ref 82–98)
MONOCYTES # BLD AUTO: 0.7 THOUSAND/ΜL (ref 0.17–1.22)
MONOCYTES NFR BLD AUTO: 10 % (ref 4–12)
NEUTROPHILS # BLD AUTO: 4.27 THOUSANDS/ΜL (ref 1.85–7.62)
NEUTS SEG NFR BLD AUTO: 57 % (ref 43–75)
NITRITE UR QL STRIP: NEGATIVE
NON-SQ EPI CELLS URNS QL MICRO: ABNORMAL /HPF
NRBC BLD AUTO-RTO: 0 /100 WBCS
PH UR STRIP.AUTO: 6 [PH] (ref 4.5–8)
PLATELET # BLD AUTO: 236 THOUSANDS/UL (ref 149–390)
PMV BLD AUTO: 10.3 FL (ref 8.9–12.7)
POTASSIUM SERPL-SCNC: 4.7 MMOL/L (ref 3.5–5.3)
PROT UR STRIP-MCNC: ABNORMAL MG/DL
RBC # BLD AUTO: 4.77 MILLION/UL (ref 3.88–5.62)
RBC #/AREA URNS AUTO: ABNORMAL /HPF
SODIUM 24H UR-SCNC: 112 MOL/L
SODIUM SERPL-SCNC: 137 MMOL/L (ref 136–145)
SP GR UR STRIP.AUTO: 1.02 (ref 1–1.03)
URATE SERPL-MCNC: 5.6 MG/DL (ref 4.2–8)
UROBILINOGEN UR QL STRIP.AUTO: 0.2 E.U./DL
WBC # BLD AUTO: 7.39 THOUSAND/UL (ref 4.31–10.16)
WBC #/AREA URNS AUTO: ABNORMAL /HPF

## 2019-02-23 PROCEDURE — 83935 ASSAY OF URINE OSMOLALITY: CPT | Performed by: INTERNAL MEDICINE

## 2019-02-23 PROCEDURE — 80048 BASIC METABOLIC PNL TOTAL CA: CPT

## 2019-02-23 PROCEDURE — 36415 COLL VENOUS BLD VENIPUNCTURE: CPT

## 2019-02-23 PROCEDURE — 84300 ASSAY OF URINE SODIUM: CPT | Performed by: INTERNAL MEDICINE

## 2019-02-23 PROCEDURE — 82570 ASSAY OF URINE CREATININE: CPT | Performed by: INTERNAL MEDICINE

## 2019-02-23 PROCEDURE — 84550 ASSAY OF BLOOD/URIC ACID: CPT

## 2019-02-23 PROCEDURE — 81001 URINALYSIS AUTO W/SCOPE: CPT | Performed by: INTERNAL MEDICINE

## 2019-02-23 PROCEDURE — 82436 ASSAY OF URINE CHLORIDE: CPT | Performed by: INTERNAL MEDICINE

## 2019-02-23 PROCEDURE — 87799 DETECT AGENT NOS DNA QUANT: CPT

## 2019-02-23 PROCEDURE — 85025 COMPLETE CBC W/AUTO DIFF WBC: CPT

## 2019-02-23 PROCEDURE — 87205 SMEAR GRAM STAIN: CPT | Performed by: INTERNAL MEDICINE

## 2019-02-24 LAB
EOSINOPHIL NFR URNS MANUAL: 0 %
OSMOLALITY UR: 766 MMOL/KG

## 2019-02-25 ENCOUNTER — TELEPHONE (OUTPATIENT)
Dept: NEPHROLOGY | Facility: CLINIC | Age: 44
End: 2019-02-25

## 2019-02-26 ENCOUNTER — OFFICE VISIT (OUTPATIENT)
Dept: NEPHROLOGY | Facility: CLINIC | Age: 44
End: 2019-02-26
Payer: COMMERCIAL

## 2019-02-26 VITALS
BODY MASS INDEX: 28.93 KG/M2 | HEART RATE: 82 BPM | SYSTOLIC BLOOD PRESSURE: 130 MMHG | DIASTOLIC BLOOD PRESSURE: 86 MMHG | HEIGHT: 66 IN | WEIGHT: 180 LBS

## 2019-02-26 DIAGNOSIS — I82.403 RECURRENT ACUTE DEEP VEIN THROMBOSIS (DVT) OF BOTH LOWER EXTREMITIES (HCC): Primary | ICD-10-CM

## 2019-02-26 DIAGNOSIS — Z94.0 KIDNEY TRANSPLANTED: ICD-10-CM

## 2019-02-26 LAB
BKV DNA # SERPL NAA+PROBE: NEGATIVE COPIES/ML
BKV DNA SERPL NAA+PROBE-LOG#: NORMAL LOG10COPY/ML
CMV DNA SERPL NAA+PROBE-ACNC: NEGATIVE IU/ML
CMV DNA SERPL NAA+PROBE-LOG IU: NORMAL LOG10 IU/ML

## 2019-02-26 PROCEDURE — 99215 OFFICE O/P EST HI 40 MIN: CPT | Performed by: INTERNAL MEDICINE

## 2019-02-26 NOTE — LETTER
February 26, 2019     Joshua Guerra, [facility]    Patient: Bashir Cueto   YOB: 1975   Date of Visit: 2/26/2019       Dear Dr Lizbeth Magdaleno:    Thank you for referring Bashir Cueto to me for evaluation  Below are my notes for this consultation  If you have questions, please do not hesitate to call me  I look forward to following your patient along with you  Sincerely,        Dharmesh Ambrocio MD        CC: MD Dharmesh Donis MD  2/26/2019  4:44 PM  Sign at close encounter  [address] 37 y o  male MRN: [id number]  2/26/2019    Reason for Visit: renal transplant f/u    ASSESSMENT and PLAN:    I had the pleasure of seeing Mr Nasir Mckeon today in the renal clinic for the continued management of renal transplant f/u      78-year-old male with a past medical history of 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), living related from kidney swap renal transplant in 2009 at [facility] (patient's brother was donor involved in swap), was on peritoneal dialysis for 9 months prior to transplant, DVT X 4 prior, HTN, depression, ADHD, admission in December of 2017 for hematuria and at that time was treated for urinary tract infection/pyelonephritis  1) CKD -     History obtained from [address], and [facility] and here at [address] [place] [address] records:    -Baseline Cr 1 3-1 8 mg/dL; June 2014 1 8  -Tac levels from 2009 - 2014 were 7-10  -Levels have been fluctuating significantly last few months  -Patient follows at [facility] - followed with Dr Deanna Frederick   -Prior baseline Cr 1 4-1 5 mg/dL  -pt had early CMV - on EGD  -Called [facility] - last time seen was in May 2017   Dr Deanna Frederick is away until next week  -[facility] - no known rejection ([phone])  -Has never had renal biopsy post transplant    Labwork review:    - UPCR 0 3  - CT abd/pel without hydro or nephrolithiasis on transplanted kidney  - to note, patient has had microscopic hematuria as far back as 2014 on UA  - UPCR 0 3 on 1/25/2019  - UA 10-20 RBC  - UA repeat on 2/23 was with 2-4 RBC, 1-2 WBC  - pt has had multiple episodes of Cr below goal in 2017 and 2018  - BK and CMV in process  - urine eos 0%    Important Medication notes:   - on diltiazem to note (interaction with tacrolimus)    Plan:  - Cr is at baseline  - if hematuria rises, proteinuria rises, Cr rises ---> will need to check UA (history of Pyelonephritis), check DSA (we will need to obtain kits from [facility] if needed), and would need to consider biopsy if no pyelo (per [facility] can complete renal biopsy locally)  - no changes to diltiazem today  - check labs in 2 weeks - pt advised to stay adherent to immune suppression as the patient has had compliance issues due to depression recently  -patient will call the dermatologist make a follow-up appointment  -refer the patient to Hematology for follow-up/care for chronic DVTs/recurrent DVTs (pt is appropriately on coumadin; generally given patient's fluctuant Cr and compliance concerns, would avoid novel anti coagulants)  - f/u with Primary Nephrology for CKD f/u  - f/u with our office 1-2 times a year; for now, reeval in 6 months; q 3-4 month f/u with Dr Melissa James  - labwork every 2-3 months    2) Immune Suppression    - was on tacrolimus 1 mg BID  -  in AM and 250 mg PM  - not on steroid regimen  - history CMV  - pt has had labile tacrolimus levels  - pt states has had depression and states has had been missing medications often over past few months; also patient has not been taking labwork at correct trough times  - discussed compliance    3) DVT - recurrent DVT   Most recent march 2018    - 4 prior DVTs  - 1 RUE post PICC  - then 3 in LE  - refer to Hematology    4) Vaccines    - up to date on vaccine with flu vaccine  - PNA vaccine - pt will review with Primary Physician  - no live vaccines    5) Age appropriate Ca screening    - pt will make f/u appt with Dermatology  - has history of psoriaform dermatitis     6) HTN    - pt is on diltiazem  - BP stable    7) abd pain - resolved    8) HPL    - last LDL above goal  - recheck  - may need to consider statin low dose  Transplant patients would benefit from statin therapy generally if not contraindication    9) Depression    -prior suicide attempts - prior 2003  - pt knows to go to the hospital  - will be attempting ECT  - patient follows with Psychiatry    10) Hb - monitor    11) IgA nephropathy native disease     monitor for recurrence    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    - defer to Primary Nephrology for f/u    It was a pleasure evaluating your patient in the office today  Thank you for allowing our team to participate in the care of Mr Tiffanie Dorman  Please do not hesitate to contact our team if further issues/questions shall arise in the interim  No problem-specific Assessment & Plan notes found for this encounter  HPI:    Pt denies complaints  Denies n/v  PATIENT INSTRUCTIONS:    There are no Patient Instructions on file for this visit  OBJECTIVE:  Current Weight:    There were no vitals filed for this visit  There is no height or weight on file to calculate BMI  REVIEW OF SYSTEMS:    Review of Systems   Constitutional: Negative  Negative for appetite change and fatigue  HENT: Negative  Eyes: Negative  Respiratory: Negative  Negative for shortness of breath  Cardiovascular: Negative  Negative for leg swelling  Gastrointestinal: Negative  Endocrine: Negative  Genitourinary: Negative  Negative for difficulty urinating  Musculoskeletal: Negative  Allergic/Immunologic: Negative  Neurological: Negative  Hematological: Negative  Psychiatric/Behavioral: Negative  All other systems reviewed and are negative  PHYSICAL EXAM:      Physical Exam   Constitutional: He is oriented to person, place, and time  He appears well-developed and well-nourished  No distress  HENT:   Head: Normocephalic and atraumatic  Mouth/Throat: No oropharyngeal exudate  Eyes: Conjunctivae are normal  Right eye exhibits no discharge  Left eye exhibits no discharge  No scleral icterus  Neck: Normal range of motion  Neck supple  No JVD present  Cardiovascular: Normal rate, regular rhythm and normal heart sounds  Exam reveals no gallop and no friction rub  No murmur heard  Pulmonary/Chest: Effort normal and breath sounds normal  No respiratory distress  He has no wheezes  He has no rales  He exhibits no tenderness  Abdominal: Soft  Bowel sounds are normal  He exhibits no distension  There is no tenderness  There is no rebound  Musculoskeletal: Normal range of motion  He exhibits no edema, tenderness or deformity  Neurological: He is alert and oriented to person, place, and time  He has normal reflexes  He displays normal reflexes  No cranial nerve deficit  Coordination normal    Skin: Skin is warm and dry  No rash noted  He is not diaphoretic  No erythema  No pallor  Psychiatric: He has a normal mood and affect  His behavior is normal  Judgment and thought content normal    Nursing note and vitals reviewed        Medications:    Current Outpatient Medications:     amphetamine-dextroamphetamine (ADDERALL, 30MG,) 30 MG tablet, Take 30 mg by mouth daily, Disp: , Rfl:     Benzoyl Peroxide (benzoyl peroxide) 10 % LIQD, Apply topically daily, Disp: , Rfl:     betamethasone, augmented, (DIPROLENE) 0 05 % ointment, Apply 2 application topically, Disp: , Rfl:     Cholecalciferol (VITAMIN D-3) 1000 units CAPS, Take 2,000 Units by mouth daily, Disp: , Rfl:     diltiazem (CARDIZEM) 120 MG tablet, Take 1 tablet (120 mg total) by mouth 2 (two) times a day, Disp: 60 tablet, Rfl: 2    mycophenolate (CELLCEPT) 250 mg capsule, Take 2 in the AM, 1 in the PM , Disp: 60 capsule, Rfl: 1    sertraline (ZOLOFT) 100 mg tablet, Take 1 tablet (100 mg total) by mouth daily (Patient taking differently: Take 200 mg by mouth daily  ), Disp: 14 tablet, Rfl: 0    tacrolimus (PROGRAF) 1 mg capsule, Take 1 capsule (1 mg total) by mouth 2 (two) times a day, Disp: 60 capsule, Rfl: 5    warfarin (COUMADIN) 10 mg tablet, Take by mouth daily, Disp: , Rfl:     warfarin (COUMADIN) 5 mg tablet, TAKE ONE OR TWO TABLETS BY MOUTH DAILY, Disp: 180 tablet, Rfl: 1    Laboratory Results:  Results from last 7 days   Lab Units 02/23/19  0941   WBC Thousand/uL 7 39   HEMOGLOBIN g/dL 15 3   HEMATOCRIT % 44 8   PLATELETS Thousands/uL 236   POTASSIUM mmol/L 4 7   CHLORIDE mmol/L 104   CO2 mmol/L 25   BUN mg/dL 25   CREATININE mg/dL 1 55*   CALCIUM mg/dL 9 8       Results for orders placed or performed in visit on 89/38/17   Basic metabolic panel   Result Value Ref Range    Sodium 137 136 - 145 mmol/L    Potassium 4 7 3 5 - 5 3 mmol/L    Chloride 104 100 - 108 mmol/L    CO2 25 21 - 32 mmol/L    ANION GAP 8 4 - 13 mmol/L    BUN 25 5 - 25 mg/dL    Creatinine 1 55 (H) 0 60 - 1 30 mg/dL    Glucose, Fasting 87 65 - 99 mg/dL    Calcium 9 8 8 3 - 10 1 mg/dL    eGFR 53 ml/min/1 73sq m   CBC and differential   Result Value Ref Range    WBC 7 39 4 31 - 10 16 Thousand/uL    RBC 4 77 3 88 - 5 62 Million/uL    Hemoglobin 15 3 12 0 - 17 0 g/dL    Hematocrit 44 8 36 5 - 49 3 %    MCV 94 82 - 98 fL    MCH 32 1 26 8 - 34 3 pg    MCHC 34 2 31 4 - 37 4 g/dL    RDW 12 0 11 6 - 15 1 %    MPV 10 3 8 9 - 12 7 fL    Platelets 178 136 - 220 Thousands/uL    nRBC 0 /100 WBCs    Neutrophils Relative 57 43 - 75 %    Immat GRANS % 0 0 - 2 %    Lymphocytes Relative 26 14 - 44 %    Monocytes Relative 10 4 - 12 %    Eosinophils Relative 6 0 - 6 %    Basophils Relative 1 0 - 1 %    Neutrophils Absolute 4 27 1 85 - 7 62 Thousands/µL    Immature Grans Absolute 0 03 0 00 - 0 20 Thousand/uL    Lymphocytes Absolute 1 89 0 60 - 4 47 Thousands/µL    Monocytes Absolute 0 70 0 17 - 1 22 Thousand/µL    Eosinophils Absolute 0 45 0 00 - 0 61 Thousand/µL    Basophils Absolute 0 05 0 00 - 0 10 Thousands/µL   Uric acid   Result Value Ref Range    Uric Acid 5 6 4 2 - 8 0 mg/dL

## 2019-02-26 NOTE — PROGRESS NOTES
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 (diagnosed at 15 yo), living related from kidney swap renal transplant in 2009 at [facility] (patient's brother was donor involved in swap), was on peritoneal dialysis for 9 months prior to transplant, DVT X 4 prior, HTN, depression, ADHD, admission in December of 2017 for hematuria and at that time was treated for urinary tract infection/pyelonephritis  1) CKD -     History obtained from [address], and [facility] and here at [facility] records:    -Baseline Cr 1 3-1 8 mg/dL; June 2014 1 8  -Tac levels from 2009 - 2014 were 7-10  -Levels have been fluctuating significantly last few months  -Patient follows at [facility] - followed with Dr Murphy Colon   -Prior baseline Cr 1 4-1 5 mg/dL  -pt had early CMV - on EGD  -Called [facility] - last time seen was in May 2017   Dr Murphy Colon is away until next week  -[facility] - no known rejection ([phone])  -Has never had renal biopsy post transplant    Labwork review:    - UPCR 0 3  - CT abd/pel without hydro or nephrolithiasis on transplanted kidney  - to note, patient has had microscopic hematuria as far back as 2014 on UA  - UPCR 0 3 on 1/25/2019  - UA 10-20 RBC  - UA repeat on 2/23 was with 2-4 RBC, 1-2 WBC  - pt has had multiple episodes of Cr below goal in 2017 and 2018  - BK and CMV in process  - urine eos 0%    Important Medication notes:   - on diltiazem to note (interaction with tacrolimus)    Plan:  - Cr is at baseline  - if hematuria rises, proteinuria rises, Cr rises ---> will need to check UA (history of Pyelonephritis), check DSA (we will need to obtain kits from [facility] if needed), and would need to consider biopsy if no pyelo (per [facility] can complete renal biopsy locally)  - no changes to diltiazem today  - check labs in 2 weeks - pt advised to stay adherent to immune suppression as the patient has had compliance issues due to depression recently  -patient will call the dermatologist make a follow-up appointment  -refer the patient to Hematology for follow-up/care for chronic DVTs/recurrent DVTs (pt is appropriately on coumadin; generally given patient's fluctuant Cr and compliance concerns, would avoid novel anti coagulants)  - f/u with Primary Nephrology for CKD f/u  - f/u with our office 1-2 times a year; for now, reeval in 6 months; q 3-4 month f/u with Dr Princess Zapata every 2-3 months  - f/u BK and CMV titers    2) Immune Suppression    - was on tacrolimus 1 mg BID  -  in AM and 250 mg PM  - not on steroid regimen  - history CMV  - pt has had labile tacrolimus levels  - pt states has had depression and states has had been missing medications often over past few months; also patient has not been taking labwork at correct trough times  - discussed compliance    3) DVT - recurrent DVT  Most recent march 2018    - 4 prior DVTs  - 1 RUE post PICC  - then 3 in LE  - refer to Hematology    4) Vaccines    - up to date on vaccine with flu vaccine  - PNA vaccine - pt will review with Primary Physician  - no live vaccines    5) Age appropriate Ca screening    - pt will make f/u appt with Dermatology  - has history of psoriaform dermatitis     6) HTN    - pt is on diltiazem  - BP stable    7) abd pain - resolved    8) HPL    - last LDL above goal  - recheck  - may need to consider statin low dose   Transplant patients would benefit from statin therapy generally if not contraindication    9) Depression    -prior suicide attempts - prior 2003  - pt knows to go to the hospital  - will be attempting ECT  - patient follows with Psychiatry    10) Hb - monitor    11) IgA nephropathy native disease monitor for recurrence    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    - defer to Primary Nephrology for f/u    It was a pleasure evaluating your patient in the office today  Thank you for allowing our team to participate in the care of Mr Theresa Tim  Please do not hesitate to contact our team if further issues/questions shall arise in the interim  No problem-specific Assessment & Plan notes found for this encounter  HPI:    Pt denies complaints  Denies n/v  PATIENT INSTRUCTIONS:    There are no Patient Instructions on file for this visit  OBJECTIVE:  Current Weight:    There were no vitals filed for this visit  There is no height or weight on file to calculate BMI  REVIEW OF SYSTEMS:    Review of Systems   Constitutional: Negative  Negative for appetite change and fatigue  HENT: Negative  Eyes: Negative  Respiratory: Negative  Negative for shortness of breath  Cardiovascular: Negative  Negative for leg swelling  Gastrointestinal: Negative  Endocrine: Negative  Genitourinary: Negative  Negative for difficulty urinating  Musculoskeletal: Negative  Allergic/Immunologic: Negative  Neurological: Negative  Hematological: Negative  Psychiatric/Behavioral: Negative  All other systems reviewed and are negative  PHYSICAL EXAM:      Physical Exam   Constitutional: He is oriented to person, place, and time  He appears well-developed and well-nourished  No distress  HENT:   Head: Normocephalic and atraumatic  Mouth/Throat: No oropharyngeal exudate  Eyes: Conjunctivae are normal  Right eye exhibits no discharge  Left eye exhibits no discharge  No scleral icterus  Neck: Normal range of motion  Neck supple  No JVD present  Cardiovascular: Normal rate, regular rhythm and normal heart sounds  Exam reveals no gallop and no friction rub  No murmur heard  Pulmonary/Chest: Effort normal and breath sounds normal  No respiratory distress  He has no wheezes  He has no rales   He exhibits no tenderness  Abdominal: Soft  Bowel sounds are normal  He exhibits no distension  There is no tenderness  There is no rebound  Musculoskeletal: Normal range of motion  He exhibits no edema, tenderness or deformity  Neurological: He is alert and oriented to person, place, and time  He has normal reflexes  He displays normal reflexes  No cranial nerve deficit  Coordination normal    Skin: Skin is warm and dry  No rash noted  He is not diaphoretic  No erythema  No pallor  Psychiatric: He has a normal mood and affect  His behavior is normal  Judgment and thought content normal    Nursing note and vitals reviewed        Medications:    Current Outpatient Medications:     amphetamine-dextroamphetamine (ADDERALL, 30MG,) 30 MG tablet, Take 30 mg by mouth daily, Disp: , Rfl:     Benzoyl Peroxide (benzoyl peroxide) 10 % LIQD, Apply topically daily, Disp: , Rfl:     betamethasone, augmented, (DIPROLENE) 0 05 % ointment, Apply 2 application topically, Disp: , Rfl:     Cholecalciferol (VITAMIN D-3) 1000 units CAPS, Take 2,000 Units by mouth daily, Disp: , Rfl:     diltiazem (CARDIZEM) 120 MG tablet, Take 1 tablet (120 mg total) by mouth 2 (two) times a day, Disp: 60 tablet, Rfl: 2    mycophenolate (CELLCEPT) 250 mg capsule, Take 2 in the AM, 1 in the PM , Disp: 60 capsule, Rfl: 1    sertraline (ZOLOFT) 100 mg tablet, Take 1 tablet (100 mg total) by mouth daily (Patient taking differently: Take 200 mg by mouth daily  ), Disp: 14 tablet, Rfl: 0    tacrolimus (PROGRAF) 1 mg capsule, Take 1 capsule (1 mg total) by mouth 2 (two) times a day, Disp: 60 capsule, Rfl: 5    warfarin (COUMADIN) 10 mg tablet, Take by mouth daily, Disp: , Rfl:     warfarin (COUMADIN) 5 mg tablet, TAKE ONE OR TWO TABLETS BY MOUTH DAILY, Disp: 180 tablet, Rfl: 1    Laboratory Results:  Results from last 7 days   Lab Units 02/23/19  0941   WBC Thousand/uL 7 39   HEMOGLOBIN g/dL 15 3   HEMATOCRIT % 44 8   PLATELETS Thousands/uL 236   POTASSIUM mmol/L 4 7   CHLORIDE mmol/L 104   CO2 mmol/L 25   BUN mg/dL 25   CREATININE mg/dL 1 55*   CALCIUM mg/dL 9 8       Results for orders placed or performed in visit on 78/69/86   Basic metabolic panel   Result Value Ref Range    Sodium 137 136 - 145 mmol/L    Potassium 4 7 3 5 - 5 3 mmol/L    Chloride 104 100 - 108 mmol/L    CO2 25 21 - 32 mmol/L    ANION GAP 8 4 - 13 mmol/L    BUN 25 5 - 25 mg/dL    Creatinine 1 55 (H) 0 60 - 1 30 mg/dL    Glucose, Fasting 87 65 - 99 mg/dL    Calcium 9 8 8 3 - 10 1 mg/dL    eGFR 53 ml/min/1 73sq m   CBC and differential   Result Value Ref Range    WBC 7 39 4 31 - 10 16 Thousand/uL    RBC 4 77 3 88 - 5 62 Million/uL    Hemoglobin 15 3 12 0 - 17 0 g/dL    Hematocrit 44 8 36 5 - 49 3 %    MCV 94 82 - 98 fL    MCH 32 1 26 8 - 34 3 pg    MCHC 34 2 31 4 - 37 4 g/dL    RDW 12 0 11 6 - 15 1 %    MPV 10 3 8 9 - 12 7 fL    Platelets 536 005 - 028 Thousands/uL    nRBC 0 /100 WBCs    Neutrophils Relative 57 43 - 75 %    Immat GRANS % 0 0 - 2 %    Lymphocytes Relative 26 14 - 44 %    Monocytes Relative 10 4 - 12 %    Eosinophils Relative 6 0 - 6 %    Basophils Relative 1 0 - 1 %    Neutrophils Absolute 4 27 1 85 - 7 62 Thousands/µL    Immature Grans Absolute 0 03 0 00 - 0 20 Thousand/uL    Lymphocytes Absolute 1 89 0 60 - 4 47 Thousands/µL    Monocytes Absolute 0 70 0 17 - 1 22 Thousand/µL    Eosinophils Absolute 0 45 0 00 - 0 61 Thousand/µL    Basophils Absolute 0 05 0 00 - 0 10 Thousands/µL   Uric acid   Result Value Ref Range    Uric Acid 5 6 4 2 - 8 0 mg/dL

## 2019-02-26 NOTE — PATIENT INSTRUCTIONS
1) please call your Dermatologist to schedule appointment  2) go for labwork on week of march 11th and then every 2-3 months after this  3) please make sure next set of labs are accurate 11-13 hour trough

## 2019-03-28 ENCOUNTER — LAB (OUTPATIENT)
Dept: LAB | Facility: HOSPITAL | Age: 44
End: 2019-03-28
Attending: INTERNAL MEDICINE
Payer: COMMERCIAL

## 2019-03-28 DIAGNOSIS — Z94.0 KIDNEY TRANSPLANTED: ICD-10-CM

## 2019-03-28 LAB
ALBUMIN SERPL BCP-MCNC: 3.8 G/DL (ref 3.5–5)
ALP SERPL-CCNC: 105 U/L (ref 46–116)
ALT SERPL W P-5'-P-CCNC: 26 U/L (ref 12–78)
ANION GAP SERPL CALCULATED.3IONS-SCNC: 9 MMOL/L (ref 4–13)
AST SERPL W P-5'-P-CCNC: 20 U/L (ref 5–45)
BACTERIA UR QL AUTO: ABNORMAL /HPF
BILIRUB SERPL-MCNC: 0.4 MG/DL (ref 0.2–1)
BILIRUB UR QL STRIP: ABNORMAL
BUN SERPL-MCNC: 25 MG/DL (ref 5–25)
CALCIUM SERPL-MCNC: 10.1 MG/DL (ref 8.3–10.1)
CHLORIDE SERPL-SCNC: 103 MMOL/L (ref 100–108)
CHOLEST SERPL-MCNC: 250 MG/DL (ref 50–200)
CLARITY UR: ABNORMAL
CO2 SERPL-SCNC: 28 MMOL/L (ref 21–32)
COLOR UR: YELLOW
CREAT SERPL-MCNC: 1.65 MG/DL (ref 0.6–1.3)
ERYTHROCYTE [DISTWIDTH] IN BLOOD BY AUTOMATED COUNT: 12.5 % (ref 11.6–15.1)
GFR SERPL CREATININE-BSD FRML MDRD: 49 ML/MIN/1.73SQ M
GLUCOSE P FAST SERPL-MCNC: 99 MG/DL (ref 65–99)
GLUCOSE UR STRIP-MCNC: NEGATIVE MG/DL
HCT VFR BLD AUTO: 47.5 % (ref 36.5–49.3)
HDLC SERPL-MCNC: 40 MG/DL (ref 40–60)
HGB BLD-MCNC: 16.4 G/DL (ref 12–17)
HGB UR QL STRIP.AUTO: ABNORMAL
HYALINE CASTS #/AREA URNS LPF: ABNORMAL /LPF
KETONES UR STRIP-MCNC: NEGATIVE MG/DL
LDLC SERPL CALC-MCNC: 168 MG/DL (ref 0–100)
LEUKOCYTE ESTERASE UR QL STRIP: NEGATIVE
MAGNESIUM SERPL-MCNC: 2 MG/DL (ref 1.6–2.6)
MCH RBC QN AUTO: 33.1 PG (ref 26.8–34.3)
MCHC RBC AUTO-ENTMCNC: 34.5 G/DL (ref 31.4–37.4)
MCV RBC AUTO: 96 FL (ref 82–98)
MUCOUS THREADS UR QL AUTO: ABNORMAL
NITRITE UR QL STRIP: NEGATIVE
NON-SQ EPI CELLS URNS QL MICRO: ABNORMAL /HPF
NONHDLC SERPL-MCNC: 210 MG/DL
PH UR STRIP.AUTO: 5.5 [PH]
PHOSPHATE SERPL-MCNC: 2.3 MG/DL (ref 2.7–4.5)
PLATELET # BLD AUTO: 249 THOUSANDS/UL (ref 149–390)
PMV BLD AUTO: 10.3 FL (ref 8.9–12.7)
POTASSIUM SERPL-SCNC: 4.6 MMOL/L (ref 3.5–5.3)
PROT SERPL-MCNC: 8.1 G/DL (ref 6.4–8.2)
PROT UR STRIP-MCNC: ABNORMAL MG/DL
RBC # BLD AUTO: 4.95 MILLION/UL (ref 3.88–5.62)
RBC #/AREA URNS AUTO: ABNORMAL /HPF
SODIUM SERPL-SCNC: 140 MMOL/L (ref 136–145)
SP GR UR STRIP.AUTO: >=1.03 (ref 1–1.03)
TACROLIMUS BLD-MCNC: 7.5 NG/ML (ref 2–20)
TRIGL SERPL-MCNC: 209 MG/DL
UROBILINOGEN UR QL STRIP.AUTO: 0.2 E.U./DL
WBC # BLD AUTO: 8.41 THOUSAND/UL (ref 4.31–10.16)
WBC #/AREA URNS AUTO: ABNORMAL /HPF

## 2019-03-28 PROCEDURE — 84100 ASSAY OF PHOSPHORUS: CPT | Performed by: INTERNAL MEDICINE

## 2019-03-28 PROCEDURE — 80053 COMPREHEN METABOLIC PANEL: CPT | Performed by: INTERNAL MEDICINE

## 2019-03-28 PROCEDURE — 36415 COLL VENOUS BLD VENIPUNCTURE: CPT | Performed by: INTERNAL MEDICINE

## 2019-03-28 PROCEDURE — 81001 URINALYSIS AUTO W/SCOPE: CPT | Performed by: INTERNAL MEDICINE

## 2019-03-28 PROCEDURE — 80197 ASSAY OF TACROLIMUS: CPT | Performed by: INTERNAL MEDICINE

## 2019-03-28 PROCEDURE — 83735 ASSAY OF MAGNESIUM: CPT | Performed by: INTERNAL MEDICINE

## 2019-03-28 PROCEDURE — 85027 COMPLETE CBC AUTOMATED: CPT | Performed by: INTERNAL MEDICINE

## 2019-03-28 PROCEDURE — 80061 LIPID PANEL: CPT

## 2019-04-01 ENCOUNTER — TELEPHONE (OUTPATIENT)
Dept: NEPHROLOGY | Facility: CLINIC | Age: 44
End: 2019-04-01

## 2019-04-03 ENCOUNTER — APPOINTMENT (OUTPATIENT)
Dept: LAB | Facility: HOSPITAL | Age: 44
End: 2019-04-03
Attending: INTERNAL MEDICINE
Payer: COMMERCIAL

## 2019-04-03 DIAGNOSIS — Z94.0 KIDNEY TRANSPLANTED: ICD-10-CM

## 2019-04-03 LAB
CREAT UR-MCNC: 297 MG/DL
PROT UR-MCNC: 139 MG/DL
PROT/CREAT UR: 0.47 MG/G{CREAT} (ref 0–0.1)

## 2019-04-03 PROCEDURE — 82570 ASSAY OF URINE CREATININE: CPT | Performed by: INTERNAL MEDICINE

## 2019-04-03 PROCEDURE — 84156 ASSAY OF PROTEIN URINE: CPT | Performed by: INTERNAL MEDICINE

## 2019-04-05 DIAGNOSIS — I10 ESSENTIAL HYPERTENSION: ICD-10-CM

## 2019-04-05 RX ORDER — DILTIAZEM HYDROCHLORIDE 120 MG/1
120 TABLET, FILM COATED ORAL 2 TIMES DAILY
Qty: 60 TABLET | Refills: 2 | Status: SHIPPED | OUTPATIENT
Start: 2019-04-05 | End: 2019-06-17 | Stop reason: HOSPADM

## 2019-04-09 ENCOUNTER — OFFICE VISIT (OUTPATIENT)
Dept: INTERNAL MEDICINE CLINIC | Facility: CLINIC | Age: 44
End: 2019-04-09
Payer: COMMERCIAL

## 2019-04-09 VITALS
HEIGHT: 66 IN | BODY MASS INDEX: 28.25 KG/M2 | WEIGHT: 175.8 LBS | SYSTOLIC BLOOD PRESSURE: 110 MMHG | HEART RATE: 76 BPM | OXYGEN SATURATION: 99 % | DIASTOLIC BLOOD PRESSURE: 72 MMHG

## 2019-04-09 DIAGNOSIS — I10 ESSENTIAL HYPERTENSION: ICD-10-CM

## 2019-04-09 DIAGNOSIS — N18.9 ACUTE KIDNEY INJURY SUPERIMPOSED ON CKD (HCC): ICD-10-CM

## 2019-04-09 DIAGNOSIS — Z86.718 HISTORY OF DVT (DEEP VEIN THROMBOSIS): Chronic | ICD-10-CM

## 2019-04-09 DIAGNOSIS — I82.4Z1 DEEP VEIN THROMBOSIS (DVT) OF DISTAL VEIN OF RIGHT LOWER EXTREMITY, UNSPECIFIED CHRONICITY (HCC): Primary | ICD-10-CM

## 2019-04-09 DIAGNOSIS — R22.32 AXILLARY MASS, LEFT: ICD-10-CM

## 2019-04-09 DIAGNOSIS — N18.30 STAGE 3 CHRONIC KIDNEY DISEASE (HCC): ICD-10-CM

## 2019-04-09 DIAGNOSIS — N17.9 ACUTE KIDNEY INJURY SUPERIMPOSED ON CKD (HCC): ICD-10-CM

## 2019-04-09 PROCEDURE — 3008F BODY MASS INDEX DOCD: CPT | Performed by: PHYSICIAN ASSISTANT

## 2019-04-09 PROCEDURE — 3074F SYST BP LT 130 MM HG: CPT | Performed by: PHYSICIAN ASSISTANT

## 2019-04-09 PROCEDURE — 99214 OFFICE O/P EST MOD 30 MIN: CPT | Performed by: PHYSICIAN ASSISTANT

## 2019-04-09 PROCEDURE — 3078F DIAST BP <80 MM HG: CPT | Performed by: PHYSICIAN ASSISTANT

## 2019-04-10 ENCOUNTER — HOSPITAL ENCOUNTER (OUTPATIENT)
Dept: ULTRASOUND IMAGING | Facility: HOSPITAL | Age: 44
Discharge: HOME | End: 2019-04-10
Payer: COMMERCIAL

## 2019-04-10 ENCOUNTER — HOSPITAL ENCOUNTER (OUTPATIENT)
Dept: RADIOLOGY | Facility: HOSPITAL | Age: 44
Discharge: HOME | End: 2019-04-10
Payer: COMMERCIAL

## 2019-04-10 ENCOUNTER — ANTICOAG VISIT (OUTPATIENT)
Dept: INTERNAL MEDICINE CLINIC | Facility: CLINIC | Age: 44
End: 2019-04-10

## 2019-04-10 ENCOUNTER — APPOINTMENT (OUTPATIENT)
Dept: LAB | Facility: HOSPITAL | Age: 44
End: 2019-04-10
Payer: COMMERCIAL

## 2019-04-10 DIAGNOSIS — I10 ESSENTIAL HYPERTENSION: ICD-10-CM

## 2019-04-10 DIAGNOSIS — N18.9 ACUTE KIDNEY INJURY SUPERIMPOSED ON CKD (HCC): ICD-10-CM

## 2019-04-10 DIAGNOSIS — N17.9 ACUTE KIDNEY INJURY SUPERIMPOSED ON CKD (HCC): ICD-10-CM

## 2019-04-10 DIAGNOSIS — N18.30 STAGE 3 CHRONIC KIDNEY DISEASE (HCC): ICD-10-CM

## 2019-04-10 DIAGNOSIS — R22.32 AXILLARY MASS, LEFT: ICD-10-CM

## 2019-04-10 DIAGNOSIS — I82.4Z1 DEEP VEIN THROMBOSIS (DVT) OF DISTAL VEIN OF RIGHT LOWER EXTREMITY, UNSPECIFIED CHRONICITY (HCC): ICD-10-CM

## 2019-04-10 LAB
BASOPHILS # BLD AUTO: 0.05 THOUSANDS/ΜL (ref 0–0.1)
BASOPHILS NFR BLD AUTO: 1 % (ref 0–1)
EOSINOPHIL # BLD AUTO: 0.08 THOUSAND/ΜL (ref 0–0.61)
EOSINOPHIL NFR BLD AUTO: 1 % (ref 0–6)
ERYTHROCYTE [DISTWIDTH] IN BLOOD BY AUTOMATED COUNT: 12.2 % (ref 11.6–15.1)
HCT VFR BLD AUTO: 47.7 % (ref 36.5–49.3)
HGB BLD-MCNC: 16.2 G/DL (ref 12–17)
IMM GRANULOCYTES # BLD AUTO: 0.02 THOUSAND/UL (ref 0–0.2)
IMM GRANULOCYTES NFR BLD AUTO: 0 % (ref 0–2)
INR PPP: 2.87 (ref 0.86–1.17)
LYMPHOCYTES # BLD AUTO: 2.37 THOUSANDS/ΜL (ref 0.6–4.47)
LYMPHOCYTES NFR BLD AUTO: 28 % (ref 14–44)
MCH RBC QN AUTO: 31.1 PG (ref 26.8–34.3)
MCHC RBC AUTO-ENTMCNC: 34 G/DL (ref 31.4–37.4)
MCV RBC AUTO: 92 FL (ref 82–98)
MONOCYTES # BLD AUTO: 0.85 THOUSAND/ΜL (ref 0.17–1.22)
MONOCYTES NFR BLD AUTO: 10 % (ref 4–12)
NEUTROPHILS # BLD AUTO: 5.16 THOUSANDS/ΜL (ref 1.85–7.62)
NEUTS SEG NFR BLD AUTO: 60 % (ref 43–75)
NRBC BLD AUTO-RTO: 0 /100 WBCS
PLATELET # BLD AUTO: 248 THOUSANDS/UL (ref 149–390)
PMV BLD AUTO: 10.4 FL (ref 8.9–12.7)
PROTHROMBIN TIME: 29.7 SECONDS (ref 11.8–14.2)
RBC # BLD AUTO: 5.21 MILLION/UL (ref 3.88–5.62)
WBC # BLD AUTO: 8.53 THOUSAND/UL (ref 4.31–10.16)

## 2019-04-10 PROCEDURE — 85025 COMPLETE CBC W/AUTO DIFF WBC: CPT

## 2019-04-10 PROCEDURE — 73110 X-RAY EXAM OF WRIST: CPT

## 2019-04-10 PROCEDURE — 71046 X-RAY EXAM CHEST 2 VIEWS: CPT

## 2019-04-10 PROCEDURE — 85610 PROTHROMBIN TIME: CPT | Performed by: PHYSICIAN ASSISTANT

## 2019-04-10 PROCEDURE — 73030 X-RAY EXAM OF SHOULDER: CPT

## 2019-04-10 PROCEDURE — 76882 US LMTD JT/FCL EVL NVASC XTR: CPT

## 2019-04-10 PROCEDURE — 36415 COLL VENOUS BLD VENIPUNCTURE: CPT | Performed by: PHYSICIAN ASSISTANT

## 2019-04-11 ENCOUNTER — TELEPHONE (OUTPATIENT)
Dept: INTERNAL MEDICINE CLINIC | Facility: CLINIC | Age: 44
End: 2019-04-11

## 2019-04-11 DIAGNOSIS — Z94.0 KIDNEY TRANSPLANTED: ICD-10-CM

## 2019-04-12 RX ORDER — MYCOPHENOLATE MOFETIL 250 MG/1
CAPSULE ORAL
Qty: 90 CAPSULE | Refills: 4 | Status: SHIPPED | OUTPATIENT
Start: 2019-04-12 | End: 2019-04-23 | Stop reason: SDUPTHER

## 2019-04-19 ENCOUNTER — TELEPHONE (OUTPATIENT)
Dept: NEPHROLOGY | Facility: CLINIC | Age: 44
End: 2019-04-19

## 2019-04-19 NOTE — RESULT ENCOUNTER NOTE
Hello    Patient normally is followed up by Ms Griffin Alfaro  Can we reach out to the patient to remind him to go for his blood work this week or next week  Patient has recurring lab work in the chart for his transplant labs  His last tests showed relatively stable proteinuria, but there was slightly more red cells in the urine and this needs to be repeated  Patient's urine was concentrated and therefore please ask the following  -repeat blood work  -increase fluid intake  -patient did not go to the hematologist appointment, can we find out why      Thank you    np

## 2019-04-22 ENCOUNTER — TELEPHONE (OUTPATIENT)
Dept: NEPHROLOGY | Facility: CLINIC | Age: 44
End: 2019-04-22

## 2019-04-23 DIAGNOSIS — Z94.0 KIDNEY TRANSPLANTED: ICD-10-CM

## 2019-04-23 RX ORDER — MYCOPHENOLATE MOFETIL 250 MG/1
CAPSULE ORAL
Qty: 90 CAPSULE | Refills: 4 | Status: SHIPPED | OUTPATIENT
Start: 2019-04-23 | End: 2019-11-07 | Stop reason: SDUPTHER

## 2019-04-26 ENCOUNTER — TELEPHONE (OUTPATIENT)
Dept: INTERNAL MEDICINE CLINIC | Facility: CLINIC | Age: 44
End: 2019-04-26

## 2019-04-26 ENCOUNTER — ANTICOAG VISIT (OUTPATIENT)
Dept: INTERNAL MEDICINE CLINIC | Facility: CLINIC | Age: 44
End: 2019-04-26

## 2019-04-26 ENCOUNTER — LAB (OUTPATIENT)
Dept: LAB | Facility: HOSPITAL | Age: 44
End: 2019-04-26
Attending: INTERNAL MEDICINE
Payer: COMMERCIAL

## 2019-04-26 DIAGNOSIS — Z94.0 KIDNEY TRANSPLANTED: ICD-10-CM

## 2019-04-26 DIAGNOSIS — I82.4Z1 DEEP VEIN THROMBOSIS (DVT) OF DISTAL VEIN OF RIGHT LOWER EXTREMITY, UNSPECIFIED CHRONICITY (HCC): Primary | ICD-10-CM

## 2019-04-26 LAB
CREAT UR-MCNC: 66.8 MG/DL
INR PPP: 1.86 (ref 0.86–1.17)
PROT UR-MCNC: 41 MG/DL
PROT/CREAT UR: 0.61 MG/G{CREAT} (ref 0–0.1)

## 2019-04-26 PROCEDURE — 82570 ASSAY OF URINE CREATININE: CPT

## 2019-04-26 PROCEDURE — 84156 ASSAY OF PROTEIN URINE: CPT

## 2019-04-26 RX ORDER — WARFARIN SODIUM 4 MG/1
TABLET ORAL
Qty: 60 TABLET | Refills: 3 | Status: ON HOLD | OUTPATIENT
Start: 2019-04-26 | End: 2019-06-14

## 2019-04-29 ENCOUNTER — TELEPHONE (OUTPATIENT)
Dept: NEPHROLOGY | Facility: CLINIC | Age: 44
End: 2019-04-29

## 2019-06-12 ENCOUNTER — TELEPHONE (OUTPATIENT)
Dept: NEPHROLOGY | Facility: CLINIC | Age: 44
End: 2019-06-12

## 2019-06-14 ENCOUNTER — APPOINTMENT (INPATIENT)
Dept: ULTRASOUND IMAGING | Facility: HOSPITAL | Age: 44
DRG: 469 | End: 2019-06-14
Payer: COMMERCIAL

## 2019-06-14 ENCOUNTER — HOSPITAL ENCOUNTER (INPATIENT)
Facility: HOSPITAL | Age: 44
LOS: 3 days | DRG: 469 | End: 2019-06-17
Attending: EMERGENCY MEDICINE | Admitting: INTERNAL MEDICINE
Payer: COMMERCIAL

## 2019-06-14 DIAGNOSIS — I10 ESSENTIAL HYPERTENSION: ICD-10-CM

## 2019-06-14 DIAGNOSIS — R45.851 SUICIDAL IDEATIONS: ICD-10-CM

## 2019-06-14 DIAGNOSIS — F32.A DEPRESSION: ICD-10-CM

## 2019-06-14 DIAGNOSIS — N17.9 AKI (ACUTE KIDNEY INJURY) (HCC): Primary | ICD-10-CM

## 2019-06-14 DIAGNOSIS — N18.30 STAGE 3 CHRONIC KIDNEY DISEASE (HCC): ICD-10-CM

## 2019-06-14 DIAGNOSIS — Z94.0 RENAL TRANSPLANT RECIPIENT: ICD-10-CM

## 2019-06-14 DIAGNOSIS — Z94.0 RENAL TRANSPLANT, STATUS POST: ICD-10-CM

## 2019-06-14 LAB
ALBUMIN SERPL BCP-MCNC: 4 G/DL (ref 3.5–5)
ALP SERPL-CCNC: 112 U/L (ref 46–116)
ALT SERPL W P-5'-P-CCNC: 22 U/L (ref 12–78)
AMPHETAMINES SERPL QL SCN: NEGATIVE
ANION GAP SERPL CALCULATED.3IONS-SCNC: 16 MMOL/L (ref 4–13)
AST SERPL W P-5'-P-CCNC: 23 U/L (ref 5–45)
BACTERIA UR QL AUTO: ABNORMAL /HPF
BACTERIA UR QL AUTO: ABNORMAL /HPF
BARBITURATES UR QL: NEGATIVE
BASOPHILS # BLD AUTO: 0.07 THOUSANDS/ΜL (ref 0–0.1)
BASOPHILS NFR BLD AUTO: 1 % (ref 0–1)
BENZODIAZ UR QL: NEGATIVE
BILIRUB SERPL-MCNC: 0.9 MG/DL (ref 0.2–1)
BILIRUB UR QL STRIP: ABNORMAL
BILIRUB UR QL STRIP: NEGATIVE
BUN SERPL-MCNC: 38 MG/DL (ref 5–25)
CALCIUM SERPL-MCNC: 10 MG/DL (ref 8.3–10.1)
CHLORIDE SERPL-SCNC: 101 MMOL/L (ref 100–108)
CLARITY UR: CLEAR
CLARITY UR: CLEAR
CO2 SERPL-SCNC: 21 MMOL/L (ref 21–32)
COCAINE UR QL: NEGATIVE
COLOR UR: YELLOW
COLOR UR: YELLOW
CREAT SERPL-MCNC: 2.04 MG/DL (ref 0.6–1.3)
CREAT UR-MCNC: 188 MG/DL
EOSINOPHIL # BLD AUTO: 0.01 THOUSAND/ΜL (ref 0–0.61)
EOSINOPHIL NFR BLD AUTO: 0 % (ref 0–6)
ERYTHROCYTE [DISTWIDTH] IN BLOOD BY AUTOMATED COUNT: 12 % (ref 11.6–15.1)
ETHANOL EXG-MCNC: 0 MG/DL
GFR SERPL CREATININE-BSD FRML MDRD: 38 ML/MIN/1.73SQ M
GLUCOSE SERPL-MCNC: 82 MG/DL (ref 65–140)
GLUCOSE UR STRIP-MCNC: NEGATIVE MG/DL
GLUCOSE UR STRIP-MCNC: NEGATIVE MG/DL
HCT VFR BLD AUTO: 49.2 % (ref 36.5–49.3)
HGB BLD-MCNC: 16.5 G/DL (ref 12–17)
HGB UR QL STRIP.AUTO: ABNORMAL
HGB UR QL STRIP.AUTO: ABNORMAL
HYALINE CASTS #/AREA URNS LPF: ABNORMAL /LPF
IMM GRANULOCYTES # BLD AUTO: 0.03 THOUSAND/UL (ref 0–0.2)
IMM GRANULOCYTES NFR BLD AUTO: 0 % (ref 0–2)
INR PPP: 1.79 (ref 0.86–1.17)
KETONES UR STRIP-MCNC: ABNORMAL MG/DL
KETONES UR STRIP-MCNC: ABNORMAL MG/DL
LEUKOCYTE ESTERASE UR QL STRIP: ABNORMAL
LEUKOCYTE ESTERASE UR QL STRIP: NEGATIVE
LYMPHOCYTES # BLD AUTO: 1.45 THOUSANDS/ΜL (ref 0.6–4.47)
LYMPHOCYTES NFR BLD AUTO: 11 % (ref 14–44)
MCH RBC QN AUTO: 31.7 PG (ref 26.8–34.3)
MCHC RBC AUTO-ENTMCNC: 33.5 G/DL (ref 31.4–37.4)
MCV RBC AUTO: 94 FL (ref 82–98)
METHADONE UR QL: NEGATIVE
MONOCYTES # BLD AUTO: 1.12 THOUSAND/ΜL (ref 0.17–1.22)
MONOCYTES NFR BLD AUTO: 8 % (ref 4–12)
NEUTROPHILS # BLD AUTO: 10.73 THOUSANDS/ΜL (ref 1.85–7.62)
NEUTS SEG NFR BLD AUTO: 80 % (ref 43–75)
NITRITE UR QL STRIP: NEGATIVE
NITRITE UR QL STRIP: NEGATIVE
NON-SQ EPI CELLS URNS QL MICRO: ABNORMAL /HPF
NON-SQ EPI CELLS URNS QL MICRO: ABNORMAL /HPF
NRBC BLD AUTO-RTO: 0 /100 WBCS
OPIATES UR QL SCN: NEGATIVE
PCP UR QL: NEGATIVE
PH UR STRIP.AUTO: 5 [PH]
PH UR STRIP.AUTO: 5.5 [PH]
PLATELET # BLD AUTO: 218 THOUSANDS/UL (ref 149–390)
PMV BLD AUTO: 10.4 FL (ref 8.9–12.7)
POTASSIUM SERPL-SCNC: 5 MMOL/L (ref 3.5–5.3)
PROT SERPL-MCNC: 8 G/DL (ref 6.4–8.2)
PROT UR STRIP-MCNC: ABNORMAL MG/DL
PROT UR STRIP-MCNC: ABNORMAL MG/DL
PROT UR-MCNC: 68 MG/DL
PROT/CREAT UR: 0.36 MG/G{CREAT} (ref 0–0.1)
PROTHROMBIN TIME: 20.6 SECONDS (ref 11.8–14.2)
RBC # BLD AUTO: 5.21 MILLION/UL (ref 3.88–5.62)
RBC #/AREA URNS AUTO: ABNORMAL /HPF
RBC #/AREA URNS AUTO: ABNORMAL /HPF
SODIUM 24H UR-SCNC: 69 MOL/L
SODIUM SERPL-SCNC: 138 MMOL/L (ref 136–145)
SP GR UR STRIP.AUTO: 1.02 (ref 1–1.03)
SP GR UR STRIP.AUTO: 1.02 (ref 1–1.03)
TACROLIMUS BLD-MCNC: 3.2 NG/ML (ref 2–20)
THC UR QL: POSITIVE
UROBILINOGEN UR QL STRIP.AUTO: 0.2 E.U./DL
UROBILINOGEN UR QL STRIP.AUTO: 0.2 E.U./DL
WBC # BLD AUTO: 13.41 THOUSAND/UL (ref 4.31–10.16)
WBC #/AREA URNS AUTO: ABNORMAL /HPF
WBC #/AREA URNS AUTO: ABNORMAL /HPF

## 2019-06-14 PROCEDURE — 99285 EMERGENCY DEPT VISIT HI MDM: CPT

## 2019-06-14 PROCEDURE — 82570 ASSAY OF URINE CREATININE: CPT | Performed by: INTERNAL MEDICINE

## 2019-06-14 PROCEDURE — 80307 DRUG TEST PRSMV CHEM ANLYZR: CPT | Performed by: EMERGENCY MEDICINE

## 2019-06-14 PROCEDURE — 82043 UR ALBUMIN QUANTITATIVE: CPT | Performed by: INTERNAL MEDICINE

## 2019-06-14 PROCEDURE — 84156 ASSAY OF PROTEIN URINE: CPT | Performed by: INTERNAL MEDICINE

## 2019-06-14 PROCEDURE — 80053 COMPREHEN METABOLIC PANEL: CPT | Performed by: EMERGENCY MEDICINE

## 2019-06-14 PROCEDURE — 85025 COMPLETE CBC W/AUTO DIFF WBC: CPT | Performed by: EMERGENCY MEDICINE

## 2019-06-14 PROCEDURE — 80197 ASSAY OF TACROLIMUS: CPT | Performed by: EMERGENCY MEDICINE

## 2019-06-14 PROCEDURE — 36415 COLL VENOUS BLD VENIPUNCTURE: CPT | Performed by: EMERGENCY MEDICINE

## 2019-06-14 PROCEDURE — 87086 URINE CULTURE/COLONY COUNT: CPT | Performed by: INTERNAL MEDICINE

## 2019-06-14 PROCEDURE — 85610 PROTHROMBIN TIME: CPT | Performed by: EMERGENCY MEDICINE

## 2019-06-14 PROCEDURE — 99285 EMERGENCY DEPT VISIT HI MDM: CPT | Performed by: EMERGENCY MEDICINE

## 2019-06-14 PROCEDURE — 81001 URINALYSIS AUTO W/SCOPE: CPT | Performed by: INTERNAL MEDICINE

## 2019-06-14 PROCEDURE — 76776 US EXAM K TRANSPL W/DOPPLER: CPT

## 2019-06-14 PROCEDURE — 96360 HYDRATION IV INFUSION INIT: CPT

## 2019-06-14 PROCEDURE — 84300 ASSAY OF URINE SODIUM: CPT | Performed by: INTERNAL MEDICINE

## 2019-06-14 PROCEDURE — 99254 IP/OBS CNSLTJ NEW/EST MOD 60: CPT | Performed by: INTERNAL MEDICINE

## 2019-06-14 PROCEDURE — 99223 1ST HOSP IP/OBS HIGH 75: CPT | Performed by: INTERNAL MEDICINE

## 2019-06-14 PROCEDURE — 82075 ASSAY OF BREATH ETHANOL: CPT | Performed by: EMERGENCY MEDICINE

## 2019-06-14 RX ORDER — MYCOPHENOLATE MOFETIL 250 MG/1
500 CAPSULE ORAL DAILY
Status: DISCONTINUED | OUTPATIENT
Start: 2019-06-14 | End: 2019-06-17 | Stop reason: HOSPADM

## 2019-06-14 RX ORDER — DILTIAZEM HYDROCHLORIDE 120 MG/1
120 CAPSULE, COATED, EXTENDED RELEASE ORAL DAILY
Status: DISCONTINUED | OUTPATIENT
Start: 2019-06-14 | End: 2019-06-16

## 2019-06-14 RX ORDER — MELATONIN
2000 DAILY
Status: DISCONTINUED | OUTPATIENT
Start: 2019-06-14 | End: 2019-06-17 | Stop reason: HOSPADM

## 2019-06-14 RX ORDER — TACROLIMUS 0.5 MG/1
1 CAPSULE ORAL EVERY 12 HOURS SCHEDULED
Status: DISCONTINUED | OUTPATIENT
Start: 2019-06-14 | End: 2019-06-17 | Stop reason: HOSPADM

## 2019-06-14 RX ORDER — ONDANSETRON 2 MG/ML
4 INJECTION INTRAMUSCULAR; INTRAVENOUS EVERY 6 HOURS PRN
Status: DISCONTINUED | OUTPATIENT
Start: 2019-06-14 | End: 2019-06-17 | Stop reason: HOSPADM

## 2019-06-14 RX ORDER — SODIUM CHLORIDE 9 MG/ML
125 INJECTION, SOLUTION INTRAVENOUS CONTINUOUS
Status: DISCONTINUED | OUTPATIENT
Start: 2019-06-14 | End: 2019-06-17

## 2019-06-14 RX ORDER — MYCOPHENOLATE MOFETIL 250 MG/1
250 CAPSULE ORAL
Status: DISCONTINUED | OUTPATIENT
Start: 2019-06-14 | End: 2019-06-17 | Stop reason: HOSPADM

## 2019-06-14 RX ORDER — LABETALOL 20 MG/4 ML (5 MG/ML) INTRAVENOUS SYRINGE
10 EVERY 4 HOURS PRN
Status: DISCONTINUED | OUTPATIENT
Start: 2019-06-14 | End: 2019-06-17 | Stop reason: HOSPADM

## 2019-06-14 RX ORDER — SODIUM CHLORIDE 9 MG/ML
1000 INJECTION, SOLUTION INTRAVENOUS ONCE
Status: COMPLETED | OUTPATIENT
Start: 2019-06-14 | End: 2019-06-14

## 2019-06-14 RX ORDER — SERTRALINE HYDROCHLORIDE 100 MG/1
100 TABLET, FILM COATED ORAL DAILY
Status: DISCONTINUED | OUTPATIENT
Start: 2019-06-14 | End: 2019-06-17

## 2019-06-14 RX ORDER — WARFARIN SODIUM 5 MG/1
10 TABLET ORAL
Status: DISCONTINUED | OUTPATIENT
Start: 2019-06-14 | End: 2019-06-17 | Stop reason: HOSPADM

## 2019-06-14 RX ADMIN — LABETALOL 20 MG/4 ML (5 MG/ML) INTRAVENOUS SYRINGE 10 MG: at 15:18

## 2019-06-14 RX ADMIN — TACROLIMUS 1 MG: 0.5 CAPSULE ORAL at 16:19

## 2019-06-14 RX ADMIN — SODIUM CHLORIDE 125 ML/HR: 0.9 INJECTION, SOLUTION INTRAVENOUS at 22:52

## 2019-06-14 RX ADMIN — MYCOPHENOLATE MOFETIL 250 MG: 250 CAPSULE ORAL at 22:48

## 2019-06-14 RX ADMIN — SODIUM CHLORIDE 125 ML/HR: 0.9 INJECTION, SOLUTION INTRAVENOUS at 15:13

## 2019-06-14 RX ADMIN — SERTRALINE HYDROCHLORIDE 100 MG: 100 TABLET ORAL at 16:20

## 2019-06-14 RX ADMIN — VITAMIN D, TAB 1000IU (100/BT) 2000 UNITS: 25 TAB at 16:20

## 2019-06-14 RX ADMIN — WARFARIN SODIUM 10 MG: 5 TABLET ORAL at 17:10

## 2019-06-14 RX ADMIN — SODIUM CHLORIDE 1000 ML/HR: 0.9 INJECTION, SOLUTION INTRAVENOUS at 12:34

## 2019-06-14 RX ADMIN — DILTIAZEM HYDROCHLORIDE 120 MG: 120 CAPSULE, COATED, EXTENDED RELEASE ORAL at 16:20

## 2019-06-14 RX ADMIN — MYCOPHENOLATE MOFETIL 500 MG: 250 CAPSULE ORAL at 16:19

## 2019-06-15 ENCOUNTER — APPOINTMENT (INPATIENT)
Dept: ULTRASOUND IMAGING | Facility: HOSPITAL | Age: 44
DRG: 469 | End: 2019-06-15
Payer: COMMERCIAL

## 2019-06-15 LAB
25(OH)D3 SERPL-MCNC: 40.1 NG/ML (ref 30–100)
ALBUMIN SERPL BCP-MCNC: 3.3 G/DL (ref 3.5–5)
ALP SERPL-CCNC: 98 U/L (ref 46–116)
ALT SERPL W P-5'-P-CCNC: 21 U/L (ref 12–78)
ANION GAP SERPL CALCULATED.3IONS-SCNC: 11 MMOL/L (ref 4–13)
AST SERPL W P-5'-P-CCNC: 23 U/L (ref 5–45)
BACTERIA UR CULT: NORMAL
BASOPHILS # BLD AUTO: 0.06 THOUSANDS/ΜL (ref 0–0.1)
BASOPHILS NFR BLD AUTO: 1 % (ref 0–1)
BILIRUB SERPL-MCNC: 0.5 MG/DL (ref 0.2–1)
BUN SERPL-MCNC: 29 MG/DL (ref 5–25)
CALCIUM SERPL-MCNC: 9.2 MG/DL (ref 8.3–10.1)
CHLORIDE SERPL-SCNC: 105 MMOL/L (ref 100–108)
CK MB SERPL-MCNC: 1.8 % (ref 0–2.5)
CK MB SERPL-MCNC: 3.7 NG/ML (ref 0–5)
CK SERPL-CCNC: 204 U/L (ref 39–308)
CO2 SERPL-SCNC: 22 MMOL/L (ref 21–32)
CREAT SERPL-MCNC: 1.5 MG/DL (ref 0.6–1.3)
CREAT UR-MCNC: 117 MG/DL
EOSINOPHIL # BLD AUTO: 0.15 THOUSAND/ΜL (ref 0–0.61)
EOSINOPHIL NFR BLD AUTO: 3 % (ref 0–6)
ERYTHROCYTE [DISTWIDTH] IN BLOOD BY AUTOMATED COUNT: 12.2 % (ref 11.6–15.1)
EST. AVERAGE GLUCOSE BLD GHB EST-MCNC: 111 MG/DL
GFR SERPL CREATININE-BSD FRML MDRD: 55 ML/MIN/1.73SQ M
GLUCOSE SERPL-MCNC: 86 MG/DL (ref 65–140)
HAV IGM SER QL: NORMAL
HBA1C MFR BLD: 5.5 % (ref 4.2–6.3)
HBV CORE IGM SER QL: NORMAL
HBV SURFACE AG SER QL: NORMAL
HCT VFR BLD AUTO: 44.7 % (ref 36.5–49.3)
HCV AB SER QL: NORMAL
HGB BLD-MCNC: 14.7 G/DL (ref 12–17)
IMM GRANULOCYTES # BLD AUTO: 0.01 THOUSAND/UL (ref 0–0.2)
IMM GRANULOCYTES NFR BLD AUTO: 0 % (ref 0–2)
INR PPP: 2.17 (ref 0.86–1.17)
LACTATE SERPL-SCNC: 0.6 MMOL/L (ref 0.5–2)
LYMPHOCYTES # BLD AUTO: 1.9 THOUSANDS/ΜL (ref 0.6–4.47)
LYMPHOCYTES NFR BLD AUTO: 35 % (ref 14–44)
MAGNESIUM SERPL-MCNC: 1.7 MG/DL (ref 1.6–2.6)
MCH RBC QN AUTO: 31.5 PG (ref 26.8–34.3)
MCHC RBC AUTO-ENTMCNC: 32.9 G/DL (ref 31.4–37.4)
MCV RBC AUTO: 96 FL (ref 82–98)
MICROALBUMIN UR-MCNC: 167 MG/L (ref 0–20)
MICROALBUMIN/CREAT 24H UR: 143 MG/G CREATININE (ref 0–30)
MONOCYTES # BLD AUTO: 0.73 THOUSAND/ΜL (ref 0.17–1.22)
MONOCYTES NFR BLD AUTO: 14 % (ref 4–12)
NEUTROPHILS # BLD AUTO: 2.52 THOUSANDS/ΜL (ref 1.85–7.62)
NEUTS SEG NFR BLD AUTO: 47 % (ref 43–75)
NRBC BLD AUTO-RTO: 0 /100 WBCS
PHOSPHATE SERPL-MCNC: 3.2 MG/DL (ref 2.7–4.5)
PLATELET # BLD AUTO: 187 THOUSANDS/UL (ref 149–390)
PMV BLD AUTO: 10.4 FL (ref 8.9–12.7)
POTASSIUM SERPL-SCNC: 4 MMOL/L (ref 3.5–5.3)
PROCALCITONIN SERPL-MCNC: 0.11 NG/ML
PROT SERPL-MCNC: 6.9 G/DL (ref 6.4–8.2)
PROTHROMBIN TIME: 23.9 SECONDS (ref 11.8–14.2)
PTH-INTACT SERPL-MCNC: 113.5 PG/ML (ref 18.4–80.1)
RBC # BLD AUTO: 4.66 MILLION/UL (ref 3.88–5.62)
SODIUM SERPL-SCNC: 138 MMOL/L (ref 136–145)
TROPONIN I SERPL-MCNC: <0.02 NG/ML
TSH SERPL DL<=0.05 MIU/L-ACNC: 1.1 UIU/ML (ref 0.36–3.74)
URATE SERPL-MCNC: 7.1 MG/DL (ref 4.2–8)
WBC # BLD AUTO: 5.37 THOUSAND/UL (ref 4.31–10.16)

## 2019-06-15 PROCEDURE — 85025 COMPLETE CBC W/AUTO DIFF WBC: CPT | Performed by: INTERNAL MEDICINE

## 2019-06-15 PROCEDURE — 82553 CREATINE MB FRACTION: CPT | Performed by: INTERNAL MEDICINE

## 2019-06-15 PROCEDURE — 80053 COMPREHEN METABOLIC PANEL: CPT | Performed by: INTERNAL MEDICINE

## 2019-06-15 PROCEDURE — 80074 ACUTE HEPATITIS PANEL: CPT | Performed by: INTERNAL MEDICINE

## 2019-06-15 PROCEDURE — 83605 ASSAY OF LACTIC ACID: CPT | Performed by: INTERNAL MEDICINE

## 2019-06-15 PROCEDURE — 84100 ASSAY OF PHOSPHORUS: CPT | Performed by: INTERNAL MEDICINE

## 2019-06-15 PROCEDURE — 82306 VITAMIN D 25 HYDROXY: CPT | Performed by: INTERNAL MEDICINE

## 2019-06-15 PROCEDURE — 87389 HIV-1 AG W/HIV-1&-2 AB AG IA: CPT | Performed by: INTERNAL MEDICINE

## 2019-06-15 PROCEDURE — 84550 ASSAY OF BLOOD/URIC ACID: CPT | Performed by: INTERNAL MEDICINE

## 2019-06-15 PROCEDURE — 84145 PROCALCITONIN (PCT): CPT | Performed by: INTERNAL MEDICINE

## 2019-06-15 PROCEDURE — 99232 SBSQ HOSP IP/OBS MODERATE 35: CPT | Performed by: PHYSICIAN ASSISTANT

## 2019-06-15 PROCEDURE — 99233 SBSQ HOSP IP/OBS HIGH 50: CPT | Performed by: INTERNAL MEDICINE

## 2019-06-15 PROCEDURE — 82550 ASSAY OF CK (CPK): CPT | Performed by: INTERNAL MEDICINE

## 2019-06-15 PROCEDURE — 84484 ASSAY OF TROPONIN QUANT: CPT | Performed by: INTERNAL MEDICINE

## 2019-06-15 PROCEDURE — 85610 PROTHROMBIN TIME: CPT | Performed by: INTERNAL MEDICINE

## 2019-06-15 PROCEDURE — 83036 HEMOGLOBIN GLYCOSYLATED A1C: CPT | Performed by: INTERNAL MEDICINE

## 2019-06-15 PROCEDURE — 83735 ASSAY OF MAGNESIUM: CPT | Performed by: INTERNAL MEDICINE

## 2019-06-15 PROCEDURE — 84443 ASSAY THYROID STIM HORMONE: CPT | Performed by: INTERNAL MEDICINE

## 2019-06-15 PROCEDURE — 83970 ASSAY OF PARATHORMONE: CPT | Performed by: INTERNAL MEDICINE

## 2019-06-15 RX ADMIN — MYCOPHENOLATE MOFETIL 500 MG: 250 CAPSULE ORAL at 08:02

## 2019-06-15 RX ADMIN — TACROLIMUS 1 MG: 0.5 CAPSULE ORAL at 08:01

## 2019-06-15 RX ADMIN — MYCOPHENOLATE MOFETIL 250 MG: 250 CAPSULE ORAL at 21:44

## 2019-06-15 RX ADMIN — VITAMIN D, TAB 1000IU (100/BT) 2000 UNITS: 25 TAB at 08:01

## 2019-06-15 RX ADMIN — TACROLIMUS 1 MG: 0.5 CAPSULE ORAL at 21:45

## 2019-06-15 RX ADMIN — DILTIAZEM HYDROCHLORIDE 120 MG: 120 CAPSULE, COATED, EXTENDED RELEASE ORAL at 08:02

## 2019-06-15 RX ADMIN — SERTRALINE HYDROCHLORIDE 100 MG: 100 TABLET ORAL at 08:02

## 2019-06-15 RX ADMIN — WARFARIN SODIUM 10 MG: 5 TABLET ORAL at 16:55

## 2019-06-16 LAB
ANION GAP SERPL CALCULATED.3IONS-SCNC: 10 MMOL/L (ref 4–13)
BASOPHILS # BLD AUTO: 0.07 THOUSANDS/ΜL (ref 0–0.1)
BASOPHILS NFR BLD AUTO: 1 % (ref 0–1)
BUN SERPL-MCNC: 19 MG/DL (ref 5–25)
CALCIUM SERPL-MCNC: 9.6 MG/DL (ref 8.3–10.1)
CHLORIDE SERPL-SCNC: 106 MMOL/L (ref 100–108)
CO2 SERPL-SCNC: 24 MMOL/L (ref 21–32)
CREAT SERPL-MCNC: 1.43 MG/DL (ref 0.6–1.3)
EOSINOPHIL # BLD AUTO: 0.21 THOUSAND/ΜL (ref 0–0.61)
EOSINOPHIL NFR BLD AUTO: 3 % (ref 0–6)
ERYTHROCYTE [DISTWIDTH] IN BLOOD BY AUTOMATED COUNT: 11.9 % (ref 11.6–15.1)
GFR SERPL CREATININE-BSD FRML MDRD: 59 ML/MIN/1.73SQ M
GLUCOSE SERPL-MCNC: 98 MG/DL (ref 65–140)
HCT VFR BLD AUTO: 47.2 % (ref 36.5–49.3)
HGB BLD-MCNC: 15.6 G/DL (ref 12–17)
IMM GRANULOCYTES # BLD AUTO: 0.01 THOUSAND/UL (ref 0–0.2)
IMM GRANULOCYTES NFR BLD AUTO: 0 % (ref 0–2)
INR PPP: 2.07 (ref 0.86–1.17)
LYMPHOCYTES # BLD AUTO: 2.4 THOUSANDS/ΜL (ref 0.6–4.47)
LYMPHOCYTES NFR BLD AUTO: 37 % (ref 14–44)
MCH RBC QN AUTO: 31.3 PG (ref 26.8–34.3)
MCHC RBC AUTO-ENTMCNC: 33.1 G/DL (ref 31.4–37.4)
MCV RBC AUTO: 95 FL (ref 82–98)
MONOCYTES # BLD AUTO: 0.75 THOUSAND/ΜL (ref 0.17–1.22)
MONOCYTES NFR BLD AUTO: 12 % (ref 4–12)
NEUTROPHILS # BLD AUTO: 2.97 THOUSANDS/ΜL (ref 1.85–7.62)
NEUTS SEG NFR BLD AUTO: 47 % (ref 43–75)
NRBC BLD AUTO-RTO: 0 /100 WBCS
PLATELET # BLD AUTO: 198 THOUSANDS/UL (ref 149–390)
PMV BLD AUTO: 10 FL (ref 8.9–12.7)
POTASSIUM SERPL-SCNC: 3.6 MMOL/L (ref 3.5–5.3)
PROTHROMBIN TIME: 23 SECONDS (ref 11.8–14.2)
RBC # BLD AUTO: 4.98 MILLION/UL (ref 3.88–5.62)
SODIUM SERPL-SCNC: 140 MMOL/L (ref 136–145)
WBC # BLD AUTO: 6.41 THOUSAND/UL (ref 4.31–10.16)

## 2019-06-16 PROCEDURE — 99232 SBSQ HOSP IP/OBS MODERATE 35: CPT | Performed by: PHYSICIAN ASSISTANT

## 2019-06-16 PROCEDURE — 99233 SBSQ HOSP IP/OBS HIGH 50: CPT | Performed by: INTERNAL MEDICINE

## 2019-06-16 PROCEDURE — 85025 COMPLETE CBC W/AUTO DIFF WBC: CPT | Performed by: INTERNAL MEDICINE

## 2019-06-16 PROCEDURE — 80048 BASIC METABOLIC PNL TOTAL CA: CPT | Performed by: INTERNAL MEDICINE

## 2019-06-16 PROCEDURE — 85610 PROTHROMBIN TIME: CPT | Performed by: INTERNAL MEDICINE

## 2019-06-16 RX ORDER — DILTIAZEM HYDROCHLORIDE 180 MG/1
180 CAPSULE, COATED, EXTENDED RELEASE ORAL DAILY
Status: DISCONTINUED | OUTPATIENT
Start: 2019-06-17 | End: 2019-06-17 | Stop reason: HOSPADM

## 2019-06-16 RX ADMIN — MYCOPHENOLATE MOFETIL 250 MG: 250 CAPSULE ORAL at 21:42

## 2019-06-16 RX ADMIN — TACROLIMUS 1 MG: 0.5 CAPSULE ORAL at 08:12

## 2019-06-16 RX ADMIN — WARFARIN SODIUM 10 MG: 5 TABLET ORAL at 16:42

## 2019-06-16 RX ADMIN — TACROLIMUS 1 MG: 0.5 CAPSULE ORAL at 21:43

## 2019-06-16 RX ADMIN — MYCOPHENOLATE MOFETIL 500 MG: 250 CAPSULE ORAL at 08:12

## 2019-06-16 RX ADMIN — SERTRALINE HYDROCHLORIDE 100 MG: 100 TABLET ORAL at 08:12

## 2019-06-16 RX ADMIN — DILTIAZEM HYDROCHLORIDE 120 MG: 120 CAPSULE, COATED, EXTENDED RELEASE ORAL at 08:12

## 2019-06-16 RX ADMIN — VITAMIN D, TAB 1000IU (100/BT) 2000 UNITS: 25 TAB at 08:12

## 2019-06-17 ENCOUNTER — HOSPITAL ENCOUNTER (INPATIENT)
Facility: HOSPITAL | Age: 44
LOS: 2 days | DRG: 751 | End: 2019-06-19
Attending: PSYCHIATRY & NEUROLOGY | Admitting: PSYCHIATRY & NEUROLOGY
Payer: COMMERCIAL

## 2019-06-17 VITALS
RESPIRATION RATE: 16 BRPM | HEIGHT: 66 IN | TEMPERATURE: 98 F | DIASTOLIC BLOOD PRESSURE: 101 MMHG | SYSTOLIC BLOOD PRESSURE: 136 MMHG | HEART RATE: 92 BPM | OXYGEN SATURATION: 97 % | WEIGHT: 173.28 LBS | BODY MASS INDEX: 27.85 KG/M2

## 2019-06-17 DIAGNOSIS — I82.4Z1 DEEP VEIN THROMBOSIS (DVT) OF DISTAL VEIN OF RIGHT LOWER EXTREMITY, UNSPECIFIED CHRONICITY (HCC): ICD-10-CM

## 2019-06-17 DIAGNOSIS — Z94.0 HISTORY OF RENAL TRANSPLANT: Primary | ICD-10-CM

## 2019-06-17 DIAGNOSIS — N18.30 STAGE 3 CHRONIC KIDNEY DISEASE (HCC): ICD-10-CM

## 2019-06-17 DIAGNOSIS — R45.851 SUICIDAL IDEATIONS: ICD-10-CM

## 2019-06-17 DIAGNOSIS — N17.9 AKI (ACUTE KIDNEY INJURY) (HCC): ICD-10-CM

## 2019-06-17 DIAGNOSIS — Z94.0 RENAL TRANSPLANT RECIPIENT: ICD-10-CM

## 2019-06-17 LAB
ANION GAP SERPL CALCULATED.3IONS-SCNC: 11 MMOL/L (ref 4–13)
BASOPHILS # BLD AUTO: 0.05 THOUSANDS/ΜL (ref 0–0.1)
BASOPHILS NFR BLD AUTO: 1 % (ref 0–1)
BUN SERPL-MCNC: 20 MG/DL (ref 5–25)
CALCIUM SERPL-MCNC: 9.7 MG/DL (ref 8.3–10.1)
CHLORIDE SERPL-SCNC: 104 MMOL/L (ref 100–108)
CO2 SERPL-SCNC: 23 MMOL/L (ref 21–32)
CREAT SERPL-MCNC: 1.38 MG/DL (ref 0.6–1.3)
EOSINOPHIL # BLD AUTO: 0.19 THOUSAND/ΜL (ref 0–0.61)
EOSINOPHIL NFR BLD AUTO: 3 % (ref 0–6)
ERYTHROCYTE [DISTWIDTH] IN BLOOD BY AUTOMATED COUNT: 11.9 % (ref 11.6–15.1)
GFR SERPL CREATININE-BSD FRML MDRD: 61 ML/MIN/1.73SQ M
GLUCOSE SERPL-MCNC: 94 MG/DL (ref 65–140)
HCT VFR BLD AUTO: 49.4 % (ref 36.5–49.3)
HGB BLD-MCNC: 16.6 G/DL (ref 12–17)
HIV 1+2 AB+HIV1 P24 AG SERPL QL IA: NORMAL
IMM GRANULOCYTES # BLD AUTO: 0.02 THOUSAND/UL (ref 0–0.2)
IMM GRANULOCYTES NFR BLD AUTO: 0 % (ref 0–2)
INR PPP: 2.2 (ref 0.86–1.17)
LYMPHOCYTES # BLD AUTO: 2.02 THOUSANDS/ΜL (ref 0.6–4.47)
LYMPHOCYTES NFR BLD AUTO: 28 % (ref 14–44)
MCH RBC QN AUTO: 31.6 PG (ref 26.8–34.3)
MCHC RBC AUTO-ENTMCNC: 33.6 G/DL (ref 31.4–37.4)
MCV RBC AUTO: 94 FL (ref 82–98)
MONOCYTES # BLD AUTO: 0.74 THOUSAND/ΜL (ref 0.17–1.22)
MONOCYTES NFR BLD AUTO: 10 % (ref 4–12)
NEUTROPHILS # BLD AUTO: 4.16 THOUSANDS/ΜL (ref 1.85–7.62)
NEUTS SEG NFR BLD AUTO: 58 % (ref 43–75)
NRBC BLD AUTO-RTO: 0 /100 WBCS
PLATELET # BLD AUTO: 217 THOUSANDS/UL (ref 149–390)
PMV BLD AUTO: 10.6 FL (ref 8.9–12.7)
POTASSIUM SERPL-SCNC: 3.9 MMOL/L (ref 3.5–5.3)
PROTHROMBIN TIME: 24.1 SECONDS (ref 11.8–14.2)
RBC # BLD AUTO: 5.25 MILLION/UL (ref 3.88–5.62)
SODIUM SERPL-SCNC: 138 MMOL/L (ref 136–145)
WBC # BLD AUTO: 7.18 THOUSAND/UL (ref 4.31–10.16)

## 2019-06-17 PROCEDURE — 85610 PROTHROMBIN TIME: CPT | Performed by: INTERNAL MEDICINE

## 2019-06-17 PROCEDURE — 99239 HOSP IP/OBS DSCHRG MGMT >30: CPT | Performed by: PHYSICIAN ASSISTANT

## 2019-06-17 PROCEDURE — 85025 COMPLETE CBC W/AUTO DIFF WBC: CPT | Performed by: INTERNAL MEDICINE

## 2019-06-17 PROCEDURE — 80048 BASIC METABOLIC PNL TOTAL CA: CPT | Performed by: INTERNAL MEDICINE

## 2019-06-17 PROCEDURE — 99233 SBSQ HOSP IP/OBS HIGH 50: CPT | Performed by: INTERNAL MEDICINE

## 2019-06-17 RX ORDER — BENZTROPINE MESYLATE 1 MG/ML
1 INJECTION INTRAMUSCULAR; INTRAVENOUS EVERY 6 HOURS PRN
Status: DISCONTINUED | OUTPATIENT
Start: 2019-06-17 | End: 2019-06-19 | Stop reason: HOSPADM

## 2019-06-17 RX ORDER — SERTRALINE HYDROCHLORIDE 100 MG/1
200 TABLET, FILM COATED ORAL DAILY
Status: DISCONTINUED | OUTPATIENT
Start: 2019-06-18 | End: 2019-06-19 | Stop reason: HOSPADM

## 2019-06-17 RX ORDER — BENZTROPINE MESYLATE 1 MG/ML
1 INJECTION INTRAMUSCULAR; INTRAVENOUS EVERY 6 HOURS PRN
Status: CANCELLED | OUTPATIENT
Start: 2019-06-17

## 2019-06-17 RX ORDER — TACROLIMUS 0.5 MG/1
1 CAPSULE ORAL EVERY 12 HOURS SCHEDULED
Status: CANCELLED | OUTPATIENT
Start: 2019-06-17

## 2019-06-17 RX ORDER — WARFARIN SODIUM 5 MG/1
10 TABLET ORAL
Status: CANCELLED | OUTPATIENT
Start: 2019-06-17

## 2019-06-17 RX ORDER — LORAZEPAM 2 MG/ML
1 INJECTION INTRAMUSCULAR EVERY 6 HOURS PRN
Status: CANCELLED | OUTPATIENT
Start: 2019-06-17

## 2019-06-17 RX ORDER — SERTRALINE HYDROCHLORIDE 100 MG/1
200 TABLET, FILM COATED ORAL DAILY
Refills: 0 | Status: ON HOLD
Start: 2019-06-18 | End: 2019-06-21 | Stop reason: SDUPTHER

## 2019-06-17 RX ORDER — SERTRALINE HYDROCHLORIDE 100 MG/1
200 TABLET, FILM COATED ORAL DAILY
Status: CANCELLED | OUTPATIENT
Start: 2019-06-18

## 2019-06-17 RX ORDER — RISPERIDONE 1 MG/1
1 TABLET, ORALLY DISINTEGRATING ORAL EVERY 8 HOURS PRN
Status: CANCELLED | OUTPATIENT
Start: 2019-06-17

## 2019-06-17 RX ORDER — HYDROXYZINE 50 MG/1
50 TABLET, FILM COATED ORAL EVERY 6 HOURS PRN
Status: DISCONTINUED | OUTPATIENT
Start: 2019-06-17 | End: 2019-06-19 | Stop reason: HOSPADM

## 2019-06-17 RX ORDER — BENZTROPINE MESYLATE 1 MG/1
1 TABLET ORAL EVERY 6 HOURS PRN
Status: CANCELLED | OUTPATIENT
Start: 2019-06-17

## 2019-06-17 RX ORDER — DILTIAZEM HYDROCHLORIDE 180 MG/1
180 CAPSULE, COATED, EXTENDED RELEASE ORAL DAILY
Status: DISCONTINUED | OUTPATIENT
Start: 2019-06-18 | End: 2019-06-19

## 2019-06-17 RX ORDER — SERTRALINE HYDROCHLORIDE 100 MG/1
200 TABLET, FILM COATED ORAL DAILY
Status: DISCONTINUED | OUTPATIENT
Start: 2019-06-17 | End: 2019-06-17 | Stop reason: HOSPADM

## 2019-06-17 RX ORDER — TRAZODONE HYDROCHLORIDE 50 MG/1
50 TABLET ORAL
Status: CANCELLED | OUTPATIENT
Start: 2019-06-17

## 2019-06-17 RX ORDER — LORAZEPAM 0.5 MG/1
0.5 TABLET ORAL EVERY 6 HOURS PRN
Status: CANCELLED | OUTPATIENT
Start: 2019-06-17

## 2019-06-17 RX ORDER — HALOPERIDOL 5 MG/ML
5 INJECTION INTRAMUSCULAR EVERY 8 HOURS PRN
Status: DISCONTINUED | OUTPATIENT
Start: 2019-06-17 | End: 2019-06-19 | Stop reason: HOSPADM

## 2019-06-17 RX ORDER — TRAZODONE HYDROCHLORIDE 50 MG/1
50 TABLET ORAL
Status: DISCONTINUED | OUTPATIENT
Start: 2019-06-17 | End: 2019-06-19 | Stop reason: HOSPADM

## 2019-06-17 RX ORDER — HALOPERIDOL 5 MG/ML
5 INJECTION INTRAMUSCULAR EVERY 8 HOURS PRN
Status: CANCELLED | OUTPATIENT
Start: 2019-06-17

## 2019-06-17 RX ORDER — ACETAMINOPHEN 325 MG/1
325 TABLET ORAL EVERY 6 HOURS PRN
Status: DISCONTINUED | OUTPATIENT
Start: 2019-06-17 | End: 2019-06-19

## 2019-06-17 RX ORDER — RISPERIDONE 1 MG/1
1 TABLET, ORALLY DISINTEGRATING ORAL EVERY 8 HOURS PRN
Status: DISCONTINUED | OUTPATIENT
Start: 2019-06-17 | End: 2019-06-19 | Stop reason: HOSPADM

## 2019-06-17 RX ORDER — OLANZAPINE 2.5 MG/1
10 TABLET ORAL EVERY 12 HOURS PRN
Status: CANCELLED | OUTPATIENT
Start: 2019-06-17

## 2019-06-17 RX ORDER — LORAZEPAM 2 MG/ML
1 INJECTION INTRAMUSCULAR EVERY 6 HOURS PRN
Status: DISCONTINUED | OUTPATIENT
Start: 2019-06-17 | End: 2019-06-19

## 2019-06-17 RX ORDER — MAGNESIUM HYDROXIDE/ALUMINUM HYDROXICE/SIMETHICONE 120; 1200; 1200 MG/30ML; MG/30ML; MG/30ML
30 SUSPENSION ORAL EVERY 4 HOURS PRN
Status: CANCELLED | OUTPATIENT
Start: 2019-06-17

## 2019-06-17 RX ORDER — ACETAMINOPHEN 325 MG/1
325 TABLET ORAL EVERY 6 HOURS PRN
Status: CANCELLED | OUTPATIENT
Start: 2019-06-17

## 2019-06-17 RX ORDER — HYDROXYZINE HYDROCHLORIDE 25 MG/1
25 TABLET, FILM COATED ORAL EVERY 6 HOURS PRN
Status: DISCONTINUED | OUTPATIENT
Start: 2019-06-17 | End: 2019-06-19 | Stop reason: HOSPADM

## 2019-06-17 RX ORDER — MAGNESIUM HYDROXIDE/ALUMINUM HYDROXICE/SIMETHICONE 120; 1200; 1200 MG/30ML; MG/30ML; MG/30ML
30 SUSPENSION ORAL EVERY 4 HOURS PRN
Status: DISCONTINUED | OUTPATIENT
Start: 2019-06-17 | End: 2019-06-19 | Stop reason: HOSPADM

## 2019-06-17 RX ORDER — MYCOPHENOLATE MOFETIL 250 MG/1
500 CAPSULE ORAL DAILY
Status: DISCONTINUED | OUTPATIENT
Start: 2019-06-18 | End: 2019-06-19 | Stop reason: HOSPADM

## 2019-06-17 RX ORDER — ACETAMINOPHEN 325 MG/1
975 TABLET ORAL EVERY 6 HOURS PRN
Status: DISCONTINUED | OUTPATIENT
Start: 2019-06-17 | End: 2019-06-19

## 2019-06-17 RX ORDER — DILTIAZEM HYDROCHLORIDE 180 MG/1
180 CAPSULE, COATED, EXTENDED RELEASE ORAL DAILY
Refills: 0
Start: 2019-06-18 | End: 2019-06-21 | Stop reason: HOSPADM

## 2019-06-17 RX ORDER — HYDROXYZINE HYDROCHLORIDE 25 MG/1
25 TABLET, FILM COATED ORAL EVERY 6 HOURS PRN
Status: CANCELLED | OUTPATIENT
Start: 2019-06-17

## 2019-06-17 RX ORDER — MYCOPHENOLATE MOFETIL 250 MG/1
500 CAPSULE ORAL DAILY
Status: CANCELLED | OUTPATIENT
Start: 2019-06-18

## 2019-06-17 RX ORDER — MELATONIN
2000 DAILY
Status: CANCELLED | OUTPATIENT
Start: 2019-06-18

## 2019-06-17 RX ORDER — LORAZEPAM 0.5 MG/1
0.5 TABLET ORAL EVERY 6 HOURS PRN
Status: DISCONTINUED | OUTPATIENT
Start: 2019-06-17 | End: 2019-06-19 | Stop reason: HOSPADM

## 2019-06-17 RX ORDER — HALOPERIDOL 5 MG
5 TABLET ORAL EVERY 8 HOURS PRN
Status: CANCELLED | OUTPATIENT
Start: 2019-06-17

## 2019-06-17 RX ORDER — OLANZAPINE 10 MG/1
10 INJECTION, POWDER, LYOPHILIZED, FOR SOLUTION INTRAMUSCULAR EVERY 12 HOURS PRN
Status: DISCONTINUED | OUTPATIENT
Start: 2019-06-17 | End: 2019-06-19 | Stop reason: HOSPADM

## 2019-06-17 RX ORDER — MYCOPHENOLATE MOFETIL 250 MG/1
250 CAPSULE ORAL
Status: CANCELLED | OUTPATIENT
Start: 2019-06-17

## 2019-06-17 RX ORDER — ACETAMINOPHEN 325 MG/1
650 TABLET ORAL EVERY 4 HOURS PRN
Status: CANCELLED | OUTPATIENT
Start: 2019-06-17

## 2019-06-17 RX ORDER — OLANZAPINE 10 MG/1
10 INJECTION, POWDER, LYOPHILIZED, FOR SOLUTION INTRAMUSCULAR EVERY 12 HOURS PRN
Status: CANCELLED | OUTPATIENT
Start: 2019-06-17

## 2019-06-17 RX ORDER — ACETAMINOPHEN 325 MG/1
975 TABLET ORAL EVERY 6 HOURS PRN
Status: CANCELLED | OUTPATIENT
Start: 2019-06-17

## 2019-06-17 RX ORDER — WARFARIN SODIUM 10 MG/1
10 TABLET ORAL
Status: DISCONTINUED | OUTPATIENT
Start: 2019-06-17 | End: 2019-06-19 | Stop reason: HOSPADM

## 2019-06-17 RX ORDER — BENZTROPINE MESYLATE 1 MG/1
1 TABLET ORAL EVERY 6 HOURS PRN
Status: DISCONTINUED | OUTPATIENT
Start: 2019-06-17 | End: 2019-06-19 | Stop reason: HOSPADM

## 2019-06-17 RX ORDER — TACROLIMUS 1 MG/1
1 CAPSULE ORAL EVERY 12 HOURS SCHEDULED
Status: DISCONTINUED | OUTPATIENT
Start: 2019-06-17 | End: 2019-06-19 | Stop reason: HOSPADM

## 2019-06-17 RX ORDER — MYCOPHENOLATE MOFETIL 250 MG/1
250 CAPSULE ORAL
Status: DISCONTINUED | OUTPATIENT
Start: 2019-06-17 | End: 2019-06-19 | Stop reason: HOSPADM

## 2019-06-17 RX ORDER — HYDROXYZINE HYDROCHLORIDE 25 MG/1
50 TABLET, FILM COATED ORAL EVERY 6 HOURS PRN
Status: CANCELLED | OUTPATIENT
Start: 2019-06-17

## 2019-06-17 RX ORDER — OLANZAPINE 10 MG/1
10 TABLET ORAL EVERY 12 HOURS PRN
Status: DISCONTINUED | OUTPATIENT
Start: 2019-06-17 | End: 2019-06-19 | Stop reason: HOSPADM

## 2019-06-17 RX ORDER — MELATONIN
2000 DAILY
Status: DISCONTINUED | OUTPATIENT
Start: 2019-06-18 | End: 2019-06-19 | Stop reason: HOSPADM

## 2019-06-17 RX ORDER — HALOPERIDOL 5 MG
5 TABLET ORAL EVERY 8 HOURS PRN
Status: DISCONTINUED | OUTPATIENT
Start: 2019-06-17 | End: 2019-06-19 | Stop reason: HOSPADM

## 2019-06-17 RX ORDER — ACETAMINOPHEN 325 MG/1
650 TABLET ORAL EVERY 4 HOURS PRN
Status: DISCONTINUED | OUTPATIENT
Start: 2019-06-17 | End: 2019-06-19

## 2019-06-17 RX ORDER — DILTIAZEM HYDROCHLORIDE 180 MG/1
180 CAPSULE, COATED, EXTENDED RELEASE ORAL DAILY
Status: CANCELLED | OUTPATIENT
Start: 2019-06-18

## 2019-06-17 RX ADMIN — VITAMIN D, TAB 1000IU (100/BT) 2000 UNITS: 25 TAB at 09:36

## 2019-06-17 RX ADMIN — MYCOPHENOLATE MOFETIL 500 MG: 250 CAPSULE ORAL at 09:36

## 2019-06-17 RX ADMIN — MYCOPHENOLATE MOFETIL 250 MG: 250 CAPSULE ORAL at 21:19

## 2019-06-17 RX ADMIN — DILTIAZEM HYDROCHLORIDE 180 MG: 180 CAPSULE, COATED, EXTENDED RELEASE ORAL at 09:36

## 2019-06-17 RX ADMIN — TACROLIMUS 1 MG: 0.5 CAPSULE ORAL at 09:36

## 2019-06-17 RX ADMIN — TACROLIMUS 1 MG: 1 CAPSULE ORAL at 21:19

## 2019-06-17 RX ADMIN — SERTRALINE HYDROCHLORIDE 200 MG: 100 TABLET ORAL at 09:36

## 2019-06-17 RX ADMIN — WARFARIN SODIUM 10 MG: 10 TABLET ORAL at 19:57

## 2019-06-18 ENCOUNTER — TRANSITIONAL CARE MANAGEMENT (OUTPATIENT)
Dept: INTERNAL MEDICINE CLINIC | Facility: CLINIC | Age: 44
End: 2019-06-18

## 2019-06-18 PROBLEM — F33.2 SEVERE EPISODE OF RECURRENT MAJOR DEPRESSIVE DISORDER, WITHOUT PSYCHOTIC FEATURES (HCC): Status: ACTIVE | Noted: 2019-06-18

## 2019-06-18 PROBLEM — N17.9 ACUTE KIDNEY INJURY SUPERIMPOSED ON CKD (HCC): Status: RESOLVED | Noted: 2017-12-17 | Resolved: 2019-06-18

## 2019-06-18 PROBLEM — N17.9 ACUTE KIDNEY INJURY (HCC): Status: RESOLVED | Noted: 2019-06-14 | Resolved: 2019-06-18

## 2019-06-18 PROBLEM — N18.9 ACUTE KIDNEY INJURY SUPERIMPOSED ON CKD (HCC): Status: RESOLVED | Noted: 2017-12-17 | Resolved: 2019-06-18

## 2019-06-18 PROBLEM — N39.0 UTI (URINARY TRACT INFECTION): Status: RESOLVED | Noted: 2017-12-17 | Resolved: 2019-06-18

## 2019-06-18 PROCEDURE — 99221 1ST HOSP IP/OBS SF/LOW 40: CPT | Performed by: PSYCHIATRY & NEUROLOGY

## 2019-06-18 PROCEDURE — 99252 IP/OBS CONSLTJ NEW/EST SF 35: CPT | Performed by: INTERNAL MEDICINE

## 2019-06-18 PROCEDURE — 99231 SBSQ HOSP IP/OBS SF/LOW 25: CPT | Performed by: PHYSICIAN ASSISTANT

## 2019-06-18 RX ORDER — BENZONATATE 100 MG/1
100 CAPSULE ORAL 3 TIMES DAILY PRN
Status: DISCONTINUED | OUTPATIENT
Start: 2019-06-18 | End: 2019-06-18

## 2019-06-18 RX ORDER — GUAIFENESIN 100 MG/5ML
200 SOLUTION ORAL EVERY 4 HOURS PRN
Status: DISCONTINUED | OUTPATIENT
Start: 2019-06-18 | End: 2019-06-18

## 2019-06-18 RX ADMIN — SERTRALINE HYDROCHLORIDE 200 MG: 100 TABLET ORAL at 08:18

## 2019-06-18 RX ADMIN — TACROLIMUS 1 MG: 1 CAPSULE ORAL at 08:18

## 2019-06-18 RX ADMIN — ACETAMINOPHEN 650 MG: 325 TABLET, FILM COATED ORAL at 08:18

## 2019-06-18 RX ADMIN — MYCOPHENOLATE MOFETIL 250 MG: 250 CAPSULE ORAL at 21:40

## 2019-06-18 RX ADMIN — WARFARIN SODIUM 10 MG: 10 TABLET ORAL at 18:15

## 2019-06-18 RX ADMIN — VITAMIN D, TAB 1000IU (100/BT) 2000 UNITS: 25 TAB at 08:18

## 2019-06-18 RX ADMIN — MYCOPHENOLATE MOFETIL 500 MG: 250 CAPSULE ORAL at 08:18

## 2019-06-18 RX ADMIN — TACROLIMUS 1 MG: 1 CAPSULE ORAL at 21:39

## 2019-06-18 RX ADMIN — DILTIAZEM HYDROCHLORIDE 180 MG: 180 CAPSULE, COATED, EXTENDED RELEASE ORAL at 08:18

## 2019-06-18 RX ADMIN — HYDROXYZINE HYDROCHLORIDE 50 MG: 50 TABLET ORAL at 13:39

## 2019-06-18 RX ADMIN — TRAZODONE HYDROCHLORIDE 50 MG: 50 TABLET ORAL at 00:57

## 2019-06-19 ENCOUNTER — HOSPITAL ENCOUNTER (INPATIENT)
Facility: HOSPITAL | Age: 44
LOS: 2 days | Discharge: HOME | DRG: 469 | End: 2019-06-21
Attending: INTERNAL MEDICINE | Admitting: INTERNAL MEDICINE
Payer: COMMERCIAL

## 2019-06-19 VITALS
WEIGHT: 167 LBS | DIASTOLIC BLOOD PRESSURE: 94 MMHG | TEMPERATURE: 97.5 F | OXYGEN SATURATION: 94 % | HEART RATE: 104 BPM | BODY MASS INDEX: 26.84 KG/M2 | RESPIRATION RATE: 18 BRPM | HEIGHT: 66 IN | SYSTOLIC BLOOD PRESSURE: 125 MMHG

## 2019-06-19 DIAGNOSIS — I10 ESSENTIAL HYPERTENSION: ICD-10-CM

## 2019-06-19 DIAGNOSIS — F32.A DEPRESSION: ICD-10-CM

## 2019-06-19 DIAGNOSIS — F33.2 SEVERE EPISODE OF RECURRENT MAJOR DEPRESSIVE DISORDER, WITHOUT PSYCHOTIC FEATURES (HCC): ICD-10-CM

## 2019-06-19 DIAGNOSIS — N17.9 AKI (ACUTE KIDNEY INJURY) (HCC): ICD-10-CM

## 2019-06-19 DIAGNOSIS — Z94.0 HISTORY OF RENAL TRANSPLANT: ICD-10-CM

## 2019-06-19 DIAGNOSIS — N18.30 STAGE 3 CHRONIC KIDNEY DISEASE (HCC): Primary | ICD-10-CM

## 2019-06-19 DIAGNOSIS — Z94.0 RENAL TRANSPLANT RECIPIENT: ICD-10-CM

## 2019-06-19 DIAGNOSIS — I82.4Z1 DEEP VEIN THROMBOSIS (DVT) OF DISTAL VEIN OF RIGHT LOWER EXTREMITY, UNSPECIFIED CHRONICITY (HCC): ICD-10-CM

## 2019-06-19 PROBLEM — S68.119S: Status: RESOLVED | Noted: 2018-06-24 | Resolved: 2019-06-19

## 2019-06-19 PROBLEM — Z86.718 HISTORY OF DVT IN ADULTHOOD: Status: ACTIVE | Noted: 2018-09-21

## 2019-06-19 LAB
ANION GAP SERPL CALCULATED.3IONS-SCNC: 8 MMOL/L (ref 4–13)
BUN SERPL-MCNC: 47 MG/DL (ref 5–25)
CALCIUM SERPL-MCNC: 9.7 MG/DL (ref 8.3–10.1)
CHLORIDE SERPL-SCNC: 104 MMOL/L (ref 100–108)
CO2 SERPL-SCNC: 25 MMOL/L (ref 21–32)
CREAT SERPL-MCNC: 2.18 MG/DL (ref 0.6–1.3)
GFR SERPL CREATININE-BSD FRML MDRD: 35 ML/MIN/1.73SQ M
GLUCOSE SERPL-MCNC: 96 MG/DL (ref 65–140)
INR PPP: 3.18 (ref 0.86–1.17)
POTASSIUM SERPL-SCNC: 3.8 MMOL/L (ref 3.5–5.3)
PROTHROMBIN TIME: 30.9 SECONDS (ref 11.8–14.2)
SODIUM SERPL-SCNC: 137 MMOL/L (ref 136–145)

## 2019-06-19 PROCEDURE — 99238 HOSP IP/OBS DSCHRG MGMT 30/<: CPT | Performed by: PSYCHIATRY & NEUROLOGY

## 2019-06-19 PROCEDURE — 87799 DETECT AGENT NOS DNA QUANT: CPT | Performed by: INTERNAL MEDICINE

## 2019-06-19 PROCEDURE — 99254 IP/OBS CNSLTJ NEW/EST MOD 60: CPT | Performed by: INTERNAL MEDICINE

## 2019-06-19 PROCEDURE — 80197 ASSAY OF TACROLIMUS: CPT | Performed by: INTERNAL MEDICINE

## 2019-06-19 PROCEDURE — 80048 BASIC METABOLIC PNL TOTAL CA: CPT | Performed by: INTERNAL MEDICINE

## 2019-06-19 PROCEDURE — 85610 PROTHROMBIN TIME: CPT | Performed by: INTERNAL MEDICINE

## 2019-06-19 PROCEDURE — 99222 1ST HOSP IP/OBS MODERATE 55: CPT | Performed by: INTERNAL MEDICINE

## 2019-06-19 RX ORDER — SODIUM CHLORIDE 9 MG/ML
125 INJECTION, SOLUTION INTRAVENOUS CONTINUOUS
Status: DISCONTINUED | OUTPATIENT
Start: 2019-06-19 | End: 2019-06-20

## 2019-06-19 RX ORDER — SODIUM CHLORIDE 9 MG/ML
125 INJECTION, SOLUTION INTRAVENOUS CONTINUOUS
Status: DISCONTINUED | OUTPATIENT
Start: 2019-06-19 | End: 2019-06-19 | Stop reason: HOSPADM

## 2019-06-19 RX ORDER — MYCOPHENOLATE MOFETIL 250 MG/1
250 CAPSULE ORAL
Status: DISCONTINUED | OUTPATIENT
Start: 2019-06-19 | End: 2019-06-21 | Stop reason: HOSPADM

## 2019-06-19 RX ORDER — MELATONIN
2000 DAILY
Status: CANCELLED | OUTPATIENT
Start: 2019-06-20

## 2019-06-19 RX ORDER — MYCOPHENOLATE MOFETIL 250 MG/1
500 CAPSULE ORAL DAILY
Status: CANCELLED | OUTPATIENT
Start: 2019-06-20

## 2019-06-19 RX ORDER — TACROLIMUS 1 MG/1
1 CAPSULE ORAL EVERY 12 HOURS SCHEDULED
Status: DISCONTINUED | OUTPATIENT
Start: 2019-06-19 | End: 2019-06-21 | Stop reason: HOSPADM

## 2019-06-19 RX ORDER — TRAZODONE HYDROCHLORIDE 50 MG/1
50 TABLET ORAL
Status: DISCONTINUED | OUTPATIENT
Start: 2019-06-19 | End: 2019-06-21 | Stop reason: HOSPADM

## 2019-06-19 RX ORDER — MYCOPHENOLATE MOFETIL 250 MG/1
250 CAPSULE ORAL
Status: CANCELLED | OUTPATIENT
Start: 2019-06-19

## 2019-06-19 RX ORDER — SERTRALINE HYDROCHLORIDE 100 MG/1
200 TABLET, FILM COATED ORAL DAILY
Status: CANCELLED | OUTPATIENT
Start: 2019-06-20

## 2019-06-19 RX ORDER — TRAZODONE HYDROCHLORIDE 50 MG/1
50 TABLET ORAL
Status: CANCELLED | OUTPATIENT
Start: 2019-06-19

## 2019-06-19 RX ORDER — WARFARIN SODIUM 10 MG/1
10 TABLET ORAL
Status: CANCELLED | OUTPATIENT
Start: 2019-06-19

## 2019-06-19 RX ORDER — WARFARIN SODIUM 10 MG/1
10 TABLET ORAL
Status: DISCONTINUED | OUTPATIENT
Start: 2019-06-19 | End: 2019-06-19

## 2019-06-19 RX ORDER — MELATONIN
2000 DAILY
Status: DISCONTINUED | OUTPATIENT
Start: 2019-06-20 | End: 2019-06-21 | Stop reason: HOSPADM

## 2019-06-19 RX ORDER — TACROLIMUS 1 MG/1
1 CAPSULE ORAL EVERY 12 HOURS SCHEDULED
Status: CANCELLED | OUTPATIENT
Start: 2019-06-19

## 2019-06-19 RX ORDER — MYCOPHENOLATE MOFETIL 250 MG/1
500 CAPSULE ORAL DAILY
Status: DISCONTINUED | OUTPATIENT
Start: 2019-06-20 | End: 2019-06-21 | Stop reason: HOSPADM

## 2019-06-19 RX ORDER — SERTRALINE HYDROCHLORIDE 100 MG/1
200 TABLET, FILM COATED ORAL DAILY
Status: DISCONTINUED | OUTPATIENT
Start: 2019-06-20 | End: 2019-06-21 | Stop reason: HOSPADM

## 2019-06-19 RX ADMIN — MYCOPHENOLATE MOFETIL 250 MG: 250 CAPSULE ORAL at 21:22

## 2019-06-19 RX ADMIN — TACROLIMUS 1 MG: 1 CAPSULE ORAL at 21:21

## 2019-06-19 RX ADMIN — SODIUM CHLORIDE 125 ML/HR: 0.9 INJECTION, SOLUTION INTRAVENOUS at 18:25

## 2019-06-19 RX ADMIN — MYCOPHENOLATE MOFETIL 500 MG: 250 CAPSULE ORAL at 09:45

## 2019-06-19 RX ADMIN — VITAMIN D, TAB 1000IU (100/BT) 2000 UNITS: 25 TAB at 09:45

## 2019-06-19 RX ADMIN — TRAZODONE HYDROCHLORIDE 50 MG: 50 TABLET ORAL at 01:26

## 2019-06-19 RX ADMIN — SERTRALINE HYDROCHLORIDE 200 MG: 100 TABLET ORAL at 09:45

## 2019-06-19 RX ADMIN — TACROLIMUS 1 MG: 1 CAPSULE ORAL at 09:45

## 2019-06-19 NOTE — ASSESSMENT & PLAN NOTE
Multiple histories of DVTs in the past   INR elevated at 3 19  No Coumadin for tonight  Follow-up levels in a m

## 2019-06-19 NOTE — ASSESSMENT & PLAN NOTE
Creatinine at baseline, tacrolimus level at 3 2 (therapeutic) which was checked on 06/14/2019  History of end-stage renal disease secondary to IgA nephropathy/vasculitis   Hx of renal transplant in 2009  Continue Tacrolimus 1 mg PO every 12 hours, mycophenolate 500 mg PO Qdaily in the AM and 250 mg PO Qdaily in the PM   Refer above for additional details

## 2019-06-19 NOTE — H&P
H&P- Mickie Degroot 1975, 37 y o  male MRN: [id number]  Unit/Bed#: [address] [place] [address] Encounter: [id number]  Primary Care Provider: Elisabet Manley MD   Date and time admitted to hospital: 6/19/2019  5:00 PM         * Acute renal failure superimposed on stage 3 chronic kidney disease ([facility] [address] )  Assessment & Plan  Present on admission:Creatinine at 2 1  Increase in serum creatinine by >0 3 mg/dl within 48 hours as compared to the last creatinine  No signs of uremia or and otherwise unexplained decline in mental status  GENERAL: IV fluids, avoid nephrotoxic agents  Avoid hypotensive episodes  Diltiazem discontinued for now  LABS ORDERED: BMP, urine analysis, urine culture, bk virus (serum and urine), tacrolimus level, repeat ultrasound renals  CONSULT:  Nephrology (case discussed with Dr Maty Brunson MD)  History of DVT in adulthood  Assessment & Plan  Multiple histories of DVTs in the past   INR elevated at 3 19  No Coumadin for tonight  Follow-up levels in a m  Essential hypertension  Assessment & Plan  Systolic blood pressure in the 90s, while he was at [facility]  Hold diltiazem for now  Vitals as per protocol  Severe episode of recurrent major depressive disorder, without psychotic features [facility])  Assessment & Plan  Continue as per Psychiatry  One-to-one observation has been discontinued  Suicidal ideations  Assessment & Plan  As per Psychiatry no one-to-one observation required  History of renal transplant  Assessment & Plan  Creatinine at baseline, tacrolimus level at 3 2 (therapeutic) which was checked on 06/14/2019  History of end-stage renal disease secondary to IgA nephropathy/vasculitis   Hx of renal transplant in 2009  Continue Tacrolimus 1 mg PO every 12 hours, mycophenolate 500 mg PO Qdaily in the AM and 250 mg PO Qdaily in the PM   Refer above for additional details  VTE PROPHYLAXIS: SCDs   INR SUPRA-THERAPEUTIC HOLD COUMADIN TILL INR LESS THAN 3     CODE STATUS: FULL    Anticipated Length of Stay:  Patient will be admitted on an Inpatient basis with an anticipated length of stay of  more than 2 midnights  Justification for Hospital Stay:  Acute kidney injury on CKD stage 3      CHIEF COMPLAINT  Elevated creatinine from baseline    HISTORY OF PRESENT ILLNESS  Kevin Oakes is a pleasant 20-year-old gentleman was transferred from [facility] for his acute elevated creatinine from baseline  Patient was [facility] [place] [facility]14/2019 - 06/17/2019 for acute kidney injury and suicidal ideations  Creatinine was back at baseline(1 2-1 5) and patient was cleared to be transferred to [address] [place] [address], on 06/17/2019  He mentions he has been on Coumadin for history of multiple DVTs  Anticoagulation is managed by his primary care physician Dr Freddie Morgan  INR at therapeutic range  He states he is making good urine  He offers no complaints during my encounter  Patient denies chest pain, shortness of breath, PND, orthopnea, palpitations, diaphoresis, nausea, vomiting, diarrhea, constipation, blood in urine, stool, sputum, fevers, chills, abdominal pain,dysuria, headache, new onset weakness, slurred speech, seizure-like activity,dizziness, blurred vision, loss of consciousness, fall, trauma to the head, recent change in medications, dietary noncompliance, skin rashes, recent travel or recent sick contacts        REVIEW OF SYSTEMS  · A comprehensive 10 point review system conducted all negative except as mentioned in HPI       PMH/PSH    Past Medical History:   Diagnosis Date    ADD (attention deficit disorder)     Depression     D[facility]deep [facility] thrombosis) [facility]     H/O immunosuppressive therapy     History of kidney disease     Hypertension     Nephropathy, IgA        Past Surgical History:   Procedure Laterality Date    NEPHRECTOMY TRANSPLANTED ORGAN         ALLERGIES  Allergies   Allergen Reactions    Penicillins Rash       HOME MEDICATIONS  Current Facility-Administered Medications on File Prior to Encounter   Medication    [DISCONTINUED] acetaminophen (TYLENOL) tablet 325 mg    [DISCONTINUED] acetaminophen (TYLENOL) tablet 650 mg    [DISCONTINUED] acetaminophen (TYLENOL) tablet 975 mg    [DISCONTINUED] aluminum-magnesium hydroxide-simethicone (MYLANTA) 200-200-20 mg/5 mL oral suspension 30 mL    [DISCONTINUED] benzonatate (TESSALON PERLES) capsule 100 mg    [DISCONTINUED] benztropine (COGENTIN) injection 1 mg    [DISCONTINUED] benztropine (COGENTIN) tablet 1 mg    [DISCONTINUED] cholecalciferol (VITAMIN D3) tablet 2,000 Units    [DISCONTINUED] diltiazem (CARDIZEM CD) 24 hr capsule 180 mg    [DISCONTINUED] guaiFENesin (ROBITUSSIN) oral solution 200 mg    [DISCONTINUED] haloperidol (HALDOL) tablet 5 mg    [DISCONTINUED] haloperidol lactate (HALDOL) injection 5 mg    [DISCONTINUED] hydrOXYzine HCL (ATARAX) tablet 25 mg    [DISCONTINUED] hydrOXYzine HCL (ATARAX) tablet 50 mg    [DISCONTINUED] LORazepam (ATIVAN) 2 mg/mL injection 1 mg    [DISCONTINUED] LORazepam (ATIVAN) tablet 0 5 mg    [DISCONTINUED] magnesium hydroxide (MILK OF MAGNESIA) 400 mg/5 mL oral suspension 30 mL    [DISCONTINUED] mycophenolate (CELLCEPT) capsule 250 mg    [DISCONTINUED] mycophenolate (CELLCEPT) capsule 500 mg    [DISCONTINUED] OLANZapine (ZyPREXA) IM injection 10 mg    [DISCONTINUED] OLANZapine (ZyPREXA) tablet 10 mg    [DISCONTINUED] risperiDONE (RisperDAL M-TABS) dispersible tablet 1 mg    [DISCONTINUED] sertraline (ZOLOFT) tablet 200 mg    [DISCONTINUED] sodium chloride 0 9 % infusion    [DISCONTINUED] tacrolimus (PROGRAF) capsule 1 mg    [DISCONTINUED] traZODone (DESYREL) tablet 50 mg    [DISCONTINUED] warfarin (COUMADIN) tablet 10 mg     Current Outpatient Medications on File Prior to Encounter   Medication Sig    Cholecalciferol (VITAMIN D-3) 1000 units CAPS Take 2,000 Units by mouth daily    diltiazem (CARDIZEM CD) 180 mg 24 hr capsule Take 1 capsule (180 mg total) by mouth daily    mycophenolate (CELLCEPT) 250 mg capsule Take 2 in the AM, 1 in the PM     sertraline (ZOLOFT) 100 mg tablet Take 2 tablets (200 mg total) by mouth daily    tacrolimus (PROGRAF) 1 mg capsule Take 1 capsule (1 mg total) by mouth 2 (two) times a day    warfarin (COUMADIN) 10 mg tablet Take by mouth daily         SOCIAL HISTORY     Marital Status: /Civil Union   Substance Use History:   Social History     Substance and Sexual Activity   Alcohol Use Not Currently    Comment: Occasionally     Social History     Tobacco Use   Smoking Status Former Smoker    Years: 4 00   Smokeless Tobacco Never Used     Social History     Substance and Sexual Activity   Drug Use Yes    Types: Marijuana    Comment: smokes marijuana a few times daily       FAMILY HISTORY   Reviewed non-contributory  OBJECTIVE  GENERAL: AAO x 3  HEENT: atraumatic, normocephalic  Oral mucosa moist, no icterus, pallor  PERRLA +  Neck supple, no JVD, no lymphadenopathy, no thryomegaly  CHEST: B/L breath sounds heard  CVS: S1, S2   ABDOMEN: Soft/obese/NT/BS heard  NEUROLOGICAL: CN II -XI grossly intact  No focal motor or sensory deficits  No signs of meningeal irritation or cerebellar dysfunction  EXTREMITIES: No cyanosis/clubbing or edema  LAB DATA  Results for Forest Carlin (MRN [id number]) as of 6/19/2019 17:11   6/19/2019 06:12 6/19/2019 07:57   Sodium 137    Potassium 3 8    Chloride 104    CO2 25    Anion Gap 8    BUN 47 (H)    Creatinine 2 18 (H)    Calcium 9 7    eGFR 35    Protime  30 9 (H)   INR  3 18 (H)       Us Transplant Kidney With Doppler    Result Date: 6/15/2019  Narrative: TRANSPLANT RENAL ULTRASOUND INDICATION: Urinary tract infection, RENAL FAILURE, ACUTE RENAL FAILURE, CHRONIC arf  COMPARISON: None TECHNIQUE:   Ultrasound of the complete retroperitoneum transplant kidney ultrasound was performed with a curvilinear transducer utilizing volumetric sweeps and still imaging techniques   FINDINGS: 11 6 x 4 8 x 1 2 cm left renal transplant kidney is identified without hydronephrosis  Resistive indices are within normal limits within the arcuate arteries  Main renal vein and main renal artery are patent with appropriate waveforms  Bladder: No evidence of intraluminal mass  Top normal bladder wall thickness 3 to 4 mm  Native kidneys were not identified  Impression: Unremarkable left renal transplant  Workstation performed: [id number]       EKG, Pathology, and Other Studies Reviewed on Admission:  Obtain baseline EKG  Total time spent in the process of admission, completion of records, counseling, coordination of care, discussion with patient/family was approximately 35 minutes  Sheldon Oconnell MD  HOSPITALIST SERVICES  6/19/2019      PLEASE NOTE:  This encounter was completed utilizing the [employer] Direct Speech Voice Recognition Software  Grammatical errors, random word insertions, pronoun errors and incomplete sentences are occasional consequences of the system due to software limitations, ambient noise and hardware issues  These may be missed by proof reading prior to affixing electronic signature  Any questions or concerns about the content, text or information contained within the body of this dictation should be directly addressed to the physician for clarification  Please do not hesitate to call me directly if you have any any questions or concerns

## 2019-06-19 NOTE — PLAN OF CARE
Problem: DISCHARGE PLANNING  Goal: Discharge to home or other facility with appropriate resources  Description  INTERVENTIONS:  - Identify barriers to discharge w/patient and caregiver  - Arrange for needed discharge resources and transportation as appropriate  - Identify discharge learning needs (meds, wound care, etc )  - Arrange for interpretive services to assist at discharge as needed  - Refer to Case Management Department for coordinating discharge planning if the patient needs post-hospital services based on physician/advanced practitioner order or complex needs related to functional status, cognitive ability, or social support system  Outcome: Progressing     Problem: Knowledge Deficit  Goal: Patient/family/caregiver demonstrates understanding of disease process, treatment plan, medications, and discharge instructions  Description  Complete learning assessment and assess knowledge base    Interventions:  - Provide teaching at level of understanding  - Provide teaching via preferred learning methods  Outcome: Progressing     Problem: GENITOURINARY - ADULT  Goal: Maintains or returns to baseline urinary function  Description  INTERVENTIONS:  - Assess urinary function  - Encourage oral fluids to ensure adequate hydration  - Administer IV fluids as ordered to ensure adequate hydration  - Administer ordered medications as needed  - Offer frequent toileting  - Follow urinary retention protocol if ordered  Outcome: Progressing  Goal: Absence of urinary retention  Description  INTERVENTIONS:  - Assess patient's ability to void and empty bladder  - Monitor I/O  - Bladder scan as needed  - Discuss with physician/AP medications to alleviate retention as needed  - Discuss catheterization for long term situations as appropriate   Outcome: Progressing

## 2019-06-19 NOTE — ASSESSMENT & PLAN NOTE
Present on admission:Creatinine at 2 1  Increase in serum creatinine by >0 3 mg/dl within 48 hours as compared to the last creatinine  No signs of uremia or and otherwise unexplained decline in mental status  GENERAL: IV fluids, avoid nephrotoxic agents  Avoid hypotensive episodes  Diltiazem discontinued for now  LABS ORDERED: BMP, urine analysis, urine culture, bk virus (serum and urine), tacrolimus level, repeat ultrasound renals  CONSULT:  Nephrology (case discussed with Dr Ellie Villa MD)

## 2019-06-19 NOTE — ASSESSMENT & PLAN NOTE
Systolic blood pressure in the 90s, while he was at [facility]  Hold diltiazem for now  Vitals as per protocol

## 2019-06-20 ENCOUNTER — APPOINTMENT (INPATIENT)
Dept: ULTRASOUND IMAGING | Facility: HOSPITAL | Age: 44
DRG: 469 | End: 2019-06-20
Payer: COMMERCIAL

## 2019-06-20 LAB
ALBUMIN SERPL BCP-MCNC: 3.1 G/DL (ref 3.5–5)
ALP SERPL-CCNC: 88 U/L (ref 46–116)
ALT SERPL W P-5'-P-CCNC: 22 U/L (ref 12–78)
ANION GAP SERPL CALCULATED.3IONS-SCNC: 8 MMOL/L (ref 4–13)
AST SERPL W P-5'-P-CCNC: 16 U/L (ref 5–45)
BACTERIA UR QL AUTO: ABNORMAL /HPF
BASOPHILS # BLD AUTO: 0.05 THOUSANDS/ΜL (ref 0–0.1)
BASOPHILS NFR BLD AUTO: 1 % (ref 0–1)
BILIRUB SERPL-MCNC: 0.2 MG/DL (ref 0.2–1)
BILIRUB UR QL STRIP: NEGATIVE
BUN SERPL-MCNC: 33 MG/DL (ref 5–25)
CALCIUM SERPL-MCNC: 9.3 MG/DL (ref 8.3–10.1)
CHLORIDE SERPL-SCNC: 109 MMOL/L (ref 100–108)
CLARITY UR: CLEAR
CO2 SERPL-SCNC: 26 MMOL/L (ref 21–32)
COLOR UR: ABNORMAL
CREAT SERPL-MCNC: 1.56 MG/DL (ref 0.6–1.3)
EOSINOPHIL # BLD AUTO: 0.17 THOUSAND/ΜL (ref 0–0.61)
EOSINOPHIL NFR BLD AUTO: 3 % (ref 0–6)
ERYTHROCYTE [DISTWIDTH] IN BLOOD BY AUTOMATED COUNT: 11.7 % (ref 11.6–15.1)
GFR SERPL CREATININE-BSD FRML MDRD: 53 ML/MIN/1.73SQ M
GLUCOSE SERPL-MCNC: 85 MG/DL (ref 65–140)
GLUCOSE UR STRIP-MCNC: NEGATIVE MG/DL
HCT VFR BLD AUTO: 44.6 % (ref 36.5–49.3)
HGB BLD-MCNC: 15 G/DL (ref 12–17)
HGB UR QL STRIP.AUTO: ABNORMAL
IMM GRANULOCYTES # BLD AUTO: 0.02 THOUSAND/UL (ref 0–0.2)
IMM GRANULOCYTES NFR BLD AUTO: 0 % (ref 0–2)
INR PPP: 3.3 (ref 0.86–1.17)
KETONES UR STRIP-MCNC: NEGATIVE MG/DL
LEUKOCYTE ESTERASE UR QL STRIP: NEGATIVE
LYMPHOCYTES # BLD AUTO: 2.5 THOUSANDS/ΜL (ref 0.6–4.47)
LYMPHOCYTES NFR BLD AUTO: 43 % (ref 14–44)
MCH RBC QN AUTO: 31.4 PG (ref 26.8–34.3)
MCHC RBC AUTO-ENTMCNC: 33.6 G/DL (ref 31.4–37.4)
MCV RBC AUTO: 93 FL (ref 82–98)
MONOCYTES # BLD AUTO: 0.66 THOUSAND/ΜL (ref 0.17–1.22)
MONOCYTES NFR BLD AUTO: 11 % (ref 4–12)
NEUTROPHILS # BLD AUTO: 2.44 THOUSANDS/ΜL (ref 1.85–7.62)
NEUTS SEG NFR BLD AUTO: 42 % (ref 43–75)
NITRITE UR QL STRIP: NEGATIVE
NON-SQ EPI CELLS URNS QL MICRO: ABNORMAL /HPF
NRBC BLD AUTO-RTO: 0 /100 WBCS
PH UR STRIP.AUTO: 6 [PH]
PLATELET # BLD AUTO: 195 THOUSANDS/UL (ref 149–390)
PMV BLD AUTO: 10.6 FL (ref 8.9–12.7)
POTASSIUM SERPL-SCNC: 3.8 MMOL/L (ref 3.5–5.3)
PROT SERPL-MCNC: 6.8 G/DL (ref 6.4–8.2)
PROT UR STRIP-MCNC: NEGATIVE MG/DL
PROTHROMBIN TIME: 31.8 SECONDS (ref 11.8–14.2)
RBC # BLD AUTO: 4.78 MILLION/UL (ref 3.88–5.62)
RBC #/AREA URNS AUTO: ABNORMAL /HPF
SODIUM SERPL-SCNC: 143 MMOL/L (ref 136–145)
SP GR UR STRIP.AUTO: 1.01 (ref 1–1.03)
TACROLIMUS BLD-MCNC: 7.4 NG/ML (ref 2–20)
UROBILINOGEN UR QL STRIP.AUTO: 0.2 E.U./DL
WBC # BLD AUTO: 5.84 THOUSAND/UL (ref 4.31–10.16)
WBC #/AREA URNS AUTO: ABNORMAL /HPF

## 2019-06-20 PROCEDURE — 87086 URINE CULTURE/COLONY COUNT: CPT | Performed by: INTERNAL MEDICINE

## 2019-06-20 PROCEDURE — 85025 COMPLETE CBC W/AUTO DIFF WBC: CPT | Performed by: INTERNAL MEDICINE

## 2019-06-20 PROCEDURE — 76776 US EXAM K TRANSPL W/DOPPLER: CPT

## 2019-06-20 PROCEDURE — 99253 IP/OBS CNSLTJ NEW/EST LOW 45: CPT | Performed by: PSYCHIATRY & NEUROLOGY

## 2019-06-20 PROCEDURE — 99255 IP/OBS CONSLTJ NEW/EST HI 80: CPT | Performed by: INTERNAL MEDICINE

## 2019-06-20 PROCEDURE — 81001 URINALYSIS AUTO W/SCOPE: CPT | Performed by: INTERNAL MEDICINE

## 2019-06-20 PROCEDURE — 87799 DETECT AGENT NOS DNA QUANT: CPT | Performed by: INTERNAL MEDICINE

## 2019-06-20 PROCEDURE — 85610 PROTHROMBIN TIME: CPT | Performed by: INTERNAL MEDICINE

## 2019-06-20 PROCEDURE — 80053 COMPREHEN METABOLIC PANEL: CPT | Performed by: INTERNAL MEDICINE

## 2019-06-20 RX ORDER — DILTIAZEM HYDROCHLORIDE 120 MG/1
120 CAPSULE, COATED, EXTENDED RELEASE ORAL DAILY
Status: DISCONTINUED | OUTPATIENT
Start: 2019-06-20 | End: 2019-06-20

## 2019-06-20 RX ORDER — SODIUM CHLORIDE 9 MG/ML
50 INJECTION, SOLUTION INTRAVENOUS CONTINUOUS
Status: DISCONTINUED | OUTPATIENT
Start: 2019-06-20 | End: 2019-06-21 | Stop reason: HOSPADM

## 2019-06-20 RX ADMIN — TACROLIMUS 1 MG: 1 CAPSULE ORAL at 21:36

## 2019-06-20 RX ADMIN — SERTRALINE HYDROCHLORIDE 200 MG: 100 TABLET ORAL at 09:01

## 2019-06-20 RX ADMIN — SODIUM CHLORIDE 125 ML/HR: 0.9 INJECTION, SOLUTION INTRAVENOUS at 09:06

## 2019-06-20 RX ADMIN — SODIUM CHLORIDE 50 ML/HR: 0.9 INJECTION, SOLUTION INTRAVENOUS at 18:06

## 2019-06-20 RX ADMIN — MYCOPHENOLATE MOFETIL 250 MG: 250 CAPSULE ORAL at 21:37

## 2019-06-20 RX ADMIN — TACROLIMUS 1 MG: 1 CAPSULE ORAL at 09:02

## 2019-06-20 RX ADMIN — VITAMIN D, TAB 1000IU (100/BT) 2000 UNITS: 25 TAB at 09:00

## 2019-06-20 RX ADMIN — MYCOPHENOLATE MOFETIL 500 MG: 250 CAPSULE ORAL at 09:02

## 2019-06-20 NOTE — ASSESSMENT & PLAN NOTE
Present on admission: Creatinine down to baseline at 1 5 today  Creatinine at 2 1   GENERAL: IV fluids, avoid nephrotoxic agents  Avoid hypotensive episodes  Diltiazem discontinued for now  LABS ORDERED: BMP, urine analysis, urine culture, bk virus (serum and urine), tacrolimus level, repeat ultrasound renals  CONSULT:  Nephrology (case discussed with Dr Glenroy Arrieta MD)

## 2019-06-20 NOTE — UTILIZATION REVIEW
Initial Clinical Review  Admission: Date/Time/Statement: 6/19/19 @ 1700   Orders Placed This Encounter   Procedures    Inpatient Admission     Standing Status:   Standing     Number of Occurrences:   1     Order Specific Question:   Admitting Physician     Answer:   Dariel Victor [[id number]]     Order Specific Question:   Level of Care     Answer:   Med Surg [[id number]]     Order Specific Question:   Estimated length of stay     Answer:   More than 2 Midnights     Order Specific Question:   Certification     Answer:   I certify that inpatient services are medically necessary for this patient for a duration of greater than two midnights  See H&P and MD Progress Notes for additional information about the patient's course of treatment  Comments:   frances   Assessment/Plan:  38 YO MALE DIRECT ADMISSION FROM [facility] FOR ELEVATED BUN/CR  HX DVT/COUMADIN, 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, RENAL TRANSPLANT  RECENT ADMISSION FOR FRANCES & SUICIDAL IDEATIONS, TRANSFERRED TO [facility] 6/17/19  ADMISSION WORK-UP SHOWING CREATININE @ 2 1, INR>3  ADMITTED TO INPATIENT STATUS FOR ARF ON CKD, STARTED ON IVF, COUMADIN ON HOLD, RENAL CONSULTED    ED Triage Vitals   Temperature Pulse Respirations Blood Pressure SpO2   06/19/19 2227 06/19/19 2227 06/19/19 2227 06/19/19 2227 06/19/19 2227   98 3 °F (36 8 °C) 99 18 (!) 165/105 94 %      Temp Source Heart Rate Source Patient Position - Orthostatic VS BP Location FiO2 (%)   06/19/19 2227 06/20/19 0719 06/19/19 2227 06/19/19 2227 --   Oral Monitor Lying Right arm       Pain Score       06/19/19 1813       No Pain        Wt Readings from Last 1 Encounters:   06/20/19 79 3 kg (174 lb 13 2 oz)     Additional Vital Signs:   Date/Time  Temp  Pulse  Resp  BP  SpO2  O2 Device  Patient Position - Orthostatic VS   06/20/19 0719  97 5 °F (36 4 °C)  60  17  134/96  96 %  None (Room air)  Lying   06/19/19 2227  98 3 °F (36 8 °C)  99  18  165/105Abnormal   94 %  None (Room air)  Lying   06/19/19 1823           None (Room air)     Pertinent Labs/Diagnostic Test Results:   Results from last 7 days   Lab Units 06/20/19  0439 06/17/19  0536 06/16/19  0516 06/15/19  0546 06/14/19  1229   WBC Thousand/uL 5 84 7 18 6 41 5 37 13 41*   HEMOGLOBIN g/dL 15 0 16 6 15 6 14 7 16 5   HEMATOCRIT % 44 6 49 4* 47 2 44 7 49 2   PLATELETS Thousands/uL 195 217 198 187 218   NEUTROS ABS Thousands/µL 2 44 4 16 2 97 2 52 10 73*     Results from last 7 days   Lab Units 06/20/19  0439 06/19/19  0612 06/17/19  0536 06/16/19  0516 06/15/19  0545   SODIUM mmol/L 143 137 138 140 138   POTASSIUM mmol/L 3 8 3 8 3 9 3 6 4 0   CHLORIDE mmol/L 109* 104 104 106 105   CO2 mmol/L 26 25 23 24 22   ANION GAP mmol/L 8 8 11 10 11   BUN mg/dL 33* 47* 20 19 29*   CREATININE mg/dL 1 56* 2 18* 1 38* 1 43* 1 50*   EGFR ml/min/1 73sq m 53 35 61 59 55   CALCIUM mg/dL 9 3 9 7 9 7 9 6 9 2   MAGNESIUM mg/dL  --   --   --   --  1 7   PHOSPHORUS mg/dL  --   --   --   --  3 2     Results from last 7 days   Lab Units 06/20/19  0439 06/15/19  0545 06/14/19  1229   AST U/L 16 23 23   ALT U/L 22 21 22   ALK PHOS U/L 88 98 112   TOTAL PROTEIN g/dL 6 8 6 9 8 0   ALBUMIN g/dL 3 1* 3 3* 4 0   TOTAL BILIRUBIN mg/dL 0 20 0 50 0 90     Results from last 7 days   Lab Units 06/20/19  0439 06/19/19  0612 06/17/19  0536 06/16/19  0516 06/15/19  0545 06/14/19  1229   GLUCOSE RANDOM mg/dL 85 96 94 98 86 82     Results from last 7 days   Lab Units 06/15/19  0545   HEMOGLOBIN A1C % 5 5   EAG mg/dl 111     Results from last 7 days   Lab Units 06/15/19  0545   CK TOTAL U/L 204   CK MB INDEX % 1 8   CK MB ng/mL 3 7     Results from last 7 days   Lab Units 06/15/19  0546   TROPONIN I ng/mL <0 02     Results from last 7 days   Lab Units 06/20/19  0439 06/19/19  0757 06/17/19  0853   PROTIME seconds 31 8* 30 9* 24 1*   INR  3 30* 3 18* 2 20*     Results from last 7 days   Lab Units 06/15/19  0546   PROCALCITONIN ng/ml 0 11     Results from last 7 days   Lab Units 06/15/19  0553   LACTIC ACID mmol/L 0 6     Results from last 7 days   Lab Units 06/15/19  0545   HEP B S AG  Non-reactive   HEP C AB  Non-reactive   HEP B C IGM  Non-reactive     Results from last 7 days   Lab Units 06/20/19  0150 06/14/19  1703 06/14/19  1444   CLARITY UA  Clear Clear Clear   COLOR UA  Straw Yellow Yellow   SPEC GRAV UA  1 015 1 025 1 025   PH UA  6 0 5 0 5 5   GLUCOSE UA mg/dl Negative Negative Negative   KETONES UA mg/dl Negative 15 (1+)* 40 (2+)*   BLOOD UA  Large* Large* Large*   PROTEIN UA mg/dl Negative Trace* 30 (1+)*   NITRITE UA  Negative Negative Negative   BILIRUBIN UA  Negative Negative Small*   UROBILINOGEN UA E U /dl 0 2 0 2 0 2   LEUKOCYTES UA  Negative Small* Negative   WBC UA /hpf None Seen 0-1* 0-1*   RBC UA /hpf 10-20* 1-2* 4-10*   BACTERIA UA /hpf None Seen Occasional Occasional   EPITHELIAL CELLS WET PREP /hpf None Seen Occasional Occasional     Results from last 7 days   Lab Units 06/14/19  1444   AMPH/METH  Negative   BARBITURATE UR  Negative   BENZODIAZEPINE UR  Negative   COCAINE UR  Negative   METHADONE URINE  Negative   OPIATE UR  Negative   PCP UR  Negative   THC UR  Positive*     Results from last 7 days   Lab Units 06/14/19  1456   URINE CULTURE  No Growth <1000 cfu/mL     Past Medical History:   Diagnosis Date    ADD (attention deficit disorder)     Depression     [facility]     H/O immunosuppressive therapy     History of kidney disease     Hypertension     Nephropathy, IgA      Present on Admission:   Acute renal failure superimposed on stage 3 chronic kidney disease ([place] [address] )   Essential hypertension   Severe episode of recurrent major depressive disorder, without psychotic features ([place] [address] )  Admitting Diagnosis: FRANCES (acute kidney injury) ([place] [address] ) [N17 9]  Age/Sex: 37 y o  male  Admission Orders:  MED SURG  CONSULT RENAL  CONSULT PSYCHE  Current Facility-Administered Medications:  cholecalciferol 2,000 Units Oral Daily   mycophenolate 250 mg Oral HS   mycophenolate 500 mg Oral Daily   sertraline 200 mg Oral Daily   sodium chloride 125 mL/hr Intravenous Continuous   tacrolimus 1 mg Oral Q12H [address]   traZODone 50 mg Oral HS PRN     Network Utilization Review Department  Phone: [phone]; Fax [phone]  [email or web address]  ATTENTION: Please call with any questions or concerns to [phone] and carefully listen to the prompts so that you are directed to the right person  Send all requests for admission clinical reviews, approved or denied determinations and any other requests to fax [phone]   All voicemails are confidential

## 2019-06-20 NOTE — PLAN OF CARE
Problem: DISCHARGE PLANNING  Goal: Discharge to home or other facility with appropriate resources  Description  INTERVENTIONS:  - Identify barriers to discharge w/patient and caregiver  - Arrange for needed discharge resources and transportation as appropriate  - Identify discharge learning needs (meds, wound care, etc )  - Arrange for interpretive services to assist at discharge as needed  - Refer to Case Management Department for coordinating discharge planning if the patient needs post-hospital services based on physician/advanced practitioner order or complex needs related to functional status, cognitive ability, or social support system  6/20/2019 1647 by Jairo Terrazas RN  Outcome: Progressing  6/20/2019 1646 by Jairo Terrazas RN  Outcome: Progressing     Problem: Knowledge Deficit  Goal: Patient/family/caregiver demonstrates understanding of disease process, treatment plan, medications, and discharge instructions  Description  Complete learning assessment and assess knowledge base    Interventions:  - Provide teaching at level of understanding  - Provide teaching via preferred learning methods  6/20/2019 1647 by Jairo Terrazas RN  Outcome: Progressing  6/20/2019 1646 by Jairo Terrazas RN  Outcome: Progressing     Problem: GENITOURINARY - ADULT  Goal: Maintains or returns to baseline urinary function  Description  INTERVENTIONS:  - Assess urinary function  - Encourage oral fluids to ensure adequate hydration  - Administer IV fluids as ordered to ensure adequate hydration  - Administer ordered medications as needed  - Offer frequent toileting  - Follow urinary retention protocol if ordered  6/20/2019 1647 by Jairo Terrazas RN  Outcome: Progressing  6/20/2019 1646 by Jairo Terrazas RN  Outcome: Progressing  Goal: Absence of urinary retention  Description  INTERVENTIONS:  - Assess patient's ability to void and empty bladder  - Monitor I/O  - Bladder scan as needed  - Discuss with physician/AP medications to alleviate retention as needed  - Discuss catheterization for long term situations as appropriate   6/20/2019 1647 by Stevenson Mayorga, RN  Outcome: Progressing  6/20/2019 1646 by Stevenson Mayorga, RN  Outcome: Progressing

## 2019-06-20 NOTE — CONSULTS
Consultation - Nephrology   Hill Santiago 37 y o  male MRN: [id number]  Unit/Bed#: [address] [place] [address] [id number] Encounter: [id number]    ASSESSMENT and PLAN:  78-year-old male with history of end-stage renal disease due to IgA nephropathy, status post living related kidney transplant in 2009 was transfer from [facility] to [facility] side for acute kidney injury  · Acute kidney injury, POA  · Acute kidney injury was likely prerenal with drop in blood pressure due to recent increase in dose of Cardizem  Renal function improving to creatinine 1 5 from previous creatinine 2 1 with improvement in blood pressure  · BK virus DNA urine pending  Renal ultrasound with no hydronephrosis  · Tacrolimus level was 7 4 which is at goal   · As renal function already improving, decreasing IV normal saline to 50 mL/hour  Will likely stop by tomorrow  · Chronic kidney disease stage 3/status post living related donor transplant in 2009 at [facility]  · [address] with Dr Jose Joe and Delores Ly  · Baseline creatinine 1 3-1 4  · Continue outpatient follow-up with Nephrology    · Long-term immunosuppression  · CONTINUE tacrolimus 1 mg q 12 hours, last tacrolimus level was 7 4 on 06/20  Continue mycophenolate 500 mg in a m  And 250 mg in p m  Not on steroids  · Primary hypertension:  Blood pressure currently elevated due to patient being off Cardizem  Continue monitoring blood pressure  Calcium channel blocker can increase the level of tacrolimus but patient has been stable with Cardizem in the past   At this time would continue to hold Cardizem  Started on Coreg 6 25 mg twice daily for blood pressure control  Continue monitoring blood pressure  · Hematuria:  UA with urine microscopy on repeat on 06/20 positive for 10-20 RBCs but no WBC  No protein  Follow-up urine culture  Patient has been afebrile and currently not on antibiotics   As per Dr Vignesh Luu note from 2/26- he had hematuria in the past   Would have low threshold for initiation of antibiotics due to patient being on immunosuppression  No tenderness over the transplanted kidney  · Major depression with suicidal ideation-continue care per primary team   Patient mentions he follows with psychiatrist in [place] and receives ECT  · History of DVT on Coumadin    Discussed with primary team    HISTORY OF PRESENT ILLNESS:  Requesting Physician: Kobe Aguirre MD  Reason for Consult:  Acute kidney injury    Germania Taylor is a 37 y o  male with history of ESRD due to IgA nephropathy diagnosed at the age of 14, status post living related kidney transplant 2009 at [address]  who was initially admitted to the [facility]al [facility] for suicidal ideation/depression and transferred to [facility] for management of acute kidney injury  Patient was recently at [facility] from 06/14 to 6/17 for acute kidney injury due to volume depletion [place] highway [place] 9 [place]  Renal function improved with IV hydration from creatinine 2 0-1 38 after which he was transferred to inpatient psych unit at [facility]  While he was at the inpatient psych unit, blood pressure was found to be elevated and dose of Cardizem was increased after which blood pressure dropped to 90s  Patient baseline creatinine has been around 1 3-1 4, followed by Dr Dr Igor Luke for CKD and Dr Joey Riddle for kidney transplant  He was on Cardizem as an outpatient but the dose was increased due to elevated blood pressure after which blood pressure dropped to 90  Creatinine increased from 1 38 on 06/17 to creatinine of 2 18  He was started on IV fluid and creatinine has improved to 1 56 today  Blood pressure has improved and is actually elevated    Patient is making good urine  He denies any urinary symptoms and denies any shortness of breath    PAST MEDICAL HISTORY:  Past Medical History:   Diagnosis Date    ADD (attention deficit disorder)     Depression     [facility]) [facility] [address] )     H/O immunosuppressive therapy     History of kidney disease     Hypertension     Nephropathy, IgA        PAST SURGICAL HISTORY:  Past Surgical History:   Procedure Laterality Date    NEPHRECTOMY TRANSPLANTED ORGAN         SOCIAL HISTORY:  Social History     Substance and Sexual Activity   Alcohol Use Not Currently    Frequency: Monthly or less    Binge frequency: Never    Comment: Occasionally     Social History     Substance and Sexual Activity   Drug Use Yes    Types: Marijuana    Comment: smokes marijuana a few times daily     Social History     Tobacco Use   Smoking Status Former Smoker    Years: 4 00   Smokeless Tobacco Never Used       FAMILY HISTORY:  Family History   Problem Relation Age of Onset    Deep vein thrombosis Father     Deep vein thrombosis Paternal Grandfather     Transient ischemic attack Mother        ALLERGIES:  Allergies   Allergen Reactions    Penicillins Rash       MEDICATIONS:    Current Facility-Administered Medications:     cholecalciferol (VITAMIN D3) tablet 2,000 Units, 2,000 Units, Oral, Daily, Rijesh R Fredy Bernheim, MD, 2,000 Units at 06/20/19 0900    mycophenolate (CELLCEPT) capsule 250 mg, 250 mg, Oral, HS, Brayan Lewis MD, 250 mg at 06/19/19 2122    mycophenolate (CELLCEPT) capsule 500 mg, 500 mg, Oral, Daily, Anita Castano MD, 500 mg at 06/20/19 0902    sertraline (ZOLOFT) tablet 200 mg, 200 mg, Oral, Daily, Anita Castano MD, 200 mg at 06/20/19 0901    sodium chloride 0 9 % infusion, 125 mL/hr, Intravenous, Continuous, Anita Castano MD, Last Rate: 125 mL/hr at 06/20/19 0906, 125 mL/hr at 06/20/19 0906    tacrolimus (PROGRAF) capsule 1 mg, 1 mg, Oral, Q12H [facility], Rijesh R Fredy Bernheim, MD, 1 mg at 06/20/19 0902    traZODone (DESYREL) tablet 50 mg, 50 mg, Oral, HS PRN, Brayan Lewis MD    REVIEW OF SYSTEMS:   Review of Systems   Constitutional: Negative for activity change, appetite change, chills, diaphoresis, fatigue and fever  HENT: Negative for congestion, facial swelling and nosebleeds  Eyes: Negative for pain and visual disturbance  Respiratory: Negative for cough, chest tightness and shortness of breath  Cardiovascular: Negative for chest pain and palpitations  Gastrointestinal: Negative for abdominal distention, abdominal pain, diarrhea, nausea and vomiting  Genitourinary: Negative for difficulty urinating, dysuria, flank pain, frequency and hematuria  Musculoskeletal: Negative for arthralgias, back pain and joint swelling  Skin: Negative for rash  Neurological: Negative for dizziness, seizures, syncope, weakness and headaches  Psychiatric/Behavioral: Negative for agitation and confusion  The patient is not nervous/anxious  All the systems were reviewed and were negative except as documented on the HPI  PHYSICAL EXAM:  Current Weight: Weight - Scale: 79 3 kg (174 lb 13 2 oz)  First Weight: Weight - Scale: 79 2 kg (174 lb 9 7 oz)  Vitals:    06/19/19 1708 06/19/19 2227 06/20/19 0600 06/20/19 0719   BP:  (!) 165/105  134/96   BP Location:  Right arm  Right arm   Pulse:  99  60   Resp:  18  17   Temp:  98 3 °F (36 8 °C)  97 5 °F (36 4 °C)   TempSrc:  Oral  Oral   SpO2:  94%  96%   Weight: 79 2 kg (174 lb 9 7 oz)  79 3 kg (174 lb 13 2 oz)        Intake/Output Summary (Last 24 hours) at 6/20/2019 1450  Last data filed at 6/20/2019 1121  Gross per 24 hour   Intake 1835 42 ml   Output 1791 ml   Net 44 42 ml     Physical Exam   Constitutional: He is oriented to person, place, and time  Vital signs are normal  He appears well-developed  No distress  HENT:   Head: Normocephalic and atraumatic  Mouth/Throat: Oropharynx is clear and moist    Eyes: Pupils are equal, round, and reactive to light  Conjunctivae are normal  No scleral icterus  Neck: Neck supple  No thyromegaly present  Cardiovascular: Normal rate, regular rhythm and normal heart sounds  Exam reveals no friction rub  No murmur heard    Pulmonary/Chest: Effort normal and breath sounds normal  No respiratory distress  He has no wheezes  He has no rales  Abdominal: Soft  Bowel sounds are normal  He exhibits no distension  There is no tenderness  No tenderness over the transplanted kidney   Musculoskeletal: He exhibits no edema or deformity  Lymphadenopathy:     He has no cervical adenopathy  Neurological: He is alert and oriented to person, place, and time  He is not disoriented  Skin: He is not diaphoretic  No cyanosis  No pallor  Nails show no clubbing  Psychiatric: He has a normal mood and affect  His mood appears not anxious  His affect is not inappropriate  Invasive Devices:        Lab Results:   Results from last 7 days   Lab Units 06/20/19  0439 06/19/19  0612 06/17/19  0536 06/16/19  0516  06/15/19  0545 06/14/19  1229   WBC Thousand/uL 5 84  --  7 18 6 41   < >  --  13 41*   HEMOGLOBIN g/dL 15 0  --  16 6 15 6   < >  --  16 5   HEMATOCRIT % 44 6  --  49 4* 47 2   < >  --  49 2   PLATELETS Thousands/uL 195  --  217 198   < >  --  218   POTASSIUM mmol/L 3 8 3 8 3 9 3 6  --  4 0 5 0   CHLORIDE mmol/L 109* 104 104 106  --  105 101   CO2 mmol/L 26 25 23 24  --  22 21   BUN mg/dL 33* 47* 20 19  --  29* 38*   CREATININE mg/dL 1 56* 2 18* 1 38* 1 43*  --  1 50* 2 04*   CALCIUM mg/dL 9 3 9 7 9 7 9 6  --  9 2 10 0   MAGNESIUM mg/dL  --   --   --   --   --  1 7  --    PHOSPHORUS mg/dL  --   --   --   --   --  3 2  --    ALK PHOS U/L 88  --   --   --   --  98 112   ALT U/L 22  --   --   --   --  21 22   AST U/L 16  --   --   --   --  23 23    < > = values in this interval not displayed  Other Studies:   Renal ultrasound did not show hydronephrosis    Portions of the record may have been created with voice recognition software  Occasional wrong word or "sound a like" substitutions may have occurred due to the inherent limitations of voice recognition software  Read the chart carefully and recognize, using context, where substitutions have occurred  If you have any questions, please contact the dictating provider

## 2019-06-20 NOTE — CONSULTS
Progress Note - Behavioral Health   Ben Free 37 y o  male MRN: [id number]  Unit/Bed#: [address] [place] [address] [id number] Encounter: [id number]    Assessment/Plan   Principal Problem:    Acute renal failure superimposed on stage 3 chronic kidney disease (HCC)  Active Problems:    History of renal transplant    History of DVT in adulthood    Essential hypertension    Suicidal ideations    Severe episode of recurrent major depressive disorder, without psychotic features ([address] )    Subjective:  Patient is compliant with medications with no acute side effects  Patient was transferred from inpatient psychiatry unit to medical unit yesterday for IV fluid  Patient is stable from psychiatric standpoint and remained appropriate during entire evaluation  Patient is tolerating Zoloft 200 mg daily with no acute side effects  No manic or psychotic symptoms noted  Patient report improvement in mood and anxiety and denies endorsing suicidal or homicidal ideation  Patient is looking forward to his outpatient follow-up with a psychiatrist on discharge  Current Medications:    Current Facility-Administered Medications:  cholecalciferol 2,000 Units Oral Daily Flores Puente MD    mycophenolate 250 mg Oral HS Anita Mcconnell MD    mycophenolate 500 mg Oral Daily Anita Mcconnell MD    sertraline 200 mg Oral Daily Anita Mcconnell MD    sodium chloride 125 mL/hr Intravenous Continuous Flores Puente MD Last Rate: 125 mL/hr (06/20/19 0906)   tacrolimus 1 mg Oral Q12H Cathy Novak MD    traZODone 50 mg Oral HS PRN Flores Puente MD        Behavioral Health Medications: all current active meds have been reviewed  Vital signs in last 24 hours:  Temp:  [97 5 °F (36 4 °C)-98 7 °F (37 1 °C)] 97 5 °F (36 4 °C)  HR:  [] 60  Resp:  [17-18] 17  BP: (124-165)/() 134/96    Laboratory results:    I have personally reviewed all pertinent laboratory/tests results    Labs in last 72 hours:   Recent Labs     06/20/19  3025 WBC 5 84   RBC 4 78   HGB 15 0   HCT 44 6      RDW 11 7   NEUTROABS 2 44   SODIUM 143   K 3 8   *   CO2 26   BUN 33*   CREATININE 1 56*   GLUC 85   CALCIUM 9 3   AST 16   ALT 22   ALKPHOS 88   TP 6 8   ALB 3 1*   TBILI 0 20     Admission Labs:   Admission on 06/19/2019   Component Date Value    Clarity, UA 06/20/2019 Clear     Color, UA 06/20/2019 Straw     Specific [place], UA 06/20/2019 1 015     pH, UA 06/20/2019 6 0     Glucose, UA 06/20/2019 Negative     Ketones, UA 06/20/2019 Negative     Blood, UA 06/20/2019 Large*    Protein, UA 06/20/2019 Negative     Nitrite, UA 06/20/2019 Negative     Bilirubin, UA 06/20/2019 Negative     Urobilinogen, UA 06/20/2019 0 2     Leukocytes, UA 06/20/2019 Negative     WBC, UA 06/20/2019 None Seen     RBC, UA 06/20/2019 10-20*    Bacteria, UA 06/20/2019 None Seen     Epithelial Cells 06/20/2019 None Seen     Sodium 06/20/2019 143     Potassium 06/20/2019 3 8     Chloride 06/20/2019 109*    CO2 06/20/2019 26     ANION GAP 06/20/2019 8     BUN 06/20/2019 33*    Creatinine 06/20/2019 1 56*    Glucose 06/20/2019 85     Calcium 06/20/2019 9 3     AST 06/20/2019 16     ALT 06/20/2019 22     Alkaline Phosphatase 06/20/2019 88     Total Protein 06/20/2019 6 8     Albumin 06/20/2019 3 1*    Total Bilirubin 06/20/2019 0 20     eGFR 06/20/2019 53     WBC 06/20/2019 5 84     RBC 06/20/2019 4 78     Hemoglobin 06/20/2019 15 0     Hematocrit 06/20/2019 44 6     MCV 06/20/2019 93     MCH 06/20/2019 31 4     MCHC 06/20/2019 33 6     RDW 06/20/2019 11 7     MPV 06/20/2019 10 6     Platelets 15/11/7743 195     nRBC 06/20/2019 0     Neutrophils Relative 06/20/2019 42*    Immat GRANS % 06/20/2019 0     Lymphocytes Relative 06/20/2019 43     Monocytes Relative 06/20/2019 11     Eosinophils Relative 06/20/2019 3     Basophils Relative 06/20/2019 1     Neutrophils Absolute 06/20/2019 2 44     Immature Grans Absolute 06/20/2019 0 02     Lymphocytes Absolute 06/20/2019 2 50     Monocytes Absolute 06/20/2019 0 66     Eosinophils Absolute 06/20/2019 0 17     Basophils Absolute 06/20/2019 0 05     Protime 06/20/2019 31 8*    INR 06/20/2019 3 30*       Psychiatric Review of Systems:  Behavior over the last 24 hours:  improved  Sleep: normal  Appetite: normal  Medication side effects: No  ROS: no complaints    Mental Status Evaluation:  Appearance:  casually dressed   Behavior:  guarded   Speech:  soft   Mood:  normal   Affect:  normal   Language naming objects and repeating phrases   Thought Process:  normal   Thought Content:  normal   Perceptual Disturbances: None   Risk Potential: Suicidal Ideations none, Homicidal Ideations none and Potential for Aggression No   Sensorium:  person and place   Cognition:  grossly intact   Consciousness:  awake    Attention: attention span and concentration were age appropriate   Insight:  fair   Judgment: fair   Intellect fair   Gait/Station: sitting in bed   Motor Activity: no abnormal movements     Memory: Short and long term memory  fair     Progress Toward Goals: progressing    Recommended Treatment:   1  Continue Zoloft 200 mg daily for depression management  2  Discharge patient to outpatient psychiatry follow-up once medically stable  Psychiatry will sign off  Risks, benefits and possible side effects of Medications:   Risks, benefits, and possible side effects of medications explained to patient and patient verbalizes understanding  This note has been constructed using a voice recognition system  There may be translation, syntax,  or grammatical errors  If you have any questions, please contact the dictating provider

## 2019-06-20 NOTE — SOCIAL WORK
Met with patient, who is a transfer from [facility]  He was transferred to [facility] on 6/17 from [facility] for SI/depression  Explained role of care management  He lives with spouse and 2 sons ages 5 and 6 in a one story home in [address]  He is independent adl's and ambulation, drives, is unemployed  DME - INR monitor for DVT  Past services - current with ECT treatments 2-3 x weekly at [facility] - states he has had 5 treatments  Spouse drives him to appointments  He follows with psychiatrist, Dr Becerril Manus  States he was hospitalized in [place] for mental health in the past   He states that he and spouse have been having 'issues' but had a good conversation last night  He thinks that they can work things out but might need some time apart  He plans on going to stay with his mother in [place] if he can qualify for [employer]  His plan for when he is discharged from the hospital would be to go to his home in [facility], but he states he has no transportation there  Will provide [facility] transport when discharged  No services anticipated  He has discharge appointments that were arranged on [facility] and are in his discharge instructions  Will follow

## 2019-06-20 NOTE — PROGRESS NOTES
Pt calm and friendly with peers on the floor  Pt was in the dayroom most of the shift  Visiting with peers and encouraging them  Participated in groups and visible in the milieu  Denied SI, HI and SCOTTY

## 2019-06-21 VITALS
DIASTOLIC BLOOD PRESSURE: 84 MMHG | HEART RATE: 65 BPM | OXYGEN SATURATION: 97 % | RESPIRATION RATE: 16 BRPM | WEIGHT: 171.96 LBS | TEMPERATURE: 97.5 F | BODY MASS INDEX: 26.77 KG/M2 | SYSTOLIC BLOOD PRESSURE: 140 MMHG

## 2019-06-21 DIAGNOSIS — N18.9 ACUTE KIDNEY INJURY SUPERIMPOSED ON CKD (HCC): Primary | ICD-10-CM

## 2019-06-21 DIAGNOSIS — N17.9 ACUTE KIDNEY INJURY SUPERIMPOSED ON CKD (HCC): Primary | ICD-10-CM

## 2019-06-21 LAB
ANION GAP SERPL CALCULATED.3IONS-SCNC: 7 MMOL/L (ref 4–13)
BACTERIA UR CULT: NORMAL
BKV DNA # SERPL NAA+PROBE: NEGATIVE COPIES/ML
BKV DNA SERPL NAA+PROBE-LOG#: NORMAL LOG10COPY/ML
BUN SERPL-MCNC: 22 MG/DL (ref 5–25)
CALCIUM SERPL-MCNC: 9.7 MG/DL (ref 8.3–10.1)
CHLORIDE SERPL-SCNC: 107 MMOL/L (ref 100–108)
CO2 SERPL-SCNC: 27 MMOL/L (ref 21–32)
CREAT SERPL-MCNC: 1.6 MG/DL (ref 0.6–1.3)
GFR SERPL CREATININE-BSD FRML MDRD: 51 ML/MIN/1.73SQ M
GLUCOSE SERPL-MCNC: 80 MG/DL (ref 65–140)
INR PPP: 2.26 (ref 0.86–1.17)
MAGNESIUM SERPL-MCNC: 1.9 MG/DL (ref 1.6–2.6)
PHOSPHATE SERPL-MCNC: 3 MG/DL (ref 2.7–4.5)
POTASSIUM SERPL-SCNC: 4 MMOL/L (ref 3.5–5.3)
PROTHROMBIN TIME: 23.7 SECONDS (ref 11.8–14.2)
SODIUM SERPL-SCNC: 141 MMOL/L (ref 136–145)

## 2019-06-21 PROCEDURE — 80048 BASIC METABOLIC PNL TOTAL CA: CPT | Performed by: INTERNAL MEDICINE

## 2019-06-21 PROCEDURE — 85610 PROTHROMBIN TIME: CPT | Performed by: INTERNAL MEDICINE

## 2019-06-21 PROCEDURE — 99239 HOSP IP/OBS DSCHRG MGMT >30: CPT | Performed by: INTERNAL MEDICINE

## 2019-06-21 PROCEDURE — 84100 ASSAY OF PHOSPHORUS: CPT | Performed by: INTERNAL MEDICINE

## 2019-06-21 PROCEDURE — 83735 ASSAY OF MAGNESIUM: CPT | Performed by: INTERNAL MEDICINE

## 2019-06-21 RX ORDER — ACETAMINOPHEN 325 MG/1
650 TABLET ORAL EVERY 6 HOURS PRN
Status: DISCONTINUED | OUTPATIENT
Start: 2019-06-21 | End: 2019-06-21 | Stop reason: HOSPADM

## 2019-06-21 RX ORDER — WARFARIN SODIUM 7.5 MG/1
7.5 TABLET ORAL
Start: 2019-06-21 | End: 2020-06-09 | Stop reason: SDUPTHER

## 2019-06-21 RX ORDER — WARFARIN SODIUM 5 MG/1
5 TABLET ORAL
Status: DISCONTINUED | OUTPATIENT
Start: 2019-06-21 | End: 2019-06-21 | Stop reason: HOSPADM

## 2019-06-21 RX ORDER — CALCIUM CARBONATE 200(500)MG
500 TABLET,CHEWABLE ORAL DAILY PRN
Status: DISCONTINUED | OUTPATIENT
Start: 2019-06-21 | End: 2019-06-21 | Stop reason: HOSPADM

## 2019-06-21 RX ORDER — SERTRALINE HYDROCHLORIDE 100 MG/1
200 TABLET, FILM COATED ORAL DAILY
Refills: 0
Start: 2019-06-21 | End: 2019-08-09 | Stop reason: CLARIF

## 2019-06-21 RX ORDER — CARVEDILOL 3.12 MG/1
3.12 TABLET ORAL 2 TIMES DAILY WITH MEALS
Qty: 30 TABLET | Refills: 0 | Status: SHIPPED | OUTPATIENT
Start: 2019-06-21 | End: 2019-07-27 | Stop reason: SDUPTHER

## 2019-06-21 RX ADMIN — MYCOPHENOLATE MOFETIL 500 MG: 250 CAPSULE ORAL at 09:38

## 2019-06-21 RX ADMIN — VITAMIN D, TAB 1000IU (100/BT) 2000 UNITS: 25 TAB at 09:38

## 2019-06-21 RX ADMIN — SERTRALINE HYDROCHLORIDE 200 MG: 100 TABLET ORAL at 09:38

## 2019-06-21 RX ADMIN — CALCIUM CARBONATE (ANTACID) CHEW TAB 500 MG 500 MG: 500 CHEW TAB at 12:00

## 2019-06-21 RX ADMIN — ACETAMINOPHEN 650 MG: 325 TABLET, FILM COATED ORAL at 01:12

## 2019-06-21 RX ADMIN — TRAZODONE HYDROCHLORIDE 50 MG: 50 TABLET ORAL at 00:53

## 2019-06-21 RX ADMIN — TACROLIMUS 1 MG: 1 CAPSULE ORAL at 09:39

## 2019-06-21 NOTE — PLAN OF CARE
Problem: DISCHARGE PLANNING  Goal: Discharge to home or other facility with appropriate resources  Description  INTERVENTIONS:  - Identify barriers to discharge w/patient and caregiver  - Arrange for needed discharge resources and transportation as appropriate  - Identify discharge learning needs (meds, wound care, etc )  - Arrange for interpretive services to assist at discharge as needed  - Refer to Case Management Department for coordinating discharge planning if the patient needs post-hospital services based on physician/advanced practitioner order or complex needs related to functional status, cognitive ability, or social support system  6/21/2019 1730 by Mike Stratton RN  Outcome: Adequate for Discharge  6/21/2019 1206 by Mike Stratton RN  Outcome: Progressing     Problem: Knowledge Deficit  Goal: Patient/family/caregiver demonstrates understanding of disease process, treatment plan, medications, and discharge instructions  Description  Complete learning assessment and assess knowledge base    Interventions:  - Provide teaching at level of understanding  - Provide teaching via preferred learning methods  6/21/2019 1730 by Mike Stratton RN  Outcome: Adequate for Discharge  6/21/2019 1206 by Mike Stratton RN  Outcome: Progressing     Problem: GENITOURINARY - ADULT  Goal: Maintains or returns to baseline urinary function  Description  INTERVENTIONS:  - Assess urinary function  - Encourage oral fluids to ensure adequate hydration  - Administer IV fluids as ordered to ensure adequate hydration  - Administer ordered medications as needed  - Offer frequent toileting  - Follow urinary retention protocol if ordered  6/21/2019 1730 by Mike Stratton RN  Outcome: Adequate for Discharge  6/21/2019 1206 by Mike Stratton RN  Outcome: Progressing  Goal: Absence of urinary retention  Description  INTERVENTIONS:  - Assess patient's ability to void and empty bladder  - Monitor I/O  - Bladder scan as needed  - Discuss with physician/AP medications to alleviate retention as needed  - Discuss catheterization for long term situations as appropriate   6/21/2019 1730 by Rain Pompa RN  Outcome: Adequate for Discharge  6/21/2019 1206 by Rain Pompa RN  Outcome: Progressing

## 2019-06-21 NOTE — PLAN OF CARE
Problem: DISCHARGE PLANNING  Goal: Discharge to home or other facility with appropriate resources  Description  INTERVENTIONS:  - Identify barriers to discharge w/patient and caregiver  - Arrange for needed discharge resources and transportation as appropriate  - Identify discharge learning needs (meds, wound care, etc )  - Arrange for interpretive services to assist at discharge as needed  - Refer to Case Management Department for coordinating discharge planning if the patient needs post-hospital services based on physician/advanced practitioner order or complex needs related to functional status, cognitive ability, or social support system  6/20/2019 2308 by Eugene Mendez RN  Outcome: Progressing  6/20/2019 2308 by Eugene Mendez RN  Outcome: Progressing     Problem: Knowledge Deficit  Goal: Patient/family/caregiver demonstrates understanding of disease process, treatment plan, medications, and discharge instructions  Description  Complete learning assessment and assess knowledge base    Interventions:  - Provide teaching at level of understanding  - Provide teaching via preferred learning methods  6/20/2019 2308 by Eugene Mendez RN  Outcome: Progressing  6/20/2019 2308 by Eugene Mendez RN  Outcome: Progressing     Problem: GENITOURINARY - ADULT  Goal: Maintains or returns to baseline urinary function  Description  INTERVENTIONS:  - Assess urinary function  - Encourage oral fluids to ensure adequate hydration  - Administer IV fluids as ordered to ensure adequate hydration  - Administer ordered medications as needed  - Offer frequent toileting  - Follow urinary retention protocol if ordered  6/20/2019 2308 by Eugene Mendez RN  Outcome: Progressing  6/20/2019 2308 by Eugene Mendez RN  Outcome: Progressing  Goal: Absence of urinary retention  Description  INTERVENTIONS:  - Assess patient's ability to void and empty bladder  - Monitor I/O  - Bladder scan as needed  - Discuss with physician/AP medications to alleviate retention as needed  - Discuss catheterization for long term situations as appropriate   6/20/2019 2308 by Flori Ibarra, RN  Outcome: Progressing  6/20/2019 2308 by Flori Ibarra, RN  Outcome: Progressing

## 2019-06-21 NOTE — DISCHARGE INSTRUCTIONS
Acute Kidney Injury, Ambulatory Care   GENERAL INFORMATION:   Acute kidney injury  happens when your kidneys suddenly stop working correctly  Normally, the kidneys turn fluid, chemicals, and waste from your blood into urine  In acute kidney injury, your kidneys can no longer do this  In most cases, it is temporary, but it may become a chronic kidney condition  Common symptoms include the following:   · Decreased urination or dark-colored urine    · Swelling in your arms, legs, or feet     · Abdominal or low back pain    · Vomiting, diarrhea, or loss of appetite    · Fatigue     · Skin rash  Seek immediate care for the following symptoms:   · Heart beating faster than normal for you    · Sudden chest pain or trouble breathing    · Seizure  Treatment for acute kidney injury:  Treatment depends upon the cause of your acute kidney injury and how severe it is  Medicines may be given to increase blood flow to your kidneys and protect your kidneys  You may also need medicine to decrease inflammation in your kidneys  You may be given IV fluids to replenish fluids and help your heart pump blood  Dialysis may be needed to remove chemicals and waste from your blood when your kidneys cannot  Manage acute kidney injury:   · Manage other health conditions  Care for your diabetes, high blood pressure, or heart disease  These conditions increase your risk for acute kidney injury  · Talk to your healthcare provider before you take over-the-counter-medicine  NSAIDs, stomach medicine, or laxatives may harm your kidneys and increase your risk for acute kidney injury  Follow up with your healthcare provider as directed:  Write down your questions so you remember to ask them during your visits  CARE AGREEMENT:   You have the right to help plan your care  Learn about your health condition and how it may be treated  Discuss treatment options with your caregivers to decide what care you want to receive   You always have the right to refuse treatment  The above information is an  only  It is not intended as medical advice for individual conditions or treatments  Talk to your doctor, nurse or pharmacist before following any medical regimen to see if it is safe and effective for you  © 2014 [address] is for End User's use only and may not be sold, redistributed or otherwise used for commercial purposes  All illustrations and images included in CareNotes® are the copyrighted property of [employer]  or Mohit Mayfield  Acute Kidney Injury   WHAT YOU NEED TO KNOW:   What is acute kidney injury? Acute kidney injury (FRANCES) is also called acute kidney failure, or acute renal failure  FRANCES happens when your kidneys suddenly stop working correctly  Normally, the kidneys remove fluid, chemicals, and waste from your blood  These wastes are turned into urine by your kidneys  FRANCES usually happens over hours or days  When you have FRANCES, your kidneys do not remove the waste, chemicals, or extra fluid from your body  A normal amount of urine is not produced  FRANCES is usually temporary, but it may become a chronic kidney condition  What causes FRANCES? · Decreased blood flow to the kidney, such as from hypercalcemia (high blood calcium level) or severe heart disease     · A disease or condition that affects the kidneys, such as hypertension (high blood pressure) or diabetes     · A blockage in the kidney or ureter, such as a kidney or bladder stone, enlarged prostate, or tumor  What increases my risk for FRANCES? · Being hospitalized with a serious illness, such as sepsis or severe burns    · Peripheral artery disease    · Older age in adults    · Kidney or liver diseases    · Medical conditions such as dehydration, hypertension, diabetes, or heart failure    · Certain medicines such as NSAIDs  What are the signs and symptoms of FRANCES? You may not have any symptoms with early or mild FRANCES   As RFANCES progresses, you may have any of the following:  · Decrease in the amount of urine or no urination    · Swelling in your arms, legs, or feet     · Weakness, drowsiness, or no appetite    · Nausea, flank pain, muscle twitching or muscle cramps    · Itchy skin, or your breath or body smells like urine    · Behavior changes, confusion, disorientation, or seizures  How is FRANCES diagnosed? There are many causes of FRANECS  To find the cause and to treat your FRANCES correctly, your healthcare provider may do any of the following:  · Blood and urine tests  show how well your kidneys are working  They may also show the cause of your FRANCES  · An x-ray or ultrasound  may show problems with your kidneys  Your healthcare provider may see a blockage in your kidneys  He or she may see narrowing of the artery that sends blood to your kidneys  You may be given contrast liquid to help your kidneys show up better in the pictures  Tell the healthcare provider if you have ever had an allergic reaction to contrast liquid  How is FRANCES treated? Treatment depends upon the cause of your acute kidney injury and how severe it is  Usually, FRANCES will be monitored in the hospital  If you have mild FRANCES, you may be able to go home to recover  Your healthcare providers will treat the cause of your FRANCES  You may need IV fluids if your FRANCES was caused by little or no fluid in your body  You may need dialysis to remove waste and extra fluid from your body  Your healthcare provider may tell you to eat food low in sodium (salt), potassium, phosphorus, or protein  You may need to see a dietitian before you are discharged to get help with planning your meals  How can I prevent FRANCES? · Manage other health conditions  such as diabetes, high blood pressure, or heart disease  These conditions increase your risk for acute kidney injury  Take your medicines for these conditions as directed  Also, monitor your blood sugar and blood pressure levels as directed   Contact your healthcare provider if your levels are not in the range he or she says it should be  · Talk to your healthcare provider before you take over-the-counter-medicine  NSAIDs, stomach medicine, or laxatives may harm your kidneys and increase your risk for acute kidney injury  If it is okay to take the medicine, follow the directions on the package  Do not take more than directed  · Tell healthcare providers if you have had FRANCES  before you get contrast liquid for an x-ray or CT scan  Your healthcare provider may give you medicine to prevent kidney problems caused by the liquid  CARE AGREEMENT:   You have the right to help plan your care  Learn about your health condition and how it may be treated  Discuss treatment options with your caregivers to decide what care you want to receive  You always have the right to refuse treatment  The above information is an  only  It is not intended as medical advice for individual conditions or treatments  Talk to your doctor, nurse or pharmacist before following any medical regimen to see if it is safe and effective for you  © 2017 [address]  Information is for End User's use only and may not be sold, redistributed or otherwise used for commercial purposes  All illustrations and images included in CareNotes® are the copyrighted property of A [employer] A M [employer]  or [employer]  BLOOD WORK:  1  PT INR to be done in 3 days  2  BMP in 3-5 days time

## 2019-06-21 NOTE — SOCIAL WORK
Anila waiver signed, CM called [facility] and spoke with Hema Plummer and setup transport for 2pm today, treatment team aware, and no other needs at this time  Cm department will follow through discharge

## 2019-06-21 NOTE — DISCHARGE SUMMARY
DISCHARGE SUMMARY FOR JEREMY Smith     NAME: Temitope Valdez  AGE: 37 y o  SEX: male   MRN: [id number]   Encounter: [id number]   Unit/Bed: [address] [place] [address]     Admitting Provider:  Susan Sol MD  Discharge Provider:  Susan Sol MD  Admission Date: 6/19/2019       Discharge Date: 06/21/19   LOS: 2  Primary Care Physician at Discharge: Zach Pandey [phone]    DISCHARGE DIAGNOSES  · Acute kidney injury superimposed on stage 3 chronic kidney disease  · Multiple DVTs  · History of renal transplant  · Essential hypertension  · Severe episode of recurrent major depressive disorder, without psychotic features  · Suicidal ideations    HOSPITAL COURSE:  Temitope Valdez is a pleasant 24-year-old gentleman was transferred from [facility] for his acute elevated creatinine from baseline on 06/19/2019  Patient was admitted at [facility] 06/14/2019 - 06/17/2019 for acute kidney injury and suicidal ideations   Creatinine was back at baseline(1 2-1 5) and patient was cleared to be transferred to [facility], on 06/17/2019  He mentions he has been on Coumadin for history of multiple DVTs   Anticoagulation is managed by his primary care physician Dr Femi Rubi at therapeutic range  He states he is making good urine  He offers no complaints during my encounter  Patient denies chest pain, shortness of breath, PND, orthopnea, palpitations, diaphoresis, nausea, vomiting, diarrhea, constipation, blood in urine, stool, sputum, fevers, chills, abdominal pain,dysuria, headache, new onset weakness, slurred speech, seizure-like activity,dizziness, blurred vision, loss of consciousness, fall, trauma to the head, recent change in medications, dietary noncompliance, skin rashes, recent travel or recent sick contacts    Below is the short summary of hospital stay:    Acute renal failure superimposed on stage 3 chronic kidney disease ([facility] [address] )  Assessment & Plan  Present on admission: Creatinine at 2 1 on presentation  Creatinine on day of discharge 1 6  Ultrasound renals once again unremarkable  Tacrolimus level on 06/19/2019 = 7 4  BMP in 3-5 days time            History of DVT in adulthood  Assessment & Plan  Multiple histories of DVTs in the past   INR 1 day of discharge 2 26  Patient will continue Coumadin 7 5 mg once daily  Patient instructed to get blood work PT INR in 3 days  Dose adjustment as per primary care physician      Essential hypertension  Assessment & Plan  Patient will be discharged on Coreg 3 125 mg twice daily  Diltiazem has been discontinued  Further titration of antihypertensive can be done as outpatient      Severe episode of recurrent major depressive disorder, without psychotic features [facility])  Assessment & Plan  Patient was seen by Psychiatry, and recommend to continue Zoloft 200 mg once daily  Follow-up as outpatient      Suicidal ideations  Assessment & Plan  Resolved      History of renal transplant  Assessment & Plan  Creatinine at baseline, tacrolimus level at 3 2 (therapeutic) which was checked on 06/14/2019  History of end-stage renal disease secondary to IgA nephropathy/vasculitis   Hx of renal transplant in 2009  Continue Tacrolimus 1 mg PO every 12 hours, mycophenolate 500 mg PO Qdaily in the AM and 250 mg PO Qdaily in the PM   Tacrolimus level rechecked on 06/19/2019 = 7 4, and goal level  CONSULTING PROVIDERS   · Nephrology  · Psychiatry    PROCEDURES PERFORMED  Us Transplant Kidney With Doppler    Result Date: 6/20/2019  Narrative: RENAL ULTRASOUND INDICATION:   RENAL FAILURE, ACUTE  COMPARISON: 6/15/2019 TECHNIQUE:   Ultrasound of the retroperitoneum was performed with a curvilinear transducer utilizing volumetric sweeps and still imaging techniques  FINDINGS: KIDNEYS: Left renal transplant is again identified measuring 12 8 x 4 5 cm  No hydronephrosis is identified  No perinephric fluid is identified  Resistive indices range from 0 5-0 62    No masses are seen  Atrophic native kidneys are present  URETERS: Nonvisualized  BLADDER: Normally distended  No focal thickening or mass lesions  Impression: Unremarkable left renal transplant  Workstation performed: [id number]     Us Transplant Kidney With Doppler    Result Date: 6/15/2019  Narrative: TRANSPLANT RENAL ULTRASOUND INDICATION: Urinary tract infection, RENAL FAILURE, ACUTE RENAL FAILURE, CHRONIC arf  COMPARISON: None TECHNIQUE:   Ultrasound of the complete retroperitoneum transplant kidney ultrasound was performed with a curvilinear transducer utilizing volumetric sweeps and still imaging techniques  FINDINGS: 11 6 x 4 8 x 1 2 cm left renal transplant kidney is identified without hydronephrosis  Resistive indices are within normal limits within the arcuate arteries  Main renal vein and main renal artery are patent with appropriate waveforms  Bladder: No evidence of intraluminal mass  Top normal bladder wall thickness 3 to 4 mm  Native kidneys were not identified  Impression: Unremarkable left renal transplant  Workstation performed: [id number]       PERTINENT LAB DATAS  Results for Pricila Turner (MRN [id number]) as of 6/21/2019 12:26   6/21/2019 04:52   Sodium 141   Potassium 4 0   CO2 27   Anion Gap 7   BUN 22   Creatinine 1 60 (H)   Glucose, Random 80   Calcium 9 7   eGFR 51   Phosphorus 3 0   Magnesium 1 9   Protime 23 7 (H)   INR 2 26 (H)         PHYSICAL EXAM:  Vitals:   Blood Pressure: 140/84 (06/21/19 0716)  Pulse: 65 (06/21/19 0716)  Temperature: 97 5 °F (36 4 °C) (06/21/19 0716)  Temp Source: Oral (06/21/19 0716)  Respirations: 16 (06/21/19 0716)  Weight - Scale: 78 kg (171 lb 15 3 oz) (06/21/19 0600)  SpO2: 97 % (06/21/19 0716)    GENERAL: AAO x 3  HEENT: atraumatic, normocephalic  Oral mucosa moist, no icterus, pallor  PERRLA +  Neck supple, no JVD, no lymphadenopathy, no thryomegaly  CHEST: B/L breath sounds heard, occasional wheezing  CVS: S1, S2  No cyanosis/clubbing or edema    ABDOMEN: Soft/obese/NT/BS heard  NEUROLOGICAL: CN II -XI grossly intact  No focal motor or sensory deficits  No signs of meningeal irritation or cerebellar dysfunction  EXTREMITIES: No cyanosis/clubbing or edema  Discharge Disposition:  Home    Test Results Pending at Discharge:   · BK virus, DNA, urine quantitative  · DKA virus, DNA quantitative  Medications   Please see After Visit Summary for reconciled discharge medications provided to patient and family  Diet restrictions:        Diet Orders   (From admission, onward)            Start     Ordered    06/19/19 1724  Diet Renal; Potassium 2 GM; No; No  Diet effective now     Question Answer Comment   Diet Type Renal    Renal Potassium 2 GM    Should patient have a fluid restriction? No    Should patient have a protein modifier? No    RD to adjust diet per protocol? Yes        06/19/19 1724         Activity restrictions: No strenuous activity   Discharge Condition: GOOD    Outpatient Follow-Up  1  Lalito Harmon MD [facility] / [address] [facility] [phone] - follow-up within one week  2  Follow up with Nephrology as scheduled in 2-3 weeks  3  PT INR in 2 days -- call primary care physician to adjust doses of Coumadin  4  BMP in 3-5 days time  Code Status: Level 1 - Full Code    Discharge Statement    I spent 31 minutes discharging the patient  This time was spent on the day of discharge  Greater than 50% of total time was spent with the patient and / or family counseling and / or coordination of care  Morenita Vann MD  HOSPITALIST SERVICES  6/21/2019    PLEASE NOTE:  This encounter was completed utilizing the [employer] Direct Speech Voice Recognition Software  Grammatical errors, random word insertions, pronoun errors and incomplete sentences are occasional consequences of the system due to software limitations, ambient noise and hardware issues  These may be missed by proof reading prior to affixing electronic signature  Any questions or concerns about the content, text or information contained within the body of this dictation should be directly addressed to the physician for clarification  Please do not hesitate to call me directly if you have any any questions or concerns

## 2019-06-24 ENCOUNTER — TELEPHONE (OUTPATIENT)
Dept: NEPHROLOGY | Facility: CLINIC | Age: 44
End: 2019-06-24

## 2019-06-24 ENCOUNTER — TRANSITIONAL CARE MANAGEMENT (OUTPATIENT)
Dept: INTERNAL MEDICINE CLINIC | Facility: CLINIC | Age: 44
End: 2019-06-24

## 2019-06-24 LAB
BKV DNA # UR NAA+PROBE: NEGATIVE COPIES/ML
BKV DNA SPEC NAA+PROBE-LOG#: NORMAL LOG10COPY/ML

## 2019-06-25 ENCOUNTER — TELEPHONE (OUTPATIENT)
Dept: NEPHROLOGY | Facility: CLINIC | Age: 44
End: 2019-06-25

## 2019-06-27 ENCOUNTER — TELEPHONE (OUTPATIENT)
Dept: NEPHROLOGY | Facility: CLINIC | Age: 44
End: 2019-06-27

## 2019-07-02 ENCOUNTER — TELEPHONE (OUTPATIENT)
Dept: NEPHROLOGY | Facility: CLINIC | Age: 44
End: 2019-07-02

## 2019-07-06 DIAGNOSIS — I82.4Y9 DEEP VEIN THROMBOSIS (DVT) OF PROXIMAL LOWER EXTREMITY, UNSPECIFIED CHRONICITY, UNSPECIFIED LATERALITY (HCC): ICD-10-CM

## 2019-07-08 RX ORDER — WARFARIN SODIUM 5 MG/1
TABLET ORAL
Qty: 60 TABLET | Refills: 5 | Status: ON HOLD | OUTPATIENT
Start: 2019-07-08 | End: 2020-01-28 | Stop reason: CLARIF

## 2019-07-26 ENCOUNTER — TELEPHONE (OUTPATIENT)
Dept: NEPHROLOGY | Facility: CLINIC | Age: 44
End: 2019-07-26

## 2019-07-26 NOTE — TELEPHONE ENCOUNTER
Patient of Dr Sonal Corona called stating that his blood pressure medication has been changed by his Physiatrist to Norvasc 5 mg 1 a day and Coreg 3 125mg 2 x's a day  Patient wants Dr Omayra Lara to know about the change of his blood pressure medication because of his kidney condition  Patient wants to know if Dr Omayra Lara would put in a refill for these medication or should it come from his PCP Dr Elisabet Manley, because his Physiatrist does not follow blood pressure medication  Patient Physiatrist just needed to know how his blood pressure is running because of the patients treatments  If Dr Omayra Lara does call in for refills please send them to [facility] @ [phone]  Please call patient @ [phone] to let him know if the refills are going to be done by Dr Omayra Lara or if they should come from his PCP Dr Elisabet Arthur Monday,

## 2019-07-26 NOTE — TELEPHONE ENCOUNTER
Called patient to discuss why his physiatrist changed his BP meds  Patient reports his "Psychiatrist" Dr Geoffrey Latham is doing electro therapy and they added Norvasc 5 mg approximately June time frame to decrease vessel constriction to reduce change of stroke during ECT  Patient was already taking Coreg 3 125 mg twice daily  --Wants to know if adding Norvasc is safe for his kidney function and safe for him to take   --did discuss with him to watch for edema and bradycardia  Patient would also like to know:  --If he should keep taking them he will need refills and he would like to know if he should go through you for the refills, Dr Julio Underwood or the PCP? Patient is scheduled to see Dr Julio Underwood 9 17 2019  Does he need to be seen sooner or have updated blood work? Please advise  Thank you

## 2019-07-27 DIAGNOSIS — I10 ESSENTIAL HYPERTENSION: ICD-10-CM

## 2019-07-27 RX ORDER — CARVEDILOL 3.12 MG/1
3.12 TABLET ORAL 2 TIMES DAILY WITH MEALS
Qty: 30 TABLET | Refills: 0 | Status: SHIPPED | OUTPATIENT
Start: 2019-07-27 | End: 2019-08-09 | Stop reason: CLARIF

## 2019-07-27 RX ORDER — AMLODIPINE BESYLATE 5 MG/1
5 TABLET ORAL DAILY
Qty: 60 TABLET | Refills: 2 | Status: SHIPPED | OUTPATIENT
Start: 2019-07-27 | End: 2019-08-09 | Stop reason: CLARIF

## 2019-07-27 RX ORDER — AMLODIPINE BESYLATE 5 MG/1
5 TABLET ORAL DAILY
Refills: 0 | COMMUNITY
Start: 2019-07-06 | End: 2019-07-27 | Stop reason: SDUPTHER

## 2019-07-27 NOTE — TELEPHONE ENCOUNTER
Discussed with the patient    Both medicines are okay kidney point of view and I renewed both medication at local pharmacy

## 2019-08-06 ENCOUNTER — TELEPHONE (OUTPATIENT)
Dept: INTERNAL MEDICINE CLINIC | Facility: CLINIC | Age: 44
End: 2019-08-06

## 2019-08-06 ENCOUNTER — APPOINTMENT (OUTPATIENT)
Dept: LAB | Facility: HOSPITAL | Age: 44
End: 2019-08-06
Payer: COMMERCIAL

## 2019-08-06 ENCOUNTER — ANTICOAG VISIT (OUTPATIENT)
Dept: INTERNAL MEDICINE CLINIC | Facility: CLINIC | Age: 44
End: 2019-08-06

## 2019-08-06 NOTE — TELEPHONE ENCOUNTER
----- Message from Ned Liu PA-C sent at 8/6/2019  3:52 PM EDT -----  Continue Coumadin 7 5 mg per day recheck INR in 1 month

## 2019-08-09 ENCOUNTER — OFFICE VISIT (OUTPATIENT)
Dept: INTERNAL MEDICINE CLINIC | Facility: CLINIC | Age: 44
End: 2019-08-09
Payer: COMMERCIAL

## 2019-08-09 VITALS
WEIGHT: 179.4 LBS | OXYGEN SATURATION: 98 % | HEIGHT: 67 IN | SYSTOLIC BLOOD PRESSURE: 112 MMHG | BODY MASS INDEX: 28.16 KG/M2 | TEMPERATURE: 97.4 F | DIASTOLIC BLOOD PRESSURE: 86 MMHG | HEART RATE: 76 BPM

## 2019-08-09 DIAGNOSIS — I10 ESSENTIAL HYPERTENSION: Primary | ICD-10-CM

## 2019-08-09 DIAGNOSIS — N18.3 ACUTE RENAL FAILURE SUPERIMPOSED ON STAGE 3 CHRONIC KIDNEY DISEASE, UNSPECIFIED ACUTE RENAL FAILURE TYPE: ICD-10-CM

## 2019-08-09 DIAGNOSIS — N17.9 ACUTE RENAL FAILURE SUPERIMPOSED ON STAGE 3 CHRONIC KIDNEY DISEASE, UNSPECIFIED ACUTE RENAL FAILURE TYPE: ICD-10-CM

## 2019-08-09 DIAGNOSIS — Z01.818 PREOP EXAMINATION: ICD-10-CM

## 2019-08-09 DIAGNOSIS — Z94.0 RENAL TRANSPLANT RECIPIENT: ICD-10-CM

## 2019-08-09 DIAGNOSIS — F33.2 SEVERE EPISODE OF RECURRENT MAJOR DEPRESSIVE DISORDER, WITHOUT PSYCHOTIC FEATURES (HCC): ICD-10-CM

## 2019-08-09 DIAGNOSIS — R45.851 SUICIDAL IDEATIONS: ICD-10-CM

## 2019-08-09 PROCEDURE — 3008F BODY MASS INDEX DOCD: CPT | Performed by: PHYSICIAN ASSISTANT

## 2019-08-09 PROCEDURE — 99214 OFFICE O/P EST MOD 30 MIN: CPT | Performed by: PHYSICIAN ASSISTANT

## 2019-08-09 PROCEDURE — 3074F SYST BP LT 130 MM HG: CPT | Performed by: PHYSICIAN ASSISTANT

## 2019-08-09 RX ORDER — LURASIDONE HYDROCHLORIDE 60 MG/1
80 TABLET, FILM COATED ORAL DAILY
Refills: 2 | COMMUNITY
Start: 2019-07-18

## 2019-08-09 RX ORDER — DILTIAZEM HYDROCHLORIDE 120 MG/1
TABLET, FILM COATED ORAL
COMMUNITY
Start: 2017-03-28 | End: 2020-03-30

## 2019-08-09 NOTE — PROGRESS NOTES
Assessment/Plan: he is cleared for his upcoming ECT  No labs were ordered  Diagnoses and all orders for this visit:    Essential hypertension    Acute renal failure superimposed on stage 3 chronic kidney disease, unspecified acute renal failure type ([facility] )    Renal transplant recipient    Suicidal ideations    Severe episode of recurrent major depressive disorder, without psychotic features ([facility] )    Preop examination    Other orders  -     diltiazem (CARDIZEM) 120 MG tablet; TAKE 1 TABLET TWICE DAILY  -     sertraline (ZOLOFT) 50 mg tablet; Take 50 mg by mouth every morning  -     LATUDA 60 MG TABS; TAKE ONE TABLET BY MOUTH EVERY DAY WITH FOOD (350 CALORIES)        No problem-specific Assessment & Plan notes found for this encounter  Subjective:      Patient ID: Taisha Gross is a 37 y o  male  He is a physical exam and clearance for his upcoming ECT  He has been getting ECT for 2 months  He needs a monthly clearance  He has been getting remarkable benefit from the CT with relief of his depression some memory loss but not too troubling for him  Normally active and well currently not working history of to psych hospitalizations for severe depression in the past 6 months  History of a renal transplant which has been stable history of DVT on Coumadin  No leg pain or leg swelling no shortness of breath or chest pain history of hypertension well controlled with medication  He is currently without complaints asymptomatic in a good mood      The following portions of the patient's history were reviewed and updated as appropriate:   He has a past medical history of ADD (attention deficit disorder), Depression, DVT (deep venous thrombosis) ([facility] ), H/O immunosuppressive therapy, History of kidney disease, Hypertension, and Nephropathy, IgA ,  does not have any pertinent problems on file  ,   has a past surgical history that includes Nephrectomy transplanted organ ,  family history includes Deep vein thrombosis in his father and paternal grandfather; Transient ischemic attack in his mother  ,   reports that he has quit smoking  He quit after 4 00 years of use  He has never used smokeless tobacco  He reports that he drank alcohol  He reports that he has current or past drug history  Drug: Marijuana  ,  is allergic to penicillins     Current Outpatient Medications   Medication Sig Dispense Refill    Cholecalciferol (VITAMIN D-3) 1000 units CAPS Take 2,000 Units by mouth daily      diltiazem (CARDIZEM) 120 MG tablet TAKE 1 TABLET TWICE DAILY      LATUDA 60 MG TABS TAKE ONE TABLET BY MOUTH EVERY DAY WITH FOOD (350 CALORIES)  2    mycophenolate (CELLCEPT) 250 mg capsule Take 2 in the AM, 1 in the PM  90 capsule 4    sertraline (ZOLOFT) 50 mg tablet Take 50 mg by mouth every morning  0    tacrolimus (PROGRAF) 1 mg capsule Take 1 capsule (1 mg total) by mouth 2 (two) times a day 60 capsule 5    warfarin (COUMADIN) 5 mg tablet TAKE 1 TO 2 TABLETS BY MOUTH EVERY DAY 60 tablet 5    warfarin (COUMADIN) 7 5 mg tablet Take 1 tablet (7 5 mg total) by mouth daily       No current facility-administered medications for this visit  Review of Systems   Constitutional: Negative for activity change, appetite change, chills, diaphoresis, fatigue, fever and unexpected weight change  HENT: Negative for congestion, dental problem, drooling, ear discharge, ear pain, facial swelling, hearing loss, nosebleeds, postnasal drip, rhinorrhea, sinus pressure, sinus pain, sneezing, sore throat, tinnitus, trouble swallowing and voice change  Eyes: Negative for photophobia, pain, discharge, redness, itching and visual disturbance  Respiratory: Negative for apnea, cough, choking, chest tightness, shortness of breath, wheezing and stridor  Cardiovascular: Negative for chest pain, palpitations and leg swelling     Gastrointestinal: Negative for abdominal distention, abdominal pain, anal bleeding, blood in stool, constipation, diarrhea, nausea, rectal pain and vomiting  Endocrine: Negative for cold intolerance, heat intolerance, polydipsia, polyphagia and polyuria  Genitourinary: Negative for decreased urine volume, difficulty urinating, dysuria, enuresis, flank pain, frequency, genital sores, hematuria and urgency  Musculoskeletal: Negative for arthralgias, back pain, gait problem, joint swelling, myalgias, neck pain and neck stiffness  Skin: Negative for color change, pallor, rash and wound  Neurological: Negative for dizziness, tremors, seizures, syncope, facial asymmetry, speech difficulty, weakness, light-headedness, numbness and headaches  Hematological: Negative for adenopathy  Does not bruise/bleed easily  Psychiatric/Behavioral: Negative for agitation, behavioral problems, confusion, decreased concentration, dysphoric mood, hallucinations, self-injury, sleep disturbance and suicidal ideas  The patient is not nervous/anxious and is not hyperactive  Objective:  Vitals:    08/09/19 0917   BP: 112/86   BP Location: Left arm   Patient Position: Sitting   Cuff Size: Standard   Pulse: 76   Temp: (!) 97 4 °F (36 3 °C)   TempSrc: Tympanic   SpO2: 98%   Weight: 81 4 kg (179 lb 6 4 oz)   Height: 5' 7 2" (1 707 m)     Body mass index is 27 93 kg/m²  Physical Exam   Constitutional: He is oriented to person, place, and time  He appears well-developed  HENT:   Head: Normocephalic  Right Ear: External ear normal    Left Ear: External ear normal    Mouth/Throat: Oropharynx is clear and moist    Eyes: Pupils are equal, round, and reactive to light  Conjunctivae are normal    Neck: Neck supple  No JVD present  No thyromegaly present  Cardiovascular: Normal rate, regular rhythm, normal heart sounds and intact distal pulses  Exam reveals no gallop and no friction rub  No murmur heard  Pulmonary/Chest: Effort normal and breath sounds normal    Abdominal: Soft   Bowel sounds are normal    Musculoskeletal: Normal range of motion  He exhibits no edema  Lymphadenopathy:     He has no cervical adenopathy  Neurological: He is alert and oriented to person, place, and time  He has normal reflexes  Skin: Skin is warm and dry  Left flank surgical scar   Psychiatric: He has a normal mood and affect   His behavior is normal  Judgment and thought content normal     Slight memory impairment and  Hesitant speech

## 2019-08-20 ENCOUNTER — TELEPHONE (OUTPATIENT)
Dept: NEPHROLOGY | Facility: CLINIC | Age: 44
End: 2019-08-20

## 2019-08-20 NOTE — TELEPHONE ENCOUNTER
Called and left a message on machine for patient to return our call about rescheduling his appointment for Tuesday 9/17/2019 Nephrology follow up with Dr Kelsi Johnson, because of Dr Kelsi Johnson having lab review and shonda Escalona,

## 2019-09-09 ENCOUNTER — OFFICE VISIT (OUTPATIENT)
Dept: INTERNAL MEDICINE CLINIC | Facility: CLINIC | Age: 44
End: 2019-09-09
Payer: COMMERCIAL

## 2019-09-09 VITALS
OXYGEN SATURATION: 97 % | HEART RATE: 71 BPM | HEIGHT: 67 IN | SYSTOLIC BLOOD PRESSURE: 120 MMHG | WEIGHT: 187 LBS | BODY MASS INDEX: 29.35 KG/M2 | TEMPERATURE: 98.1 F | DIASTOLIC BLOOD PRESSURE: 86 MMHG

## 2019-09-09 DIAGNOSIS — I10 ESSENTIAL HYPERTENSION: Primary | ICD-10-CM

## 2019-09-09 DIAGNOSIS — Z94.0 RENAL TRANSPLANT RECIPIENT: ICD-10-CM

## 2019-09-09 DIAGNOSIS — F33.2 SEVERE EPISODE OF RECURRENT MAJOR DEPRESSIVE DISORDER, WITHOUT PSYCHOTIC FEATURES (HCC): ICD-10-CM

## 2019-09-09 DIAGNOSIS — R45.851 SUICIDAL IDEATIONS: ICD-10-CM

## 2019-09-09 DIAGNOSIS — I12.9 HYPERTENSIVE CHRONIC KIDNEY DISEASE, UNSPECIFIED CKD STAGE: ICD-10-CM

## 2019-09-09 DIAGNOSIS — L30.8 PSORIASIFORM DERMATITIS: ICD-10-CM

## 2019-09-09 DIAGNOSIS — N17.9 ACUTE RENAL FAILURE SUPERIMPOSED ON STAGE 3 CHRONIC KIDNEY DISEASE, UNSPECIFIED ACUTE RENAL FAILURE TYPE: ICD-10-CM

## 2019-09-09 DIAGNOSIS — N18.3 ACUTE RENAL FAILURE SUPERIMPOSED ON STAGE 3 CHRONIC KIDNEY DISEASE, UNSPECIFIED ACUTE RENAL FAILURE TYPE: ICD-10-CM

## 2019-09-09 PROCEDURE — 3074F SYST BP LT 130 MM HG: CPT | Performed by: PHYSICIAN ASSISTANT

## 2019-09-09 PROCEDURE — 99214 OFFICE O/P EST MOD 30 MIN: CPT | Performed by: PHYSICIAN ASSISTANT

## 2019-09-09 PROCEDURE — 3079F DIAST BP 80-89 MM HG: CPT | Performed by: PHYSICIAN ASSISTANT

## 2019-09-09 RX ORDER — DEXTROAMPHETAMINE SACCHARATE, AMPHETAMINE ASPARTATE, DEXTROAMPHETAMINE SULFATE AND AMPHETAMINE SULFATE 5; 5; 5; 5 MG/1; MG/1; MG/1; MG/1
TABLET ORAL
Refills: 0 | COMMUNITY
Start: 2019-08-30 | End: 2020-06-16 | Stop reason: ALTCHOICE

## 2019-09-09 RX ORDER — BETAMETHASONE DIPROPIONATE 0.5 MG/G
CREAM TOPICAL 2 TIMES DAILY
Qty: 45 G | Refills: 3 | Status: SHIPPED | OUTPATIENT
Start: 2019-09-09 | End: 2020-01-24

## 2019-09-09 NOTE — PROGRESS NOTES
Assessment/Plan: he is cleared for his upcoming HCT vital signs are stable physical exam unremarkable       Diagnoses and all orders for this visit:    Essential hypertension    Acute renal failure superimposed on stage 3 chronic kidney disease, unspecified acute renal failure type ([facility] [address] )    Hypertensive chronic kidney disease, unspecified CKD stage    Renal transplant recipient    Suicidal ideations    Severe episode of recurrent major depressive disorder, without psychotic features ([facility])    Psoriasiform dermatitis  -     betamethasone dipropionate (DIPROSONE) 0 05 % cream; Apply topically 2 (two) times a day    Other orders  -     amphetamine-dextroamphetamine (ADDERALL) 20 mg tablet; TAKE 1 TABLET BY MOUTH TWICE A DAY(ONGOING TREATMENT)        No problem-specific Assessment & Plan notes found for this encounter  Subjective:      Patient ID: Angelo Rod is a 37 y o  male  He needs a surgical clearance for ECT  Last treatment was about a month ago  He is doing exceedingly well with the CT he is able to work he follows closely with individual psychotherapy and he is doing well on Dala Peaches and Adderall  He has a history of severe depression suicide attempts  His memory is preserved  History of a kidney transplant doing well following with Nephrology blood pressure control is adequate  He denies shortness of breath chest pain palpitations or dizziness history of DVT chronically on Coumadin      The following portions of the patient's history were reviewed and updated as appropriate:   He has a past medical history of ADD (attention deficit disorder), Depression, DVT (deep venous thrombosis) ([facility] ), H/O immunosuppressive therapy, History of kidney disease, Hypertension, and Nephropathy, IgA ,  does not have any pertinent problems on file  ,   has a past surgical history that includes Nephrectomy transplanted organ ,  family history includes Deep vein thrombosis in his father and paternal grandfather; Transient ischemic attack in his mother  ,   reports that he has quit smoking  He quit after 4 00 years of use  He has never used smokeless tobacco  He reports that he drank alcohol  He reports that he has current or past drug history  Drug: Marijuana  ,  is allergic to penicillins     Current Outpatient Medications   Medication Sig Dispense Refill    amphetamine-dextroamphetamine (ADDERALL) 20 mg tablet TAKE 1 TABLET BY MOUTH TWICE A DAY(ONGOING TREATMENT)  0    Cholecalciferol (VITAMIN D-3) 1000 units CAPS Take 2,000 Units by mouth daily      diltiazem (CARDIZEM) 120 MG tablet TAKE 1 TABLET TWICE DAILY      LATUDA 60 MG TABS TAKE ONE TABLET BY MOUTH EVERY DAY WITH FOOD (350 CALORIES)  2    mycophenolate (CELLCEPT) 250 mg capsule Take 2 in the AM, 1 in the PM  90 capsule 4    sertraline (ZOLOFT) 50 mg tablet Take 100 mg by mouth every morning   0    tacrolimus (PROGRAF) 1 mg capsule Take 1 capsule (1 mg total) by mouth 2 (two) times a day 60 capsule 5    warfarin (COUMADIN) 5 mg tablet TAKE 1 TO 2 TABLETS BY MOUTH EVERY DAY 60 tablet 5    warfarin (COUMADIN) 7 5 mg tablet Take 1 tablet (7 5 mg total) by mouth daily      betamethasone dipropionate (DIPROSONE) 0 05 % cream Apply topically 2 (two) times a day 45 g 3     No current facility-administered medications for this visit  Review of Systems   Constitutional: Negative for activity change, appetite change, chills, diaphoresis, fatigue, fever and unexpected weight change  HENT: Negative for congestion, dental problem, drooling, ear discharge, ear pain, facial swelling, hearing loss, nosebleeds, postnasal drip, rhinorrhea, sinus pressure, sinus pain, sneezing, sore throat, tinnitus, trouble swallowing and voice change  Eyes: Negative for photophobia, pain, discharge, redness, itching and visual disturbance  Respiratory: Negative for apnea, cough, choking, chest tightness, shortness of breath, wheezing and stridor      Cardiovascular: Negative for chest pain, palpitations and leg swelling  Gastrointestinal: Negative for abdominal distention, abdominal pain, anal bleeding, blood in stool, constipation, diarrhea, nausea, rectal pain and vomiting  Endocrine: Negative for cold intolerance, heat intolerance, polydipsia, polyphagia and polyuria  Genitourinary: Negative for decreased urine volume, difficulty urinating, dysuria, enuresis, flank pain, frequency, genital sores, hematuria and urgency  Musculoskeletal: Negative for arthralgias, back pain, gait problem, joint swelling, myalgias, neck pain and neck stiffness  Skin: Negative for color change, pallor, rash and wound  Neurological: Negative for dizziness, tremors, seizures, syncope, facial asymmetry, speech difficulty, weakness, light-headedness, numbness and headaches  Hematological: Negative for adenopathy  Does not bruise/bleed easily  Psychiatric/Behavioral: Negative for agitation, behavioral problems, confusion, decreased concentration, dysphoric mood, hallucinations, self-injury, sleep disturbance and suicidal ideas  The patient is not nervous/anxious and is not hyperactive  Objective:  Vitals:    09/09/19 1304   BP: 120/86   BP Location: Left arm   Patient Position: Sitting   Cuff Size: Standard   Pulse: 71   Temp: 98 1 °F (36 7 °C)   SpO2: 97%   Weight: 84 8 kg (187 lb)   Height: 5' 7 2" (1 707 m)     Body mass index is 29 11 kg/m²  Physical Exam   Constitutional: He is oriented to person, place, and time  He appears well-developed  HENT:   Head: Normocephalic  Right Ear: External ear normal    Left Ear: External ear normal    Mouth/Throat: Oropharynx is clear and moist  No oropharyngeal exudate  Eyes: Pupils are equal, round, and reactive to light  Conjunctivae are normal    Neck: Neck supple  No JVD present  No thyromegaly present  Cardiovascular: Normal rate, regular rhythm, normal heart sounds and intact distal pulses   Exam reveals no gallop and no friction rub    No murmur heard  Pulmonary/Chest: Effort normal and breath sounds normal  No stridor  No respiratory distress  He has no wheezes  He has no rales  He exhibits no tenderness  Abdominal: Soft  Bowel sounds are normal    Musculoskeletal: Normal range of motion  He exhibits no edema  Lymphadenopathy:     He has no cervical adenopathy  Neurological: He is alert and oriented to person, place, and time  He has normal reflexes  Skin: Skin is warm and dry  Scattered 2 cm crusty plaque areas  bothlower extremity with striae of the skin    No significant edema or underlying venous stasis skin changes

## 2019-09-10 ENCOUNTER — TELEPHONE (OUTPATIENT)
Dept: INTERNAL MEDICINE CLINIC | Facility: CLINIC | Age: 44
End: 2019-09-10

## 2019-09-26 ENCOUNTER — APPOINTMENT (OUTPATIENT)
Dept: LAB | Facility: HOSPITAL | Age: 44
End: 2019-09-26
Payer: COMMERCIAL

## 2019-09-26 ENCOUNTER — ANTICOAG VISIT (OUTPATIENT)
Dept: INTERNAL MEDICINE CLINIC | Facility: CLINIC | Age: 44
End: 2019-09-26

## 2019-09-26 ENCOUNTER — TELEPHONE (OUTPATIENT)
Dept: INTERNAL MEDICINE CLINIC | Facility: CLINIC | Age: 44
End: 2019-09-26

## 2019-09-26 NOTE — PROGRESS NOTES
9/26/19-Per Dr Fatemeh Oconnell    Please continue the same dose of the warfarin, repeat labs in 1 month-CF

## 2019-09-26 NOTE — TELEPHONE ENCOUNTER
----- Message from Kathy Brink MD sent at 9/26/2019 11:07 AM EDT -----  Please continue the same dose of the warfarin, repeat labs in 1 month

## 2019-09-26 NOTE — TELEPHONE ENCOUNTER
----- Message from Marissa Nunes PA-C sent at 9/26/2019 11:08 AM EDT -----  Coumadin 7 5 mg per day repeat in 1 month

## 2019-10-05 ENCOUNTER — APPOINTMENT (OUTPATIENT)
Dept: LAB | Facility: HOSPITAL | Age: 44
End: 2019-10-05
Attending: INTERNAL MEDICINE
Payer: COMMERCIAL

## 2019-10-07 ENCOUNTER — TELEPHONE (OUTPATIENT)
Dept: NEPHROLOGY | Facility: CLINIC | Age: 44
End: 2019-10-07

## 2019-10-07 NOTE — TELEPHONE ENCOUNTER
----- Message from Edna Hart MD sent at 10/7/2019 12:34 PM EDT -----  Hello    Patient normally is followed up by Ms Gianfranco Camarillo MA team - can you please let the patient know that the creatinine is at the upper end of his baseline  Tacrolimus level is elevated  -can you please find out from the patient if this was a true 11-13 hour trough?    Can you please confirm the patient's tacrolimus dosing   -can you please let me know you find out and that we can determine the next plan  -also I have not seen the patient since February, can you please schedule the patient with me for next week if I have any availability ?     Dr Escobar Saybobo the above    Thank you    np

## 2019-10-08 ENCOUNTER — OFFICE VISIT (OUTPATIENT)
Dept: NEPHROLOGY | Facility: CLINIC | Age: 44
End: 2019-10-08
Payer: COMMERCIAL

## 2019-10-08 VITALS
HEART RATE: 68 BPM | SYSTOLIC BLOOD PRESSURE: 120 MMHG | RESPIRATION RATE: 16 BRPM | BODY MASS INDEX: 28.16 KG/M2 | DIASTOLIC BLOOD PRESSURE: 96 MMHG | TEMPERATURE: 97.9 F | WEIGHT: 179.4 LBS | HEIGHT: 67 IN

## 2019-10-08 DIAGNOSIS — N17.9 ACUTE RENAL FAILURE SUPERIMPOSED ON STAGE 3 CHRONIC KIDNEY DISEASE, UNSPECIFIED ACUTE RENAL FAILURE TYPE: ICD-10-CM

## 2019-10-08 DIAGNOSIS — I10 ESSENTIAL HYPERTENSION: ICD-10-CM

## 2019-10-08 DIAGNOSIS — N18.3 ACUTE RENAL FAILURE SUPERIMPOSED ON STAGE 3 CHRONIC KIDNEY DISEASE, UNSPECIFIED ACUTE RENAL FAILURE TYPE: ICD-10-CM

## 2019-10-08 DIAGNOSIS — I12.9 HYPERTENSIVE CHRONIC KIDNEY DISEASE, UNSPECIFIED CKD STAGE: ICD-10-CM

## 2019-10-08 DIAGNOSIS — Z94.0 HISTORY OF RENAL TRANSPLANT: Primary | ICD-10-CM

## 2019-10-08 PROCEDURE — 99214 OFFICE O/P EST MOD 30 MIN: CPT | Performed by: INTERNAL MEDICINE

## 2019-10-08 NOTE — PATIENT INSTRUCTIONS
Chronic Kidney Disease   AMBULATORY CARE:   Chronic kidney disease (CKD)  is the gradual and permanent loss of kidney function  Normally, the kidneys remove fluid, chemicals, and waste from your blood  These wastes are turned into urine by your kidneys  CKD may worsen over time and lead to kidney failure  Common symptoms include the following:   · Changes in how often you need to urinate    · Swelling in your arms, legs, or feet    · Shortness of breath    · Fatigue or weakness    · Bad or bitter taste in your mouth    · Nausea, vomiting, or loss of appetite  Seek care immediately if:   · You are confused and very drowsy  · You have a seizure  · You have shortness of breath  Contact your healthcare provider if:   · You suddenly gain or lose more weight than your healthcare provider has told you is okay  · You have itchy skin or a rash  · You urinate more or less than you normally do  · You have blood in your urine  · You have nausea and repeated vomiting  · You have fatigue or muscle weakness  · You have hiccups that will not stop  · You have questions or concerns about your condition or care  Treatment for CKD:  Medicines may be given to decrease blood pressure and get rid of extra fluid  You may also receive medicine to manage health conditions that may occur with CKD  Dialysis is a treatment to remove chemicals and waste from your blood when your kidneys can no longer do this  Surgery may be needed to create an arteriovenous fistula (AVF) in your arm or insert a catheter into your abdomen so that you can receive dialysis  A kidney transplant may be done if your CKD becomes severe  Manage CKD:   · Maintain a healthy weight  Ask your healthcare provider how much you should weigh  Ask him to help you create a weight loss plan if you are overweight  · Exercise 30 to 60 minutes a day, 4 to 7 times a week, or as directed  Ask about the best exercise plan for you   Regular exercise can help you manage CKD, high blood pressure, and diabetes  · Follow your healthcare provider's advice about what to eat and drink  He may tell you to eat food low in sodium (salt), potassium, phosphorus, or protein  You may need to see a dietitian if you need help planning meals  Ask how much liquid to drink each day and which liquids are best for you  · Limit alcohol  Ask how much alcohol is safe for you to drink  A drink of alcohol is 12 ounces of beer, 5 ounces of wine, or 1½ ounces of liquor  · Do not smoke  Nicotine and other chemicals in cigarettes and cigars can cause lung and kidney damage  Ask your healthcare provider for information if you currently smoke and need help to quit  E-cigarettes or smokeless tobacco still contain nicotine  Talk to your healthcare provider before you use these products  · Ask your healthcare provider if you need vaccines  Infections such as pneumonia, influenza, and hepatitis can be more harmful or more likely to occur in a person who has CKD  Vaccines reduce your risk of infection with these viruses  Follow up with your healthcare provider as directed:  Write down your questions so you remember to ask them during your visits  © 2017 [address] [employer] Information is for End User's use only and may not be sold, redistributed or otherwise used for commercial purposes  All illustrations and images included in CareNotes® are the copyrighted property of [employer]  or [employer]it [employer]  The above information is an  only  It is not intended as medical advice for individual conditions or treatments  Talk to your doctor, nurse or pharmacist before following any medical regimen to see if it is safe and effective for you

## 2019-10-08 NOTE — PROGRESS NOTES
NEPHROLOGY OFFICE FOLLOW UP  Rosy Xavier 37 y o  male MRN: [id number]    Encounter: [id number] 10/8/2019    REASON FOR VISIT: Rosy Xavier is a 37 y o  male who is here on 10/8/2019 for Follow-up; Chronic Kidney Disease; and Hypertension    HPI:    Ruchi Marin came in today for follow-up of kidney transplant  Last time I saw him he was in hospital with severe depression  Requiring ECT  Feeling better now  Denies any complaint    No chest pain no palpitation or shortness of Breath      REVIEW OF SYSTEMS:    Review of Systems   Constitutional: Negative for activity change and fatigue  HENT: Negative for congestion and ear discharge  Eyes: Negative for photophobia and pain  Respiratory: Negative for apnea and choking  Cardiovascular: Negative for chest pain and palpitations  Gastrointestinal: Negative for abdominal distention and blood in stool  Endocrine: Negative for heat intolerance and polyphagia  Genitourinary: Negative for flank pain and urgency  Musculoskeletal: Negative for neck pain and neck stiffness  Skin: Negative for color change and wound  Allergic/Immunologic: Negative for food allergies and immunocompromised state  Neurological: Negative for seizures and facial asymmetry  Hematological: Negative for adenopathy  Does not bruise/bleed easily  Psychiatric/Behavioral: Negative for self-injury and suicidal ideas           PAST MEDICAL HISTORY:  Past Medical History:   Diagnosis Date    ADD (attention deficit disorder)     Chronic kidney disease     Depression     DVT (deep venous thrombosis) (HCC)     H/O immunosuppressive therapy     History of kidney disease     Hypertension     Nephropathy, IgA        PAST SURGICAL HISTORY:  Past Surgical History:   Procedure Laterality Date    NEPHRECTOMY TRANSPLANTED ORGAN         SOCIAL HISTORY:  Social History     Substance and Sexual Activity   Alcohol Use Not Currently    Frequency: Monthly or less    Binge frequency: Never Comment: Occasionally     Social History     Substance and Sexual Activity   Drug Use Yes    Types: Marijuana    Comment: smokes marijuana a few times daily     Social History     Tobacco Use   Smoking Status Former Smoker    Years: 4 00   Smokeless Tobacco Never Used       FAMILY HISTORY:  Family History   Problem Relation Age of Onset    Deep vein thrombosis Father     Deep vein thrombosis Paternal Grandfather     Transient ischemic attack Mother        MEDICATIONS:    Current Outpatient Medications:     amphetamine-dextroamphetamine (ADDERALL) 20 mg tablet, TAKE 1 TABLET BY MOUTH TWICE A DAY(ONGOING TREATMENT), Disp: , Rfl: 0    betamethasone dipropionate (DIPROSONE) 0 05 % cream, Apply topically 2 (two) times a day, Disp: 45 g, Rfl: 3    Cholecalciferol (VITAMIN D-3) 1000 units CAPS, Take 2,000 Units by mouth daily, Disp: , Rfl:     diltiazem (CARDIZEM) 120 MG tablet, TAKE 1 TABLET TWICE DAILY, Disp: , Rfl:     LATUDA 60 MG TABS, TAKE ONE TABLET BY MOUTH EVERY DAY WITH FOOD (350 CALORIES), Disp: , Rfl: 2    mycophenolate (CELLCEPT) 250 mg capsule, Take 2 in the AM, 1 in the PM , Disp: 90 capsule, Rfl: 4    sertraline (ZOLOFT) 50 mg tablet, Take 100 mg by mouth every morning , Disp: , Rfl: 0    tacrolimus (PROGRAF) 1 mg capsule, Take 1 capsule (1 mg total) by mouth 2 (two) times a day, Disp: 60 capsule, Rfl: 5    warfarin (COUMADIN) 5 mg tablet, TAKE 1 TO 2 TABLETS BY MOUTH EVERY DAY, Disp: 60 tablet, Rfl: 5    warfarin (COUMADIN) 7 5 mg tablet, Take 1 tablet (7 5 mg total) by mouth daily, Disp: , Rfl:     PHYSICAL EXAM:  Vitals:    10/08/19 1028   BP: 120/96   BP Location: Right arm   Patient Position: Sitting   Pulse: 68   Resp: 16   Temp: 97 9 °F (36 6 °C)   TempSrc: Tympanic   Weight: 81 4 kg (179 lb 6 4 oz)   Height: 5' 6 5" (1 689 m)     Body mass index is 28 52 kg/m²  Physical Exam   Constitutional: He is oriented to person, place, and time  He appears well-developed  No distress     HENT: Head: Normocephalic  Mouth/Throat: Oropharynx is clear and moist    Eyes: Conjunctivae are normal  No scleral icterus  Neck: Neck supple  No JVD present  Cardiovascular: Normal rate and normal heart sounds  Pulmonary/Chest: Effort normal  He has no wheezes  Abdominal: Soft  There is no tenderness  Musculoskeletal: Normal range of motion  He exhibits no edema  Neurological: He is alert and oriented to person, place, and time  Skin: Skin is warm  No rash noted  Psychiatric: He has a normal mood and affect  His behavior is normal        LAB RESULTS:  Results for orders placed or performed in visit on 09/06/19   Protime-INR   Result Value Ref Range    Protime 28 4 (H) 11 6 - 14 5 seconds    INR 2 63 (H) 0 84 - 1 19       ASSESSMENT and PLAN:      History of renal transplant  Patient kidney function is fluctuating  Tacrolimus level is 11 but it is not absolute trough level as patient did blood work almost 7 hours after his last dose  Will recheck the trough level after 12 hour of last dose    Acute renal failure superimposed on stage 3 chronic kidney disease ([address] )  Patient kidney function is deteriorating  Creatinine down to 1 7  I discussed the transplant nephrologist who was see him  Advised hydration at this point I will repeat blood work in 1 month and actually monthly basis    Hypertensive CKD (chronic kidney disease)  Patient has CKD because of hypertension though his previous kidney disease was IgA nephropathy  He does have hematuria so I am not sure there is recurrence of IgA nephropathy in transplant kidney  He will be seen by transplant nephrology decide any need for kidney biopsy    Essential hypertension  Blood pressure is fluctuating  I will monitored closely  Advised to monitor blood pressure at home also    Everything discussed at length with the patient  Advised hydration and avoidance of any nephrotoxic medicine    Will be seen by transplant nephrologist   Will get monthly blood and urine test   I will see him back in 3 months          Portions of the record may have been created with voice recognition software  Occasional wrong word or "sound a like" substitutions may have occurred due to the inherent limitations of voice recognition software  Read the chart carefully and recognize, using context, where substitutions have occurred  If you have any questions, please contact the dictating provider

## 2019-10-08 NOTE — ASSESSMENT & PLAN NOTE
Patient kidney function is deteriorating  Creatinine down to 1 7  I discussed the transplant nephrologist who was see him    Advised hydration at this point I will repeat blood work in 1 month and actually monthly basis

## 2019-10-08 NOTE — ASSESSMENT & PLAN NOTE
Patient kidney function is fluctuating  Tacrolimus level is 11 but it is not absolute trough level as patient did blood work almost 7 hours after his last dose    Will recheck the trough level after 12 hour of last dose

## 2019-10-08 NOTE — ASSESSMENT & PLAN NOTE
Patient has CKD because of hypertension though his previous kidney disease was IgA nephropathy  He does have hematuria so I am not sure there is recurrence of IgA nephropathy in transplant kidney    He will be seen by transplant nephrology decide any need for kidney biopsy

## 2019-10-16 ENCOUNTER — APPOINTMENT (OUTPATIENT)
Dept: LAB | Facility: HOSPITAL | Age: 44
End: 2019-10-16
Attending: INTERNAL MEDICINE
Payer: COMMERCIAL

## 2019-10-16 DIAGNOSIS — Z94.0 HISTORY OF RENAL TRANSPLANT: ICD-10-CM

## 2019-10-16 PROCEDURE — 36415 COLL VENOUS BLD VENIPUNCTURE: CPT

## 2019-10-16 PROCEDURE — 80197 ASSAY OF TACROLIMUS: CPT

## 2019-10-17 LAB — TACROLIMUS BLD-MCNC: 8.6 NG/ML (ref 2–20)

## 2019-11-05 ENCOUNTER — ANTICOAG VISIT (OUTPATIENT)
Dept: INTERNAL MEDICINE CLINIC | Facility: CLINIC | Age: 44
End: 2019-11-05

## 2019-11-05 ENCOUNTER — APPOINTMENT (OUTPATIENT)
Dept: LAB | Facility: HOSPITAL | Age: 44
End: 2019-11-05
Payer: COMMERCIAL

## 2019-11-05 DIAGNOSIS — I82.4Z1 DEEP VEIN THROMBOSIS (DVT) OF DISTAL VEIN OF RIGHT LOWER EXTREMITY, UNSPECIFIED CHRONICITY (HCC): ICD-10-CM

## 2019-11-05 DIAGNOSIS — Z94.0 HISTORY OF RENAL TRANSPLANT: ICD-10-CM

## 2019-11-05 LAB
BASOPHILS # BLD AUTO: 0.07 THOUSANDS/ΜL (ref 0–0.1)
BASOPHILS NFR BLD AUTO: 1 % (ref 0–1)
EOSINOPHIL # BLD AUTO: 0.1 THOUSAND/ΜL (ref 0–0.61)
EOSINOPHIL NFR BLD AUTO: 2 % (ref 0–6)
ERYTHROCYTE [DISTWIDTH] IN BLOOD BY AUTOMATED COUNT: 12.1 % (ref 11.6–15.1)
HCT VFR BLD AUTO: 47.4 % (ref 36.5–49.3)
HGB BLD-MCNC: 15.8 G/DL (ref 12–17)
IMM GRANULOCYTES # BLD AUTO: 0.01 THOUSAND/UL (ref 0–0.2)
IMM GRANULOCYTES NFR BLD AUTO: 0 % (ref 0–2)
LYMPHOCYTES # BLD AUTO: 1.68 THOUSANDS/ΜL (ref 0.6–4.47)
LYMPHOCYTES NFR BLD AUTO: 31 % (ref 14–44)
MCH RBC QN AUTO: 31.5 PG (ref 26.8–34.3)
MCHC RBC AUTO-ENTMCNC: 33.3 G/DL (ref 31.4–37.4)
MCV RBC AUTO: 95 FL (ref 82–98)
MONOCYTES # BLD AUTO: 0.61 THOUSAND/ΜL (ref 0.17–1.22)
MONOCYTES NFR BLD AUTO: 11 % (ref 4–12)
NEUTROPHILS # BLD AUTO: 2.96 THOUSANDS/ΜL (ref 1.85–7.62)
NEUTS SEG NFR BLD AUTO: 55 % (ref 43–75)
NRBC BLD AUTO-RTO: 0 /100 WBCS
PLATELET # BLD AUTO: 247 THOUSANDS/UL (ref 149–390)
PMV BLD AUTO: 9.9 FL (ref 8.9–12.7)
RBC # BLD AUTO: 5.01 MILLION/UL (ref 3.88–5.62)
WBC # BLD AUTO: 5.43 THOUSAND/UL (ref 4.31–10.16)

## 2019-11-05 PROCEDURE — 85025 COMPLETE CBC W/AUTO DIFF WBC: CPT

## 2019-11-05 RX ORDER — WARFARIN SODIUM 1 MG/1
TABLET ORAL
Qty: 90 TABLET | Refills: 3 | Status: SHIPPED | OUTPATIENT
Start: 2019-11-05 | End: 2020-01-24

## 2019-11-05 NOTE — TELEPHONE ENCOUNTER
----- Message from Jeanna Nation PA-C sent at 11/5/2019  9:24 AM EST -----  No Coumadin for 2 days then start 6 milligrams per day protime in 1 week

## 2019-11-05 NOTE — PROGRESS NOTES
11/5/19-Per Malik Hooks    No Coumadin for 2 days then start 6 milligrams per day protime in 1 week-CF

## 2019-11-07 ENCOUNTER — OFFICE VISIT (OUTPATIENT)
Dept: NEPHROLOGY | Facility: CLINIC | Age: 44
End: 2019-11-07
Payer: COMMERCIAL

## 2019-11-07 VITALS
DIASTOLIC BLOOD PRESSURE: 78 MMHG | HEIGHT: 67 IN | BODY MASS INDEX: 27.94 KG/M2 | SYSTOLIC BLOOD PRESSURE: 120 MMHG | WEIGHT: 178 LBS

## 2019-11-07 DIAGNOSIS — Z94.0 KIDNEY TRANSPLANTED: ICD-10-CM

## 2019-11-07 DIAGNOSIS — Z94.0 RENAL TRANSPLANT RECIPIENT: Primary | ICD-10-CM

## 2019-11-07 PROCEDURE — 99215 OFFICE O/P EST HI 40 MIN: CPT | Performed by: INTERNAL MEDICINE

## 2019-11-07 RX ORDER — MYCOPHENOLATE MOFETIL 250 MG/1
CAPSULE ORAL
Qty: 90 CAPSULE | Refills: 5 | Status: SHIPPED | OUTPATIENT
Start: 2019-11-07 | End: 2020-04-23

## 2019-11-07 NOTE — PROGRESS NOTES
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), living related from kidney swap renal transplant in 2009 at [facility] (patient's brother was donor involved in swap), was on peritoneal dialysis for 9 months prior to transplant, DVT X 4 prior, HTN, depression, ADHD, admission in December of 2017 for hematuria and at that time was treated for urinary tract infection/pyelonephritis        1) CKD III, renal transplant -      History obtained from [facility], and [facility] and here at [facility] records:     -Baseline Cr 1 3-1 8 mg/dL; June 2014 1 8  -Tac levels from 2009 - 2014 were 7-10  -Levels have been fluctuating significantly last few months  -Patient follows [facility] - followed with Dr Gwen Walker   -Prior baseline Cr 1 4-1 5 mg/dL  -pt had early CMV - on EGD  -Called [place] [facility] - last time seen was in May 2017  -[facility] - no known rejection ([phone])  -Has never had renal biopsy post transplant     Labwork review:     - UPCR 0 3  - CT abd/pel without hydro or nephrolithiasis on transplanted kidney  - to note, patient has had microscopic hematuria as far back as 2014 on UA  - UPCR 0 3 on 1/25/2019  - UA 10-20 RBC  - UA repeat on 2/23 was with 2-4 RBC, 1-2 WBC  - pt has had multiple episodes of Cr below goal in 2017 and 2018  - BK and CMV in process  - urine eos 0%    Since last being seen:    - Cr has remained stable 1 5-1 8 mg/dL from 2 2 mg/dL in hospital in June  - most recent Cr 1 82 mg/dL  - electrolytes stable  - Hb stable 15 8  - UA with 4-10 RBC, 4-10 WBC, 1+ protein, 4-10 hyaline casts  - UPCR stable 0 29     Important Medication notes:   - on diltiazem to note (interaction with tacrolimus)     Plan:  - Cr is at baseline  - if hematuria rises, proteinuria rises, Cr rises ---> will need to check UA (history of Pyelonephritis), check DSA (we will need to obtain kits from [facility] if needed), and would need to consider biopsy if no pyelo (per [facility] can complete renal biopsy locally)  - no changes to diltiazem today  - patient's adherence is improving   - we will assist the patient to obtain a dermatology appointment  - f/u with Primary Nephrology for CKD f/u  - will see the patient again in approximately 4-5 months  - repeat lab work next month  Then can continue with months lab work  - f/u BK and CMV titers in February 2020  - dental visit - advised pt that as long as regular cleaning ok without proph antibiotics, but if further then will need  Task sent to remind pt       2) Immune Suppression     - was on tacrolimus 1 mg BID  -  in AM and 250 mg PM  - not on steroid regimen  - history CMV  - pt has had labile tacrolimus levels  - discussed compliance important  - tac level 7 2 on 11/2019-no changes for now    3) DVT - recurrent DVT   Most recent march 2018     - 4 prior DVTs  - 1 RUE post PICC  - then 3 in LE  -  further plans per primary team     4) Vaccines     - up to date on vaccine with flu vaccine  - PNA vaccine - pt will review with Primary Physician  - no live vaccines     5) Age appropriate Ca screening     - pt will make f/u appt with Dermatology  - has history of psoriaform dermatitis      6) HTN     - pt is on diltiazem  - BP stable     7) HPL     - further plan per primary team   -would continue to monitor lipid panel closely  -consider statin if needed     9) Depression     -prior suicide attempts - prior 2003  - pt knows to go to the hospital  - will be attempting ECT  - patient follows with Psychiatry     10) Hb - monitor     11) IgA nephropathy native disease      monitor for recurrence     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     - defer to Primary Nephrology for f/u     It was a pleasure evaluating your patient in the office today  Thank you for allowing our team to participate in the care of Mr Loyd Sanchez  Please do not hesitate to contact our team if further issues/questions shall arise in the interim  No problem-specific Assessment & Plan notes found for this encounter  HPI:    Patient denies complaints  States he is continuing to struggle at times with his anxiety and depression but it is improving and controlled with psychiatric and therapy  Denies fevers, chills, nausea, vomiting  PATIENT INSTRUCTIONS:    Patient Instructions   1) Avoid NSAIDS - (Example - motrin, advil, ibuprofen, aleve, exederin, etc)  2) Always follow a low salt diet  3) labwork in one month  4) appointment in 3 months        OBJECTIVE:  Current Weight: Weight - Scale: 80 7 kg (178 lb)  Vitals:    11/07/19 1402   BP: 120/78   BP Location: Right arm   Patient Position: Sitting   Cuff Size: Standard   Weight: 80 7 kg (178 lb)   Height: 5' 6 5" (1 689 m)    Body mass index is 28 3 kg/m²  REVIEW OF SYSTEMS:    Review of Systems   Constitutional: Negative  Negative for appetite change and fatigue  HENT: Negative  Eyes: Negative  Respiratory: Negative  Negative for shortness of breath  Cardiovascular: Negative  Negative for leg swelling  Gastrointestinal: Negative  Endocrine: Negative  Genitourinary: Negative  Negative for difficulty urinating  Musculoskeletal: Negative  Allergic/Immunologic: Negative  Neurological: Negative  Hematological: Negative  Psychiatric/Behavioral: The patient is nervous/anxious  All other systems reviewed and are negative  PHYSICAL EXAM:      Physical Exam   Constitutional: He is oriented to person, place, and time  He appears well-developed and well-nourished  No distress  HENT:   Head: Normocephalic and atraumatic  Mouth/Throat: No oropharyngeal exudate     Eyes: Conjunctivae are normal  Right eye exhibits no discharge  Left eye exhibits no discharge  No scleral icterus  Neck: Normal range of motion  Neck supple  No JVD present  Cardiovascular: Normal rate, regular rhythm and normal heart sounds  Exam reveals no gallop and no friction rub  No murmur heard  Pulmonary/Chest: Effort normal and breath sounds normal  No respiratory distress  He has no wheezes  He has no rales  He exhibits no tenderness  Abdominal: Soft  Bowel sounds are normal  He exhibits no distension  There is no tenderness  There is no rebound  Musculoskeletal: Normal range of motion  He exhibits no edema, tenderness or deformity  Neurological: He is alert and oriented to person, place, and time  He has normal reflexes  No cranial nerve deficit  Coordination normal    Skin: Skin is warm and dry  No rash noted  He is not diaphoretic  No erythema  No pallor  Psychiatric: He has a normal mood and affect  His behavior is normal  Judgment and thought content normal    Nursing note and vitals reviewed        Medications:    Current Outpatient Medications:     amphetamine-dextroamphetamine (ADDERALL) 20 mg tablet, TAKE 1 TABLET BY MOUTH TWICE A DAY(ONGOING TREATMENT), Disp: , Rfl: 0    betamethasone dipropionate (DIPROSONE) 0 05 % cream, Apply topically 2 (two) times a day, Disp: 45 g, Rfl: 3    Cholecalciferol (VITAMIN D-3) 1000 units CAPS, Take 2,000 Units by mouth daily, Disp: , Rfl:     diltiazem (CARDIZEM) 120 MG tablet, TAKE 1 TABLET TWICE DAILY, Disp: , Rfl:     LATUDA 60 MG TABS, Take 80 mg by mouth daily , Disp: , Rfl: 2    mycophenolate (CELLCEPT) 250 mg capsule, Take 2 in the AM, 1 in the PM , Disp: 90 capsule, Rfl: 4    sertraline (ZOLOFT) 50 mg tablet, Take 100 mg by mouth every morning , Disp: , Rfl: 0    tacrolimus (PROGRAF) 1 mg capsule, Take 1 capsule (1 mg total) by mouth 2 (two) times a day, Disp: 60 capsule, Rfl: 5    warfarin (COUMADIN) 1 mg tablet, Take 1 tablet by mouth daily as directed, Disp: 90 tablet, Rfl: 3   warfarin (COUMADIN) 5 mg tablet, TAKE 1 TO 2 TABLETS BY MOUTH EVERY DAY, Disp: 60 tablet, Rfl: 5    warfarin (COUMADIN) 7 5 mg tablet, Take 1 tablet (7 5 mg total) by mouth daily, Disp: , Rfl:     Laboratory Results:  Results from last 7 days   Lab Units 11/05/19  0835   WBC Thousand/uL 5 43   HEMOGLOBIN g/dL 15 8   HEMATOCRIT % 47 4   PLATELETS Thousands/uL 247   POTASSIUM mmol/L 4 6   CHLORIDE mmol/L 106   CO2 mmol/L 23   BUN mg/dL 34*   CREATININE mg/dL 1 82*   CALCIUM mg/dL 9 7   MAGNESIUM mg/dL 2 0   PHOSPHORUS mg/dL 2 3*       Results for orders placed or performed in visit on 11/05/19   CBC and differential   Result Value Ref Range    WBC 5 43 4 31 - 10 16 Thousand/uL    RBC 5 01 3 88 - 5 62 Million/uL    Hemoglobin 15 8 12 0 - 17 0 g/dL    Hematocrit 47 4 36 5 - 49 3 %    MCV 95 82 - 98 fL    MCH 31 5 26 8 - 34 3 pg    MCHC 33 3 31 4 - 37 4 g/dL    RDW 12 1 11 6 - 15 1 %    MPV 9 9 8 9 - 12 7 fL    Platelets 637 697 - 815 Thousands/uL    nRBC 0 /100 WBCs    Neutrophils Relative 55 43 - 75 %    Immat GRANS % 0 0 - 2 %    Lymphocytes Relative 31 14 - 44 %    Monocytes Relative 11 4 - 12 %    Eosinophils Relative 2 0 - 6 %    Basophils Relative 1 0 - 1 %    Neutrophils Absolute 2 96 1 85 - 7 62 Thousands/µL    Immature Grans Absolute 0 01 0 00 - 0 20 Thousand/uL    Lymphocytes Absolute 1 68 0 60 - 4 47 Thousands/µL    Monocytes Absolute 0 61 0 17 - 1 22 Thousand/µL    Eosinophils Absolute 0 10 0 00 - 0 61 Thousand/µL    Basophils Absolute 0 07 0 00 - 0 10 Thousands/µL

## 2019-11-07 NOTE — PATIENT INSTRUCTIONS
1) Avoid NSAIDS - (Example - motrin, advil, ibuprofen, aleve, exederin, etc)  2) Always follow a low salt diet  3) labwork in one month  4) appointment in 3 months

## 2019-11-07 NOTE — LETTER
November 7, 2019     Apple Cervantes MD  [address] [place] [id number]    Patient: Lisa Gibson   YOB: 1975   Date of Visit: 11/7/2019       Dear Dr Marleta Duane:    Thank you for referring Lisa Gibson to me for evaluation  Below are my notes for this consultation  If you have questions, please do not hesitate to call me  I look forward to following your patient along with you  Sincerely,        Gail Guy MD        CC: MD Gail Chicas MD  11/7/2019  2:47 PM  Sign at close encounter  [address] 53 y o  male MRN: [id number]  11/7/2019    Reason for Visit: renal transplant f/u    ASSESSMENT and PLAN:    I had the pleasure of seeing Mr Mario Livingston today in the renal clinic for the continued management of renal transplant  41-year-old male with a past medical history of 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 (diagnosed at 15 yo), living related from kidney swap renal transplant in 2009 at [facility] (patient's brother was donor involved in swap), was on peritoneal dialysis for 9 months prior to transplant, DVT X 4 prior, HTN, depression, ADHD, admission in December of 2017 for hematuria and at that time was treated for urinary tract infection/pyelonephritis        1) CKD III, renal transplant -      History obtained from [address], and [facility] and here at [address] [place] [address] records:     -Baseline Cr 1 3-1 8 mg/dL; June 2014 1 8  -Tac levels from 2009 - 2014 were 7-10  -Levels have been fluctuating significantly last few months  -Patient follows at [facility] - followed with Dr Almas Hill   -Prior baseline Cr 1 4-1 5 mg/dL  -pt had early CMV - on EGD  -Called [facility] - last time seen was in May 2017  -[facility] - no known rejection ([phone])  -Has never had renal biopsy post transplant     Labwork review:     - UPCR 0 3  - CT abd/pel without hydro or nephrolithiasis on transplanted kidney  - to note, patient has had microscopic hematuria as far back as 2014 on UA  - UPCR 0 3 on 1/25/2019  - UA 10-20 RBC  - UA repeat on 2/23 was with 2-4 RBC, 1-2 WBC  - pt has had multiple episodes of Cr below goal in 2017 and 2018  - BK and CMV in process  - urine eos 0%    Since last being seen:    - Cr has remained stable 1 5-1 8 mg/dL from 2 2 mg/dL in hospital in June  - most recent Cr 1 82 mg/dL  - electrolytes stable  - Hb stable 15 8  - UA with 4-10 RBC, 4-10 WBC, 1+ protein, 4-10 hyaline casts  - UPCR stable 0 29     Important Medication notes:   - on diltiazem to note (interaction with tacrolimus)     Plan:  - Cr is at baseline  - if hematuria rises, proteinuria rises, Cr rises ---> will need to check UA (history of Pyelonephritis), check DSA (we will need to obtain kits from [place] if needed), and would need to consider biopsy if no pyelo (per [facility] complete renal biopsy locally)  - no changes to diltiazem today  - patient's adherence is improving   - we will assist the patient to obtain a dermatology appointment  - f/u with Primary Nephrology for CKD f/u  - will see the patient again in approximately 4-5 months  - repeat lab work next month  Then can continue with months lab work  - f/u BK and CMV titers in February 2020     2) Immune Suppression     - was on tacrolimus 1 mg BID  -  in AM and 250 mg PM  - not on steroid regimen  - history CMV  - pt has had labile tacrolimus levels  - discussed compliance important  - tac level 7 2 on 11/2019-no changes for now    3) DVT - recurrent DVT   Most recent march 2018     - 4 prior DVTs  - 1 RUE post PICC  - then 3 in LE  -   further plans per primary team     4) Vaccines     - up to date on vaccine with flu vaccine  - PNA vaccine - pt will review with Primary Physician  - no live vaccines     5) Age appropriate Ca screening     - pt will make f/u appt with Dermatology  - has history of psoriaform dermatitis      6) HTN     - pt is on diltiazem  - BP stable     7) HPL     - further plan per primary team   -would continue to monitor lipid panel closely  -consider statin if needed     9) Depression     -prior suicide attempts - prior 2003  - pt knows to go to the hospital  - will be attempting ECT  - patient follows with Psychiatry     10) Hb - monitor     11) IgA nephropathy native disease      monitor for recurrence     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     - defer to Primary Nephrology for f/u     It was a pleasure evaluating your patient in the office today  Thank you for allowing our team to participate in the care of Mr Darius Spivey  Please do not hesitate to contact our team if further issues/questions shall arise in the interim  No problem-specific Assessment & Plan notes found for this encounter  HPI:    Patient denies complaints  States he is continuing to struggle at times with his anxiety and depression but it is improving and controlled with psychiatric and therapy  Denies fevers, chills, nausea, vomiting  PATIENT INSTRUCTIONS:    Patient Instructions   1) Avoid NSAIDS - (Example - motrin, advil, ibuprofen, aleve, exederin, etc)  2) Always follow a low salt diet  3) labwork in one month  4) appointment in 3 months        OBJECTIVE:  Current Weight: Weight - Scale: 80 7 kg (178 lb)  Vitals:    11/07/19 1402   BP: 120/78   BP Location: Right arm   Patient Position: Sitting   Cuff Size: Standard   Weight: 80 7 kg (178 lb)   Height: 5' 6 5" (1 689 m)    Body mass index is 28 3 kg/m²  REVIEW OF SYSTEMS:    Review of Systems   Constitutional: Negative  Negative for appetite change and fatigue  HENT: Negative  Eyes: Negative  Respiratory: Negative  Negative for shortness of breath  Cardiovascular: Negative  Negative for leg swelling  Gastrointestinal: Negative  Endocrine: Negative  Genitourinary: Negative  Negative for difficulty urinating  Musculoskeletal: Negative  Allergic/Immunologic: Negative  Neurological: Negative  Hematological: Negative  Psychiatric/Behavioral: The patient is nervous/anxious  All other systems reviewed and are negative  PHYSICAL EXAM:      Physical Exam   Constitutional: He is oriented to person, place, and time  He appears well-developed and well-nourished  No distress  HENT:   Head: Normocephalic and atraumatic  Mouth/Throat: No oropharyngeal exudate  Eyes: Conjunctivae are normal  Right eye exhibits no discharge  Left eye exhibits no discharge  No scleral icterus  Neck: Normal range of motion  Neck supple  No JVD present  Cardiovascular: Normal rate, regular rhythm and normal heart sounds  Exam reveals no gallop and no friction rub  No murmur heard  Pulmonary/Chest: Effort normal and breath sounds normal  No respiratory distress  He has no wheezes  He has no rales  He exhibits no tenderness  Abdominal: Soft  Bowel sounds are normal  He exhibits no distension  There is no tenderness  There is no rebound  Musculoskeletal: Normal range of motion  He exhibits no edema, tenderness or deformity  Neurological: He is alert and oriented to person, place, and time  He has normal reflexes  No cranial nerve deficit  Coordination normal    Skin: Skin is warm and dry  No rash noted  He is not diaphoretic  No erythema  No pallor  Psychiatric: He has a normal mood and affect  His behavior is normal  Judgment and thought content normal    Nursing note and vitals reviewed        Medications:    Current Outpatient Medications:     amphetamine-dextroamphetamine (ADDERALL) 20 mg tablet, TAKE 1 TABLET BY MOUTH TWICE A DAY(ONGOING TREATMENT), Disp: , Rfl: 0    betamethasone dipropionate (DIPROSONE) 0 05 % cream, Apply topically 2 (two) times a day, Disp: 45 g, Rfl: 3    Cholecalciferol (VITAMIN D-3) 1000 units CAPS, Take 2,000 Units by mouth daily, Disp: , Rfl:     diltiazem (CARDIZEM) 120 MG tablet, TAKE 1 TABLET TWICE DAILY, Disp: , Rfl:     LATUDA 60 MG TABS, Take 80 mg by mouth daily , Disp: , Rfl: 2   mycophenolate (CELLCEPT) 250 mg capsule, Take 2 in the AM, 1 in the PM , Disp: 90 capsule, Rfl: 4    sertraline (ZOLOFT) 50 mg tablet, Take 100 mg by mouth every morning , Disp: , Rfl: 0    tacrolimus (PROGRAF) 1 mg capsule, Take 1 capsule (1 mg total) by mouth 2 (two) times a day, Disp: 60 capsule, Rfl: 5    warfarin (COUMADIN) 1 mg tablet, Take 1 tablet by mouth daily as directed, Disp: 90 tablet, Rfl: 3    warfarin (COUMADIN) 5 mg tablet, TAKE 1 TO 2 TABLETS BY MOUTH EVERY DAY, Disp: 60 tablet, Rfl: 5    warfarin (COUMADIN) 7 5 mg tablet, Take 1 tablet (7 5 mg total) by mouth daily, Disp: , Rfl:     Laboratory Results:  Results from last 7 days   Lab Units 11/05/19  0835   WBC Thousand/uL 5 43   HEMOGLOBIN g/dL 15 8   HEMATOCRIT % 47 4   PLATELETS Thousands/uL 247   POTASSIUM mmol/L 4 6   CHLORIDE mmol/L 106   CO2 mmol/L 23   BUN mg/dL 34*   CREATININE mg/dL 1 82*   CALCIUM mg/dL 9 7   MAGNESIUM mg/dL 2 0   PHOSPHORUS mg/dL 2 3*       Results for orders placed or performed in visit on 11/05/19   CBC and differential   Result Value Ref Range    WBC 5 43 4 31 - 10 16 Thousand/uL    RBC 5 01 3 88 - 5 62 Million/uL    Hemoglobin 15 8 12 0 - 17 0 g/dL    Hematocrit 47 4 36 5 - 49 3 %    MCV 95 82 - 98 fL    MCH 31 5 26 8 - 34 3 pg    MCHC 33 3 31 4 - 37 4 g/dL    RDW 12 1 11 6 - 15 1 %    MPV 9 9 8 9 - 12 7 fL    Platelets 778 975 - 477 Thousands/uL    nRBC 0 /100 WBCs    Neutrophils Relative 55 43 - 75 %    Immat GRANS % 0 0 - 2 %    Lymphocytes Relative 31 14 - 44 %    Monocytes Relative 11 4 - 12 %    Eosinophils Relative 2 0 - 6 %    Basophils Relative 1 0 - 1 %    Neutrophils Absolute 2 96 1 85 - 7 62 Thousands/µL    Immature Grans Absolute 0 01 0 00 - 0 20 Thousand/uL    Lymphocytes Absolute 1 68 0 60 - 4 47 Thousands/µL    Monocytes Absolute 0 61 0 17 - 1 22 Thousand/µL    Eosinophils Absolute 0 10 0 00 - 0 61 Thousand/µL    Basophils Absolute 0 07 0 00 - 0 10 Thousands/µL

## 2019-11-08 ENCOUNTER — TELEPHONE (OUTPATIENT)
Dept: NEPHROLOGY | Facility: CLINIC | Age: 44
End: 2019-11-08

## 2019-11-08 NOTE — TELEPHONE ENCOUNTER
----- Message from Devaughn Gomez MD sent at 11/8/2019  3:25 PM EST -----  Hello    Can you please remind pt that with his dental cleaning - as long as regular cleaning then no prophylactic antibiotics needed, but if deeper cleaning or procedures, then he will need    His dentist can call me if needed    Thank you    np

## 2019-11-18 ENCOUNTER — APPOINTMENT (OUTPATIENT)
Dept: LAB | Facility: HOSPITAL | Age: 44
End: 2019-11-18
Payer: COMMERCIAL

## 2019-11-18 ENCOUNTER — ANTICOAG VISIT (OUTPATIENT)
Dept: INTERNAL MEDICINE CLINIC | Facility: CLINIC | Age: 44
End: 2019-11-18

## 2019-11-18 ENCOUNTER — TELEPHONE (OUTPATIENT)
Dept: INTERNAL MEDICINE CLINIC | Facility: CLINIC | Age: 44
End: 2019-11-18

## 2019-11-18 ENCOUNTER — TELEPHONE (OUTPATIENT)
Dept: OTHER | Facility: HOSPITAL | Age: 44
End: 2019-11-18

## 2019-11-18 NOTE — TELEPHONE ENCOUNTER
----- Message from Aris Muro MD sent at 11/18/2019  4:59 PM EST -----  Please take warfarin 7 5 mg daily and repeat labs in 1 week

## 2019-11-18 NOTE — TELEPHONE ENCOUNTER
Attempted to return the patient's message about perioperative antibiotics  Patient is going for dental procedure which is invasive with teeth pulling  Would recommend preoperative antibiotic with Keflex  Has a penicillin allergy  I am not sure if the patient is allergic to Keflex and therefore I called the patient  No answer  Task request sent  I will not send the medication until we have a chance to review with the patient

## 2019-11-18 NOTE — TELEPHONE ENCOUNTER
Spoke with Dr Itzel Brown, she said patient to take what Malik Thapa instructed earlier today 6mg/7 5mg alternating & recheck in one week

## 2019-11-19 DIAGNOSIS — Z79.2 PROPHYLACTIC ANTIBIOTIC: Primary | ICD-10-CM

## 2019-11-19 RX ORDER — CEPHALEXIN 500 MG/1
2000 CAPSULE ORAL ONCE
Qty: 4 CAPSULE | Refills: 0 | Status: SHIPPED | OUTPATIENT
Start: 2019-11-19 | End: 2019-11-19

## 2019-11-20 ENCOUNTER — OFFICE VISIT (OUTPATIENT)
Dept: DERMATOLOGY | Facility: CLINIC | Age: 44
End: 2019-11-20
Payer: COMMERCIAL

## 2019-11-20 DIAGNOSIS — L30.8 PSORIASIFORM DERMATITIS: ICD-10-CM

## 2019-11-20 DIAGNOSIS — L82.1 SEBORRHEIC KERATOSIS: Primary | ICD-10-CM

## 2019-11-20 DIAGNOSIS — Z92.25 HISTORY OF IMMUNOSUPPRESSIVE THERAPY: ICD-10-CM

## 2019-11-20 DIAGNOSIS — Z13.89 SCREENING FOR SKIN CONDITION: ICD-10-CM

## 2019-11-20 DIAGNOSIS — Z94.0 RENAL TRANSPLANT RECIPIENT: ICD-10-CM

## 2019-11-20 PROCEDURE — 99213 OFFICE O/P EST LOW 20 MIN: CPT | Performed by: DERMATOLOGY

## 2019-11-20 RX ORDER — BETAMETHASONE DIPROPIONATE 0.5 MG/G
OINTMENT TOPICAL 2 TIMES DAILY
Qty: 45 G | Refills: 1 | Status: SHIPPED | OUTPATIENT
Start: 2019-11-20 | End: 2020-01-24

## 2019-11-20 NOTE — PROGRESS NOTES
[facility] DERMATOLOGY  [address] [place] [id number]  [phone]  [phone]     MRN: [id number] : 1975  Encounter: [id number]  Patient Information: Melody Contreras  Chief complaint:  Yearly skin check    History of present illness:  49-year-old male who had not seen for 2 years presents for overall checkup patient with known history of immunosuppressive therapy for kidney transplant 10 years ago presents for follow-up patient continues to have problems with a rash on his legs patient has been treated by me previously still has the same ointment given to him 2 years ago has not been using a real consistently  Past Medical History:   Diagnosis Date    ADD (attention deficit disorder)     Chronic kidney disease     Depression     [facility]     H/O immunosuppressive therapy     History of kidney disease     Hypertension     Nephropathy, IgA      Past Surgical History:   Procedure Laterality Date    NEPHRECTOMY TRANSPLANTED ORGAN       Social History   Social History     Substance and Sexual Activity   Alcohol Use Not Currently    Frequency: Monthly or less    Binge frequency: Never    Comment: Occasionally     Social History     Substance and Sexual Activity   Drug Use Yes    Types: Marijuana    Comment: smokes marijuana a few times daily     Social History     Tobacco Use   Smoking Status Former Smoker    Years: 4 00   Smokeless Tobacco Never Used     Family History   Problem Relation Age of Onset    Deep vein thrombosis Father     Deep vein thrombosis Paternal Grandfather     Transient ischemic attack Mother      Meds/Allergies   Allergies   Allergen Reactions    Penicillins Rash       Meds:  Prior to Admission medications    Medication Sig Start Date End Date Taking?  Authorizing Provider   amphetamine-dextroamphetamine (ADDERALL) 20 mg tablet TAKE 1 TABLET BY MOUTH TWICE A DAY(ONGOING TREATMENT) 19  Yes Historical Provider, MD   betamethasone dipropionate (DIPROSONE) 0 05 % cream Apply topically 2 (two) times a day 9/9/19  Yes Ashlie Pratt PA-C   Cholecalciferol (VITAMIN D-3) 1000 units CAPS Take 2,000 Units by mouth daily   Yes Historical Provider, MD   diltiazem (CARDIZEM) 120 MG tablet TAKE 1 TABLET TWICE DAILY 3/28/17  Yes Historical Provider, MD   LATUDA 60 MG TABS Take 80 mg by mouth daily  7/18/19  Yes Historical Provider, MD   mycophenolate (CELLCEPT) 250 mg capsule Take 2 in the AM, 1 in the PM  11/7/19  Yes Estrella Villa MD   tacrolimus (PROGRAF) 1 mg capsule Take 1 capsule (1 mg total) by mouth 2 (two) times a day 12/31/18  Yes Jade Tolentino MD   warfarin (COUMADIN) 1 mg tablet Take 1 tablet by mouth daily as directed 11/5/19  Yes Ashlie Pratt PA-C   warfarin (COUMADIN) 5 mg tablet TAKE 1 TO 2 TABLETS BY MOUTH EVERY DAY 7/8/19  Yes Bart Anthony MD   warfarin (COUMADIN) 7 5 mg tablet Take 1 tablet (7 5 mg total) by mouth daily 6/21/19  Yes Elisabet Cardenas MD   cephalexin (KEFLEX) 500 mg capsule Take 4 capsules (2,000 mg total) by mouth once for 1 dose Take 2 hours before your dental procedure  Thank you   11/19/19 11/19/19  Estrella Villa MD   sertraline (ZOLOFT) 50 mg tablet Take 100 mg by mouth every morning  7/8/19   Historical Provider, MD       Subjective:     Review of Systems:    General: negative for - chills, fatigue, fever,  weight gain or weight loss  Psychological: negative for - anxiety, behavioral disorder, concentration difficulties, decreased libido, depression, irritability, memory difficulties, mood swings, sleep disturbances or suicidal ideation  ENT: negative for - hearing difficulties , nasal congestion, nasal discharge, oral lesions, sinus pain, sneezing, sore throat  Allergy and Immunology: negative for - hives, insect bite sensitivity,  Hematological and Lymphatic: negative for - bleeding problems, blood clots,bruising, swollen lymph nodes  Endocrine: negative for - hair pattern changes, hot flashes, malaise/lethargy, mood swings, palpitations, polydipsia/polyuria, skin changes, temperature intolerance or unexpected weight change  Respiratory: negative for - cough, hemoptysis, orthopnea, shortness of breath, or wheezing  Cardiovascular: negative for - chest pain, dyspnea on exertion, edema,  Gastrointestinal: negative for - abdominal pain, nausea/vomiting  Genito-Urinary: negative for - dysuria, incontinence, irregular/heavy menses or urinary frequency/urgency  Musculoskeletal: negative for - gait disturbance, joint pain, joint stiffness, joint swelling, muscle pain, muscular weakness  Dermatological:  As in HPI  Neurological: negative for confusion, dizziness, headaches, impaired coordination/balance, memory loss, numbness/tingling, seizures, speech problems, tremors or weakness       Objective: There were no vitals taken for this visit  Physical Exam:    General Appearance:    Alert, cooperative, no distress   Head:    Normocephalic, without obvious abnormality, atraumatic           Skin:   A full skin exam was performed including scalp, head scalp, eyes, ears, nose, lips, neck, chest, axilla, abdomen, back, buttocks, bilateral upper extremities, bilateral lower extremities, hands, feet, fingers, toes, fingernails, and toenails normal pigmented lesion regular shape and color scaling lichenification noted on lower legs with a numerous stria  noted also as well nothing else remarkable noted on complete exam     Assessment:     1  Seborrheic keratosis     2  Renal transplant recipient  Ambulatory referral to Dermatology   3  History of immunosuppressive therapy     4  Psoriasiform dermatitis     5   Screening for skin condition           Plan:   Psoriasiform dermatitis patient advise that this is a process from chronic scratching advised him to use the topical steroid twice a day until completely resolves and then discontinue treatment not to just use when the area is bothersomeotherwise will never go away  Nevi reviewed the concept of ABCDE and ugly duckling nothing markedly atypical patient reassured  History of immunosuppressive therapy patient advised cause of the medications that he is on he is at higher risk for potential skin cancers  If any changes growths have occurred patient should notify us  Also patient should be extremely careful with sun exposure  Yearly follow-up recommended  Screening for Dermatologic Disorders: Nothing else of concern noted on complete exam follow up in 1 year       Nicky Rodriguez MD  11/20/2019,11:39 AM    Portions of the record may have been created with voice recognition software   Occasional wrong word or "sound a like" substitutions may have occurred due to the inherent limitations of voice recognition software   Read the chart carefully and recognize, using context, where substitutions have occurred

## 2019-11-20 NOTE — PATIENT INSTRUCTIONS
Psoriasiform dermatitis patient advise that this is a process from chronic scratching advised him to use the topical steroid twice a day until completely resolves and then discontinue treatment not to just use when the area is bothersomeotherwise will never go away  Nevi reviewed the concept of ABCDE and ugly duckling nothing markedly atypical patient reassured  History of immunosuppressive therapy patient advised cause of the medications that he is on he is at higher risk for potential skin cancers  If any changes growths have occurred patient should notify us  Also patient should be extremely careful with sun exposure  Yearly follow-up recommended    Screening for Dermatologic Disorders: Nothing else of concern noted on complete exam follow up in 1 year

## 2019-12-13 ENCOUNTER — APPOINTMENT (OUTPATIENT)
Dept: LAB | Facility: HOSPITAL | Age: 44
End: 2019-12-13
Payer: COMMERCIAL

## 2019-12-13 ENCOUNTER — ANTICOAG VISIT (OUTPATIENT)
Dept: INTERNAL MEDICINE CLINIC | Facility: CLINIC | Age: 44
End: 2019-12-13

## 2019-12-13 DIAGNOSIS — Z94.0 RENAL TRANSPLANT RECIPIENT: ICD-10-CM

## 2019-12-13 NOTE — PROGRESS NOTES
12/13/19 PER DR Yesica Moon PT TO TAKE 6MG SAT AND SUN AND 7 5 MG OTHER 5 DAYS AND RECHECK IN 1 WEEK/SF

## 2019-12-20 ENCOUNTER — ANTICOAG VISIT (OUTPATIENT)
Dept: INTERNAL MEDICINE CLINIC | Facility: CLINIC | Age: 44
End: 2019-12-20

## 2019-12-20 ENCOUNTER — TELEPHONE (OUTPATIENT)
Dept: INTERNAL MEDICINE CLINIC | Facility: CLINIC | Age: 44
End: 2019-12-20

## 2019-12-20 ENCOUNTER — APPOINTMENT (OUTPATIENT)
Dept: LAB | Facility: HOSPITAL | Age: 44
End: 2019-12-20
Payer: COMMERCIAL

## 2019-12-20 LAB — INR PPP: 1.7 (ref 0.84–1.19)

## 2019-12-20 NOTE — TELEPHONE ENCOUNTER
----- Message from Andres Nguyen PA-C sent at 12/20/2019 11:11 AM EST -----   Coumadin 7 5 mg per day recheck 1 week

## 2019-12-26 ENCOUNTER — TELEPHONE (OUTPATIENT)
Dept: NEPHROLOGY | Facility: CLINIC | Age: 44
End: 2019-12-26

## 2019-12-26 DIAGNOSIS — I10 ESSENTIAL HYPERTENSION: ICD-10-CM

## 2019-12-26 DIAGNOSIS — Z94.0 KIDNEY TRANSPLANTED: Primary | ICD-10-CM

## 2019-12-26 DIAGNOSIS — Z94.0 RENAL TRANSPLANT RECIPIENT: ICD-10-CM

## 2019-12-26 NOTE — TELEPHONE ENCOUNTER
----- Message from Aline Anderson MD sent at 12/26/2019  4:10 PM EST -----  Hello    Patient normally is followed up by Ms Keven Lara  Can you please let the patient know that the renal function is stable  Protein creatinine ratio is stable  Tacrolimus level is appropriate  Urine still with small amount of red cells    Potassium is upper limit of normal   Phosphorus is slightly low   -can you please advised the patient to follow low potassium diet  - can pt drink diet coke one can daily for the next 3 days and then stop  - repeat CMP, tac, mag, phos, UA, UPCR in 4 weeks    Thank you    np

## 2019-12-26 NOTE — TELEPHONE ENCOUNTER
Spoke with the patient and he is aware of his lab test results and to follow a low potassium diet and to drink diet coke for the next 3 days and then stop and repeat lab work in 4 weeks which has been mailed out for the patient  The patient has no further questions at this time        Margaret Grossman MA

## 2020-01-17 ENCOUNTER — TELEPHONE (OUTPATIENT)
Dept: INTERNAL MEDICINE CLINIC | Facility: CLINIC | Age: 45
End: 2020-01-17

## 2020-01-21 ENCOUNTER — OFFICE VISIT (OUTPATIENT)
Dept: INTERNAL MEDICINE CLINIC | Facility: CLINIC | Age: 45
End: 2020-01-21
Payer: COMMERCIAL

## 2020-01-21 VITALS
OXYGEN SATURATION: 96 % | DIASTOLIC BLOOD PRESSURE: 76 MMHG | WEIGHT: 163.6 LBS | BODY MASS INDEX: 25.68 KG/M2 | SYSTOLIC BLOOD PRESSURE: 102 MMHG | TEMPERATURE: 97.2 F | HEIGHT: 67 IN | HEART RATE: 112 BPM

## 2020-01-21 DIAGNOSIS — I10 ESSENTIAL HYPERTENSION: Primary | ICD-10-CM

## 2020-01-21 DIAGNOSIS — F33.2 SEVERE EPISODE OF RECURRENT MAJOR DEPRESSIVE DISORDER, WITHOUT PSYCHOTIC FEATURES (HCC): ICD-10-CM

## 2020-01-21 DIAGNOSIS — I12.9 HYPERTENSIVE CHRONIC KIDNEY DISEASE, UNSPECIFIED CKD STAGE: ICD-10-CM

## 2020-01-21 DIAGNOSIS — N18.3 ACUTE RENAL FAILURE SUPERIMPOSED ON STAGE 3 CHRONIC KIDNEY DISEASE, UNSPECIFIED ACUTE RENAL FAILURE TYPE: ICD-10-CM

## 2020-01-21 DIAGNOSIS — Z94.0 RENAL TRANSPLANT RECIPIENT: ICD-10-CM

## 2020-01-21 DIAGNOSIS — R19.7 DIARRHEA, UNSPECIFIED TYPE: ICD-10-CM

## 2020-01-21 DIAGNOSIS — R11.2 NAUSEA AND VOMITING, INTRACTABILITY OF VOMITING NOT SPECIFIED, UNSPECIFIED VOMITING TYPE: ICD-10-CM

## 2020-01-21 DIAGNOSIS — R10.33 PERIUMBILICAL ABDOMINAL PAIN: ICD-10-CM

## 2020-01-21 DIAGNOSIS — Z94.0 HISTORY OF RENAL TRANSPLANT: ICD-10-CM

## 2020-01-21 DIAGNOSIS — N17.9 ACUTE RENAL FAILURE SUPERIMPOSED ON STAGE 3 CHRONIC KIDNEY DISEASE, UNSPECIFIED ACUTE RENAL FAILURE TYPE: ICD-10-CM

## 2020-01-21 PROCEDURE — 99214 OFFICE O/P EST MOD 30 MIN: CPT | Performed by: PHYSICIAN ASSISTANT

## 2020-01-21 PROCEDURE — 3008F BODY MASS INDEX DOCD: CPT | Performed by: PHYSICIAN ASSISTANT

## 2020-01-21 PROCEDURE — 3078F DIAST BP <80 MM HG: CPT | Performed by: PHYSICIAN ASSISTANT

## 2020-01-21 PROCEDURE — 3074F SYST BP LT 130 MM HG: CPT | Performed by: PHYSICIAN ASSISTANT

## 2020-01-23 ENCOUNTER — APPOINTMENT (EMERGENCY)
Dept: CT IMAGING | Facility: HOSPITAL | Age: 45
DRG: 683 | End: 2020-01-23
Payer: COMMERCIAL

## 2020-01-23 ENCOUNTER — APPOINTMENT (OUTPATIENT)
Dept: LAB | Facility: HOSPITAL | Age: 45
DRG: 683 | End: 2020-01-23
Payer: COMMERCIAL

## 2020-01-23 ENCOUNTER — ANTICOAG VISIT (OUTPATIENT)
Dept: INTERNAL MEDICINE CLINIC | Facility: CLINIC | Age: 45
End: 2020-01-23

## 2020-01-23 ENCOUNTER — TELEPHONE (OUTPATIENT)
Dept: INTERNAL MEDICINE CLINIC | Facility: CLINIC | Age: 45
End: 2020-01-23

## 2020-01-23 ENCOUNTER — APPOINTMENT (EMERGENCY)
Dept: RADIOLOGY | Facility: HOSPITAL | Age: 45
DRG: 683 | End: 2020-01-23
Payer: COMMERCIAL

## 2020-01-23 ENCOUNTER — TELEPHONE (OUTPATIENT)
Dept: OTHER | Facility: HOSPITAL | Age: 45
End: 2020-01-23

## 2020-01-23 ENCOUNTER — HOSPITAL ENCOUNTER (INPATIENT)
Facility: HOSPITAL | Age: 45
LOS: 5 days | Discharge: HOME | DRG: 683 | End: 2020-01-28
Attending: EMERGENCY MEDICINE | Admitting: INTERNAL MEDICINE
Payer: COMMERCIAL

## 2020-01-23 DIAGNOSIS — Z94.0 KIDNEY TRANSPLANTED: ICD-10-CM

## 2020-01-23 DIAGNOSIS — R19.7 DIARRHEA, UNSPECIFIED TYPE: ICD-10-CM

## 2020-01-23 DIAGNOSIS — R19.7 NAUSEA VOMITING AND DIARRHEA: ICD-10-CM

## 2020-01-23 DIAGNOSIS — Z86.718 HISTORY OF DVT (DEEP VEIN THROMBOSIS): ICD-10-CM

## 2020-01-23 DIAGNOSIS — K85.90 PANCREATITIS: ICD-10-CM

## 2020-01-23 DIAGNOSIS — Z94.0 RENAL TRANSPLANT RECIPIENT: ICD-10-CM

## 2020-01-23 DIAGNOSIS — Z94.0 HISTORY OF RENAL TRANSPLANT: ICD-10-CM

## 2020-01-23 DIAGNOSIS — R11.2 NAUSEA AND VOMITING, INTRACTABILITY OF VOMITING NOT SPECIFIED, UNSPECIFIED VOMITING TYPE: ICD-10-CM

## 2020-01-23 DIAGNOSIS — R11.2 NAUSEA VOMITING AND DIARRHEA: ICD-10-CM

## 2020-01-23 DIAGNOSIS — E83.42 HYPOMAGNESEMIA: ICD-10-CM

## 2020-01-23 DIAGNOSIS — N17.9 ACUTE RENAL FAILURE (ARF) (HCC): Primary | ICD-10-CM

## 2020-01-23 DIAGNOSIS — R10.33 PERIUMBILICAL ABDOMINAL PAIN: ICD-10-CM

## 2020-01-23 DIAGNOSIS — E86.0 DEHYDRATION: ICD-10-CM

## 2020-01-23 DIAGNOSIS — I10 ESSENTIAL HYPERTENSION: ICD-10-CM

## 2020-01-23 DIAGNOSIS — E87.6 HYPOKALEMIA: ICD-10-CM

## 2020-01-23 LAB
ALBUMIN SERPL BCP-MCNC: 3.3 G/DL (ref 3.5–5)
ALBUMIN SERPL BCP-MCNC: 3.4 G/DL (ref 3.5–5)
ALP SERPL-CCNC: 90 U/L (ref 46–116)
ALP SERPL-CCNC: 95 U/L (ref 46–116)
ALT SERPL W P-5'-P-CCNC: 17 U/L (ref 12–78)
ALT SERPL W P-5'-P-CCNC: 17 U/L (ref 12–78)
ANION GAP SERPL CALCULATED.3IONS-SCNC: 10 MMOL/L (ref 4–13)
ANION GAP SERPL CALCULATED.3IONS-SCNC: 12 MMOL/L (ref 4–13)
APTT PPP: 42 SECONDS (ref 23–37)
AST SERPL W P-5'-P-CCNC: 12 U/L (ref 5–45)
AST SERPL W P-5'-P-CCNC: 14 U/L (ref 5–45)
ATRIAL RATE: 77 BPM
BACTERIA UR QL AUTO: ABNORMAL /HPF
BACTERIA UR QL AUTO: ABNORMAL /HPF
BASOPHILS # BLD AUTO: 0.04 THOUSANDS/ΜL (ref 0–0.1)
BASOPHILS # BLD AUTO: 0.04 THOUSANDS/ΜL (ref 0–0.1)
BASOPHILS NFR BLD AUTO: 1 % (ref 0–1)
BASOPHILS NFR BLD AUTO: 1 % (ref 0–1)
BILIRUB DIRECT SERPL-MCNC: 0.07 MG/DL (ref 0–0.2)
BILIRUB SERPL-MCNC: 0.3 MG/DL (ref 0.2–1)
BILIRUB SERPL-MCNC: 0.4 MG/DL (ref 0.2–1)
BILIRUB UR QL STRIP: NEGATIVE
BILIRUB UR QL STRIP: NEGATIVE
BUN SERPL-MCNC: 59 MG/DL (ref 5–25)
BUN SERPL-MCNC: 63 MG/DL (ref 5–25)
CALCIUM SERPL-MCNC: 9.2 MG/DL (ref 8.3–10.1)
CALCIUM SERPL-MCNC: 9.3 MG/DL (ref 8.3–10.1)
CHLORIDE SERPL-SCNC: 104 MMOL/L (ref 100–108)
CHLORIDE SERPL-SCNC: 105 MMOL/L (ref 100–108)
CHOLEST SERPL-MCNC: 104 MG/DL (ref 50–200)
CLARITY UR: CLEAR
CLARITY UR: CLEAR
CO2 SERPL-SCNC: 20 MMOL/L (ref 21–32)
CO2 SERPL-SCNC: 23 MMOL/L (ref 21–32)
COARSE GRAN CASTS URNS QL MICRO: ABNORMAL /LPF
COLOR UR: YELLOW
COLOR UR: YELLOW
CREAT SERPL-MCNC: 3.34 MG/DL (ref 0.6–1.3)
CREAT SERPL-MCNC: 3.42 MG/DL (ref 0.6–1.3)
CREAT UR-MCNC: 156 MG/DL
EOSINOPHIL # BLD AUTO: 0.57 THOUSAND/ΜL (ref 0–0.61)
EOSINOPHIL # BLD AUTO: 0.65 THOUSAND/ΜL (ref 0–0.61)
EOSINOPHIL NFR BLD AUTO: 8 % (ref 0–6)
EOSINOPHIL NFR BLD AUTO: 9 % (ref 0–6)
ERYTHROCYTE [DISTWIDTH] IN BLOOD BY AUTOMATED COUNT: 12.6 % (ref 11.6–15.1)
ERYTHROCYTE [DISTWIDTH] IN BLOOD BY AUTOMATED COUNT: 12.7 % (ref 11.6–15.1)
ERYTHROCYTE [SEDIMENTATION RATE] IN BLOOD: 3 MM/HOUR (ref 0–10)
EST. AVERAGE GLUCOSE BLD GHB EST-MCNC: 111 MG/DL
GFR SERPL CREATININE-BSD FRML MDRD: 20 ML/MIN/1.73SQ M
GFR SERPL CREATININE-BSD FRML MDRD: 21 ML/MIN/1.73SQ M
GLUCOSE P FAST SERPL-MCNC: 92 MG/DL (ref 65–99)
GLUCOSE SERPL-MCNC: 95 MG/DL (ref 65–140)
GLUCOSE UR STRIP-MCNC: NEGATIVE MG/DL
GLUCOSE UR STRIP-MCNC: NEGATIVE MG/DL
HBA1C MFR BLD: 5.5 % (ref 4.2–6.3)
HCT VFR BLD AUTO: 43.7 % (ref 36.5–49.3)
HCT VFR BLD AUTO: 46.9 % (ref 36.5–49.3)
HDLC SERPL-MCNC: 27 MG/DL
HGB BLD-MCNC: 15.2 G/DL (ref 12–17)
HGB BLD-MCNC: 15.6 G/DL (ref 12–17)
HGB UR QL STRIP.AUTO: ABNORMAL
HGB UR QL STRIP.AUTO: ABNORMAL
HYALINE CASTS #/AREA URNS LPF: ABNORMAL /LPF
IMM GRANULOCYTES # BLD AUTO: 0.01 THOUSAND/UL (ref 0–0.2)
IMM GRANULOCYTES # BLD AUTO: 0.02 THOUSAND/UL (ref 0–0.2)
IMM GRANULOCYTES NFR BLD AUTO: 0 % (ref 0–2)
IMM GRANULOCYTES NFR BLD AUTO: 0 % (ref 0–2)
INR PPP: 4.59 (ref 0.84–1.19)
KETONES UR STRIP-MCNC: NEGATIVE MG/DL
KETONES UR STRIP-MCNC: NEGATIVE MG/DL
LDLC SERPL CALC-MCNC: 39 MG/DL (ref 0–100)
LEUKOCYTE ESTERASE UR QL STRIP: NEGATIVE
LEUKOCYTE ESTERASE UR QL STRIP: NEGATIVE
LIPASE SERPL-CCNC: 2231 U/L (ref 73–393)
LIPASE SERPL-CCNC: 523 U/L (ref 73–393)
LYMPHOCYTES # BLD AUTO: 1.53 THOUSANDS/ΜL (ref 0.6–4.47)
LYMPHOCYTES # BLD AUTO: 1.76 THOUSANDS/ΜL (ref 0.6–4.47)
LYMPHOCYTES NFR BLD AUTO: 20 % (ref 14–44)
LYMPHOCYTES NFR BLD AUTO: 25 % (ref 14–44)
MAGNESIUM SERPL-MCNC: 1.8 MG/DL (ref 1.6–2.6)
MAGNESIUM SERPL-MCNC: 2 MG/DL (ref 1.6–2.6)
MCH RBC QN AUTO: 30.8 PG (ref 26.8–34.3)
MCH RBC QN AUTO: 31.7 PG (ref 26.8–34.3)
MCHC RBC AUTO-ENTMCNC: 33.3 G/DL (ref 31.4–37.4)
MCHC RBC AUTO-ENTMCNC: 34.8 G/DL (ref 31.4–37.4)
MCV RBC AUTO: 91 FL (ref 82–98)
MCV RBC AUTO: 93 FL (ref 82–98)
MONOCYTES # BLD AUTO: 0.64 THOUSAND/ΜL (ref 0.17–1.22)
MONOCYTES # BLD AUTO: 0.74 THOUSAND/ΜL (ref 0.17–1.22)
MONOCYTES NFR BLD AUTO: 10 % (ref 4–12)
MONOCYTES NFR BLD AUTO: 9 % (ref 4–12)
MUCOUS THREADS UR QL AUTO: ABNORMAL
NEUTROPHILS # BLD AUTO: 3.86 THOUSANDS/ΜL (ref 1.85–7.62)
NEUTROPHILS # BLD AUTO: 4.74 THOUSANDS/ΜL (ref 1.85–7.62)
NEUTS SEG NFR BLD AUTO: 56 % (ref 43–75)
NEUTS SEG NFR BLD AUTO: 61 % (ref 43–75)
NITRITE UR QL STRIP: NEGATIVE
NITRITE UR QL STRIP: NEGATIVE
NON-SQ EPI CELLS URNS QL MICRO: ABNORMAL /HPF
NON-SQ EPI CELLS URNS QL MICRO: ABNORMAL /HPF
NONHDLC SERPL-MCNC: 77 MG/DL
NRBC BLD AUTO-RTO: 0 /100 WBCS
NRBC BLD AUTO-RTO: 0 /100 WBCS
P AXIS: 48 DEGREES
PH UR STRIP.AUTO: 6 [PH]
PH UR STRIP.AUTO: 6 [PH]
PHOSPHATE SERPL-MCNC: 3.1 MG/DL (ref 2.7–4.5)
PHOSPHATE SERPL-MCNC: 4.3 MG/DL (ref 2.7–4.5)
PLATELET # BLD AUTO: 243 THOUSANDS/UL (ref 149–390)
PLATELET # BLD AUTO: 247 THOUSANDS/UL (ref 149–390)
PMV BLD AUTO: 10.2 FL (ref 8.9–12.7)
PMV BLD AUTO: 10.4 FL (ref 8.9–12.7)
POTASSIUM SERPL-SCNC: 3.3 MMOL/L (ref 3.5–5.3)
POTASSIUM SERPL-SCNC: 4.1 MMOL/L (ref 3.5–5.3)
PR INTERVAL: 176 MS
PROT SERPL-MCNC: 7 G/DL (ref 6.4–8.2)
PROT SERPL-MCNC: 7.3 G/DL (ref 6.4–8.2)
PROT UR STRIP-MCNC: ABNORMAL MG/DL
PROT UR STRIP-MCNC: ABNORMAL MG/DL
PROT UR-MCNC: 51 MG/DL
PROT/CREAT UR: 0.33 MG/G{CREAT} (ref 0–0.1)
PROTHROMBIN TIME: 44.3 SECONDS (ref 11.6–14.5)
QRS AXIS: 33 DEGREES
QRSD INTERVAL: 94 MS
QT INTERVAL: 364 MS
QTC INTERVAL: 411 MS
RBC # BLD AUTO: 4.8 MILLION/UL (ref 3.88–5.62)
RBC # BLD AUTO: 5.07 MILLION/UL (ref 3.88–5.62)
RBC #/AREA URNS AUTO: ABNORMAL /HPF
RBC #/AREA URNS AUTO: ABNORMAL /HPF
SODIUM SERPL-SCNC: 137 MMOL/L (ref 136–145)
SODIUM SERPL-SCNC: 137 MMOL/L (ref 136–145)
SP GR UR STRIP.AUTO: 1.02 (ref 1–1.03)
SP GR UR STRIP.AUTO: 1.02 (ref 1–1.03)
T WAVE AXIS: 35 DEGREES
TACROLIMUS BLD-MCNC: 15.3 NG/ML (ref 2–20)
TRIGL SERPL-MCNC: 191 MG/DL
TROPONIN I SERPL-MCNC: <0.02 NG/ML
TSH SERPL DL<=0.05 MIU/L-ACNC: 1.69 UIU/ML (ref 0.36–3.74)
UROBILINOGEN UR QL STRIP.AUTO: 0.2 E.U./DL
UROBILINOGEN UR QL STRIP.AUTO: 0.2 E.U./DL
VENTRICULAR RATE: 77 BPM
WBC # BLD AUTO: 6.96 THOUSAND/UL (ref 4.31–10.16)
WBC # BLD AUTO: 7.64 THOUSAND/UL (ref 4.31–10.16)
WBC #/AREA URNS AUTO: ABNORMAL /HPF
WBC #/AREA URNS AUTO: ABNORMAL /HPF

## 2020-01-23 PROCEDURE — 99285 EMERGENCY DEPT VISIT HI MDM: CPT | Performed by: EMERGENCY MEDICINE

## 2020-01-23 PROCEDURE — 85025 COMPLETE CBC W/AUTO DIFF WBC: CPT

## 2020-01-23 PROCEDURE — 93005 ELECTROCARDIOGRAM TRACING: CPT

## 2020-01-23 PROCEDURE — 82570 ASSAY OF URINE CREATININE: CPT

## 2020-01-23 PROCEDURE — 71046 X-RAY EXAM CHEST 2 VIEWS: CPT

## 2020-01-23 PROCEDURE — 85652 RBC SED RATE AUTOMATED: CPT

## 2020-01-23 PROCEDURE — 81001 URINALYSIS AUTO W/SCOPE: CPT | Performed by: PHYSICIAN ASSISTANT

## 2020-01-23 PROCEDURE — 84156 ASSAY OF PROTEIN URINE: CPT

## 2020-01-23 PROCEDURE — 83690 ASSAY OF LIPASE: CPT

## 2020-01-23 PROCEDURE — 96360 HYDRATION IV INFUSION INIT: CPT

## 2020-01-23 PROCEDURE — 99223 1ST HOSP IP/OBS HIGH 75: CPT | Performed by: STUDENT IN AN ORGANIZED HEALTH CARE EDUCATION/TRAINING PROGRAM

## 2020-01-23 PROCEDURE — 83036 HEMOGLOBIN GLYCOSYLATED A1C: CPT

## 2020-01-23 PROCEDURE — 84100 ASSAY OF PHOSPHORUS: CPT | Performed by: EMERGENCY MEDICINE

## 2020-01-23 PROCEDURE — 74176 CT ABD & PELVIS W/O CONTRAST: CPT

## 2020-01-23 PROCEDURE — 85025 COMPLETE CBC W/AUTO DIFF WBC: CPT | Performed by: EMERGENCY MEDICINE

## 2020-01-23 PROCEDURE — 85730 THROMBOPLASTIN TIME PARTIAL: CPT | Performed by: EMERGENCY MEDICINE

## 2020-01-23 PROCEDURE — 84484 ASSAY OF TROPONIN QUANT: CPT | Performed by: EMERGENCY MEDICINE

## 2020-01-23 PROCEDURE — 80048 BASIC METABOLIC PNL TOTAL CA: CPT | Performed by: EMERGENCY MEDICINE

## 2020-01-23 PROCEDURE — 83735 ASSAY OF MAGNESIUM: CPT | Performed by: EMERGENCY MEDICINE

## 2020-01-23 PROCEDURE — 93010 ELECTROCARDIOGRAM REPORT: CPT | Performed by: INTERNAL MEDICINE

## 2020-01-23 PROCEDURE — 83690 ASSAY OF LIPASE: CPT | Performed by: EMERGENCY MEDICINE

## 2020-01-23 PROCEDURE — 80076 HEPATIC FUNCTION PANEL: CPT | Performed by: EMERGENCY MEDICINE

## 2020-01-23 PROCEDURE — 84443 ASSAY THYROID STIM HORMONE: CPT

## 2020-01-23 PROCEDURE — 81001 URINALYSIS AUTO W/SCOPE: CPT | Performed by: EMERGENCY MEDICINE

## 2020-01-23 PROCEDURE — 80061 LIPID PANEL: CPT

## 2020-01-23 PROCEDURE — 80197 ASSAY OF TACROLIMUS: CPT

## 2020-01-23 PROCEDURE — 83735 ASSAY OF MAGNESIUM: CPT

## 2020-01-23 PROCEDURE — 80053 COMPREHEN METABOLIC PANEL: CPT

## 2020-01-23 PROCEDURE — 84100 ASSAY OF PHOSPHORUS: CPT

## 2020-01-23 PROCEDURE — 99285 EMERGENCY DEPT VISIT HI MDM: CPT

## 2020-01-23 PROCEDURE — 85610 PROTHROMBIN TIME: CPT | Performed by: EMERGENCY MEDICINE

## 2020-01-23 RX ORDER — MYCOPHENOLATE MOFETIL 250 MG/1
250 CAPSULE ORAL EVERY 12 HOURS SCHEDULED
Status: DISCONTINUED | OUTPATIENT
Start: 2020-01-24 | End: 2020-01-23

## 2020-01-23 RX ORDER — TACROLIMUS 0.5 MG/1
1 CAPSULE ORAL 2 TIMES DAILY
Status: DISCONTINUED | OUTPATIENT
Start: 2020-01-24 | End: 2020-01-28

## 2020-01-23 RX ORDER — MYCOPHENOLATE MOFETIL 250 MG/1
500 CAPSULE ORAL EVERY MORNING
Status: DISCONTINUED | OUTPATIENT
Start: 2020-01-24 | End: 2020-01-28 | Stop reason: HOSPADM

## 2020-01-23 RX ORDER — MYCOPHENOLATE MOFETIL 250 MG/1
250 CAPSULE ORAL EVERY EVENING
Status: DISCONTINUED | OUTPATIENT
Start: 2020-01-24 | End: 2020-01-28 | Stop reason: HOSPADM

## 2020-01-23 RX ORDER — POTASSIUM CHLORIDE 20 MEQ/1
40 TABLET, EXTENDED RELEASE ORAL ONCE
Status: COMPLETED | OUTPATIENT
Start: 2020-01-23 | End: 2020-01-23

## 2020-01-23 RX ORDER — SODIUM CHLORIDE 9 MG/ML
125 INJECTION, SOLUTION INTRAVENOUS CONTINUOUS
Status: DISCONTINUED | OUTPATIENT
Start: 2020-01-23 | End: 2020-01-26

## 2020-01-23 RX ADMIN — SODIUM CHLORIDE 1000 ML: 0.9 INJECTION, SOLUTION INTRAVENOUS at 20:15

## 2020-01-23 RX ADMIN — POTASSIUM CHLORIDE 40 MEQ: 1500 TABLET, EXTENDED RELEASE ORAL at 20:55

## 2020-01-23 RX ADMIN — SODIUM CHLORIDE 1000 ML: 0.9 INJECTION, SOLUTION INTRAVENOUS at 22:32

## 2020-01-23 NOTE — TELEPHONE ENCOUNTER
Renal transplant note    Labs reviewed  Patient had lab work today showed a creatinine which is significantly higher than baseline at 3 4 mg/dL  Patient's baseline is approximately 1 5-1 7 mg/dL  Talking with the patient, patient states that he has had nausea, vomiting, diarrhea for 1 month  This resolved approximately 3 days ago  Etiology was unclear  He saw his primary care office couple days ago and the lab work was recommended  Lipase was elevated also    Urinalysis is stable  Patient does have chronic microscopic hematuria likely in the setting of IgA    At this juncture I asked the patient to go to the hospital for further evaluation and care  From the renal standpoint would recommend:    -follow-up tacrolimus level-this could be significantly elevated in the setting of diarrhea which can also cause acute kidney injury  -volume expansion-this is based on the patient's history over the phone given volume loss  This could also lead to acute kidney injury  -check a renal/abdominal ultrasound or CT scan of the abdomen and pelvis without contrast  - will need gastroenterology evaluation  -will need CMV PCR  - will need EBV PCR  -will also need bk PCR of the serum  - follow-up protein creatinine ratio  -I have sent a message the patient's primary nephrologist  -I have asked the patient to go to the ER  Patient states that he cannot go tonight given that he cares for his son and his wife is at work    Then our phone call got disconnected and therefore I called the patient multiple times after and left voicemail   - have placed a message to on call Nephro at [facility]  - have called ER at [place]

## 2020-01-23 NOTE — TELEPHONE ENCOUNTER
----- Message from Aviva Barron PA-C sent at 1/23/2020 11:41 AM EST -----  No Coumadin for 2 days then recheck it January 25th

## 2020-01-24 ENCOUNTER — APPOINTMENT (INPATIENT)
Dept: ULTRASOUND IMAGING | Facility: HOSPITAL | Age: 45
DRG: 683 | End: 2020-01-24
Payer: COMMERCIAL

## 2020-01-24 PROBLEM — N17.9 AKI (ACUTE KIDNEY INJURY) (HCC): Status: ACTIVE | Noted: 2020-01-24

## 2020-01-24 PROBLEM — E87.6 HYPOKALEMIA: Status: ACTIVE | Noted: 2020-01-24

## 2020-01-24 LAB
ANION GAP SERPL CALCULATED.3IONS-SCNC: 11 MMOL/L (ref 4–13)
BASOPHILS # BLD AUTO: 0.02 THOUSANDS/ΜL (ref 0–0.1)
BASOPHILS NFR BLD AUTO: 0 % (ref 0–1)
BUN SERPL-MCNC: 50 MG/DL (ref 5–25)
CALCIUM SERPL-MCNC: 8.8 MG/DL (ref 8.3–10.1)
CHLORIDE SERPL-SCNC: 110 MMOL/L (ref 100–108)
CO2 SERPL-SCNC: 19 MMOL/L (ref 21–32)
CREAT SERPL-MCNC: 2.47 MG/DL (ref 0.6–1.3)
EOSINOPHIL # BLD AUTO: 0.2 THOUSAND/ΜL (ref 0–0.61)
EOSINOPHIL NFR BLD AUTO: 3 % (ref 0–6)
ERYTHROCYTE [DISTWIDTH] IN BLOOD BY AUTOMATED COUNT: 12.6 % (ref 11.6–15.1)
GFR SERPL CREATININE-BSD FRML MDRD: 30 ML/MIN/1.73SQ M
GLUCOSE SERPL-MCNC: 118 MG/DL (ref 65–140)
HCT VFR BLD AUTO: 43.1 % (ref 36.5–49.3)
HGB BLD-MCNC: 14.6 G/DL (ref 12–17)
IMM GRANULOCYTES # BLD AUTO: 0.02 THOUSAND/UL (ref 0–0.2)
IMM GRANULOCYTES NFR BLD AUTO: 0 % (ref 0–2)
INR PPP: 4.55 (ref 0.84–1.19)
LIPASE SERPL-CCNC: 192 U/L (ref 73–393)
LYMPHOCYTES # BLD AUTO: 1.4 THOUSANDS/ΜL (ref 0.6–4.47)
LYMPHOCYTES NFR BLD AUTO: 20 % (ref 14–44)
MCH RBC QN AUTO: 31.3 PG (ref 26.8–34.3)
MCHC RBC AUTO-ENTMCNC: 33.9 G/DL (ref 31.4–37.4)
MCV RBC AUTO: 93 FL (ref 82–98)
MONOCYTES # BLD AUTO: 0.63 THOUSAND/ΜL (ref 0.17–1.22)
MONOCYTES NFR BLD AUTO: 9 % (ref 4–12)
NEUTROPHILS # BLD AUTO: 4.75 THOUSANDS/ΜL (ref 1.85–7.62)
NEUTS SEG NFR BLD AUTO: 68 % (ref 43–75)
NRBC BLD AUTO-RTO: 0 /100 WBCS
PLATELET # BLD AUTO: 220 THOUSANDS/UL (ref 149–390)
PMV BLD AUTO: 10.4 FL (ref 8.9–12.7)
POTASSIUM SERPL-SCNC: 3.1 MMOL/L (ref 3.5–5.3)
PROTHROMBIN TIME: 44 SECONDS (ref 11.6–14.5)
RBC # BLD AUTO: 4.66 MILLION/UL (ref 3.88–5.62)
SODIUM SERPL-SCNC: 140 MMOL/L (ref 136–145)
WBC # BLD AUTO: 7.02 THOUSAND/UL (ref 4.31–10.16)

## 2020-01-24 PROCEDURE — 36415 COLL VENOUS BLD VENIPUNCTURE: CPT | Performed by: STUDENT IN AN ORGANIZED HEALTH CARE EDUCATION/TRAINING PROGRAM

## 2020-01-24 PROCEDURE — 90686 IIV4 VACC NO PRSV 0.5 ML IM: CPT | Performed by: STUDENT IN AN ORGANIZED HEALTH CARE EDUCATION/TRAINING PROGRAM

## 2020-01-24 PROCEDURE — 85025 COMPLETE CBC W/AUTO DIFF WBC: CPT | Performed by: STUDENT IN AN ORGANIZED HEALTH CARE EDUCATION/TRAINING PROGRAM

## 2020-01-24 PROCEDURE — 99232 SBSQ HOSP IP/OBS MODERATE 35: CPT | Performed by: NURSE PRACTITIONER

## 2020-01-24 PROCEDURE — 85610 PROTHROMBIN TIME: CPT | Performed by: STUDENT IN AN ORGANIZED HEALTH CARE EDUCATION/TRAINING PROGRAM

## 2020-01-24 PROCEDURE — 90471 IMMUNIZATION ADMIN: CPT | Performed by: STUDENT IN AN ORGANIZED HEALTH CARE EDUCATION/TRAINING PROGRAM

## 2020-01-24 PROCEDURE — 80048 BASIC METABOLIC PNL TOTAL CA: CPT | Performed by: STUDENT IN AN ORGANIZED HEALTH CARE EDUCATION/TRAINING PROGRAM

## 2020-01-24 PROCEDURE — 83690 ASSAY OF LIPASE: CPT | Performed by: STUDENT IN AN ORGANIZED HEALTH CARE EDUCATION/TRAINING PROGRAM

## 2020-01-24 PROCEDURE — 76705 ECHO EXAM OF ABDOMEN: CPT

## 2020-01-24 PROCEDURE — 99255 IP/OBS CONSLTJ NEW/EST HI 80: CPT | Performed by: INTERNAL MEDICINE

## 2020-01-24 RX ORDER — POTASSIUM CHLORIDE 14.9 MG/ML
20 INJECTION INTRAVENOUS
Status: COMPLETED | OUTPATIENT
Start: 2020-01-24 | End: 2020-01-24

## 2020-01-24 RX ORDER — DILTIAZEM HYDROCHLORIDE 60 MG/1
120 TABLET, FILM COATED ORAL 2 TIMES DAILY
Status: DISCONTINUED | OUTPATIENT
Start: 2020-01-24 | End: 2020-01-28 | Stop reason: HOSPADM

## 2020-01-24 RX ORDER — DEXTROAMPHETAMINE SACCHARATE, AMPHETAMINE ASPARTATE, DEXTROAMPHETAMINE SULFATE AND AMPHETAMINE SULFATE 2.5; 2.5; 2.5; 2.5 MG/1; MG/1; MG/1; MG/1
20 TABLET ORAL
Status: DISCONTINUED | OUTPATIENT
Start: 2020-01-24 | End: 2020-01-28 | Stop reason: HOSPADM

## 2020-01-24 RX ADMIN — SODIUM CHLORIDE 125 ML/HR: 0.9 INJECTION, SOLUTION INTRAVENOUS at 00:23

## 2020-01-24 RX ADMIN — DEXTROAMPHETAMINE SACCHARATE, AMPHETAMINE ASPARTATE, DEXTROAMPHETAMINE SULFATE AND AMPHETAMINE SULFATE 20 MG: 2.5; 2.5; 2.5; 2.5 TABLET ORAL at 11:43

## 2020-01-24 RX ADMIN — POTASSIUM CHLORIDE 20 MEQ: 200 INJECTION, SOLUTION INTRAVENOUS at 11:41

## 2020-01-24 RX ADMIN — TACROLIMUS 1 MG: 0.5 CAPSULE ORAL at 11:44

## 2020-01-24 RX ADMIN — DEXTROAMPHETAMINE SACCHARATE, AMPHETAMINE ASPARTATE, DEXTROAMPHETAMINE SULFATE AND AMPHETAMINE SULFATE 20 MG: 2.5; 2.5; 2.5; 2.5 TABLET ORAL at 15:05

## 2020-01-24 RX ADMIN — SODIUM CHLORIDE 125 ML/HR: 0.9 INJECTION, SOLUTION INTRAVENOUS at 22:55

## 2020-01-24 RX ADMIN — SODIUM CHLORIDE 125 ML/HR: 0.9 INJECTION, SOLUTION INTRAVENOUS at 15:05

## 2020-01-24 RX ADMIN — SERTRALINE HYDROCHLORIDE 100 MG: 100 TABLET ORAL at 11:44

## 2020-01-24 RX ADMIN — SODIUM CHLORIDE 125 ML/HR: 0.9 INJECTION, SOLUTION INTRAVENOUS at 11:42

## 2020-01-24 RX ADMIN — TACROLIMUS 1 MG: 0.5 CAPSULE ORAL at 17:22

## 2020-01-24 RX ADMIN — MYCOPHENOLATE MOFETIL 250 MG: 250 CAPSULE ORAL at 17:22

## 2020-01-24 RX ADMIN — DILTIAZEM HYDROCHLORIDE 120 MG: 60 TABLET, FILM COATED ORAL at 11:49

## 2020-01-24 RX ADMIN — DILTIAZEM HYDROCHLORIDE 120 MG: 60 TABLET, FILM COATED ORAL at 17:22

## 2020-01-24 RX ADMIN — MYCOPHENOLATE MOFETIL 500 MG: 250 CAPSULE ORAL at 08:38

## 2020-01-24 RX ADMIN — POTASSIUM CHLORIDE 20 MEQ: 200 INJECTION, SOLUTION INTRAVENOUS at 15:00

## 2020-01-24 RX ADMIN — LURASIDONE HYDROCHLORIDE 80 MG: 40 TABLET, FILM COATED ORAL at 11:44

## 2020-01-24 RX ADMIN — INFLUENZA VIRUS VACCINE 0.5 ML: 15; 15; 15; 15 SUSPENSION INTRAMUSCULAR at 11:45

## 2020-01-24 NOTE — ASSESSMENT & PLAN NOTE
Presentation with history of hypertension  Home medication includes Cardizem 120 mg daily  Currently stable  Continue to monitor

## 2020-01-24 NOTE — ASSESSMENT & PLAN NOTE
39-year-old male past medical history of CKD stage 3  with baseline creatinine is around 1 5  Presents with creatinine of 3 43  BUN elevated to 59  Patient has been having nausea vomiting and diarrhea likely due to pancreatitis of unclear etiology for last 1 month  Patient reports that GI symptoms have now resolved  Tolerating diet well  Denies any new complaints  Was sent to emergency room by primary Nephrology for elevated creatinine  Acute kidney injury likely prerenal due to dehydration  Currently afebrile, hemodynamically stable  Nontoxic appearing  Appears comfortable  Hemodynamically stable  Continue IV hydration    Monitor renal function  Avoid nephrotoxic agents  Creatinine improved to 2 47

## 2020-01-24 NOTE — CONSULTS
NEPHROLOGY CONSULTATION NOTE    Patient: Rosy Xavier               Sex: male          DOA: 1/23/2020  7:12 PM   Date of Birth: @        Age: @        LOS:  LOS: 1 day     REFERRING PHYSICIAN: Marquis Jennifer MD     [address] / CONSULTATION:  Further management of FRANCES    DATE OF CONSULTATION / SERVICE: 1/24/2020    ADMISSION DIAGNOSIS: Acute renal failure superimposed on stage 3 chronic kidney disease ([address] )     CHIEF COMPLAINT     I have abnormal blood work    HPI     This is 68-year-old male with past medical history of renal transplant, came into the ER for further evaluation of abnormal blood work  Patient was found to have elevated creatinine on admission and Nephrology were consulted for further management of FRANCES  Upon review of old medical records, patient has ESRD but received living related kidney transplant in 2009  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  Now patient is been followed by Dr Dayan Saini for kidney transplant  I have reviewed his last note with the recommendation  Patient is currently on tacrolimus 1 mg PO BID along with  mg in the morning and 250 mg at night time  Upon review of old medical records, patient's previously known baseline creatinine is around 1 6-1 8  Admission creatinine was 3 4  On admission patient was also found to have elevated lipase level at 2231 with diarrhea  Currently patient is receiving IV fluid and overnight breathing has remained stable  Patient also have major depressive disorder and currently his mood is under well controlled with current anti psychotic medications  Patient also has hypertension which seems under well control with the use of Cardizem  Patient also has DVT in the past and currently taking Coumadin  INR on admission was 4 59 and currently Coumadin on hold  Patient complains of diarrhea this morning but denies nausea, vomiting, headache, dizziness or rash      PAST MEDICAL HISTORY     Past Medical History:   Diagnosis Date    ADD (attention deficit disorder)     Chronic kidney disease     Depression     [facility]     H/O immunosuppressive therapy     History of kidney disease     Hypertension     Nephropathy, IgA        PAST SURGICAL HISTORY     Past Surgical History:   Procedure Laterality Date    NEPHRECTOMY TRANSPLANTED ORGAN         ALLERGIES     Allergies   Allergen Reactions    Penicillins Rash       SOCIAL HISTORY     Social History     Substance and Sexual Activity   Alcohol Use Not Currently    Frequency: Monthly or less    Binge frequency: Never    Comment: Occasionally     Social History     Substance and Sexual Activity   Drug Use Yes    Types: Marijuana    Comment: smokes marijuana a few times daily     Social History     Tobacco Use   Smoking Status Former Smoker    Years: 4 00   Smokeless Tobacco Never Used       FAMILY HISTORY     Family History   Problem Relation Age of Onset    Deep vein thrombosis Father     Deep vein thrombosis Paternal Grandfather     Transient ischemic attack Mother        CURRENT MEDICATIONS       Current Facility-Administered Medications:     amphetamine-dextroamphetamine (ADDERALL) tablet 20 mg, 20 mg, Oral, BID (AM & Afternoon), Heber ARORA MD    diltiazem (CARDIZEM) tablet 120 mg, 120 mg, Oral, BID, Heber ARORA MD    influenza vaccine, quadrivalent (FLUARIX) IM injection 0 5 mL, 0 5 mL, Intramuscular, Once, Heber ARORA MD    lurasidone (LATUDA) tablet 80 mg, 80 mg, Oral, Daily, Heber ARORA MD    mycophenolate (CELLCEPT) capsule 250 mg, 250 mg, Oral, QPM, Heber ARORA MD    mycophenolate (CELLCEPT) capsule 500 mg, 500 mg, Oral, QAM, Heber ARORA MD, 500 mg at 01/24/20 0838    sertraline (ZOLOFT) tablet 100 mg, 100 mg, Oral, QAM, Heber ARORA MD    sodium chloride 0 9 % infusion, 125 mL/hr, Intravenous, Continuous, Heber ARORA MD, Last Rate: 125 mL/hr at 01/24/20 0023, 125 mL/hr at 01/24/20 0023    tacrolimus (PROGRAF) capsule 1 mg, 1 mg, Oral, BID, Heber ARORA MD    Current Outpatient Medications:     amphetamine-dextroamphetamine (ADDERALL) 20 mg tablet, TAKE 1 TABLET BY MOUTH TWICE A DAY(ONGOING TREATMENT), Disp: , Rfl: 0    Cholecalciferol (VITAMIN D-3) 1000 units CAPS, Take 2,000 Units by mouth daily, Disp: , Rfl:     diltiazem (CARDIZEM) 120 MG tablet, TAKE 1 TABLET TWICE DAILY, Disp: , Rfl:     LATUDA 60 MG TABS, Take 80 mg by mouth daily , Disp: , Rfl: 2    mycophenolate (CELLCEPT) 250 mg capsule, Take 2 in the AM, 1 in the PM , Disp: 90 capsule, Rfl: 5    sertraline (ZOLOFT) 50 mg tablet, Take 100 mg by mouth every morning , Disp: , Rfl: 0    tacrolimus (PROGRAF) 1 mg capsule, Take 1 capsule (1 mg total) by mouth 2 (two) times a day, Disp: 60 capsule, Rfl: 5    warfarin (COUMADIN) 5 mg tablet, TAKE 1 TO 2 TABLETS BY MOUTH EVERY DAY, Disp: 60 tablet, Rfl: 5    warfarin (COUMADIN) 7 5 mg tablet, Take 1 tablet (7 5 mg total) by mouth daily, Disp: , Rfl:     REVIEW OF SYSTEMS     Complete 10 points of review of systems were obtained and discussed in length with patient today  Complete 10 points of review of systems were negative/unremarkable except mentioned in the HPI section  OBJECTIVE     Current Weight: Weight - Scale: 79 8 kg (176 lb)  Vitals:    01/24/20 1020   BP: 134/93   Pulse: 73   Resp: 18   Temp: 98 2 °F (36 8 °C)   SpO2: 99%     Body mass index is 27 98 kg/m²  Intake/Output Summary (Last 24 hours) at 1/24/2020 1040  Last data filed at 1/24/2020 0200  Gross per 24 hour   Intake 2000 ml   Output    Net 2000 ml       PHYSICAL EXAMINATION     Physical Exam   Constitutional: No distress  HENT:   Head: Normocephalic and atraumatic  Eyes: No scleral icterus  Neck: Neck supple  No JVD present  Cardiovascular: Normal rate  Pulmonary/Chest: No accessory muscle usage  No respiratory distress  He has no wheezes  Abdominal: Soft   He exhibits no distension  Musculoskeletal: He exhibits no edema  Right hand: He exhibits no laceration  Left hand: He exhibits no laceration  Neurological: He is alert  Skin: Skin is warm  No cyanosis  Psychiatric: He is not combative  He expresses no suicidal ideation  LAB RESULTS        Results from last 7 days   Lab Units 01/24/20  0510 01/23/20 2016 01/23/20  1054   WBC Thousand/uL 7 02 7 64 6 96   HEMOGLOBIN g/dL 14 6 15 2 15 6   HEMATOCRIT % 43 1 43 7 46 9   PLATELETS Thousands/uL 220 243 247   SODIUM mmol/L 140 137 137   POTASSIUM mmol/L 3 1* 3 3* 4 1   CHLORIDE mmol/L 110* 105 104   CO2 mmol/L 19* 20* 23   BUN mg/dL 50* 59* 63*   CREATININE mg/dL 2 47* 3 34* 3 42*   EGFR ml/min/1 73sq m 30 21 20   CALCIUM mg/dL 8 8 9 2 9 3   MAGNESIUM mg/dL  --  1 8 2 0   PHOSPHORUS mg/dL  --  3 1 4 3       I have personally reviewed the old medical records and patient's previously known baseline creatinine level is ~ 1 6-1 8    RADIOLOGY RESULTS     CT abdomen pelvis wo contrast   Final Result by Lilian Méndez MD (01/23 2041)      1  Atrophic native kidneys  2   Unremarkable appearance of a left-sided renal transplant  3   No evidence of acute abnormality in the abdomen or pelvis  Workstation performed: [id number]         XR chest 2 views   ED Interpretation by Leland Quinonez DO (01/23 2026)   No acute abnormality in the chest       Final Result by Cole Mckeon MD (01/24 4061)      No acute cardiopulmonary disease  This report is in agreement with the preliminary interpretation  Workstation performed: [id number]         US liver    (Results Pending)       PLAN / RECOMMENDATIONS      1  FRANCES on top of CKD stage 3 in the setting of kidney transplant  Present on admission, suspected due to intravascular volume depletion in the setting of elevated lipase level with diarrhea      Upon review of old medical records, patient's previously known baseline creatinine is around 1  6-1 8  Patient has renal transplant and been followed by Dr Ethan Rizzo  Admission creatinine was 3 41  Life patient on admission was elevated at > 2000 with diarrhea  Patient has received IV fluids since admission and currently seems nonoliguric  CT scan of abdomen and pelvis done on admission showed no hydronephrosis of transplant kidney  Urine analysis done on admission also did not show any dysuria  Will plan to continue IV fluids today and monitor renal function  2  Renal transplant  Patient has received living related renal transplant in 2009 at [facility], and now been followed by Dr Ethan Rizzo  Currently patient is on  mg in the morning with 250 at night and tacrolimus 1 mg PO BID  Tacrolimus level done yesterday was 15 3 which is on the higher side  Patient had FRANCES and also had diarrhea and both can raise tacrolimus level although I am suspecting that yesterday's level may not be accurate     Plan to check tacrolimus level with AM labs  Patient was admitted with diarrhea but also found to have pancreatitis and would continue current MMF dose for time being  3  Hypertension in chronic kidney disease  Current blood pressure is under well control and monitor hypertension with Cardizem 120 mg PO BID  4  Hypokalemia  Suspect due to GI loss of potassium in the setting of diarrhea  Current potassium is 3 1 which is below the goal   Plan potassium chloride 20 mEq q 2 hours x2 doses IV today  Check magnesium and potassium level with AM labs  Thank you for the consultation to participate in patient's care  I have personally discussed my overall above mentioned plan with the current [address] [facility]  Alberto Scott MD  Nephrology  1/24/2020        Portions of the record may have been created with voice recognition software  Occasional wrong word or "sound a like" substitutions may have occurred due to the inherent limitations of voice recognition software   Read the chart carefully and recognize, using context, where substitutions have occurred

## 2020-01-24 NOTE — UTILIZATION REVIEW
Initial Clinical Review    Admission: Date/Time/Statement: Inpatient Admission Orders (From admission, onward)     Ordered        01/23/20 2203  Inpatient Admission  Once                   Orders Placed This Encounter   Procedures    Inpatient Admission     Standing Status:   Standing     Number of Occurrences:   1     Order Specific Question:   Admitting Physician     Answer:   Maciel Gregorio [[id number]]     Order Specific Question:   Level of Care     Answer:   Med Surg [[id number]]     Order Specific Question:   Estimated length of stay     Answer:   More than 2 Midnights     Order Specific Question:   Certification     Answer:   I certify that inpatient services are medically necessary for this patient for a duration of greater than two midnights  See H&P and MD Progress Notes for additional information about the patient's course of treatment  ED Arrival Information     Expected Arrival Acuity Means of Arrival Escorted By Service Admission Type    - 1/23/2020 18:50 Urgent Walk-In Self Hospitalist Urgent    Arrival Complaint    elevated Creat        Chief Complaint   Patient presents with    Abnormal Lab     Patient reports his kidney doctor asked him to come in because his creatine is 3 4 which is triple where he is normally at  Patient reports he had a kidney transplant in 2009  Assessment/Plan: 53year old male sent into ED by his doctor for elevated creatinine  Admitted to inpatient for ARF superimposed on CKD  H/o renal transplant in 2009, DVT  For a month, has been having abdominal pain, nausea, vomiting, diarrhea but symptoms have subsided for 2 days  Baseline creat is 1 5, found to have creat of 3 43, BUN 63 with elevated Lipase, diagnosed with pancreatitis outpatient  GI symptoms have resolved and is tolerating diet without difficulty  FRANCES likely prerenal due to dehydration  Continue with IV hydration, avoid nephrotoxic agents  Unclear etiology of his pancreatitis  Nephrology consult    Also has H/o depressive disorder with psychotic features  Continue meds  No SI or HI currently  INR supratherapeutic at 4 59  Hold coumadin dose and recheck  Nephrology Consult 1/24:  Lipase 2231 with diarrhea  Currently receiving IV fluids  CT scan of abdomen and pelvis showed no hydronephrosis of transplant kidney  hypokalemia likely due to GI loss of potassium  Given IV supplement in addition to  PO  Recheck  Ua shows no sign of dysuria  Recheck and monitor renal function  ED Triage Vitals [01/23/20 1853]   Temperature Pulse Respirations Blood Pressure SpO2   97 8 °F (36 6 °C) 84 18 131/87 98 %      Temp Source Heart Rate Source Patient Position - Orthostatic VS BP Location FiO2 (%)   Oral Monitor Sitting Left arm --      Pain Score       2        Wt Readings from Last 1 Encounters:   01/23/20 79 8 kg (176 lb)     Additional Vital Signs:   Date/Time Temp Pulse Resp BP SpO2 O2 Device Patient Position - Orthostatic VS   01/24/20 1020 98 2 °F (36 8 °C) 73 18 134/93 99 % None (Room air) Lying   01/24/20 0430  76 18 118/91 97 % None (Room air) Lying   01/23/20 2300  87 20 141/100 96 % None (Room air) Lying   01/23/20 2130  76 21 129/96 97 % None (Room air) Lying   01/23/20 1930   16 118/89 95 %         Pertinent Labs/Diagnostic Test Results:   EKG1/23:  Normal sinus rhythm  Normal ECG  No previous ECGs available  CT A/P 1/23: Atrophic native kidneys  Unremarkable appearance of a left-sided renal transplant  No evidence of acute abnormality in the abdomen or pelvis  CXR 1/24: No acute cardiopulmonary disease    Results from last 7 days   Lab Units 01/24/20  0510 01/23/20 2016 01/23/20  1054   WBC Thousand/uL 7 02 7 64 6 96   HEMOGLOBIN g/dL 14 6 15 2 15 6   HEMATOCRIT % 43 1 43 7 46 9   PLATELETS Thousands/uL 220 243 247   NEUTROS ABS Thousands/µL 4 75 4 74 3 86         Results from last 7 days   Lab Units 01/24/20  0510 01/23/20 2016 01/23/20  1054   SODIUM mmol/L 140 137 137   POTASSIUM mmol/L 3 1* 3 3* 4  1   CHLORIDE mmol/L 110* 105 104   CO2 mmol/L 19* 20* 23   ANION GAP mmol/L 11 12 10   BUN mg/dL 50* 59* 63*   CREATININE mg/dL 2 47* 3 34* 3 42*   EGFR ml/min/1 73sq m 30 21 20   CALCIUM mg/dL 8 8 9 2 9 3   MAGNESIUM mg/dL  --  1 8 2 0   PHOSPHORUS mg/dL  --  3 1 4 3     Results from last 7 days   Lab Units 01/23/20 2016 01/23/20  1054   AST U/L 14 12   ALT U/L 17 17   ALK PHOS U/L 90 95   TOTAL PROTEIN g/dL 7 0 7 3   ALBUMIN g/dL 3 3* 3 4*   TOTAL BILIRUBIN mg/dL 0 30 0 40   BILIRUBIN DIRECT mg/dL 0 07  --          Results from last 7 days   Lab Units 01/24/20  0510 01/23/20 2016   GLUCOSE RANDOM mg/dL 118 95         Results from last 7 days   Lab Units 01/23/20  1054   HEMOGLOBIN A1C % 5 5   EAG mg/dl 111     Results from last 7 days   Lab Units 01/23/20 2016   TROPONIN I ng/mL <0 02         Results from last 7 days   Lab Units 01/24/20  0510 01/23/20 2016 01/23/20  1054   PROTIME seconds 44 0* 44 3* 42 6*   INR  4 55* 4 59* 4 37*   PTT seconds  --  42*  --      Results from last 7 days   Lab Units 01/23/20  1054   TSH 3RD GENERATON uIU/mL 1 690     Results from last 7 days   Lab Units 01/24/20  0510 01/23/20 2016 01/23/20  1054   LIPASE u/L 192 523* 2,231*     Results from last 7 days   Lab Units 01/23/20  1054   SED RATE mm/hour 3         Results from last 7 days   Lab Units 01/23/20  2101 01/23/20  1054   CLARITY UA  Clear Clear   COLOR UA  Yellow Yellow   SPEC GRAV UA  1 020 1 025   PH UA  6 0 6 0   GLUCOSE UA mg/dl Negative Negative   KETONES UA mg/dl Negative Negative   BLOOD UA  Large* Large*   PROTEIN UA mg/dl 30 (1+)* Trace*   NITRITE UA  Negative Negative   BILIRUBIN UA  Negative Negative   UROBILINOGEN UA E U /dl 0 2 0 2   LEUKOCYTES UA  Negative Negative   WBC UA /hpf None Seen None Seen   RBC UA /hpf 4-10* 4-10*   BACTERIA UA /hpf Occasional Occasional   EPITHELIAL CELLS WET PREP /hpf Occasional Occasional   MUCUS THREADS  Moderate*  --    CREATININE UR mg/dL  --  156 0   PROTEIN UR mg/dL  -- 51   PROT/CREAT RATIO UR   --  0 33*     ED Treatment:   Medication Administration from 01/23/2020 1850 to 01/24/2020 1120       Date/Time Order Dose Route Action     01/23/2020 2015 sodium chloride 0 9 % bolus 1,000 mL 1,000 mL Intravenous New Bag     01/23/2020 2055 potassium chloride (K-DUR,KLOR-CON) CR tablet 40 mEq 40 mEq Oral Given     01/23/2020 2232 sodium chloride 0 9 % bolus 1,000 mL 1,000 mL Intravenous New Bag     01/24/2020 0023 sodium chloride 0 9 % infusion 125 mL/hr Intravenous New Bag     01/24/2020 0838 mycophenolate (CELLCEPT) capsule 500 mg 500 mg Oral Given        Past Medical History:   Diagnosis Date    ADD (attention deficit disorder)     Chronic kidney disease     Depression     DVT (deep venous thrombosis) (HCC)     H/O immunosuppressive therapy     History of kidney disease     Hypertension     Nephropathy, IgA        Admitting Diagnosis: Abnormal laboratory test [R89 9]  Age/Sex: 53 y o  male  Admission Orders:  US liver  BMP, Pro BNP, CBC, Tacrolimus level, mag  Scheduled Medications:    Medications:  amphetamine-dextroamphetamine 20 mg Oral BID (AM & Afternoon)   diltiazem 120 mg Oral BID   influenza vaccine 0 5 mL Intramuscular Once   lurasidone 80 mg Oral Daily   mycophenolate 250 mg Oral QPM   mycophenolate 500 mg Oral QAM   potassium chloride 20 mEq Intravenous Q2H   sertraline 100 mg Oral QAM   tacrolimus 1 mg Oral BID     Continuous IV Infusions:    sodium chloride 125 mL/hr Intravenous Continuous     PRN Meds:      IP CONSULT TO NEPHROLOGY    Network Utilization Review Department  [email or web address]  ATTENTION: Please call with any questions or concerns to [phone] and carefully listen to the prompts so that you are directed to the right person   All voicemails are confidential   Darlene Galarza all requests for admission clinical reviews, approved or denied determinations and any other requests to dedicated fax number below belonging to the campus where the patient is receiving treatment   List of dedicated fax numbers for the Facilities:  [address] DENIALS (Administrative/Medical Necessity) [phone]   [address] (Maternity/NICU/Pediatrics) [phone]   Zuly Aliza [phone]   Flor Kyaw [phone]   Sherri Cho [phone]   Nick Ralls [phone]   [address] [facility] [address] [facility] [phone]   [facility]  [phone]   [address] [facility] [address]  [address] [facility] [address] [facility] [phone]

## 2020-01-24 NOTE — PROGRESS NOTES
[facility]  Progress Note - Darius Spivey 1975, 53 y o  male MRN: [id number]    Unit/Bed#: -01 Encounter: [id number]    Primary Care Provider: Juanjo Jimenez MD   Date and time admitted to hospital: 1/23/2020  7:12 PM    * Acute renal failure superimposed on stage 3 chronic kidney disease [facility])  Assessment & Plan  64-year-old male past medical history of CKD stage 3  with baseline creatinine is around 1 5  Presents with creatinine of 3 43  BUN elevated to 59  Patient has been having nausea vomiting and diarrhea likely due to pancreatitis of unclear etiology for last 1 month  Patient reports that GI symptoms have now resolved  Tolerating diet well  Denies any new complaints  Was sent to emergency room by primary Nephrology for elevated creatinine  Acute kidney injury likely prerenal due to dehydration  Currently afebrile, hemodynamically stable  Nontoxic appearing  Appears comfortable  Hemodynamically stable  Continue IV hydration  Monitor renal function  Avoid nephrotoxic agents  Creatinine improved to 2 47      Severe episode of recurrent major depressive disorder, without psychotic features [facility])  Assessment & Plan  Present on admission with history of ADD, major depressive disorder with psychotic features  Currently stable  Denies suicidal, homicidal ideation  Continue Adderall, Zoloft, latuda  Essential hypertension  Assessment & Plan  Presentation with history of hypertension  Home medication includes Cardizem 120 mg daily  Currently stable  Continue to monitor  History of DVT (deep vein thrombosis)  Assessment & Plan  Presentation history of DVT  INR noted supratherapeutic 4 55  No signs of active overt bleeding noted  Hemoglobin stable  Likely stable  Hold Coumadin   Daily INR monitoring      History of renal transplant  Assessment & Plan  Present on admission with history of renal transplant secondary to IgA nephropathy    Currently on immunosuppressants  Continue home dose of mycophenolate mofetil 250 mg tablets,  2 tablets in a m , 1 tablet p m , tacrolimus 1 mg b i d Continue home medications  Nephrology following       VTE Pharmacologic Prophylaxis:   Pharmacologic: Warfarin (Coumadin) on hold    Mechanical VTE Prophylaxis in Place: Yes    Patient Centered Rounds: I have performed bedside rounds with nursing staff today  Discussions with Specialists or Other Care Team Provider:  Reviewed previous provider notes discussed with Nephrology case management primary RN    Education and Discussions with Family / Patient:  Educated patient current plan of care denies any additional questions or concerns this time    Time Spent for Care: 20 minutes  More than 50% of total time spent on counseling and coordination of care as described above  Current Length of Stay: 1 day(s)    Current Patient Status: Inpatient   Certification Statement: The patient will continue to require additional inpatient hospital stay due to Monitoring of renal function, IV fluids    Discharge Plan / Estimated Discharge Date:  24 hours      Code Status: Level 1 - Full Code      Subjective:   Denies any chest pain chest tightness shortness of breath or difficulty breathing  Still experiencing some mild nausea during lunch was able to tolerate most of his meal was able tolerate dinner  His overall feeling significantly better  Objective:     Vitals:   Temp (24hrs), Av °F (36 7 °C), Min:97 8 °F (36 6 °C), Max:98 2 °F (36 8 °C)    Temp:  [97 8 °F (36 6 °C)-98 2 °F (36 8 °C)] 98 °F (36 7 °C)  HR:  [73-87] 75  Resp:  [16-21] 19  BP: (117-141)/() 117/83  SpO2:  [95 %-99 %] 98 %  Body mass index is 27 22 kg/m²  Input and Output Summary (last 24 hours):        Intake/Output Summary (Last 24 hours) at 2020 1605  Last data filed at 2020 1534  Gross per 24 hour   Intake 3422 92 ml   Output 200 ml   Net 3222 92 ml     Physical Exam:     Physical Exam Constitutional: He is oriented to person, place, and time  He appears well-developed and well-nourished  HENT:   Head: Normocephalic  Eyes: Pupils are equal, round, and reactive to light  Neck: Normal range of motion  Neck supple  Cardiovascular: Normal rate  Pulmonary/Chest: Effort normal    Abdominal: Soft  There is tenderness  Musculoskeletal: Normal range of motion  Neurological: He is alert and oriented to person, place, and time  Skin: Skin is warm and dry  Psychiatric: He has a normal mood and affect  His behavior is normal  Thought content normal    Nursing note and vitals reviewed  Additional Data:     Labs:    Results from last 7 days   Lab Units 01/24/20  0510   WBC Thousand/uL 7 02   HEMOGLOBIN g/dL 14 6   HEMATOCRIT % 43 1   PLATELETS Thousands/uL 220   NEUTROS PCT % 68   LYMPHS PCT % 20   MONOS PCT % 9   EOS PCT % 3     Results from last 7 days   Lab Units 01/24/20  0510 01/23/20 2016   POTASSIUM mmol/L 3 1* 3 3*   CHLORIDE mmol/L 110* 105   CO2 mmol/L 19* 20*   BUN mg/dL 50* 59*   CREATININE mg/dL 2 47* 3 34*   CALCIUM mg/dL 8 8 9 2   ALK PHOS U/L  --  90   ALT U/L  --  17   AST U/L  --  14     Results from last 7 days   Lab Units 01/24/20  0510   INR  4 55*       * I Have Reviewed All Lab Data Listed Above  * Additional Pertinent Lab Tests Reviewed:  [facility] Admission Reviewed    Recent Cultures (last 7 days):           Last 24 Hours Medication List:     Current Facility-Administered Medications:  amphetamine-dextroamphetamine 20 mg Oral BID (AM & Afternoon) Heber ARORA MD    diltiazem 120 mg Oral BID Heber ARORA MD    lurasidone 80 mg Oral Daily Heber ARORA MD    mycophenolate 250 mg Oral QPM Heber ARORA MD    mycophenolate 500 mg Oral QAM Heber ARORA MD    potassium chloride 20 mEq Intravenous Q2H Alberto Scott MD Last Rate: 20 mEq (01/24/20 1500)   sertraline 100 mg Oral QAM Heber ARORA MD    sodium chloride 125 mL/hr Intravenous Continuous Ayse ARORA MD Last Rate: 125 mL/hr (01/24/20 6525)   tacrolimus 1 mg Oral BID Shira Litten, MD         Today, Patient Was Seen By: JONATAN Moreno    ** Please Note: Dragon 360 Dictation voice to text software may have been used in the creation of this document   **

## 2020-01-24 NOTE — UTILIZATION REVIEW
Notification of Inpatient Admission/Inpatient Authorization Request   This is a Notification of Inpatient Admission for [address]  Be advised that this patient was admitted to our facility under Inpatient Status  Contact Roby French at [phone] for additional admission information  [facility] DEPT DEDICATED [place] [phone]  Patient Name:   Steve Lance   YOB: 1975       [address]   [address]:   [address] [facility]    Tax ID: [id number]  NPI: [id number] Attending Provider/NPI: Zulay Guadarrama Md [[id number]]   Place of Service Code: 24     Place of Service Name:  [address] [place] [address]   Start Date: 1/23/20 2203     Discharge Date & Time: No discharge date for patient encounter  Type of Admission: Inpatient Status Discharge Disposition   (if discharged): Home/Self Care   Patient Diagnoses: Abnormal laboratory test [R89 9]     Orders: Admission Orders (From admission, onward)     Ordered        01/23/20 2203  Inpatient Admission  Once                    Assigned Utilization Review Contact: Roby French  Utilization   Network Utilization Review Department  Phone: [phone]; Fax [phone]  Email: [email or web address]   ATTENTION PAYERS: Please call the assigned Utilization  directly with any questions or concerns ALL voicemails in the department are confidential  Send all requests for admission clinical reviews, approved or denied determinations and any other requests to dedicated fax number belonging to the campus where the patient is receiving treatment

## 2020-01-24 NOTE — ASSESSMENT & PLAN NOTE
52-year-old male past medical history of CKD stage 3  with baseline creatinine is around 1 5  Presents with creatinine of 3 43  BUN elevated to 59  Patient has been having nausea vomiting and diarrhea likely due to pancreatitis of unclear etiology for last 1 month  Patient reports that GI symptoms have now resolved  Tolerating diet well  Denies any new complaints  Was sent to emergency room by primary Nephrology for elevated creatinine  CT abdomen pelvis report noted for unremarkable left-sided kidney transplant, atrophic native kidneys  Acute kidney injury likely prerenal due to dehydration  Currently afebrile, hemodynamically stable  Nontoxic appearing  Appears comfortable  Hemodynamically stable  Tolerating diet well  Denies any new complaints  No other events reported  Continue IV hydration    Monitor renal function  Avoid nephrotoxic agents  Neurology consult

## 2020-01-24 NOTE — ASSESSMENT & PLAN NOTE
Present on admission with history of renal transplant secondary to IgA nephropathy  Currently on immunosuppressants  Continue home dose of mycophenolate mofetil 250 mg tablets,  2 tablets in a m , 1 tablet p m , tacrolimus 1 mg b i d Continue home medications    Nephrology following

## 2020-01-24 NOTE — ASSESSMENT & PLAN NOTE
Present on admission with history of renal transplant secondary to IgA nephropathy  Currently on immunosuppressants  Continue home dose of mycophenolate mofetil 250 mg tablets,  2 tablets in a m , 1 tablet p m , tacrolimus 1 mg b i d Continue home medications  Nephrology consult

## 2020-01-24 NOTE — ED NOTES
1  CC-pt sent by his nephrologist for acute renal failure  (Hx of transplant)    2  Orientation status-A&OX4    3  Abnormal labs/ abnormal focused assessment/ abnormal vitals-elevated BUN/creat  Low potassium     4  Medications/drips-potassium and 0 9%normal saline @125ml/hr    5  Last time narcotics given-n/a    6  IV lines/drains/etc-20G R AC    7  Isolation status-n/a    8  Skin-intact    9  Ambulation-independent    10  ED nurse's phone number-[phone]  11   Admission related to traumatic injury-no           Neris Chery RN  01/24/20 [address] [facility]  01/24/20 2381

## 2020-01-24 NOTE — H&P
H&P- [facility] 1975, 53 y o  male MRN: [id number]    Unit/Bed#: ED 24 Encounter: [id number]    Primary Care Provider: Salbador Still MD   Date and time admitted to hospital: 1/23/2020  7:12 PM        * Acute renal failure superimposed on stage 3 chronic kidney disease [facility])  Assessment & Plan  23-year-old male past medical history of CKD stage 3  with baseline creatinine is around 1 5  Presents with creatinine of 3 43  BUN elevated to 63  Patient has been having nausea vomiting and diarrhea likely due to pancreatitis of unclear etiology for last 1 month  Patient reports that GI symptoms have now resolved  Tolerating diet well  Denies any new complaints  Was sent to emergency room by primary Nephrology for elevated creatinine  Baseline creatinine is around 1 5  Presented with creatinine of 3 43  BUN elevated to 63  Hypokalemia with potassium 3 3  CT abdomen pelvis report noted for unremarkable left-sided kidney transplant, atrophic native kidneys  Acute kidney injury likely prerenal due to dehydration  Currently afebrile, hemodynamically stable  Nontoxic appearing  Appears comfortable  Hemodynamically stable  Tolerating diet well  Denies any new complaints  No other events reported  Continue IV hydration  Supplement electrolytes as needed  Monitor renal function  Avoid nephrotoxic agents  Neurology consult    Severe episode of recurrent major depressive disorder, without psychotic features [facility])  Assessment & Plan  Present on admission with history of ADD, major depressive disorder with psychotic features  Currently stable  Denies suicidal, homicidal ideation  Continue Adderall, Zoloft, latuda  Essential hypertension  Assessment & Plan  Presentation with history of hypertension  Home medication includes Cardizem 120 mg daily  Currently stable  Continue to monitor  History of DVT (deep vein thrombosis)  Assessment & Plan  Presentation history of DVT    INR noted supratherapeutic 4 59  No signs of active overt bleeding noted  Hemoglobin stable  Likely stable  Hold Coumadin for tonight  Daily INR monitoring      History of renal transplant  Assessment & Plan  Present on admission with history of renal transplant secondary to IgA nephropathy  Currently on immunosuppressants  Continue home dose of mycophenolate mofetil 250 mg tablets,  2 tablets in a m , 1 tablet p m , tacrolimus 1 mg b i d Continue home medications  Nephrology consult  HTN (hypertension)  Assessment & Plan  Present patient history of hypertension  VTE Prophylaxis: Heparin    Code Status:  Level 1 full code  POLST: POLST form is not discussed and not completed at this time  Discussion with family:  Not present at bedside  Anticipated Length of Stay:  Patient will be admitted on an Inpatient basis with an anticipated length of stay of  > 2 midnights  Justification for Hospital Stay:  Acute kidney injury    Total Time for Visit, including Counseling / Coordination of Care: 1 hour  Greater than 50% of this total time spent on direct patient counseling and coordination of care  Chief Complaint:   Abnormal lab test, acute kidney injury superimposed on CKD  History of Present Illness:    Saralyn Holter is a 53 y o  male patient with past medical history of renal transplant in 2009 secondary to IgA nephropathy without any complication rejection episode currently on immunosuppressants, CKD, history of DVT on Coumadin, hypertension, depression, ADD,  who presents with abnormal lab test from nephrologist office  Patient was sent to emergency room due to elevated creatinine to 3 4 from his baseline around to be 1 5  Patient reports that for past 1 month he has been having abdominal pain, nausea, vomiting, diarrhea and recent blood work appears to show lipase is in 2000 range  Currently reports his GI symptoms have now resolved  unclear etiology of his pancreatitis    Patient denies history of gallstones  Denies alcohol use  Currently reports feeling much better and tolerating diet well  Currently on my encounter patient appears comfortable not in distress  Denies chest pain, fever, chills, nausea, vomiting, abdominal pain, diarrhea  Denies suicidal, homicidal ideation  Denies any other complaints  Denies alcohol use  No other events reported  Level 1 full code  Review of Systems:    Review of Systems   Constitutional: Negative for diaphoresis, fatigue and fever  HENT: Negative for congestion  Eyes: Negative for photophobia  Respiratory: Negative for apnea, chest tightness and shortness of breath  Cardiovascular: Negative for chest pain, palpitations and leg swelling  Gastrointestinal: Negative for abdominal distention, abdominal pain, constipation, nausea and vomiting  Endocrine: Negative for polyuria  Genitourinary: Negative for dysuria  Musculoskeletal: Negative for neck stiffness  Skin: Negative for rash  Neurological: Negative for syncope  Psychiatric/Behavioral: Negative for agitation  Past Medical and Surgical History:     Past Medical History:   Diagnosis Date    ADD (attention deficit disorder)     Chronic kidney disease     Depression     DVT (deep venous thrombosis) (HCC)     H/O immunosuppressive therapy     History of kidney disease     Hypertension     Nephropathy, IgA        Past Surgical History:   Procedure Laterality Date    NEPHRECTOMY TRANSPLANTED ORGAN         Meds/Allergies:    Prior to Admission medications    Medication Sig Start Date End Date Taking?  Authorizing Provider   amphetamine-dextroamphetamine (ADDERALL) 20 mg tablet TAKE 1 TABLET BY MOUTH TWICE A DAY(ONGOING TREATMENT) 8/30/19   Historical Provider, MD   betamethasone dipropionate (DIPROSONE) 0 05 % cream Apply topically 2 (two) times a day 9/9/19   Nneka Tang PA-C   betamethasone, augmented, (DIPROLENE) 0 05 % ointment Apply topically 2 (two) times a day To rash on lower leg till resolved 11/20/19   Shanna Daniels MD   Cholecalciferol (VITAMIN D-3) 1000 units CAPS Take 2,000 Units by mouth daily    Historical Provider, MD   diltiazem (CARDIZEM) 120 MG tablet TAKE 1 TABLET TWICE DAILY 3/28/17   Historical Provider, MD   LATUDA 60 MG TABS Take 80 mg by mouth daily  7/18/19   Historical Provider, MD   mycophenolate (CELLCEPT) 250 mg capsule Take 2 in the AM, 1 in the PM  11/7/19   Gail Guy MD   sertraline (ZOLOFT) 50 mg tablet Take 100 mg by mouth every morning  7/8/19   Historical Provider, MD   tacrolimus (PROGRAF) 1 mg capsule Take 1 capsule (1 mg total) by mouth 2 (two) times a day 12/31/18   Nikki Diaz MD   warfarin (COUMADIN) 1 mg tablet Take 1 tablet by mouth daily as directed 11/5/19   Pao Nagy PA-C   warfarin (COUMADIN) 5 mg tablet TAKE 1 TO 2 TABLETS BY MOUTH EVERY DAY 7/8/19   Apple Cervantes MD   warfarin (COUMADIN) 7 5 mg tablet Take 1 tablet (7 5 mg total) by mouth daily 6/21/19   Kristin Reynaga MD     I have reviewed home medications with patient personally  Allergies:    Allergies   Allergen Reactions    Penicillins Rash       Social History:     Marital Status: /Civil Union   Patient Pre-hospital Level of Mobility:  Independent  Patient Pre-hospital Diet Restrictions:  None reported  Substance Use History:   Social History     Substance and Sexual Activity   Alcohol Use Not Currently    Frequency: Monthly or less    Binge frequency: Never    Comment: Occasionally     Social History     Tobacco Use   Smoking Status Former Smoker    Years: 4 00   Smokeless Tobacco Never Used     Social History     Substance and Sexual Activity   Drug Use Yes    Types: Marijuana    Comment: smokes marijuana a few times daily       Family History:    non-contributory    Physical Exam:     Vitals:   Blood Pressure: 141/100 (01/23/20 2300)  Pulse: 87 (01/23/20 2300)  Temperature: 97 8 °F (36 6 °C) (01/23/20 1853)  Temp Source: Oral (01/23/20 1853)  Respirations: 20 (01/23/20 2300)  Weight - Scale: 79 8 kg (176 lb) (01/23/20 1853)  SpO2: 96 % (01/23/20 2300)    Physical Exam   Constitutional: He is oriented to person, place, and time  No distress  Chronically ill appearing not in acute distress  Acutely nontoxic appearing  HENT:   Head: Normocephalic and atraumatic  Eyes: Pupils are equal, round, and reactive to light  EOM are normal    Neck: Normal range of motion  Neck supple  Cardiovascular: Normal rate and regular rhythm  Pulmonary/Chest: Effort normal and breath sounds normal  No stridor  No respiratory distress  He has no wheezes  Abdominal: Soft  Bowel sounds are normal  He exhibits no distension  There is no tenderness  There is no guarding  Musculoskeletal: Normal range of motion  Neurological: He is alert and oriented to person, place, and time  Skin: He is not diaphoretic  Psychiatric: His behavior is normal        Additional Data:     Lab Results: I have personally reviewed pertinent reports  Results from last 7 days   Lab Units 01/23/20 2016   WBC Thousand/uL 7 64   HEMOGLOBIN g/dL 15 2   HEMATOCRIT % 43 7   PLATELETS Thousands/uL 243   NEUTROS PCT % 61   LYMPHS PCT % 20   MONOS PCT % 10   EOS PCT % 8*     Results from last 7 days   Lab Units 01/23/20 2016   SODIUM mmol/L 137   POTASSIUM mmol/L 3 3*   CHLORIDE mmol/L 105   CO2 mmol/L 20*   BUN mg/dL 59*   CREATININE mg/dL 3 34*   ANION GAP mmol/L 12   CALCIUM mg/dL 9 2   ALBUMIN g/dL 3 3*   TOTAL BILIRUBIN mg/dL 0 30   ALK PHOS U/L 90   ALT U/L 17   AST U/L 14   GLUCOSE RANDOM mg/dL 95     Results from last 7 days   Lab Units 01/23/20  2016   INR  4 59*         Results from last 7 days   Lab Units 01/23/20  1054   HEMOGLOBIN A1C % 5 5           Imaging: I have personally reviewed pertinent reports  CT abdomen pelvis wo contrast   Final Result by Liz Hill MD (01/23 2041)      1  Atrophic native kidneys     2   Unremarkable appearance of a left-sided renal transplant  3   No evidence of acute abnormality in the abdomen or pelvis  Workstation performed: [id number]         XR chest 2 views   ED Interpretation by Jorge Alvares DO (01/23 2026)   No acute abnormality in the chest           EKG, Pathology, and Other Studies Reviewed on Admission:   · EKG:  Normal sinus rhythm    Allscripts / Epic Records Reviewed: Yes     ** Please Note: This note has been constructed using a voice recognition system   **

## 2020-01-24 NOTE — ASSESSMENT & PLAN NOTE
Presentation history of DVT  INR noted supratherapeutic 4 55  No signs of active overt bleeding noted  Hemoglobin stable  Likely stable    Hold Coumadin   Daily INR monitoring

## 2020-01-24 NOTE — ASSESSMENT & PLAN NOTE
Presentation history of DVT  INR noted supratherapeutic 4 59  No signs of active overt bleeding noted  Hemoglobin stable  Likely stable    Hold Coumadin for tonight  Daily INR monitoring

## 2020-01-24 NOTE — ED PROVIDER NOTES
History  Chief Complaint   Patient presents with    Abnormal Lab     Patient reports his kidney doctor asked him to come in because his creatine is 3 4 which is triple where he is normally at  Patient reports he had a kidney transplant in 2009  Patient is a 78-year-old male with past medical and surgical history significant for renal transplant in 2009 secondary to IgA nephropathy without any complications or rejection episodes and currently on anti-rejection/immunosuppressive medication, hypertension, depression, ADD, chronic kidney disease, prior DVT on Coumadin, presents to the emergency department at the instruction of his nephrologist, Dr Noble Walton for acute on chronic renal failure  Patient reports for the past 1 month he has been having abdominal pain, nausea, vomiting and diarrhea and just had recent blood work that showed he had pancreatitis with an elevated lipase above 2000  His creatinine was above 3 which is almost 2-3 times his normal and he was instructed to come to the ED  Patient states for the past 2 days he has actually felt much better and has had no further vomiting or diarrhea  He still occasionally gets intermittent abdominal pain localized to the center of his abdomen  He denies any pain presently  On review of systems, he reports slightly less urination overall  He denies any fever, shaking chills, headache, dizziness or near syncope, cough, hemoptysis, URI symptoms, chest pain, palpitations, dyspnea, abdominal distension, blood per rectum or melena, dysuria, hematuria, flank pain, skin rash or color change, extremity swelling or pain, extremity weakness or paresthesia or other focal neurologic deficits  History provided by:  Patient   used: No        Prior to Admission Medications   Prescriptions Last Dose Informant Patient Reported? Taking?    Cholecalciferol (VITAMIN D-3) 1000 units CAPS  Self Yes No   Sig: Take 2,000 Units by mouth daily   LATUDA 60 MG TABS  Self Yes No   Sig: Take 80 mg by mouth daily    amphetamine-dextroamphetamine (ADDERALL) 20 mg tablet  Self Yes No   Sig: TAKE 1 TABLET BY MOUTH TWICE A DAY(ONGOING TREATMENT)   betamethasone dipropionate (DIPROSONE) 0 05 % cream  Self No No   Sig: Apply topically 2 (two) times a day   betamethasone, augmented, (DIPROLENE) 0 05 % ointment   No No   Sig: Apply topically 2 (two) times a day To rash on lower leg till resolved   diltiazem (CARDIZEM) 120 MG tablet  Self Yes No   Sig: TAKE 1 TABLET TWICE DAILY   mycophenolate (CELLCEPT) 250 mg capsule   No No   Sig: Take 2 in the AM, 1 in the PM    sertraline (ZOLOFT) 50 mg tablet  Self Yes No   Sig: Take 100 mg by mouth every morning    tacrolimus (PROGRAF) 1 mg capsule  Self No No   Sig: Take 1 capsule (1 mg total) by mouth 2 (two) times a day   warfarin (COUMADIN) 1 mg tablet  Self No No   Sig: Take 1 tablet by mouth daily as directed   warfarin (COUMADIN) 5 mg tablet  Self No No   Sig: TAKE 1 TO 2 TABLETS BY MOUTH EVERY DAY   warfarin (COUMADIN) 7 5 mg tablet  Self No No   Sig: Take 1 tablet (7 5 mg total) by mouth daily      Facility-Administered Medications: None       Past Medical History:   Diagnosis Date    ADD (attention deficit disorder)     Chronic kidney disease     Depression     DVT (deep venous thrombosis) (HCC)     H/O immunosuppressive therapy     History of kidney disease     Hypertension     Nephropathy, IgA        Past Surgical History:   Procedure Laterality Date    NEPHRECTOMY TRANSPLANTED ORGAN         Family History   Problem Relation Age of Onset    Deep vein thrombosis Father     Deep vein thrombosis Paternal Grandfather     Transient ischemic attack Mother      I have reviewed and agree with the history as documented      Social History     Tobacco Use    Smoking status: Former Smoker     Years: 4 00    Smokeless tobacco: Never Used   Substance Use Topics    Alcohol use: Not Currently     Frequency: Monthly or less     Binge frequency: Never     Comment: Occasionally    Drug use: Yes     Types: Marijuana     Comment: smokes marijuana a few times daily        Review of Systems   Constitutional: Negative for chills and fever  HENT: Negative for congestion, ear pain, rhinorrhea and sore throat  Eyes: Negative for photophobia, pain and visual disturbance  Respiratory: Negative for cough, chest tightness, shortness of breath and wheezing  Cardiovascular: Negative for chest pain, palpitations and leg swelling  Gastrointestinal: Negative for abdominal distention, abdominal pain, blood in stool, constipation, diarrhea, nausea and vomiting  (Recent abdominal pain, nausea, vomiting, diarrhea, now resolved x 2 days)   Genitourinary: Positive for decreased urine volume  Negative for difficulty urinating, dysuria, flank pain, frequency and hematuria  Musculoskeletal: Negative for back pain, neck pain and neck stiffness  Skin: Negative for color change, pallor, rash and wound  Allergic/Immunologic: Positive for immunocompromised state  Neurological: Negative for dizziness, syncope, weakness, light-headedness, numbness and headaches  Hematological: Negative for adenopathy  Bruises/bleeds easily  Psychiatric/Behavioral: Negative for confusion and decreased concentration  All other systems reviewed and are negative  Physical Exam  Physical Exam   Constitutional: He is oriented to person, place, and time  He appears well-developed and well-nourished  No distress  HENT:   Head: Normocephalic and atraumatic  Mouth/Throat: Oropharynx is clear and moist    Eyes: Pupils are equal, round, and reactive to light  Conjunctivae and EOM are normal    Neck: Normal range of motion  Neck supple  No JVD present  Cardiovascular: Normal rate, regular rhythm, normal heart sounds and intact distal pulses  Exam reveals no gallop and no friction rub  No murmur heard    Pulmonary/Chest: Effort normal and breath sounds normal  No respiratory distress  He has no wheezes  He has no rales  Abdominal: Soft  Bowel sounds are normal  He exhibits no distension  There is no tenderness  There is no rebound and no guarding  Musculoskeletal: Normal range of motion  He exhibits no edema or tenderness  Neurological: He is alert and oriented to person, place, and time  No gross motor or sensory deficits  Skin: Skin is warm and dry  No rash noted  He is not diaphoretic  No pallor  Psychiatric: He has a normal mood and affect  His behavior is normal    Nursing note and vitals reviewed        Vital Signs  ED Triage Vitals [01/23/20 1853]   Temperature Pulse Respirations Blood Pressure SpO2   97 8 °F (36 6 °C) 84 18 131/87 98 %      Temp Source Heart Rate Source Patient Position - Orthostatic VS BP Location FiO2 (%)   Oral Monitor Sitting Left arm --      Pain Score       2         Vitals:    01/23/20 1853 01/23/20 1930 01/23/20 2130   BP: 131/87 118/89 129/96   BP Location: Left arm Left arm Left arm   Pulse: 84  76   Resp: 18 16 21   Temp: 97 8 °F (36 6 °C)     TempSrc: Oral     SpO2: 98% 95% 97%   Weight: 79 8 kg (176 lb)         Visual Acuity      ED Medications  Medications   sodium chloride 0 9 % bolus 1,000 mL (has no administration in time range)   sodium chloride 0 9 % bolus 1,000 mL (1,000 mL Intravenous New Bag 1/23/20 2015)   potassium chloride (K-DUR,KLOR-CON) CR tablet 40 mEq (40 mEq Oral Given 1/23/20 2055)       Diagnostic Studies  Results Reviewed     Procedure Component Value Units Date/Time    Urine Microscopic [[id number]]  (Abnormal) Collected:  01/23/20 2101    Lab Status:  Final result Specimen:  Urine, Clean Catch Updated:  01/23/20 2119     RBC, UA 4-10 /hpf      WBC, UA None Seen /hpf      Epithelial Cells Occasional /hpf      Bacteria, UA Occasional /hpf      Hyaline Casts, UA 0-1 /lpf      MUCUS THREADS Moderate    UA (URINE) with reflex to Scope [[id number]]  (Abnormal) Collected:  01/23/20 2101    Lab Status:  Final result Specimen:  Urine, Clean Catch Updated:  01/23/20 2106     Color, UA Yellow     Clarity, UA Clear     Specific [place], UA 1 020     pH, UA 6 0     Leukocytes, UA Negative     Nitrite, UA Negative     Protein, UA 30 (1+) mg/dl      Glucose, UA Negative mg/dl      Ketones, UA Negative mg/dl      Urobilinogen, UA 0 2 E U /dl      Bilirubin, UA Negative     Blood, UA Large    Hepatic function panel [[id number]]  (Abnormal) Collected:  01/23/20 2016    Lab Status:  Final result Specimen:  Blood from Arm, Right Updated:  01/23/20 2055     Total Bilirubin 0 30 mg/dL      Bilirubin, Direct 0 07 mg/dL      Alkaline Phosphatase 90 U/L      AST 14 U/L      ALT 17 U/L      Total Protein 7 0 g/dL      Albumin 3 3 g/dL     Magnesium [[id number]]  (Normal) Collected:  01/23/20 2016    Lab Status:  Final result Specimen:  Blood from Arm, Right Updated:  01/23/20 2055     Magnesium 1 8 mg/dL     Phosphorus [[id number]]  (Normal) Collected:  01/23/20 2016    Lab Status:  Final result Specimen:  Blood from Arm, Right Updated:  01/23/20 2055     Phosphorus 3 1 mg/dL     Lipase [[id number]]  (Abnormal) Collected:  01/23/20 2016    Lab Status:  Final result Specimen:  Blood from Arm, Right Updated:  01/23/20 2055     Lipase 523 u/L     Protime-INR [[id number]]  (Abnormal) Collected:  01/23/20 2016    Lab Status:  Final result Specimen:  Blood from Arm, Right Updated:  01/23/20 2047     Protime 44 3 seconds      INR 4 59    APTT [[id number]]  (Abnormal) Collected:  01/23/20 2016    Lab Status:  Final result Specimen:  Blood from Arm, Right Updated:  01/23/20 2047     PTT 42 seconds     Troponin I [[id number]]  (Normal) Collected:  01/23/20 2016    Lab Status:  Final result Specimen:  Blood from Arm, Right Updated:  01/23/20 2047     Troponin I <0 02 ng/mL     Basic metabolic panel [[id number]]  (Abnormal) Collected:  01/23/20 2016    Lab Status:  Final result Specimen:  Blood from Arm, Right Updated:  01/23/20 2040     Sodium 137 mmol/L Potassium 3 3 mmol/L      Chloride 105 mmol/L      CO2 20 mmol/L      ANION GAP 12 mmol/L      BUN 59 mg/dL      Creatinine 3 34 mg/dL      Glucose 95 mg/dL      Calcium 9 2 mg/dL      eGFR 21 ml/min/1 73sq m     Narrative:       [place] guidelines for Chronic Kidney Disease (CKD):     Stage 1 with normal or high GFR (GFR > 90 mL/min/1 73 square meters)    Stage 2 Mild CKD (GFR = 60-89 mL/min/1 73 square meters)    Stage 3A Moderate CKD (GFR = 45-59 mL/min/1 73 square meters)    Stage 3B Moderate CKD (GFR = 30-44 mL/min/1 73 square meters)    Stage 4 Severe CKD (GFR = 15-29 mL/min/1 73 square meters)    Stage 5 End Stage CKD (GFR <15 mL/min/1 73 square meters)  Note: GFR calculation is accurate only with a steady state creatinine    CBC and differential [[id number]]  (Abnormal) Collected:  01/23/20 2016    Lab Status:  Final result Specimen:  Blood from Arm, Right Updated:  01/23/20 2024     WBC 7 64 Thousand/uL      RBC 4 80 Million/uL      Hemoglobin 15 2 g/dL      Hematocrit 43 7 %      MCV 91 fL      MCH 31 7 pg      MCHC 34 8 g/dL      RDW 12 6 %      MPV 10 4 fL      Platelets 489 Thousands/uL      nRBC 0 /100 WBCs      Neutrophils Relative 61 %      Immat GRANS % 0 %      Lymphocytes Relative 20 %      Monocytes Relative 10 %      Eosinophils Relative 8 %      Basophils Relative 1 %      Neutrophils Absolute 4 74 Thousands/µL      Immature Grans Absolute 0 02 Thousand/uL      Lymphocytes Absolute 1 53 Thousands/µL      Monocytes Absolute 0 74 Thousand/µL      Eosinophils Absolute 0 57 Thousand/µL      Basophils Absolute 0 04 Thousands/µL                  CT abdomen pelvis wo contrast   Final Result by Donald Rodgers MD (01/23 2041)      1  Atrophic native kidneys  2   Unremarkable appearance of a left-sided renal transplant  3   No evidence of acute abnormality in the abdomen or pelvis                 Workstation performed: [id number]         XR chest 2 views   ED Interpretation by Sherryle Mound, DO (01/23 2026)   No acute abnormality in the chest                  Procedures  ECG 12 Lead Documentation Only  Date/Time: 1/23/2020 9:45 PM  Performed by: Sherryle Mound, DO  Authorized by: Sherryle Mound, DO     ECG reviewed by me, the ED Provider: yes    Patient location:  ED  Previous ECG:     Previous ECG:  Unavailable  Interpretation:     Interpretation: normal    Rate:     ECG rate:  77    ECG rate assessment: normal    Rhythm:     Rhythm: sinus rhythm    Ectopy:     Ectopy: none    QRS:     QRS axis:  Normal    QRS intervals:  Normal  Conduction:     Conduction: normal    ST segments:     ST segments:  Normal  T waves:     T waves: normal               ED Course  ED Course as of Jan 23 2213   Thu Jan 23, 2020 2041 Similar to labs from this morning  Creatinine(!): 3 34   2043 According to prior labs, baseline creatinine is usually between 1 5 and 1 8       2124 Significantly improved from earlier this morning  Lipase(!): 523   2124 Supratherapeutic    INR(!): 4 59                               MDM  Number of Diagnoses or Management Options  Diagnosis management comments: 47-year-old male presents for acute renal failure after recent GI losses from pancreatitis  Patient not currently symptomatic as far as a vomiting, diarrhea or pain standpoint  Will recheck labs including lipase, renal function as well as other electrolytes such as magnesium and phosphorus  Will obtain noncontrast CT scan of the abdomen and pelvis as this was ordered as an outpatient and he is supposed to have this study done on 01/28/2020  Will start IV fluids (1L NS to start)         Amount and/or Complexity of Data Reviewed  Clinical lab tests: ordered and reviewed  Tests in the radiology section of CPT®: ordered and reviewed  Tests in the medicine section of CPT®: ordered and reviewed  Review and summarize past medical records: yes  Independent visualization of images, tracings, or specimens: yes          Disposition  Final diagnoses:   Acute renal failure (ARF) ([facility] [address] )   Pancreatitis - resolving / resolved   Dehydration   Hypokalemia     Time reflects when diagnosis was documented in both MDM as applicable and the Disposition within this note     Time User Action Codes Description Comment    1/23/2020 10:01 PM Daramishan Cate E Add [N17 9] Acute renal failure (ARF) ([facility] [address] )     1/23/2020 10:02 PM Darnilsa Esposito E Add [K85 90] Pancreatitis     1/23/2020 10:02 PM Darilyn Esposito E Modify [K85 90] Pancreatitis resolving / resolved    1/23/2020 10:02 PM Darilyn Esposito E Add [E86 0] Dehydration     1/23/2020 10:02 PM Darilyn Esposito E Add [E87 6] Hypokalemia       ED Disposition     ED Disposition Condition Date/Time Comment    Admit Stable Thu Jan 23, 2020 10:03 PM Case was discussed with ANA and the patient's admission status was agreed to be Admission Status: inpatient status to the service of Dr Nancy Vanegas   Follow-up Information    None         Patient's Medications   Discharge Prescriptions    No medications on file     No discharge procedures on file      ED Provider  Electronically Signed by           Shanthi Gray DO  01/23/20 0196

## 2020-01-24 NOTE — ASSESSMENT & PLAN NOTE
Present on admission with history of ADD, major depressive disorder with psychotic features  Currently stable  Denies suicidal, homicidal ideation  Continue Adderall, Zoloft, latuda

## 2020-01-25 PROBLEM — E87.2 METABOLIC ACIDOSIS: Status: ACTIVE | Noted: 2020-01-25

## 2020-01-25 PROBLEM — E87.20 METABOLIC ACIDOSIS: Status: ACTIVE | Noted: 2020-01-25

## 2020-01-25 PROBLEM — E83.42 HYPOMAGNESEMIA: Status: ACTIVE | Noted: 2020-01-25

## 2020-01-25 LAB
ANION GAP SERPL CALCULATED.3IONS-SCNC: 12 MMOL/L (ref 4–13)
BASOPHILS # BLD AUTO: 0.03 THOUSANDS/ΜL (ref 0–0.1)
BASOPHILS NFR BLD AUTO: 0 % (ref 0–1)
BUN SERPL-MCNC: 33 MG/DL (ref 5–25)
CALCIUM SERPL-MCNC: 8.7 MG/DL (ref 8.3–10.1)
CHLORIDE SERPL-SCNC: 111 MMOL/L (ref 100–108)
CO2 SERPL-SCNC: 16 MMOL/L (ref 21–32)
CREAT SERPL-MCNC: 1.87 MG/DL (ref 0.6–1.3)
EOSINOPHIL # BLD AUTO: 0.59 THOUSAND/ΜL (ref 0–0.61)
EOSINOPHIL NFR BLD AUTO: 8 % (ref 0–6)
ERYTHROCYTE [DISTWIDTH] IN BLOOD BY AUTOMATED COUNT: 12.6 % (ref 11.6–15.1)
GFR SERPL CREATININE-BSD FRML MDRD: 42 ML/MIN/1.73SQ M
GLUCOSE SERPL-MCNC: 103 MG/DL (ref 65–140)
HCT VFR BLD AUTO: 43.5 % (ref 36.5–49.3)
HGB BLD-MCNC: 14.6 G/DL (ref 12–17)
IMM GRANULOCYTES # BLD AUTO: 0.02 THOUSAND/UL (ref 0–0.2)
IMM GRANULOCYTES NFR BLD AUTO: 0 % (ref 0–2)
INR PPP: 3.96 (ref 0.84–1.19)
LYMPHOCYTES # BLD AUTO: 1.65 THOUSANDS/ΜL (ref 0.6–4.47)
LYMPHOCYTES NFR BLD AUTO: 23 % (ref 14–44)
MAGNESIUM SERPL-MCNC: 1.3 MG/DL (ref 1.6–2.6)
MCH RBC QN AUTO: 31.2 PG (ref 26.8–34.3)
MCHC RBC AUTO-ENTMCNC: 33.6 G/DL (ref 31.4–37.4)
MCV RBC AUTO: 93 FL (ref 82–98)
MONOCYTES # BLD AUTO: 0.81 THOUSAND/ΜL (ref 0.17–1.22)
MONOCYTES NFR BLD AUTO: 12 % (ref 4–12)
NEUTROPHILS # BLD AUTO: 3.95 THOUSANDS/ΜL (ref 1.85–7.62)
NEUTS SEG NFR BLD AUTO: 57 % (ref 43–75)
NRBC BLD AUTO-RTO: 0 /100 WBCS
NT-PROBNP SERPL-MCNC: 678 PG/ML
PLATELET # BLD AUTO: 221 THOUSANDS/UL (ref 149–390)
PMV BLD AUTO: 10.4 FL (ref 8.9–12.7)
POTASSIUM SERPL-SCNC: 3.6 MMOL/L (ref 3.5–5.3)
PROTHROMBIN TIME: 39.3 SECONDS (ref 11.6–14.5)
RBC # BLD AUTO: 4.68 MILLION/UL (ref 3.88–5.62)
SODIUM SERPL-SCNC: 139 MMOL/L (ref 136–145)
WBC # BLD AUTO: 7.05 THOUSAND/UL (ref 4.31–10.16)

## 2020-01-25 PROCEDURE — 83880 ASSAY OF NATRIURETIC PEPTIDE: CPT | Performed by: INTERNAL MEDICINE

## 2020-01-25 PROCEDURE — 87177 OVA AND PARASITES SMEARS: CPT | Performed by: PHYSICIAN ASSISTANT

## 2020-01-25 PROCEDURE — 82656 EL-1 FECAL QUAL/SEMIQ: CPT | Performed by: PHYSICIAN ASSISTANT

## 2020-01-25 PROCEDURE — 87505 NFCT AGENT DETECTION GI: CPT | Performed by: PHYSICIAN ASSISTANT

## 2020-01-25 PROCEDURE — 87493 C DIFF AMPLIFIED PROBE: CPT | Performed by: PHYSICIAN ASSISTANT

## 2020-01-25 PROCEDURE — 80048 BASIC METABOLIC PNL TOTAL CA: CPT | Performed by: INTERNAL MEDICINE

## 2020-01-25 PROCEDURE — 80197 ASSAY OF TACROLIMUS: CPT | Performed by: INTERNAL MEDICINE

## 2020-01-25 PROCEDURE — 85610 PROTHROMBIN TIME: CPT | Performed by: NURSE PRACTITIONER

## 2020-01-25 PROCEDURE — 99254 IP/OBS CNSLTJ NEW/EST MOD 60: CPT | Performed by: INTERNAL MEDICINE

## 2020-01-25 PROCEDURE — 83735 ASSAY OF MAGNESIUM: CPT | Performed by: INTERNAL MEDICINE

## 2020-01-25 PROCEDURE — 85025 COMPLETE CBC W/AUTO DIFF WBC: CPT | Performed by: INTERNAL MEDICINE

## 2020-01-25 PROCEDURE — NC001 PR NO CHARGE: Performed by: INTERNAL MEDICINE

## 2020-01-25 PROCEDURE — 99233 SBSQ HOSP IP/OBS HIGH 50: CPT | Performed by: INTERNAL MEDICINE

## 2020-01-25 PROCEDURE — 87209 SMEAR COMPLEX STAIN: CPT | Performed by: PHYSICIAN ASSISTANT

## 2020-01-25 PROCEDURE — 99232 SBSQ HOSP IP/OBS MODERATE 35: CPT | Performed by: NURSE PRACTITIONER

## 2020-01-25 PROCEDURE — 87329 GIARDIA AG IA: CPT | Performed by: PHYSICIAN ASSISTANT

## 2020-01-25 RX ORDER — TRISODIUM CITRATE DIHYDRATE AND CITRIC ACID MONOHYDRATE 500; 334 MG/5ML; MG/5ML
15 SOLUTION ORAL 3 TIMES DAILY
Status: DISCONTINUED | OUTPATIENT
Start: 2020-01-25 | End: 2020-01-27

## 2020-01-25 RX ORDER — MAGNESIUM SULFATE HEPTAHYDRATE 40 MG/ML
2 INJECTION, SOLUTION INTRAVENOUS ONCE
Status: COMPLETED | OUTPATIENT
Start: 2020-01-25 | End: 2020-01-25

## 2020-01-25 RX ADMIN — SODIUM CHLORIDE 125 ML/HR: 0.9 INJECTION, SOLUTION INTRAVENOUS at 21:30

## 2020-01-25 RX ADMIN — DILTIAZEM HYDROCHLORIDE 120 MG: 60 TABLET, FILM COATED ORAL at 17:12

## 2020-01-25 RX ADMIN — MYCOPHENOLATE MOFETIL 250 MG: 250 CAPSULE ORAL at 17:12

## 2020-01-25 RX ADMIN — TACROLIMUS 1 MG: 0.5 CAPSULE ORAL at 17:12

## 2020-01-25 RX ADMIN — TACROLIMUS 1 MG: 0.5 CAPSULE ORAL at 09:17

## 2020-01-25 RX ADMIN — SODIUM CHLORIDE 125 ML/HR: 0.9 INJECTION, SOLUTION INTRAVENOUS at 15:16

## 2020-01-25 RX ADMIN — SODIUM CITRATE AND CITRIC ACID MONOHYDRATE 15 ML: 500; 334 SOLUTION ORAL at 11:40

## 2020-01-25 RX ADMIN — DEXTROAMPHETAMINE SACCHARATE, AMPHETAMINE ASPARTATE, DEXTROAMPHETAMINE SULFATE AND AMPHETAMINE SULFATE 20 MG: 2.5; 2.5; 2.5; 2.5 TABLET ORAL at 09:17

## 2020-01-25 RX ADMIN — SODIUM CHLORIDE 125 ML/HR: 0.9 INJECTION, SOLUTION INTRAVENOUS at 06:57

## 2020-01-25 RX ADMIN — SODIUM CITRATE AND CITRIC ACID MONOHYDRATE 15 ML: 500; 334 SOLUTION ORAL at 21:30

## 2020-01-25 RX ADMIN — MAGNESIUM SULFATE HEPTAHYDRATE 2 G: 40 INJECTION, SOLUTION INTRAVENOUS at 10:28

## 2020-01-25 RX ADMIN — SODIUM CITRATE AND CITRIC ACID MONOHYDRATE 15 ML: 500; 334 SOLUTION ORAL at 17:14

## 2020-01-25 RX ADMIN — LURASIDONE HYDROCHLORIDE 80 MG: 40 TABLET, FILM COATED ORAL at 09:18

## 2020-01-25 RX ADMIN — DILTIAZEM HYDROCHLORIDE 120 MG: 60 TABLET, FILM COATED ORAL at 09:17

## 2020-01-25 RX ADMIN — MYCOPHENOLATE MOFETIL 500 MG: 250 CAPSULE ORAL at 09:17

## 2020-01-25 RX ADMIN — DEXTROAMPHETAMINE SACCHARATE, AMPHETAMINE ASPARTATE, DEXTROAMPHETAMINE SULFATE AND AMPHETAMINE SULFATE 20 MG: 2.5; 2.5; 2.5; 2.5 TABLET ORAL at 14:01

## 2020-01-25 RX ADMIN — SERTRALINE HYDROCHLORIDE 100 MG: 100 TABLET ORAL at 09:17

## 2020-01-25 NOTE — PROGRESS NOTES
[facility]  Progress Note - Sabas Taylor 1975, 53 y o  male MRN: [id number]    Unit/Bed#: -01 Encounter: [id number]    Primary Care Provider: Guero Guillaume MD   Date and time admitted to hospital: 1/23/2020  7:12 PM  * Acute renal failure superimposed on stage 3 chronic kidney disease ([facility] [address] )  Assessment & Plan  Acute kidney injury likely prerenal due to dehydration  Currently afebrile, hemodynamically stable  Nontoxic appearing  Appears comfortable  Hemodynamically stable  Continue IV hydration  Monitor renal function  Avoid nephrotoxic agents  Creatinine improved to 1 87        Hypomagnesemia  Assessment & Plan  Oral supplementation  BMP in a m  Hypokalemia  Assessment & Plan  Oral supplementation  Resolved    Severe episode of recurrent major depressive disorder, without psychotic features [facility])  Assessment & Plan  Present on admission with history of ADD, major depressive disorder with psychotic features  Currently stable  Denies suicidal, homicidal ideation  Continue Adderall, Zoloft, latuda  Essential hypertension  Assessment & Plan  Presentation with history of hypertension  Home medication includes Cardizem 120 mg daily  Currently stable  Continue to monitor  History of DVT (deep vein thrombosis)  Assessment & Plan  Presentation history of DVT  INR noted supratherapeutic improved to 3 96  No signs of active overt bleeding noted  Hemoglobin stable  Likely stable  Continue to hold Coumadin   Daily INR monitoring      History of renal transplant  Assessment & Plan  Present on admission with history of renal transplant secondary to IgA nephropathy  Currently on immunosuppressants  Continue home dose of mycophenolate mofetil 250 mg tablets,  2 tablets in a m , 1 tablet p m , tacrolimus 1 mg b i d Continue home medications    Nephrology following         VTE Pharmacologic Prophylaxis:   Pharmacologic: Warfarin (Coumadin)  Mechanical VTE Prophylaxis in Place: Yes    Patient Centered Rounds: I have performed bedside rounds with nursing staff today  Discussions with Specialists or Other Care Team Provider:  Discussed with Nephrology, reviewed previous notes discussed with case management primary RN    Education and Discussions with Family / Patient:  Educated patient on current plan of care denies any additional questions or concerns at this time    Time Spent for Care: 15 minutes  More than 50% of total time spent on counseling and coordination of care as described above  Current Length of Stay: 2 day(s)    Current Patient Status: Inpatient   Certification Statement: The patient will continue to require additional inpatient hospital stay due to Further monitoring of patient's electrolyte imbalances as well as creatinine    Discharge Plan / Estimated Discharge Date:  Next 24 hours pending creatinine      Code Status: Level 1 - Full Code      Subjective:   Reports some mild abdominal discomfort today denies any chest pain chest tightness shortness of breath or difficulty breathing  Some transient nausea earlier this afternoon however this has resolved  He is agreeable to staying additional 24 hours to monitor his electrolytes as creatinine    Objective:     Vitals:   Temp (24hrs), Av 7 °F (37 1 °C), Min:98 6 °F (37 °C), Max:98 8 °F (37 1 °C)    Temp:  [98 6 °F (37 °C)-98 8 °F (37 1 °C)] 98 8 °F (37 1 °C)  HR:  [78-79] 79  Resp:  [18] 18  BP: (112-126)/(66-86) 126/86  SpO2:  [95 %] 95 %  Body mass index is 27 22 kg/m²  Input and Output Summary (last 24 hours): Intake/Output Summary (Last 24 hours) at 2020 1550  Last data filed at 2020 1510  Gross per 24 hour   Intake 3160 ml   Output 2800 ml   Net 360 ml       Physical Exam:     Physical Exam   Constitutional: He is oriented to person, place, and time  HENT:   Head: Normocephalic  Eyes: Pupils are equal, round, and reactive to light  Neck: Normal range of motion  Cardiovascular: Normal rate  Pulmonary/Chest: Effort normal    Abdominal: Soft  There is tenderness  Musculoskeletal: Normal range of motion  Neurological: He is alert and oriented to person, place, and time  Skin: Skin is warm and dry  Psychiatric: He has a normal mood and affect  His behavior is normal  Thought content normal    Nursing note and vitals reviewed  Additional Data:     Labs:    Results from last 7 days   Lab Units 01/25/20 0442   WBC Thousand/uL 7 05   HEMOGLOBIN g/dL 14 6   HEMATOCRIT % 43 5   PLATELETS Thousands/uL 221   NEUTROS PCT % 57   LYMPHS PCT % 23   MONOS PCT % 12   EOS PCT % 8*     Results from last 7 days   Lab Units 01/25/20 0442 01/23/20 2016   POTASSIUM mmol/L 3 6   < > 3 3*   CHLORIDE mmol/L 111*   < > 105   CO2 mmol/L 16*   < > 20*   BUN mg/dL 33*   < > 59*   CREATININE mg/dL 1 87*   < > 3 34*   CALCIUM mg/dL 8 7   < > 9 2   ALK PHOS U/L  --   --  90   ALT U/L  --   --  17   AST U/L  --   --  14    < > = values in this interval not displayed  Results from last 7 days   Lab Units 01/25/20 0442   INR  3 96*       * I Have Reviewed All Lab Data Listed Above  * Additional Pertinent Lab Tests Reviewed:  [facility]  Recent Cultures (last 7 days):           Last 24 Hours Medication List:     Current Facility-Administered Medications:  amphetamine-dextroamphetamine 20 mg Oral BID (AM & Afternoon) Heber ARORA MD    diltiazem 120 mg Oral BID Heber ARORA MD    lurasidone 80 mg Oral Daily Heber ARORA MD    mycophenolate 250 mg Oral QPM Heber ARORA MD    mycophenolate 500 mg Oral QAM Heber ARORA MD    sertraline 100 mg Oral QAM Heber ARORA MD    sod citrate-citric acid 15 mL Oral TID Olegario Nolasco MD    sodium chloride 125 mL/hr Intravenous Continuous Heber ARORA MD Last Rate: 125 mL/hr (01/25/20 1516)   tacrolimus 1 mg Oral BID Shahrzad Kennedy MD         Today, Patient Was Seen By: Ulises Counter JONATAN Bliss    ** Please Note: Dragon 360 Dictation voice to text software may have been used in the creation of this document   **

## 2020-01-25 NOTE — CONSULTS
Consultation - [id number]  Delphine Lucero 53 y o  male MRN: [id number]  Unit/Bed#: -01 Encounter: [id number]      Inpatient consult to gastroenterology  Consult performed by: Sam Brewer PA-C  Consult ordered by: Jerson Richards           Reason for Consult / Principal Problem: Pancreatitis    HPI: Delphine Lucero is a 53y o  year old male who was admitted with FRANCES  Patient is a 12-year-old male with a history of a renal transplant in 2009 secondary to an IgA nephropathy currently on immunosuppressants who was sent to the ED by his nephrologist when labs revealed that he was in FRANCES  GI was consulted due to an elevated lipase of over 2000 on admission which then normalized  Patient denies any history of pancreatitis  He also had a a CT scan of the abdomen and pelvis on admission which was limited due to no IV contrast but revealed an unremarkable pancreas  Ultrasound was also performed which showed a normal gallbladder and a 2 2 cm right hepatic lobe lesion likely reflecting a hemangioma  Patient reports that he has been having recurrent gastrointestinal symptoms for the past month  He reports episodes of nausea, vomiting, and diarrhea  He also reports upper abdominal pain on and off  He reports that he has lost 15 lbs over the past month  He denies any melena or rectal bleeding  Currently, he reports since admission his symptoms have been better without any significant further abdominal pain  His nausea has also improved overall but he does report continued loose stools  REVIEW OF SYSTEMS:     CONSTITUTIONAL: Denies any fever, chills, or rigors  + recent weight loss of 15 lbs  HEENT: No earache or tinnitus  Denies hearing loss or visual disturbances  CARDIOVASCULAR: No chest pain or palpitations  RESPIRATORY: Denies any cough, hemoptysis, shortness of breath or dyspnea on exertion  GASTROINTESTINAL: As noted in the History of Present Illness     GENITOURINARY: No problems with urination  Denies any hematuria or dysuria  NEUROLOGIC: No dizziness or vertigo, denies headaches  MUSCULOSKELETAL: Denies any muscle or joint pain  SKIN: Denies skin rashes or itching  ENDOCRINE: Denies excessive thirst  Denies intolerance to heat or cold  PSYCHOSOCIAL: Denies depression or anxiety  Denies any recent memory loss       Historical Information   Past Medical History:   Diagnosis Date    ADD (attention deficit disorder)     Chronic kidney disease     Depression     DVT (deep venous thrombosis) (HCC)     H/O immunosuppressive therapy     History of kidney disease     Hypertension     Nephropathy, IgA      Past Surgical History:   Procedure Laterality Date    NEPHRECTOMY TRANSPLANTED ORGAN       Social History   Social History     Substance and Sexual Activity   Alcohol Use Not Currently    Frequency: Monthly or less    Binge frequency: Never    Comment: Occasionally     Social History     Substance and Sexual Activity   Drug Use Yes    Types: Marijuana    Comment: smokes marijuana a few times daily     Social History     Tobacco Use   Smoking Status Former Smoker    Years: 4 00   Smokeless Tobacco Never Used     Family History   Problem Relation Age of Onset    Deep vein thrombosis Father     Deep vein thrombosis Paternal Grandfather     Transient ischemic attack Mother        Meds/Allergies     Medications Prior to Admission   Medication    amphetamine-dextroamphetamine (ADDERALL) 20 mg tablet    Cholecalciferol (VITAMIN D-3) 1000 units CAPS    diltiazem (CARDIZEM) 120 MG tablet    LATUDA 60 MG TABS    mycophenolate (CELLCEPT) 250 mg capsule    sertraline (ZOLOFT) 50 mg tablet    tacrolimus (PROGRAF) 1 mg capsule    warfarin (COUMADIN) 5 mg tablet    warfarin (COUMADIN) 7 5 mg tablet     Current Facility-Administered Medications   Medication Dose Route Frequency    amphetamine-dextroamphetamine (ADDERALL) tablet 20 mg  20 mg Oral BID (AM & Afternoon)    diltiazem (CARDIZEM) tablet 120 mg  120 mg Oral BID    lurasidone (LATUDA) tablet 80 mg  80 mg Oral Daily    mycophenolate (CELLCEPT) capsule 250 mg  250 mg Oral QPM    mycophenolate (CELLCEPT) capsule 500 mg  500 mg Oral QAM    sertraline (ZOLOFT) tablet 100 mg  100 mg Oral QAM    sod citrate-citric acid (BICITRA) oral solution 15 mL  15 mL Oral TID    sodium chloride 0 9 % infusion  125 mL/hr Intravenous Continuous    tacrolimus (PROGRAF) capsule 1 mg  1 mg Oral BID       Allergies   Allergen Reactions    Penicillins Rash       Objective   Blood pressure 126/86, pulse 79, temperature 98 8 °F (37 1 °C), temperature source Oral, resp  rate 18, height 5' 6" (1 676 m), weight 76 5 kg (168 lb 10 4 oz), SpO2 95 %  Intake/Output Summary (Last 24 hours) at 1/25/2020 1235  Last data filed at 1/25/2020 1679  Gross per 24 hour   Intake 2582 92 ml   Output 2000 ml   Net 582 92 ml         PHYSICAL EXAM:      General Appearance:   Alert, cooperative, no distress, appears stated age    HEENT:   Normocephalic, atraumatic, anicteric      Neck:  Supple, symmetrical, trachea midline, no adenopathy;    thyroid: no enlargement/tenderness/nodules; no carotid  bruit or JVD    Lungs:   Clear to auscultation bilaterally; no rales, rhonchi or wheezing; respirations unlabored    Heart[de-identified]   S1 and S2 normal; regular rate and rhythm; no murmur, rub, or gallop     Abdomen:   Soft, non-tender, non-distended; normal bowel sounds; no masses, no organomegaly    Genitalia:   Deferred    Rectal:   Deferred    Extremities:  No cyanosis, clubbing or edema    Pulses:  2+ and symmetric all extremities    Skin:  Skin color, texture, turgor normal, no rashes or lesions    Lymph nodes:  No palpable cervical, axillary or inguinal lymphadenopathy        Lab Results:   Admission on 01/23/2020   Component Date Value    WBC 01/23/2020 7 64     RBC 01/23/2020 4 80     Hemoglobin 01/23/2020 15 2     Hematocrit 01/23/2020 43 7     MCV 01/23/2020 91  MCH 01/23/2020 31 7     MCHC 01/23/2020 34 8     RDW 01/23/2020 12 6     MPV 01/23/2020 10 4     Platelets 68/16/1995 243     nRBC 01/23/2020 0     Neutrophils Relative 01/23/2020 61     Immat GRANS % 01/23/2020 0     Lymphocytes Relative 01/23/2020 20     Monocytes Relative 01/23/2020 10     Eosinophils Relative 01/23/2020 8*    Basophils Relative 01/23/2020 1     Neutrophils Absolute 01/23/2020 4 74     Immature Grans Absolute 01/23/2020 0 02     Lymphocytes Absolute 01/23/2020 1 53     Monocytes Absolute 01/23/2020 0 74     Eosinophils Absolute 01/23/2020 0 57     Basophils Absolute 01/23/2020 0 04     Sodium 01/23/2020 137     Potassium 01/23/2020 3 3*    Chloride 01/23/2020 105     CO2 01/23/2020 20*    ANION GAP 01/23/2020 12     BUN 01/23/2020 59*    Creatinine 01/23/2020 3 34*    Glucose 01/23/2020 95     Calcium 01/23/2020 9 2     eGFR 01/23/2020 21     Total Bilirubin 01/23/2020 0 30     Bilirubin, Direct 01/23/2020 0 07     Alkaline Phosphatase 01/23/2020 90     AST 01/23/2020 14     ALT 01/23/2020 17     Total Protein 01/23/2020 7 0     Albumin 01/23/2020 3 3*    Magnesium 01/23/2020 1 8     Phosphorus 01/23/2020 3 1     Lipase 01/23/2020 523*    Color, UA 01/23/2020 Yellow     Clarity, UA 01/23/2020 Clear     Specific Gravity, UA 01/23/2020 1 020     pH, UA 01/23/2020 6 0     Leukocytes, UA 01/23/2020 Negative     Nitrite, UA 01/23/2020 Negative     Protein, UA 01/23/2020 30 (1+)*    Glucose, UA 01/23/2020 Negative     Ketones, UA 01/23/2020 Negative     Urobilinogen, UA 01/23/2020 0 2     Bilirubin, UA 01/23/2020 Negative     Blood, UA 01/23/2020 Large*    Troponin I 01/23/2020 <0 02     Protime 01/23/2020 44 3*    INR 01/23/2020 4 59*    PTT 01/23/2020 42*    RBC, UA 01/23/2020 4-10*    WBC, UA 01/23/2020 None Seen     Epithelial Cells 01/23/2020 Occasional     Bacteria, UA 01/23/2020 Occasional     Hyaline Casts, UA 01/23/2020 0-1*    MUCUS THREADS 01/23/2020 Moderate*    Ventricular Rate 01/23/2020 77     Atrial Rate 01/23/2020 77     DC Interval 01/23/2020 176     QRSD Interval 01/23/2020 94     QT Interval 01/23/2020 364     QTC Interval 01/23/2020 411     P Axis 01/23/2020 48     QRS Axis 01/23/2020 33     T Wave Axis 01/23/2020 35     WBC 01/24/2020 7 02     RBC 01/24/2020 4 66     Hemoglobin 01/24/2020 14 6     Hematocrit 01/24/2020 43 1     MCV 01/24/2020 93     MCH 01/24/2020 31 3     MCHC 01/24/2020 33 9     RDW 01/24/2020 12 6     MPV 01/24/2020 10 4     Platelets 23/84/4028 220     nRBC 01/24/2020 0     Neutrophils Relative 01/24/2020 68     Immat GRANS % 01/24/2020 0     Lymphocytes Relative 01/24/2020 20     Monocytes Relative 01/24/2020 9     Eosinophils Relative 01/24/2020 3     Basophils Relative 01/24/2020 0     Neutrophils Absolute 01/24/2020 4 75     Immature Grans Absolute 01/24/2020 0 02     Lymphocytes Absolute 01/24/2020 1 40     Monocytes Absolute 01/24/2020 0 63     Eosinophils Absolute 01/24/2020 0 20     Basophils Absolute 01/24/2020 0 02     Sodium 01/24/2020 140     Potassium 01/24/2020 3 1*    Chloride 01/24/2020 110*    CO2 01/24/2020 19*    ANION GAP 01/24/2020 11     BUN 01/24/2020 50*    Creatinine 01/24/2020 2 47*    Glucose 01/24/2020 118     Calcium 01/24/2020 8 8     eGFR 01/24/2020 30     Lipase 01/24/2020 192     Protime 01/24/2020 44 0*    INR 01/24/2020 4 55*    Sodium 01/25/2020 139     Potassium 01/25/2020 3 6     Chloride 01/25/2020 111*    CO2 01/25/2020 16*    ANION GAP 01/25/2020 12     BUN 01/25/2020 33*    Creatinine 01/25/2020 1 87*    Glucose 01/25/2020 103     Calcium 01/25/2020 8 7     eGFR 01/25/2020 42     NT-proBNP 01/25/2020 678*    WBC 01/25/2020 7 05     RBC 01/25/2020 4 68     Hemoglobin 01/25/2020 14 6     Hematocrit 01/25/2020 43 5     MCV 01/25/2020 93     MCH 01/25/2020 31 2     MCHC 01/25/2020 33 6     RDW 01/25/2020 12 6     MPV 01/25/2020 10 4     Platelets 58/79/0443 221     nRBC 01/25/2020 0     Neutrophils Relative 01/25/2020 57     Immat GRANS % 01/25/2020 0     Lymphocytes Relative 01/25/2020 23     Monocytes Relative 01/25/2020 12     Eosinophils Relative 01/25/2020 8*    Basophils Relative 01/25/2020 0     Neutrophils Absolute 01/25/2020 3 95     Immature Grans Absolute 01/25/2020 0 02     Lymphocytes Absolute 01/25/2020 1 65     Monocytes Absolute 01/25/2020 0 81     Eosinophils Absolute 01/25/2020 0 59     Basophils Absolute 01/25/2020 0 03     Magnesium 01/25/2020 1 3*    Protime 01/25/2020 39 3*    INR 01/25/2020 3 96*       Imaging Studies: I have personally reviewed pertinent imaging studies  CT scan of the abdomen and pelvis showed atrophic native kidney disease with an unremarkable appearance of the left-sided renal transplant and no evidence of acute abnormality in the abdomen or pelvis - pancreas appeared unremarkable  Ultrasound of the abdomen showed a 2 2 cm right liver lesion likely reflecting a hemangioma, gallbladder appeared normal     ASSESSMENT & PLAN:    1)  Nausea/Vomiting/Diarrhea x 1 month  2)  Abdominal pain x 1 month  3)  Elevated Lipase over 2000 on admission which normalized  CT A/P without contrast showed a normal pancreas  4)  Weight loss of 15 lbs  5)  FRANCES on top of history of Renal Transplant  6)  Liver Lesion on US - most likely a hemangioma    - Note that patient's presentation of gastrointestinal symptoms with N/V/D x 1 month and intermittent abdominal pain do not seem to correlate with a typical presentation of acute pancreatitis  Further, his CT A/P showed an unremarkable pancreas (although this exam is limited due to no IV contrast given his FRANCES)  - Monitor patient and labs  Will check stool cultures and c diff given ongoing diarrhea    - Also, recommend EGD and colonoscopy to further investigate his ongoing gastrointestinal symptoms for different potential etiologies  - Will also check fecal elastase   - Supportive care  - Nephrology input appreciated  - Recommend an MRI of the liver as an outpatient to more definitely characterize the liver lesion  The patient will be seen and evaluated by Dr Keith Cabello PA-C  1/25/2020,12:35 PM

## 2020-01-25 NOTE — PROGRESS NOTES
NEPHROLOGY PROGRESS NOTE    Patient: Kenny Marcus               Sex: male          DOA: 1/23/2020  7:12 PM   Date of Birth: @        Age: @        LOS:  LOS: 2 days   1/25/2020    REASON FOR THE CONSULTATION:  Further management of FRANCES    HPI     This is a 53 y o  male admitted for Acute renal failure superimposed on stage 3 chronic kidney disease ([facility] [address] )     SUBJECTIVE     - overnight breathing has remained stable   According to patient is still have diarrhea but bowel movement is less watery in consistency  Patient was also seen by GI earlier and I personally reviewed GI note with the recommendations  Patient denies nausea, vomiting, headache or dizziness today    - Reviewed last 24 hrs events     CURRENT MEDICATIONS       Current Facility-Administered Medications:     amphetamine-dextroamphetamine (ADDERALL) tablet 20 mg, 20 mg, Oral, BID (AM & Afternoon), Heber ARORA MD, 20 mg at 01/25/20 0917    diltiazem (CARDIZEM) tablet 120 mg, 120 mg, Oral, BID, Heber ARORA MD, 120 mg at 01/25/20 0917    lurasidone (LATUDA) tablet 80 mg, 80 mg, Oral, Daily, Heber ARORA MD, 80 mg at 01/25/20 0918    mycophenolate (CELLCEPT) capsule 250 mg, 250 mg, Oral, QPM, Heber ARORA MD, 250 mg at 01/24/20 1722    mycophenolate (CELLCEPT) capsule 500 mg, 500 mg, Oral, QAM, Heber ARORA MD, 500 mg at 01/25/20 0917    sertraline (ZOLOFT) tablet 100 mg, 100 mg, Oral, QAM, Heber ARORA MD, 100 mg at 01/25/20 0917    sod citrate-citric acid (BICITRA) oral solution 15 mL, 15 mL, Oral, TID, Iwona Daugherty MD, 15 mL at 01/25/20 1140    sodium chloride 0 9 % infusion, 125 mL/hr, Intravenous, Continuous, Heber ARORA MD, Last Rate: 125 mL/hr at 01/25/20 0657, 125 mL/hr at 01/25/20 0657    tacrolimus (PROGRAF) capsule 1 mg, 1 mg, Oral, BID, Heber ARORA MD, 1 mg at 01/25/20 0917    OBJECTIVE     Current Weight: Weight - Scale: 76 5 kg (168 lb 10 4 oz)  Vitals:    01/25/20 0700   BP: 126/86   Pulse: 79 Resp: 18   Temp: 98 8 °F (37 1 °C)   SpO2: 95%     Body mass index is 27 22 kg/m²  Intake/Output Summary (Last 24 hours) at 1/25/2020 1300  Last data filed at 1/25/2020 2895  Gross per 24 hour   Intake 2582 92 ml   Output 2000 ml   Net 582 92 ml       PHYSICAL EXAMINATION     Physical Exam   Constitutional: No distress  HENT:   Head: Normocephalic and atraumatic  Eyes: No scleral icterus  Neck: Neck supple  No JVD present  Cardiovascular: Normal rate  Pulmonary/Chest: No accessory muscle usage  No respiratory distress  He has no wheezes  Abdominal: Soft  He exhibits no distension  Musculoskeletal: He exhibits no edema  Right hand: He exhibits no laceration  Left hand: He exhibits no laceration  Neurological: He is alert  Skin: Skin is warm  No cyanosis  Psychiatric: He is not combative  He expresses no suicidal ideation  LAB RESULTS     Results from last 7 days   Lab Units 01/25/20  0442 01/24/20  0510 01/23/20 2016 01/23/20  1054   WBC Thousand/uL 7 05 7 02 7 64 6 96   HEMOGLOBIN g/dL 14 6 14 6 15 2 15 6   HEMATOCRIT % 43 5 43 1 43 7 46 9   PLATELETS Thousands/uL 221 220 243 247   SODIUM mmol/L 139 140 137 137   POTASSIUM mmol/L 3 6 3 1* 3 3* 4 1   CHLORIDE mmol/L 111* 110* 105 104   CO2 mmol/L 16* 19* 20* 23   BUN mg/dL 33* 50* 59* 63*   CREATININE mg/dL 1 87* 2 47* 3 34* 3 42*   EGFR ml/min/1 73sq m 42 30 21 20   CALCIUM mg/dL 8 7 8 8 9 2 9 3   MAGNESIUM mg/dL 1 3*  --  1 8 2 0   PHOSPHORUS mg/dL  --   --  3 1 4 3       I have personally reviewed the old medical records and patient's previously known baseline creatinine level is ~ 1 6-1 8    RADIOLOGY RESULTS      US liver   Final Result by Anjum Cole MD (01/24 1137)         1  2 2 cm right hepatic echogenic mass most likely reflecting a hemangioma  This could be more definitively characterized with contrast-enhanced CT or MRI   Alternatively, this could be evaluated with follow-up sonography to ensure stability  2  Normal-appearing gallbladder  3  Atrophic right kidney  The study was marked in EPIC for significant notification  Workstation performed: [id number]         CT abdomen pelvis wo contrast   Final Result by Giuseppe Hermosillo MD (01/23 2041)      1  Atrophic native kidneys  2   Unremarkable appearance of a left-sided renal transplant  3   No evidence of acute abnormality in the abdomen or pelvis  Workstation performed: [id number]         XR chest 2 views   ED Interpretation by Niharika Orantes DO (01/23 2026)   No acute abnormality in the chest       Final Result by Vargas Cervantes MD (01/24 4123)      No acute cardiopulmonary disease  This report is in agreement with the preliminary interpretation  Workstation performed: [id number]             PLAN / RECOMMENDATIONS      1  FRANCES on top of CKD stage 3 in the setting of kidney transplant  Present on admission, suspected due to intravascular volume depletion in the setting of elevated lipase level with diarrhea  Upon review of old medical records, patient has underlying CKD stage 3 with baseline creatinine around 1 6-1 8 and also have renal transplant and been followed by Dr Saida Rodney  Admission creatinine was 3 14   Currently receiving IV fluid and overnight renal function has improved to current creatinine of 1 87  Encouraged patient to increase oral fluid intake  Plan to continue IV fluid today and recheck renal function with AM labs  Underwent CT scan of abdomen and pelvis on admission which did not show hydronephrosis of transplant kidney  2  Renal transplant  Patient has received living related renal transplant in 2009 at [place] [facility] [employer], and currently receiving  mg in the morning, 250 mg at night along with tacrolimus 1 mg PO BID and also been followed by Dr Saida Rodney  Found to have tacrolimus of 15 3 on 1/23/2020  Pending repeat tacrolimus level from today    Since renal function is slowly improving, will wait for current tacrolimus level to come back before making any changes  3  Hypertension in chronic kidney disease  Overnight blood pressure has remained under good control and monitor hypertension with Cardizem 120 mg PO BID  4  Hypomagnesemia  Patient was found to have low magnesium level of 1 3 which is most likely secondary to diarrhea  Plan to give magnesium sulfate 2 g IV x1 dose today  Recheck magnesium level with AM labs  5  Metabolic acidosis  Non-anion gap, Multifactorial, most likely secondary to diarrhea  Current bicarb is 16 which is below the goal   Plan to start patient on Bicitra 15 mL PO TID  Recheck bicarb level with AM labs  Overall above mentioned plan was also d/w Wagner Boyle MD  Nephrology  1/25/2020        Portions of the record may have been created with voice recognition software  Occasional wrong word or "sound a like" substitutions may have occurred due to the inherent limitations of voice recognition software  Read the chart carefully and recognize, using context, where substitutions have occurred

## 2020-01-25 NOTE — ASSESSMENT & PLAN NOTE
Acute kidney injury likely prerenal due to dehydration  Currently afebrile, hemodynamically stable  Nontoxic appearing  Appears comfortable  Hemodynamically stable  Continue IV hydration    Monitor renal function  Avoid nephrotoxic agents  Creatinine improved to 1 87

## 2020-01-25 NOTE — ASSESSMENT & PLAN NOTE
Presentation history of DVT  INR noted supratherapeutic improved to 3 96  No signs of active overt bleeding noted  Hemoglobin stable  Likely stable    Continue to hold Coumadin   Daily INR monitoring

## 2020-01-26 LAB
ANION GAP SERPL CALCULATED.3IONS-SCNC: 10 MMOL/L (ref 4–13)
BASOPHILS # BLD AUTO: 0.04 THOUSANDS/ΜL (ref 0–0.1)
BASOPHILS NFR BLD AUTO: 1 % (ref 0–1)
BUN SERPL-MCNC: 26 MG/DL (ref 5–25)
CALCIUM SERPL-MCNC: 9.2 MG/DL (ref 8.3–10.1)
CHLORIDE SERPL-SCNC: 110 MMOL/L (ref 100–108)
CO2 SERPL-SCNC: 19 MMOL/L (ref 21–32)
CREAT SERPL-MCNC: 1.56 MG/DL (ref 0.6–1.3)
EOSINOPHIL # BLD AUTO: 0.56 THOUSAND/ΜL (ref 0–0.61)
EOSINOPHIL NFR BLD AUTO: 8 % (ref 0–6)
ERYTHROCYTE [DISTWIDTH] IN BLOOD BY AUTOMATED COUNT: 12.6 % (ref 11.6–15.1)
GFR SERPL CREATININE-BSD FRML MDRD: 52 ML/MIN/1.73SQ M
GLUCOSE SERPL-MCNC: 97 MG/DL (ref 65–140)
HCT VFR BLD AUTO: 45.3 % (ref 36.5–49.3)
HGB BLD-MCNC: 14.9 G/DL (ref 12–17)
IMM GRANULOCYTES # BLD AUTO: 0.02 THOUSAND/UL (ref 0–0.2)
IMM GRANULOCYTES NFR BLD AUTO: 0 % (ref 0–2)
INR PPP: 2.14 (ref 0.84–1.19)
LYMPHOCYTES # BLD AUTO: 1.86 THOUSANDS/ΜL (ref 0.6–4.47)
LYMPHOCYTES NFR BLD AUTO: 27 % (ref 14–44)
MAGNESIUM SERPL-MCNC: 1.4 MG/DL (ref 1.6–2.6)
MCH RBC QN AUTO: 30.6 PG (ref 26.8–34.3)
MCHC RBC AUTO-ENTMCNC: 32.9 G/DL (ref 31.4–37.4)
MCV RBC AUTO: 93 FL (ref 82–98)
MONOCYTES # BLD AUTO: 0.75 THOUSAND/ΜL (ref 0.17–1.22)
MONOCYTES NFR BLD AUTO: 11 % (ref 4–12)
NEUTROPHILS # BLD AUTO: 3.77 THOUSANDS/ΜL (ref 1.85–7.62)
NEUTS SEG NFR BLD AUTO: 53 % (ref 43–75)
NRBC BLD AUTO-RTO: 0 /100 WBCS
PLATELET # BLD AUTO: 220 THOUSANDS/UL (ref 149–390)
PMV BLD AUTO: 9.9 FL (ref 8.9–12.7)
POTASSIUM SERPL-SCNC: 3.9 MMOL/L (ref 3.5–5.3)
PROTHROMBIN TIME: 24.1 SECONDS (ref 11.6–14.5)
RBC # BLD AUTO: 4.87 MILLION/UL (ref 3.88–5.62)
SODIUM SERPL-SCNC: 139 MMOL/L (ref 136–145)
TACROLIMUS BLD-MCNC: 13.2 NG/ML (ref 2–20)
WBC # BLD AUTO: 7 THOUSAND/UL (ref 4.31–10.16)

## 2020-01-26 PROCEDURE — 85025 COMPLETE CBC W/AUTO DIFF WBC: CPT | Performed by: INTERNAL MEDICINE

## 2020-01-26 PROCEDURE — 99232 SBSQ HOSP IP/OBS MODERATE 35: CPT | Performed by: INTERNAL MEDICINE

## 2020-01-26 PROCEDURE — 80048 BASIC METABOLIC PNL TOTAL CA: CPT | Performed by: INTERNAL MEDICINE

## 2020-01-26 PROCEDURE — 99232 SBSQ HOSP IP/OBS MODERATE 35: CPT | Performed by: NURSE PRACTITIONER

## 2020-01-26 PROCEDURE — 99233 SBSQ HOSP IP/OBS HIGH 50: CPT | Performed by: INTERNAL MEDICINE

## 2020-01-26 PROCEDURE — 85610 PROTHROMBIN TIME: CPT | Performed by: NURSE PRACTITIONER

## 2020-01-26 PROCEDURE — 83735 ASSAY OF MAGNESIUM: CPT | Performed by: INTERNAL MEDICINE

## 2020-01-26 RX ORDER — WARFARIN SODIUM 7.5 MG/1
7.5 TABLET ORAL
Status: DISCONTINUED | OUTPATIENT
Start: 2020-01-26 | End: 2020-01-28 | Stop reason: HOSPADM

## 2020-01-26 RX ORDER — MAGNESIUM SULFATE HEPTAHYDRATE 40 MG/ML
2 INJECTION, SOLUTION INTRAVENOUS ONCE
Status: COMPLETED | OUTPATIENT
Start: 2020-01-26 | End: 2020-01-27

## 2020-01-26 RX ADMIN — SODIUM CHLORIDE 125 ML/HR: 0.9 INJECTION, SOLUTION INTRAVENOUS at 05:20

## 2020-01-26 RX ADMIN — SERTRALINE HYDROCHLORIDE 100 MG: 100 TABLET ORAL at 08:37

## 2020-01-26 RX ADMIN — MYCOPHENOLATE MOFETIL 250 MG: 250 CAPSULE ORAL at 17:13

## 2020-01-26 RX ADMIN — LURASIDONE HYDROCHLORIDE 80 MG: 40 TABLET, FILM COATED ORAL at 08:37

## 2020-01-26 RX ADMIN — DILTIAZEM HYDROCHLORIDE 120 MG: 60 TABLET, FILM COATED ORAL at 08:37

## 2020-01-26 RX ADMIN — WARFARIN SODIUM 7.5 MG: 7.5 TABLET ORAL at 17:18

## 2020-01-26 RX ADMIN — SODIUM CITRATE AND CITRIC ACID MONOHYDRATE 15 ML: 500; 334 SOLUTION ORAL at 21:47

## 2020-01-26 RX ADMIN — MAGNESIUM SULFATE HEPTAHYDRATE 2 G: 40 INJECTION, SOLUTION INTRAVENOUS at 11:12

## 2020-01-26 RX ADMIN — TACROLIMUS 1 MG: 0.5 CAPSULE ORAL at 08:37

## 2020-01-26 RX ADMIN — DEXTROAMPHETAMINE SACCHARATE, AMPHETAMINE ASPARTATE, DEXTROAMPHETAMINE SULFATE AND AMPHETAMINE SULFATE 20 MG: 2.5; 2.5; 2.5; 2.5 TABLET ORAL at 15:40

## 2020-01-26 RX ADMIN — TACROLIMUS 1 MG: 0.5 CAPSULE ORAL at 17:13

## 2020-01-26 RX ADMIN — DEXTROAMPHETAMINE SACCHARATE, AMPHETAMINE ASPARTATE, DEXTROAMPHETAMINE SULFATE AND AMPHETAMINE SULFATE 20 MG: 2.5; 2.5; 2.5; 2.5 TABLET ORAL at 08:37

## 2020-01-26 RX ADMIN — DILTIAZEM HYDROCHLORIDE 120 MG: 60 TABLET, FILM COATED ORAL at 17:18

## 2020-01-26 RX ADMIN — SODIUM CITRATE AND CITRIC ACID MONOHYDRATE 15 ML: 500; 334 SOLUTION ORAL at 15:41

## 2020-01-26 RX ADMIN — MAGNESIUM GLUCONATE 500 MG ORAL TABLET 800 MG: 500 TABLET ORAL at 17:18

## 2020-01-26 RX ADMIN — SODIUM CITRATE AND CITRIC ACID MONOHYDRATE 15 ML: 500; 334 SOLUTION ORAL at 08:37

## 2020-01-26 RX ADMIN — MYCOPHENOLATE MOFETIL 500 MG: 250 CAPSULE ORAL at 08:37

## 2020-01-26 NOTE — PROGRESS NOTES
Progress Note - [place] 53 y o  male MRN: [id number]    Unit/Bed#: -01 Encounter: [id number]    Subjective:     Patient states that he is feeling better today  He has been told that he will be going home today  We discussed the importance of performing the outpatient esophagogastroduodenoscopy and colonoscopy to evaluate his 1 month history of nausea, vomiting, weight loss, abdominal pain, and diarrhea  He admitted to some nausea last night but none this morning  Objective:     Vitals: Blood pressure 125/81, pulse 79, temperature 98 °F (36 7 °C), temperature source Oral, resp  rate 18, height 5' 6" (1 676 m), weight 76 5 kg (168 lb 10 4 oz), SpO2 98 %  ,Body mass index is 27 22 kg/m²        Intake/Output Summary (Last 24 hours) at 1/26/2020 1012  Last data filed at 1/26/2020 9637  Gross per 24 hour   Intake 3086 25 ml   Output 3475 ml   Net -388 75 ml       Physical Exam:     General Appearance: Alert, appears stated age and cooperative  HEENT:  Normocephalic, atraumatic, nonicteric, PERRLA  Neck:  Supple, symmetrical, trachea midline  Lungs: Clear to auscultation bilaterally, no rales or rhonchi, no labored breathing/accessory muscle use  Heart: Regular rate and rhythm, S1, S2 normal, no murmur, click, rub or gallop  Abdomen: Soft, non-tender, non-distended; bowel sounds normal; no masses or no organomegaly  Extremities: No cyanosis, edema  Lymphatic:  No cervical, inguinal, axillary adenopathy  Skin:  No jaundice, lesions, pallor  Pulses:  2+ and equal bilaterally, no bruits    Invasive Devices     Peripheral Intravenous Line            Peripheral IV 01/23/20 Right Arm 2 days                Lab Results:  Results from last 7 days   Lab Units 01/26/20  0538   WBC Thousand/uL 7 00   HEMOGLOBIN g/dL 14 9   HEMATOCRIT % 45 3   PLATELETS Thousands/uL 220   NEUTROS PCT % 53   LYMPHS PCT % 27   MONOS PCT % 11   EOS PCT % 8*     Results from last 7 days   Lab Units 01/26/20  0538  01/23/20  2016   POTASSIUM mmol/L 3 9   < > 3 3*   CHLORIDE mmol/L 110*   < > 105   CO2 mmol/L 19*   < > 20*   BUN mg/dL 26*   < > 59*   CREATININE mg/dL 1 56*   < > 3 34*   CALCIUM mg/dL 9 2   < > 9 2   ALK PHOS U/L  --   --  90   ALT U/L  --   --  17   AST U/L  --   --  14    < > = values in this interval not displayed  Results from last 7 days   Lab Units 01/26/20  0538   INR  2 14*     Results from last 7 days   Lab Units 01/24/20  0510   LIPASE u/L 192       Imaging Studies: I have personally reviewed pertinent imaging studies  Ct Abdomen Pelvis Wo Contrast    Result Date: 1/23/2020  Impression: 1  Atrophic native kidneys  2   Unremarkable appearance of a left-sided renal transplant  3   No evidence of acute abnormality in the abdomen or pelvis  Workstation performed: [id number]     Xr Chest 2 Views    Result Date: 1/24/2020  Impression: No acute cardiopulmonary disease  This report is in agreement with the preliminary interpretation  Workstation performed: [id number]     Us Liver    Result Date: 1/24/2020  Impression: 1  2 2 cm right hepatic echogenic mass most likely reflecting a hemangioma  This could be more definitively characterized with contrast-enhanced CT or MRI  Alternatively, this could be evaluated with follow-up sonography to ensure stability  2  Normal-appearing gallbladder  3  Atrophic right kidney  The study was marked in EPIC for significant notification  Workstation performed: [id number]       Assessment and Plan:     1)  Nausea/Vomiting/Diarrhea x 1 month  - Note that patient's presentation of gastrointestinal symptoms with N/V/D x 1 month and intermittent abdominal pain do not seem to correlate with a typical presentation of acute pancreatitis  - Monitor patient and labs  Will check stool cultures and c diff given ongoing diarrhea  - Also, recommend EGD and colonoscopy as an outpatient to further investigate his ongoing gastrointestinal symptoms for different potential etiologies    - Will also check fecal elastase   - Supportive care  2)  Abdominal pain x 1 month    3)  Elevated Lipase over 2000 on admission which normalized  CT A/P without contrast showed a normal pancreas  - His CT A/P showed an unremarkable pancreas (although this exam is limited due to no IV contrast given his FRANCES)  4)  Weight loss of 15 lbs  5)  FRANCES on top of history of Renal Transplant    6)   Liver Lesion on US - most likely a hemangioma  - Recommend an MRI of the liver as an outpatient to more definitely characterize the liver lesion      Patient being discharged home today

## 2020-01-26 NOTE — ASSESSMENT & PLAN NOTE
Acute kidney injury likely prerenal due to dehydration  Currently afebrile, hemodynamically stable  Nontoxic appearing  Appears comfortable  Hemodynamically stable  Baseline creatinine appears to be 1 5-1 8  Continue IV hydration    Monitor renal function  Avoid nephrotoxic agents  Creatinine improved to 1 56

## 2020-01-26 NOTE — PROGRESS NOTES
[address] Internal Medicine  Progress Note - Saralyn Holter 1975, 53 y o  male MRN: [id number]    Unit/Bed#: -01 Encounter: [id number]    Primary Care Provider: Bill Livingston MD   Date and time admitted to hospital: 1/23/2020  7:12 PM    * Acute renal failure superimposed on stage 3 chronic kidney disease ([facility] [address] )  Assessment & Plan  Acute kidney injury likely prerenal due to dehydration  Currently afebrile, hemodynamically stable  Nontoxic appearing  Appears comfortable  Hemodynamically stable  Baseline creatinine appears to be 1 5-1 8  Continue IV hydration  Monitor renal function  Avoid nephrotoxic agents  Creatinine improved to 1 56      Hypomagnesemia  Assessment & Plan  Oral supplementation  BMP in a m  Hypokalemia  Assessment & Plan  Oral supplementation  Resolved    Severe episode of recurrent major depressive disorder, without psychotic features [facility])  Assessment & Plan  Present on admission with history of ADD, major depressive disorder with psychotic features  Currently stable  Denies suicidal, homicidal ideation  Continue Adderall, Zoloft, latuda  Essential hypertension  Assessment & Plan  Presentation with history of hypertension  Home medication includes Cardizem 120 mg daily  Currently stable  Continue to monitor  History of DVT (deep vein thrombosis)  Assessment & Plan  Presentation history of DVT  INR noted supratherapeutic improved to 2 14  Elevation was likely related to poor nutrition over the last month  Hemoglobin stable  Resumed Coumadin  Daily INR monitoring      History of renal transplant  Assessment & Plan  Present on admission with history of renal transplant secondary to IgA nephropathy  Currently on immunosuppressants  Continue home dose of mycophenolate mofetil 250 mg tablets,  2 tablets in a m , 1 tablet p m , tacrolimus 1 mg b i d Continue home medications    Nephrology following     VTE Pharmacologic Prophylaxis:   Pharmacologic: Warfarin (Coumadin)  Mechanical VTE Prophylaxis in Place: Yes    Patient Centered Rounds: I have performed bedside rounds with nursing staff today  Discussions with Specialists or Other Care Team Provider:  Reviewed GI notes, reviewed Nephrology notes, discussed with case management primary RN    Education and Discussions with Family / Patient:  Educated patient on current plan of care denies any additional questions or concerns at this time    Time Spent for Care: 20 minutes  More than 50% of total time spent on counseling and coordination of care as described above  Current Length of Stay: 3 day(s)    Current Patient Status: Inpatient   Certification Statement: The patient will continue to require additional inpatient hospital stay due to Awaiting stool cultures, electrolyte imbalances requiring repletion    Discharge Plan / Estimated Discharge Date:  Hopeful within next 24 hours      Code Status: Level 1 - Full Code      Subjective:   Denies any chest pain chest tightness shortness of breath or difficulty breathing  Reports the nausea vomiting have improved however he is still having some diarrhea 2-3 times daily  Objective:     Vitals:   Temp (24hrs), Av °F (36 7 °C), Min:98 °F (36 7 °C), Max:98 °F (36 7 °C)    Temp:  [98 °F (36 7 °C)] 98 °F (36 7 °C)  HR:  [79-81] 79  Resp:  [18] 18  BP: (125-138)/(58-99) 138/99  SpO2:  [96 %-98 %] 98 %  Body mass index is 27 22 kg/m²  Input and Output Summary (last 24 hours): Intake/Output Summary (Last 24 hours) at 2020 1729  Last data filed at 2020 1423  Gross per 24 hour   Intake 3659 25 ml   Output 2475 ml   Net 1184 25 ml       Physical Exam:     Physical Exam   Constitutional: He is oriented to person, place, and time  He appears well-developed and well-nourished  HENT:   Head: Normocephalic  Eyes: Pupils are equal, round, and reactive to light  Neck: Normal range of motion  Cardiovascular: Normal rate and regular rhythm  Pulmonary/Chest: Effort normal    Abdominal: Soft  Bowel sounds are normal    Musculoskeletal: Normal range of motion  Neurological: He is alert and oriented to person, place, and time  Skin: Skin is warm and dry  Psychiatric: He has a normal mood and affect  His behavior is normal  Judgment and thought content normal    Nursing note and vitals reviewed  Additional Data:     Labs:    Results from last 7 days   Lab Units 01/26/20  0538   WBC Thousand/uL 7 00   HEMOGLOBIN g/dL 14 9   HEMATOCRIT % 45 3   PLATELETS Thousands/uL 220   NEUTROS PCT % 53   LYMPHS PCT % 27   MONOS PCT % 11   EOS PCT % 8*     Results from last 7 days   Lab Units 01/26/20  0538  01/23/20 2016   POTASSIUM mmol/L 3 9   < > 3 3*   CHLORIDE mmol/L 110*   < > 105   CO2 mmol/L 19*   < > 20*   BUN mg/dL 26*   < > 59*   CREATININE mg/dL 1 56*   < > 3 34*   CALCIUM mg/dL 9 2   < > 9 2   ALK PHOS U/L  --   --  90   ALT U/L  --   --  17   AST U/L  --   --  14    < > = values in this interval not displayed  Results from last 7 days   Lab Units 01/26/20  0538   INR  2 14*       * I Have Reviewed All Lab Data Listed Above  * Additional Pertinent Lab Tests Reviewed:  [facility]    Recent Cultures (last 7 days):           Last 24 Hours Medication List:     Current Facility-Administered Medications:  amphetamine-dextroamphetamine 20 mg Oral BID (AM & Afternoon) Heber ARORA MD   diltiazem 120 mg Oral BID Heber ARORA MD   lurasidone 80 mg Oral Daily Heber ARORA MD   mycophenolate 250 mg Oral QPM Heber ARORA MD   mycophenolate 500 mg Oral QAM Heber ARORA MD   sertraline 100 mg Oral QAM Heber ARORA MD   sod citrate-citric acid 15 mL Oral TID Fahad Porras MD   tacrolimus 1 mg Oral BID Heber ARORA MD   warfarin 7 5 mg Oral Daily (warfarin) JONATAN Richards        Today, Patient Was Seen By: JONATAN Richards    ** Please Note: Dragon 360 Dictation voice to text software may have been used in the creation of this document   **

## 2020-01-26 NOTE — PROGRESS NOTES
NEPHROLOGY PROGRESS NOTE    Patient: Paulina Phelan               Sex: male          DOA: 1/23/2020  7:12 PM   Date of Birth: @        Age: @        LOS:  LOS: 3 days   1/26/2020    REASON FOR THE CONSULTATION:  Further management of FRANCES  HPI     This is a 53 y o  male admitted for Acute renal failure superimposed on stage 3 chronic kidney disease ([facility] [address] )     SUBJECTIVE     - seen by GI and reviewed GI note with the recommendation from this morning  According to patient he had 3 bowel movement in last 24 hours  Currently breathing is stable and also found to be nonoliguric  Patient denies nausea, vomiting, headache or dizziness today  - Reviewed last 24 hrs events     CURRENT MEDICATIONS       Current Facility-Administered Medications:     amphetamine-dextroamphetamine (ADDERALL) tablet 20 mg, 20 mg, Oral, BID (AM & Afternoon), Heber ARORA MD, 20 mg at 01/26/20 0837    diltiazem (CARDIZEM) tablet 120 mg, 120 mg, Oral, BID, Heber ARORA MD, 120 mg at 01/26/20 0837    lurasidone (LATUDA) tablet 80 mg, 80 mg, Oral, Daily, Heber ARORA MD, 80 mg at 01/26/20 0837    mycophenolate (CELLCEPT) capsule 250 mg, 250 mg, Oral, QPM, Heber ARORA MD, 250 mg at 01/25/20 1712    mycophenolate (CELLCEPT) capsule 500 mg, 500 mg, Oral, QAM, Heber ARORA MD, 500 mg at 01/26/20 0837    sertraline (ZOLOFT) tablet 100 mg, 100 mg, Oral, QAM, Heber ARORA MD, 100 mg at 01/26/20 0837    sod citrate-citric acid (BICITRA) oral solution 15 mL, 15 mL, Oral, TID, Tricia Mcgrath MD, 15 mL at 01/26/20 0837    tacrolimus (PROGRAF) capsule 1 mg, 1 mg, Oral, BID, Heber ARORA MD, 1 mg at 01/26/20 0837    OBJECTIVE     Current Weight: Weight - Scale: 76 5 kg (168 lb 10 4 oz)  Vitals:    01/26/20 0700   BP: 125/81   Pulse: 79   Resp: 18   Temp: 98 °F (36 7 °C)   SpO2: 98%     Body mass index is 27 22 kg/m²      Intake/Output Summary (Last 24 hours) at 1/26/2020 1340  Last data filed at 1/26/2020 1112  Gross per 24 hour   Intake 3919 25 ml   Output 3475 ml   Net 444 25 ml       PHYSICAL EXAMINATION     Physical Exam   Constitutional: No distress  HENT:   Head: Normocephalic and atraumatic  Eyes: No scleral icterus  Neck: Neck supple  No JVD present  Cardiovascular: Normal rate  Pulmonary/Chest: No accessory muscle usage  No respiratory distress  He has no wheezes  Abdominal: Soft  He exhibits no distension  Musculoskeletal:        Right hand: He exhibits no laceration  Left hand: He exhibits no laceration  Neurological: He is alert  Skin: Skin is warm  No cyanosis  Psychiatric: He is not combative  He expresses no suicidal ideation  LAB RESULTS     Results from last 7 days   Lab Units 01/26/20  0538 01/25/20  0442 01/24/20  0510 01/23/20 2016 01/23/20  1054   WBC Thousand/uL 7 00 7 05 7 02 7 64 6 96   HEMOGLOBIN g/dL 14 9 14 6 14 6 15 2 15 6   HEMATOCRIT % 45 3 43 5 43 1 43 7 46 9   PLATELETS Thousands/uL 220 221 220 243 247   SODIUM mmol/L 139 139 140 137 137   POTASSIUM mmol/L 3 9 3 6 3 1* 3 3* 4 1   CHLORIDE mmol/L 110* 111* 110* 105 104   CO2 mmol/L 19* 16* 19* 20* 23   BUN mg/dL 26* 33* 50* 59* 63*   CREATININE mg/dL 1 56* 1 87* 2 47* 3 34* 3 42*   EGFR ml/min/1 73sq m 52 42 30 21 20   CALCIUM mg/dL 9 2 8 7 8 8 9 2 9 3   MAGNESIUM mg/dL 1 4* 1 3*  --  1 8 2 0   PHOSPHORUS mg/dL  --   --   --  3 1 4 3       I have personally reviewed the old medical records and patient's previously known baseline creatinine level is ~ 1 6-1 8    RADIOLOGY RESULTS      US liver   Final Result by Rod Donovan MD (01/24 1132)         1  2 2 cm right hepatic echogenic mass most likely reflecting a hemangioma  This could be more definitively characterized with contrast-enhanced CT or MRI  Alternatively, this could be evaluated with follow-up sonography to ensure stability  2  Normal-appearing gallbladder  3  Atrophic right kidney  The study was marked in EPIC for significant notification  Workstation performed: [id number]         CT abdomen pelvis wo contrast   Final Result by Marvin Sullivan MD (01/23 2041)      1  Atrophic native kidneys  2   Unremarkable appearance of a left-sided renal transplant  3   No evidence of acute abnormality in the abdomen or pelvis  Workstation performed: [id number]         XR chest 2 views   ED Interpretation by Ruslan Leiva DO (01/23 2026)   No acute abnormality in the chest       Final Result by Abel An MD (01/24 5653)      No acute cardiopulmonary disease  This report is in agreement with the preliminary interpretation  Workstation performed: [id number]             PLAN / RECOMMENDATIONS      1  FRANCES on top of CKD stage 3 in the light of kidney transplant  Present on admission, suspected due to intravascular volume depletion in the setting of diarrhea and elevated lipase level  Upon review of old medical records, patient's previously known baseline creatinine is around 1 6-1 8 and been followed by Dr Ariadna Brown for kidney transplant  Admission creatinine was 3 4 and patient has received IV fluid and renal function has improved to current creatinine of 1 56 which is now back to baseline  Plan to stop IV fluid today and monitor renal function while in the hospital     CT scan of abdomen and pelvis done on admission which did not show transplant hydronephrosis  2  Renal transplant  Patient has received living related renal transplant in 2000 and night at [address]  Currently been followed by Dr Ariadna Brown and takes  mg in the morning and 250 at night along with tacrolimus 1 mg PO BID  Tacrolimus level done on 1/23/2020 was 15 3  Repeat tacrolimus on 1/25/2020 was 13 2  Tacrolimus level is little bit higher than goal which can be secondary to FRANCES on top of diarrhea    Now FRANCES has resolved and diarrhea has also significantly improved and will plan to recheck tacrolimus level with AM labs again if remains in the hospital   For time being continue current dose of MMF and tacrolimus  3  Hypomagnesemia  Multifactorial, suspected magnesium loss in the setting of diarrhea  Current magnesium is 1 4 which is below the goal and plan to give magnesium sulfate 2 g IV x1 dose today and recheck magnesium level with AM labs  4  Metabolic acidosis  Non-anion gap  Multifactorial, suspected due to diarrhea  Patient is currently on Bicitra 15 mL PO TID and bicarb level has improved to 19  Diarrhea is also improving  Continue Bicitra while in the hospital     5  Hypertension in chronic kidney disease  Overnight blood pressure has remained under good control and monitor hypertension with Cardizem 120 mg PO BID  Overall above mentioned plan was also d/w Stefan Espinoza MD  Nephrology  1/26/2020        Portions of the record may have been created with voice recognition software  Occasional wrong word or "sound a like" substitutions may have occurred due to the inherent limitations of voice recognition software  Read the chart carefully and recognize, using context, where substitutions have occurred

## 2020-01-26 NOTE — ASSESSMENT & PLAN NOTE
Presentation history of DVT  INR noted supratherapeutic improved to 2 14  Elevation was likely related to poor nutrition over the last month  Hemoglobin stable    Resumed Coumadin  Daily INR monitoring

## 2020-01-27 PROBLEM — R19.7 NAUSEA VOMITING AND DIARRHEA: Status: ACTIVE | Noted: 2020-01-27

## 2020-01-27 PROBLEM — R11.2 NAUSEA VOMITING AND DIARRHEA: Status: ACTIVE | Noted: 2020-01-27

## 2020-01-27 LAB
ANION GAP SERPL CALCULATED.3IONS-SCNC: 11 MMOL/L (ref 4–13)
BUN SERPL-MCNC: 25 MG/DL (ref 5–25)
C DIFF TOX GENS STL QL NAA+PROBE: NEGATIVE
CALCIUM SERPL-MCNC: 9.5 MG/DL (ref 8.3–10.1)
CAMPYLOBACTER DNA SPEC NAA+PROBE: NORMAL
CHLORIDE SERPL-SCNC: 105 MMOL/L (ref 100–108)
CO2 SERPL-SCNC: 22 MMOL/L (ref 21–32)
CREAT SERPL-MCNC: 1.53 MG/DL (ref 0.6–1.3)
GFR SERPL CREATININE-BSD FRML MDRD: 54 ML/MIN/1.73SQ M
GLUCOSE SERPL-MCNC: 89 MG/DL (ref 65–140)
INR PPP: 1.4 (ref 0.84–1.19)
MAGNESIUM SERPL-MCNC: 1.5 MG/DL (ref 1.6–2.6)
POTASSIUM SERPL-SCNC: 3.6 MMOL/L (ref 3.5–5.3)
PROTHROMBIN TIME: 17.3 SECONDS (ref 11.6–14.5)
SALMONELLA DNA SPEC QL NAA+PROBE: NORMAL
SHIGA TOXIN STX GENE SPEC NAA+PROBE: NORMAL
SHIGELLA DNA SPEC QL NAA+PROBE: NORMAL
SODIUM SERPL-SCNC: 138 MMOL/L (ref 136–145)
TACROLIMUS BLD-MCNC: 19.6 NG/ML (ref 2–20)

## 2020-01-27 PROCEDURE — 99233 SBSQ HOSP IP/OBS HIGH 50: CPT | Performed by: INTERNAL MEDICINE

## 2020-01-27 PROCEDURE — 80048 BASIC METABOLIC PNL TOTAL CA: CPT | Performed by: INTERNAL MEDICINE

## 2020-01-27 PROCEDURE — 99232 SBSQ HOSP IP/OBS MODERATE 35: CPT | Performed by: PHYSICIAN ASSISTANT

## 2020-01-27 PROCEDURE — 85610 PROTHROMBIN TIME: CPT | Performed by: NURSE PRACTITIONER

## 2020-01-27 PROCEDURE — 83735 ASSAY OF MAGNESIUM: CPT | Performed by: INTERNAL MEDICINE

## 2020-01-27 PROCEDURE — 80197 ASSAY OF TACROLIMUS: CPT | Performed by: INTERNAL MEDICINE

## 2020-01-27 RX ORDER — MAGNESIUM SULFATE 1 G/100ML
1 INJECTION INTRAVENOUS ONCE
Status: COMPLETED | OUTPATIENT
Start: 2020-01-27 | End: 2020-01-27

## 2020-01-27 RX ADMIN — TACROLIMUS 1 MG: 0.5 CAPSULE ORAL at 18:39

## 2020-01-27 RX ADMIN — DILTIAZEM HYDROCHLORIDE 120 MG: 60 TABLET, FILM COATED ORAL at 09:06

## 2020-01-27 RX ADMIN — WARFARIN SODIUM 7.5 MG: 7.5 TABLET ORAL at 18:39

## 2020-01-27 RX ADMIN — LURASIDONE HYDROCHLORIDE 80 MG: 40 TABLET, FILM COATED ORAL at 09:07

## 2020-01-27 RX ADMIN — DEXTROAMPHETAMINE SACCHARATE, AMPHETAMINE ASPARTATE, DEXTROAMPHETAMINE SULFATE AND AMPHETAMINE SULFATE 20 MG: 2.5; 2.5; 2.5; 2.5 TABLET ORAL at 09:06

## 2020-01-27 RX ADMIN — SERTRALINE HYDROCHLORIDE 100 MG: 100 TABLET ORAL at 09:07

## 2020-01-27 RX ADMIN — TACROLIMUS 1 MG: 0.5 CAPSULE ORAL at 09:06

## 2020-01-27 RX ADMIN — MYCOPHENOLATE MOFETIL 500 MG: 250 CAPSULE ORAL at 09:06

## 2020-01-27 RX ADMIN — DILTIAZEM HYDROCHLORIDE 120 MG: 60 TABLET, FILM COATED ORAL at 18:44

## 2020-01-27 RX ADMIN — MAGNESIUM SULFATE HEPTAHYDRATE 1 G: 1 INJECTION, SOLUTION INTRAVENOUS at 09:16

## 2020-01-27 RX ADMIN — DEXTROAMPHETAMINE SACCHARATE, AMPHETAMINE ASPARTATE, DEXTROAMPHETAMINE SULFATE AND AMPHETAMINE SULFATE 20 MG: 2.5; 2.5; 2.5; 2.5 TABLET ORAL at 14:41

## 2020-01-27 RX ADMIN — MYCOPHENOLATE MOFETIL 250 MG: 250 CAPSULE ORAL at 18:39

## 2020-01-27 NOTE — PROGRESS NOTES
NEPHROLOGY PROGRESS NOTE    Patient: Philip Plascencia               Sex: male          DOA: 1/23/2020  7:12 PM   Date of Birth: @        Age: @        LOS:  LOS: 4 days   1/27/2020    REASON FOR THE CONSULTATION:  Further management of FRANCES in the setting of renal transplant    HPI     This is a 53 y o  male admitted for Acute renal failure superimposed on stage 3 chronic kidney disease ([facility] [address] )     SUBJECTIVE     - overnight breathing has remained stable  Currently patient is also nonoliguric  Patient denies nausea, vomiting, headache or dizziness today  - Reviewed last 24 hrs events     CURRENT MEDICATIONS       Current Facility-Administered Medications:     amphetamine-dextroamphetamine (ADDERALL) tablet 20 mg, 20 mg, Oral, BID (AM & Afternoon), Heber ARORA MD, 20 mg at 01/27/20 0906    diltiazem (CARDIZEM) tablet 120 mg, 120 mg, Oral, BID, Heber ARORA MD, 120 mg at 01/27/20 0906    lurasidone (LATUDA) tablet 80 mg, 80 mg, Oral, Daily, Heber ARORA MD, 80 mg at 01/27/20 0907    mycophenolate (CELLCEPT) capsule 250 mg, 250 mg, Oral, QPM, Heber ARORA MD, 250 mg at 01/26/20 1713    mycophenolate (CELLCEPT) capsule 500 mg, 500 mg, Oral, QAM, Heber ARORA MD, 500 mg at 01/27/20 0906    sertraline (ZOLOFT) tablet 100 mg, 100 mg, Oral, QAM, Heber ARROA MD, 100 mg at 01/27/20 0907    tacrolimus (PROGRAF) capsule 1 mg, 1 mg, Oral, BID, Heber ARORA MD, 1 mg at 01/27/20 0906    warfarin (COUMADIN) tablet 7 5 mg, 7 5 mg, Oral, Daily (warfarin), JONATAN Liriano, 7 5 mg at 01/26/20 1718    OBJECTIVE     Current Weight: Weight - Scale: 76 5 kg (168 lb 10 4 oz)  Vitals:    01/27/20 0717   BP: 127/89   Pulse: 73   Resp: 18   Temp: 97 8 °F (36 6 °C)   SpO2: 95%     Body mass index is 27 22 kg/m²      Intake/Output Summary (Last 24 hours) at 1/27/2020 1332  Last data filed at 1/27/2020 1100  Gross per 24 hour   Intake 1830 ml   Output 3125 ml   Net -1295 ml       PHYSICAL EXAMINATION Physical Exam   Constitutional: No distress  HENT:   Head: Normocephalic and atraumatic  Eyes: No scleral icterus  Neck: Neck supple  No JVD present  Cardiovascular: Normal rate  Pulmonary/Chest: No accessory muscle usage  No respiratory distress  He has no wheezes  Abdominal: Soft  He exhibits no distension  Musculoskeletal:        Right hand: He exhibits no laceration  Left hand: He exhibits no laceration  Neurological: He is alert  Skin: Skin is warm  No cyanosis  Psychiatric: He is not combative  He expresses no suicidal ideation  LAB RESULTS     Results from last 7 days   Lab Units 01/27/20  0454 01/26/20  0538 01/25/20  0442 01/24/20  0510 01/23/20 2016 01/23/20  1054   WBC Thousand/uL  --  7 00 7 05 7 02 7 64 6 96   HEMOGLOBIN g/dL  --  14 9 14 6 14 6 15 2 15 6   HEMATOCRIT %  --  45 3 43 5 43 1 43 7 46 9   PLATELETS Thousands/uL  --  220 221 220 243 247   SODIUM mmol/L 138 139 139 140 137 137   POTASSIUM mmol/L 3 6 3 9 3 6 3 1* 3 3* 4 1   CHLORIDE mmol/L 105 110* 111* 110* 105 104   CO2 mmol/L 22 19* 16* 19* 20* 23   BUN mg/dL 25 26* 33* 50* 59* 63*   CREATININE mg/dL 1 53* 1 56* 1 87* 2 47* 3 34* 3 42*   EGFR ml/min/1 73sq m 54 52 42 30 21 20   CALCIUM mg/dL 9 5 9 2 8 7 8 8 9 2 9 3   MAGNESIUM mg/dL 1 5* 1 4* 1 3*  --  1 8 2 0   PHOSPHORUS mg/dL  --   --   --   --  3 1 4 3       I have personally reviewed the old medical records and patient's previously known baseline creatinine level is ~ 1 6-1 8    RADIOLOGY RESULTS      US liver   Final Result by Dorinda Otero MD (01/24 4464)         1  2 2 cm right hepatic echogenic mass most likely reflecting a hemangioma  This could be more definitively characterized with contrast-enhanced CT or MRI  Alternatively, this could be evaluated with follow-up sonography to ensure stability  2  Normal-appearing gallbladder  3  Atrophic right kidney  The study was marked in EPIC for significant notification        Workstation performed: [id number]         CT abdomen pelvis wo contrast   Final Result by Sylvia Schultz MD (01/23 2041)      1  Atrophic native kidneys  2   Unremarkable appearance of a left-sided renal transplant  3   No evidence of acute abnormality in the abdomen or pelvis  Workstation performed: [id number]         XR chest 2 views   ED Interpretation by Yola Momin DO (01/23 2026)   No acute abnormality in the chest       Final Result by Abdullahi Nevarez MD (01/24 2770)      No acute cardiopulmonary disease  This report is in agreement with the preliminary interpretation  Workstation performed: [id number]             PLAN / RECOMMENDATIONS      1  CKD stage 3 in the light of renal transplant  Present on admission, suspected due to intravascular volume depletion in the setting of elevated lipase level and diarrhea  Upon review of old medical records, patient has underlying CKD stage 3 with baseline creatinine around 1 6-1 8 and been followed by Dr Evin Yarbrough  Patient was admitted with FRANCES with admission creatinine was 3 4 and has earlier received IV fluid and renal function has improved  IV fluid were stopped yesterday  Overnight renal function has remained stable with current creatinine of 1 53 which is at baseline  Encouraged patient to increase oral fluid intake and continue monitor renal function while in the hospital     CT scan of abdomen and pelvis done on admission without contrast showed no hydronephrosis of transplant kidney  2  Renal transplant  Patient has received living related renal transplant in 2009 at [facility]  Patient was admitted with diarrhea which has improved  Continue  mg in the morning with 250 at night  Patient is also on tacrolimus 1 mg PO BID and found to have tacrolimus of 15 3 on 12/23/2020 and repeat tacrolimus was 13 2 on 1/25/2020  Patient earlier had diarrhea with FRANCES and both has resolved now    Would suspect improvement in tacrolimus level  Pending tacrolimus level from today  For time being continue tacrolimus 1 mg PO BID  3  Hypomagnesemia  Multifactorial, suspected due to magnesium loss in the setting of diarrhea  Current magnesium is 1 5 which is better than yesterday but still below the goal and scheduled to receive IV magnesium sulfate today  Plan to recheck magnesium level with AM labs  4  Metabolic acidosis  Non gap, suspected due to GI bicarb loss in the setting of diarrhea  Started on Bicitra earlier and bicarb level has improved to 22  Plan to stop Bicitra today and monitor bicarb level  5  Hypertension in chronic kidney disease  Overnight blood pressure has remained under good control and monitor hypertension with Cardizem 120 mg PO BID today  Overall above mentioned plan was also d/w Leah Moreno MD  Nephrology  1/27/2020        Portions of the record may have been created with voice recognition software  Occasional wrong word or "sound a like" substitutions may have occurred due to the inherent limitations of voice recognition software  Read the chart carefully and recognize, using context, where substitutions have occurred

## 2020-01-27 NOTE — PLAN OF CARE
Problem: Potential for Falls  Goal: Patient will remain free of falls  Description  INTERVENTIONS:  - Assess patient frequently for physical needs  -  Identify cognitive and physical deficits and behaviors that affect risk of falls  -  [place] fall precautions as indicated by assessment   - Educate patient/family on patient safety including physical limitations  - Instruct patient to call for assistance with activity based on assessment  - Modify environment to reduce risk of injury  - Consider OT/PT consult to assist with strengthening/mobility  Outcome: Progressing     Problem: Nutrition/Hydration-ADULT  Goal: Nutrient/Hydration intake appropriate for improving, restoring or maintaining nutritional needs  Description  Monitor and assess patient's nutrition/hydration status for malnutrition  Collaborate with interdisciplinary team and initiate plan and interventions as ordered  Monitor patient's weight and dietary intake as ordered or per policy  Utilize nutrition screening tool and intervene as necessary  Determine patient's food preferences and provide high-protein, high-caloric foods as appropriate       INTERVENTIONS:  - Monitor oral intake, urinary output, labs, and treatment plans  - Assess nutrition and hydration status and recommend course of action  - Evaluate amount of meals eaten  - Assist patient with eating if necessary   - Allow adequate time for meals  - Recommend/ encourage appropriate diets, oral nutritional supplements, and vitamin/mineral supplements  - Order, calculate, and assess calorie counts as needed  - Recommend, monitor, and adjust tube feedings and TPN/PPN based on assessed needs  - Assess need for intravenous fluids  - Provide specific nutrition/hydration education as appropriate  - Include patient/family/caregiver in decisions related to nutrition  Outcome: Progressing

## 2020-01-27 NOTE — SOCIAL WORK
Per nursing rounds this AM, patient is a self and doesn't have needs  Plan at this time is for discharge today  CM department will follow

## 2020-01-28 ENCOUNTER — TRANSITIONAL CARE MANAGEMENT (OUTPATIENT)
Dept: INTERNAL MEDICINE CLINIC | Facility: CLINIC | Age: 45
End: 2020-01-28

## 2020-01-28 VITALS
HEART RATE: 82 BPM | BODY MASS INDEX: 27.1 KG/M2 | HEIGHT: 66 IN | DIASTOLIC BLOOD PRESSURE: 82 MMHG | WEIGHT: 168.65 LBS | TEMPERATURE: 98.8 F | OXYGEN SATURATION: 93 % | RESPIRATION RATE: 17 BRPM | SYSTOLIC BLOOD PRESSURE: 112 MMHG

## 2020-01-28 PROBLEM — N17.9 ACUTE RENAL FAILURE SUPERIMPOSED ON STAGE 3 CHRONIC KIDNEY DISEASE (HCC): Status: RESOLVED | Noted: 2017-12-18 | Resolved: 2020-01-28

## 2020-01-28 PROBLEM — E87.6 HYPOKALEMIA: Status: RESOLVED | Noted: 2020-01-24 | Resolved: 2020-01-28

## 2020-01-28 PROBLEM — E87.20 METABOLIC ACIDOSIS: Status: RESOLVED | Noted: 2020-01-25 | Resolved: 2020-01-28

## 2020-01-28 PROBLEM — R19.7 NAUSEA VOMITING AND DIARRHEA: Status: RESOLVED | Noted: 2020-01-27 | Resolved: 2020-01-28

## 2020-01-28 PROBLEM — N18.30 ACUTE RENAL FAILURE SUPERIMPOSED ON STAGE 3 CHRONIC KIDNEY DISEASE (HCC): Status: RESOLVED | Noted: 2017-12-18 | Resolved: 2020-01-28

## 2020-01-28 PROBLEM — E87.2 METABOLIC ACIDOSIS: Status: RESOLVED | Noted: 2020-01-25 | Resolved: 2020-01-28

## 2020-01-28 PROBLEM — R11.2 NAUSEA VOMITING AND DIARRHEA: Status: RESOLVED | Noted: 2020-01-27 | Resolved: 2020-01-28

## 2020-01-28 LAB
ANION GAP SERPL CALCULATED.3IONS-SCNC: 11 MMOL/L (ref 4–13)
BUN SERPL-MCNC: 27 MG/DL (ref 5–25)
CALCIUM SERPL-MCNC: 9.6 MG/DL (ref 8.3–10.1)
CHLORIDE SERPL-SCNC: 104 MMOL/L (ref 100–108)
CO2 SERPL-SCNC: 22 MMOL/L (ref 21–32)
CREAT SERPL-MCNC: 1.67 MG/DL (ref 0.6–1.3)
G LAMBLIA AG STL QL IA: NEGATIVE
GFR SERPL CREATININE-BSD FRML MDRD: 48 ML/MIN/1.73SQ M
GLUCOSE SERPL-MCNC: 93 MG/DL (ref 65–140)
INR PPP: 1.43 (ref 0.84–1.19)
MAGNESIUM SERPL-MCNC: 1.6 MG/DL (ref 1.6–2.6)
POTASSIUM SERPL-SCNC: 3.7 MMOL/L (ref 3.5–5.3)
PROTHROMBIN TIME: 17.5 SECONDS (ref 11.6–14.5)
SODIUM SERPL-SCNC: 137 MMOL/L (ref 136–145)

## 2020-01-28 PROCEDURE — 99239 HOSP IP/OBS DSCHRG MGMT >30: CPT | Performed by: PHYSICIAN ASSISTANT

## 2020-01-28 PROCEDURE — 99233 SBSQ HOSP IP/OBS HIGH 50: CPT | Performed by: INTERNAL MEDICINE

## 2020-01-28 PROCEDURE — 83735 ASSAY OF MAGNESIUM: CPT | Performed by: INTERNAL MEDICINE

## 2020-01-28 PROCEDURE — 80048 BASIC METABOLIC PNL TOTAL CA: CPT | Performed by: INTERNAL MEDICINE

## 2020-01-28 PROCEDURE — 85610 PROTHROMBIN TIME: CPT | Performed by: NURSE PRACTITIONER

## 2020-01-28 RX ORDER — TACROLIMUS 1 MG/1
1 CAPSULE ORAL DAILY
Qty: 60 CAPSULE | Refills: 0
Start: 2020-01-28 | End: 2020-04-08

## 2020-01-28 RX ORDER — TACROLIMUS 0.5 MG/1
1 CAPSULE ORAL DAILY
Status: DISCONTINUED | OUTPATIENT
Start: 2020-01-29 | End: 2020-01-28 | Stop reason: HOSPADM

## 2020-01-28 RX ADMIN — SERTRALINE HYDROCHLORIDE 100 MG: 100 TABLET ORAL at 09:25

## 2020-01-28 RX ADMIN — DEXTROAMPHETAMINE SACCHARATE, AMPHETAMINE ASPARTATE, DEXTROAMPHETAMINE SULFATE AND AMPHETAMINE SULFATE 20 MG: 2.5; 2.5; 2.5; 2.5 TABLET ORAL at 09:30

## 2020-01-28 RX ADMIN — DILTIAZEM HYDROCHLORIDE 120 MG: 60 TABLET, FILM COATED ORAL at 09:25

## 2020-01-28 RX ADMIN — TACROLIMUS 1 MG: 0.5 CAPSULE ORAL at 09:25

## 2020-01-28 RX ADMIN — MYCOPHENOLATE MOFETIL 500 MG: 250 CAPSULE ORAL at 09:25

## 2020-01-28 NOTE — ASSESSMENT & PLAN NOTE
· Present on admission with history of ADD, major depressive disorder with psychotic features  · Mood currently stable  · Continue Adderall, Zoloft, latuda

## 2020-01-28 NOTE — PROGRESS NOTES
Progress Note - Marita López 1975, 53 y o  male MRN: [id number]    Unit/Bed#: -01 Encounter: [id number]    Primary Care Provider: Iram Drake MD   Date and time admitted to hospital: 1/23/2020  7:12 PM     DOS: 1/27/2020    * Acute renal failure superimposed on stage 3 chronic kidney disease ([facility] 75 )  Assessment & Plan  · FRANCES with cr  Of 3 42 on admission  · Likely secondary to hypovolemia from GI loss  · Baseline cr  Appears to be within 1 6-1 8  · Cr  1 53 today, within baseline  · Nephrology following,  · IVF have been discontinued  · Encourage good PO intake   · Pt is a renal transplant recipient in 2009, tacrolimus level repeat is pending, continue tacrolimus 1 mg BID    Nausea vomiting and diarrhea  Assessment & Plan  · POA, ongoing for the past month  · Improvement noted, pt tolerating a diet, no additional nausea or vomiting, diarrhea is slowly improving  · GI evaluated,  · C  Diff and enteric stool panel are negative  · O+P, Giardia and pancreatic elastase are pending, can follow up outpatient   · Recommending EGD/colonoscopy as an outpatient  · Pt did have elevated lipase on admission which normalized and no evidence of pancreatitis on CT scan, low suspicion for pancreatitis   · Will need outpatient MRI as well for possible hemangioma noted on liver imaging    Hypomagnesemia  Assessment & Plan  · Magnesium 1 5 today  · Will give additional 1 g of IV mag sulfate today  · Repeat mag in the AM  · Will likely need daily mag oxide supplementation, start at 400 mg daily    Hypokalemia  Assessment & Plan  · Resolved with repletion    Severe episode of recurrent major depressive disorder, without psychotic features ([facility] )  Assessment & Plan  · Present on admission with history of ADD, major depressive disorder with psychotic features  · Mood currently stable  · Continue Adderall, Zoloft, latuda        Essential hypertension  Assessment & Plan  · BP stable on review   · Continue Cardizem 120 mg BID  · Monitor    History of DVT (deep vein thrombosis)  Assessment & Plan  · Pt with history of DVT, on Coumadin therapy, pt reports this will be lifelong   · Pt is maintained on Coumadin 7 5 mg daily, resumed on 1/26 as patient's INR was supratherapeutic on admission and was held   · INR 1 4 today, subtherapeutic, continue Coumadin at 7 5 mg daily as INR was elevated on admission and this is his home dose, confirmed at bedside  · Monitor INR in the AM, if improving can be discharged home with repeat INR in the next few days for any adjustments   · Goal INR 2-3     History of renal transplant  Assessment & Plan  · History of renal transplant secondary to IgA nephropathy in 2009  · Currently maintained on mycophenolate 500 mg QAM and 250 mg QPM and Tacrolimus 1 mg BID  · Nephrology following,  · Repeat tacrolimus level is pending  · Will continue at current dose for now  · Pt to follow up with nephrologist as an outpatient       VTE Pharmacologic Prophylaxis:   Pharmacologic: Warfarin (Coumadin)  Mechanical VTE Prophylaxis in Place: Yes    Patient Centered Rounds: I have performed bedside rounds with nursing staff today  Discussions with Specialists or Other Care Team Provider: Discussed with nephrology, RN, CM and reviewed previous notes     Education and Discussions with Family / Patient: Discussed with patient at bedside regarding plan of care, no friends or family members present at time of evaluation    Time Spent for Care: 30 minutes  More than 50% of total time spent on counseling and coordination of care as described above  Current Length of Stay: 4 day(s)    Current Patient Status: Inpatient   Certification Statement: The patient will continue to require additional inpatient hospital stay due to monitoring for improvement of INR with resumption of Coumadin     Discharge Plan: Not medically stable as above, anticipate next 24 hours if INR improving       Code Status: Level 1 - Full Code      Subjective:   Pt reports that he is feeling well  Denies any additional nausea or vomiting  Reports that the diarrhea is improving as well  Denies any chest pain, shortness of breath, urinary difficulties  Objective:     Vitals:   Temp (24hrs), Av 9 °F (36 6 °C), Min:97 6 °F (36 4 °C), Max:98 2 °F (36 8 °C)    Temp:  [97 6 °F (36 4 °C)-98 2 °F (36 8 °C)] 97 6 °F (36 4 °C)  HR:  [73-84] 84  Resp:  [16-18] 18  BP: (107-127)/(72-93) 113/90  SpO2:  [95 %-97 %] 96 %  Body mass index is 27 22 kg/m²  Input and Output Summary (last 24 hours): Intake/Output Summary (Last 24 hours) at 2020  Last data filed at 2020 1100  Gross per 24 hour   Intake 1330 ml   Output 3125 ml   Net -1795 ml       Physical Exam:     Physical Exam   Constitutional: No distress  Patient is in no acute distress lying in his hospital bed resting comfortably   HENT:   Head: Normocephalic and atraumatic  Eyes: Pupils are equal, round, and reactive to light  Conjunctivae are normal    Cardiovascular: Normal rate, regular rhythm and intact distal pulses  Pulmonary/Chest: Effort normal and breath sounds normal  No stridor  No respiratory distress  He has no wheezes  Abdominal: Soft  Bowel sounds are normal  He exhibits no distension  There is no tenderness  There is no guarding  Musculoskeletal: He exhibits no edema  Neurological: He is alert  Skin: Skin is warm and dry  He is not diaphoretic  No erythema  Psychiatric: He has a normal mood and affect  Vitals reviewed        Additional Data:     Labs:    Results from last 7 days   Lab Units 20  0538   WBC Thousand/uL 7 00   HEMOGLOBIN g/dL 14 9   HEMATOCRIT % 45 3   PLATELETS Thousands/uL 220   NEUTROS PCT % 53   LYMPHS PCT % 27   MONOS PCT % 11   EOS PCT % 8*     Results from last 7 days   Lab Units 20  0454  20   POTASSIUM mmol/L 3 6   < > 3 3*   CHLORIDE mmol/L 105   < > 105   CO2 mmol/L 22   < > 20*   BUN mg/dL 25   < > 59*   CREATININE mg/dL 1 53*   < > 3 34*   CALCIUM mg/dL 9 5   < > 9 2   ALK PHOS U/L  --   --  90   ALT U/L  --   --  17   AST U/L  --   --  14    < > = values in this interval not displayed  Results from last 7 days   Lab Units 01/27/20  0454   INR  1 40*       * I Have Reviewed All Lab Data Listed Above  * Additional Pertinent Lab Tests Reviewed: All Labs Within Last 24 Hours Reviewed    Imaging:    Imaging Reports Reviewed Today Include: US liver, CT a/p, CXR  Imaging Personally Reviewed by Myself Includes:  None    Recent Cultures (last 7 days):     Results from last 7 days   Lab Units 01/25/20  2148   C DIFF TOXIN B  Negative       Last 24 Hours Medication List:     Current Facility-Administered Medications:  amphetamine-dextroamphetamine 20 mg Oral BID (AM & Afternoon) Heber ARORA MD   diltiazem 120 mg Oral BID Heber ARORA MD   lurasidone 80 mg Oral Daily Heber ARORA MD   mycophenolate 250 mg Oral QPM Heber ARORA MD   mycophenolate 500 mg Oral QAM Heber ARORA MD   sertraline 100 mg Oral QAM Heber ARORA MD   tacrolimus 1 mg Oral BID Heber ARORA MD   warfarin 7 5 mg Oral Daily (warfarin) JONATAN Lane        Today, Patient Was Seen By: Emily Villalba PA-C    ** Please Note: Dictation voice to text software may have been used in the creation of this document   **

## 2020-01-28 NOTE — DISCHARGE SUMMARY
Discharge- Joya Sheikh 1975, 53 y o  male MRN: [id number]    Unit/Bed#: -01 Encounter: [id number]    Primary Care Provider: Divya Badillo MD   Date and time admitted to hospital: 1/23/2020  7:12 PM      DOS: 1/28/2020    * Acute renal failure superimposed on stage 3 chronic kidney disease ([facility] 75 )  Assessment & Plan  · FRANCES with cr  Of 3 42 on admission  · Likely secondary to hypovolemia from GI loss  · Baseline cr  Appears to be within 1 6-1 8  · Cr  1 67 today, within baseline  · Nephrology following,  · Continue to encourage good PO intake   · Repeat tacrolimus level is 19 6, decrease tacrolimus to 1 mg daily and repeat level in 3-4 days with results to be faxed to outpatient nephrologist   · Stable for discharge from nephrology standpoint    Nausea vomiting and diarrhea  Assessment & Plan  · POA, ongoing for the past month  · Improvement noted, pt tolerating a diet, no additional nausea or vomiting, diarrhea is slowly improving  · GI evaluated,  · C  Diff and enteric stool panel are negative  · O+P, Giardia and pancreatic elastase are pending, can follow up outpatient   · Recommending EGD/colonoscopy as an outpatient  · Pt did have elevated lipase on admission which normalized and no evidence of pancreatitis on CT scan, low suspicion for pancreatitis   · Will need outpatient MRI as well for possible hemangioma noted on liver imaging  · Stable from GI standpoint for discharge    Hypomagnesemia  Assessment & Plan  · Magnesium 1 6 today s/p IV mag sulfate  · Will likely need daily mag oxide supplementation, start at 400 mg daily    Severe episode of recurrent major depressive disorder, without psychotic features ([facility] 75 )  Assessment & Plan  · Present on admission with history of ADD, major depressive disorder with psychotic features  · Mood currently stable  · Continue Adderall, Zoloft, latuda        Essential hypertension  Assessment & Plan  · BP stable on review   · Continue Cardizem 120 mg BID  · Monitor    History of DVT (deep vein thrombosis)  Assessment & Plan  · Pt with history of recurrent DVT, on Coumadin therapy, last episode was noted to be in March of 2018  · Pt is maintained on Coumadin 7 5 mg daily, resumed on 1/26 as patient's INR was supratherapeutic on admission and was held for multiple days  · INR 1 43 today, subtherapeutic, however would continue Coumadin at 7 5 mg daily and obtain INR in 2 days from discharge  · Goal INR 2-3     History of renal transplant  Assessment & Plan  · History of renal transplant secondary to IgA nephropathy in 2009  · Currently maintained on mycophenolate 500 mg QAM and 250 mg QPM and Tacrolimus 1 mg BID  · Nephrology following,  · Repeat tacrolimus level was 19 6  · Decrease tacrolimus to daily and repeat level in 3-4 days outpatient  · Pt to follow up with nephrologist as an outpatient     Hypokalemiaresolved as of 1/28/2020  Assessment & Plan  · Resolved with repletion    Discharging Physician / Practitioner: Rodo Glaser PA-C  PCP: Bill Livingston MD  Admission Date:   Admission Orders (From admission, onward)     Ordered        01/23/20 2203  Inpatient Admission  Once                   Discharge Date: 01/28/20    Resolved Problems  Date Reviewed: 1/28/2020          Resolved    Hypokalemia 1/28/2020     Resolved by  Rodo Glaser PA-C          Consultations During Hospital Stay:  · GI  · Nephrology    Procedures Performed:   · Chest x-ray 1/23-no acute cardiopulmonary disease  · CT abdomen pelvis 1/23-atrophic native kidneys  Unremarkable appearance of the left-sided renal transplant  No evidence of acute abnormality in the abdomen or pelvis  · Ultrasound liver 1/24-2 2 cm right hepatic echogenic mass most likely reflecting a hemangioma  Normal appearing gallbladder  Atrophic right kidney      Significant Findings / Test Results:   · FRANCES resolved  · Hypomagnesium resolved  · Troponin x1 negative  · Lipid panel with total cholesterol 104, triglycerides 191, HDL 27, LDL 39  · INR 4 37 on admission, 1 43 on discharge, will need repeat INR in 2 days and continuation of home dose at 7 5 mg daily  · TSH WNL  · Enteric stool panel and C diff PCR negative    Incidental Findings:   · Hemangioma, requiring outpatient MRI     Test Results Pending at Discharge (will require follow up):   · Ova and parasite, pancreatic elastase, giardia stool studies     Outpatient Tests Requested:  · INR in 2 days    Complications:  None    Reason for Admission:  Acute kidney injury with associated nausea, vomiting and diarrhea    Hospital Course:     Saralyn Holter is a 53 y o  male patient with significant past medical history of renal transplant, hypertension, recurrent DVT on Coumadin therapy, CKD who originally presented to the hospital on 1/23/2020 due to normal lab test from outpatient nephrologist office  Patient's creatinine was noted to be 3 4 off of his baseline of 1 5  It was noted that patient had had 1 month of nausea, vomiting and diarrhea with elevated lipase  However CT scan was negative for acute pancreatitis changes and lipase resolved spontaneously  Patient did have right upper quadrant ultrasound performed with evidence of likely hemangioma  Stool studies were obtained and enteric panel, C diff were negative  Patient's symptoms improved throughout hospitalization  He was recommended to have outpatient EGD and colonoscopy as well as abdominal MRI for further evaluation of hemangioma  Patient was cleared for discharge from GI standpoint for close follow-up  Patient was maintained on IV fluid hydration with improvement of renal functioning  He was then transitioned to p o  Intake with continued improvement and stabilization of creatinine  Patient had a tacrolimus level checked during hospitalization which was noted to be elevated and tacrolimus was decreased to daily    Patient is to follow-up closely with outpatient nephrologist   Patient was noted to have persistently low levels of magnesium and was repleted during hospitalization  He is to continue daily p o  Supplementation outpatient  Patient is maintained on Coumadin for history of recurrent DVTs  He was noted to be supratherapeutic on admission and therefore his Coumadin was held for multiple days  His Coumadin was restarted on 01/26 and is to continue his Coumadin therapy at 7 5 mg daily as an outpatient with recheck of INR in the next 2 days  Patient's INR was 1 43 on discharge, not complaining of any shortness of breath, lower extremity edema or pain  Last DVT was noted to be in March of 2018  Patient was cleared for discharge from GI and Nephrology standpoint  Symptoms significantly improved throughout hospitalization and was able to tolerate a diet  Patient was discharged in stable condition  For additional information please refer to medical records  Medication changes include:  Decrease in tacrolimus to 1 mg p o  Daily and addition of magnesium oxide 400 mg daily        Please see above list of diagnoses and related plan for additional information  Condition at Discharge: stable     Discharge Day Visit / Exam:     Subjective:  Patient reports that he feels well today  Denies any additional abdominal pain, nausea or vomiting  Does still have some diarrhea but appears to be more formed  Reports he has been going about 2 to 3 times a day  Denies any chest pain, shortness of breath, lower extremity pain or edema  He reports he is ready to go home  Vitals: Blood Pressure: 112/82 (01/28/20 0732)  Pulse: 82 (01/28/20 0732)  Temperature: 98 8 °F (37 1 °C) (01/28/20 0732)  Temp Source: Oral (01/28/20 0732)  Respirations: 17 (01/28/20 0732)  Height: 5' 6" (167 6 cm) (01/24/20 1420)  Weight - Scale: 76 5 kg (168 lb 10 4 oz) (01/24/20 1420)  SpO2: 93 % (01/28/20 0732)  Exam:   Physical Exam   Constitutional: No distress     Patient is in no acute distress lying in his hospital bed resting comfortably  Calm and cooperative  HENT:   Head: Normocephalic and atraumatic  Eyes: Pupils are equal, round, and reactive to light  Conjunctivae are normal    Cardiovascular: Normal rate, regular rhythm and intact distal pulses  Pulmonary/Chest: Effort normal and breath sounds normal  No stridor  No respiratory distress  He has no wheezes  Abdominal: Soft  Bowel sounds are normal  He exhibits no distension  There is no tenderness  There is no guarding  Musculoskeletal: He exhibits no edema  Neurological: He is alert  Skin: Skin is warm and dry  He is not diaphoretic  No erythema  Psychiatric: He has a normal mood and affect  Vitals reviewed  Discussion with Family:  Discussed with patient at bedside regarding plan of care  Advised follow-up with PCP in 1 week  Also advised follow-up with GI for EGD/colonoscopy and abdominal MRI to evaluate for incidental hemangioma  He is to follow-up on his additional stool studies as well  Patient also advised to repeat tacrolimus level in about 3-4 days and follow-up with his primary nephrologist   Discussed with patient that his INR is subtherapeutic and therefore he should continue his Coumadin 7 5 mg daily and obtain INR in the next 2 days for any adjustments that need to be performed  He was advised of symptoms to prompt coming back to the emergency room including lower extremity edema, pain and shortness of breath  Patient is in agreement to plan and verbalized understanding  Discharge instructions/Information to patient and family:   See after visit summary for information provided to patient and family  Provisions for Follow-Up Care:  See after visit summary for information related to follow-up care and any pertinent home health orders        Disposition:     Home    For Discharges to [facility] SNF:   · Not Applicable to this Patient - Not Applicable to this Patient    Planned Readmission:  None     Discharge Statement:  I spent 40 minutes discharging the patient  This time was spent on the day of discharge  I had direct contact with the patient on the day of discharge  Greater than 50% of the total time was spent examining patient, answering all patient questions, arranging and discussing plan of care with patient as well as directly providing post-discharge instructions  Additional time then spent on discharge activities  Discharge Medications:  See after visit summary for reconciled discharge medications provided to patient and family        ** Please Note: This note has been constructed using a voice recognition system **

## 2020-01-28 NOTE — ASSESSMENT & PLAN NOTE
· History of renal transplant secondary to IgA nephropathy in 2009  · Currently maintained on mycophenolate 500 mg QAM and 250 mg QPM and Tacrolimus 1 mg BID  · Nephrology following,  · Repeat tacrolimus level was 19 6  · Decrease tacrolimus to daily and repeat level in 3-4 days outpatient  · Pt to follow up with nephrologist as an outpatient

## 2020-01-28 NOTE — PROGRESS NOTES
NEPHROLOGY PROGRESS NOTE    Patient: Sabas Taylor               Sex: male          DOA: 1/23/2020  7:12 PM   Date of Birth: @        Age: @        LOS:  LOS: 5 days   1/28/2020    REASON FOR THE CONSULTATION:  Further management of FRANCES in the setting of renal transplant  HPI     This is a 53 y o  male admitted for Acute renal failure superimposed on stage 3 chronic kidney disease (HCC)     SUBJECTIVE     - overnight breathing has remained stable and also found to be nonoliguric  Patient denies nausea, vomiting, headache or dizziness today    - Reviewed last 24 hrs events     CURRENT MEDICATIONS       Current Facility-Administered Medications:     amphetamine-dextroamphetamine (ADDERALL) tablet 20 mg, 20 mg, Oral, BID (AM & Afternoon), Heber ARROA MD, 20 mg at 01/28/20 0930    diltiazem (CARDIZEM) tablet 120 mg, 120 mg, Oral, BID, Heber ARORA MD, 120 mg at 01/28/20 0925    lurasidone (LATUDA) tablet 80 mg, 80 mg, Oral, Daily With MD Yokasta Robles  mycophenolate (CELLCEPT) capsule 250 mg, 250 mg, Oral, QPM, Heber ARORA MD, 250 mg at 01/27/20 1839    mycophenolate (CELLCEPT) capsule 500 mg, 500 mg, Oral, QAM, Heber ARORA MD, 500 mg at 01/28/20 0925    sertraline (ZOLOFT) tablet 100 mg, 100 mg, Oral, QAM, Heber ARORA MD, 100 mg at 01/28/20 0925    [START ON 1/29/2020] tacrolimus (PROGRAF) capsule 1 mg, 1 mg, Oral, Daily, Irena Davison MD    warfarin (COUMADIN) tablet 7 5 mg, 7 5 mg, Oral, Daily (warfarin), JONATAN Johnson, 7 5 mg at 01/27/20 1839    Current Outpatient Medications:     amphetamine-dextroamphetamine (ADDERALL) 20 mg tablet, TAKE 1 TABLET BY MOUTH TWICE A DAY(ONGOING TREATMENT), Disp: , Rfl: 0    Cholecalciferol (VITAMIN D-3) 1000 units CAPS, Take 2,000 Units by mouth daily, Disp: , Rfl:     diltiazem (CARDIZEM) 120 MG tablet, TAKE 1 TABLET TWICE DAILY, Disp: , Rfl:     LATUDA 60 MG TABS, Take 80 mg by mouth daily , Disp: , Rfl: 2    magnesium oxide (MAG-OX) 400 mg, Take 1 tablet (400 mg total) by mouth daily, Disp: 30 tablet, Rfl: 0    mycophenolate (CELLCEPT) 250 mg capsule, Take 2 in the AM, 1 in the PM , Disp: 90 capsule, Rfl: 5    sertraline (ZOLOFT) 50 mg tablet, Take 100 mg by mouth every morning , Disp: , Rfl: 0    tacrolimus (PROGRAF) 1 mg capsule, Take 1 capsule (1 mg total) by mouth daily, Disp: 60 capsule, Rfl: 0    warfarin (COUMADIN) 7 5 mg tablet, Take 1 tablet (7 5 mg total) by mouth daily, Disp: , Rfl:     OBJECTIVE     Current Weight: Weight - Scale: 76 5 kg (168 lb 10 4 oz)  Vitals:    01/28/20 0732   BP: 112/82   Pulse: 82   Resp: 17   Temp: 98 8 °F (37 1 °C)   SpO2: 93%     Body mass index is 27 22 kg/m²  Intake/Output Summary (Last 24 hours) at 1/28/2020 1210  Last data filed at 1/28/2020 0858  Gross per 24 hour   Intake 240 ml   Output    Net 240 ml       PHYSICAL EXAMINATION     Physical Exam   Constitutional: No distress  HENT:   Head: Normocephalic and atraumatic  Eyes: No scleral icterus  Neck: Neck supple  No JVD present  Cardiovascular: Normal rate  Pulmonary/Chest: No accessory muscle usage  No respiratory distress  He has no wheezes  Abdominal: Soft  He exhibits no distension  Musculoskeletal:        Right hand: He exhibits no laceration  Left hand: He exhibits no laceration  Neurological: He is alert  Skin: Skin is warm  No cyanosis  Psychiatric: He is not combative  He expresses no suicidal ideation           LAB RESULTS     Results from last 7 days   Lab Units 01/28/20  0507 01/27/20  0454 01/26/20  0538 01/25/20  0442 01/24/20  0510 01/23/20 2016 01/23/20  1054   WBC Thousand/uL  --   --  7 00 7 05 7 02 7 64 6 96   HEMOGLOBIN g/dL  --   --  14 9 14 6 14 6 15 2 15 6   HEMATOCRIT %  --   --  45 3 43 5 43 1 43 7 46 9   PLATELETS Thousands/uL  --   --  220 221 220 243 247   SODIUM mmol/L 137 138 139 139 140 137 137   POTASSIUM mmol/L 3 7 3 6 3 9 3 6 3 1* 3 3* 4 1   CHLORIDE mmol/L 104 105 110* 111* 110* 105 104   CO2 mmol/L 22 22 19* 16* 19* 20* 23   BUN mg/dL 27* 25 26* 33* 50* 59* 63*   CREATININE mg/dL 1 67* 1 53* 1 56* 1 87* 2 47* 3 34* 3 42*   EGFR ml/min/1 73sq m 48 54 52 42 30 21 20   CALCIUM mg/dL 9 6 9 5 9 2 8 7 8 8 9 2 9 3   MAGNESIUM mg/dL 1 6 1 5* 1 4* 1 3*  --  1 8 2 0   PHOSPHORUS mg/dL  --   --   --   --   --  3 1 4 3       I have personally reviewed the old medical records and patient's previously known baseline creatinine level is ~ 1 6-1 8    RADIOLOGY RESULTS      US liver   Final Result by Andrea Corral MD (01/24 1137)         1  2 2 cm right hepatic echogenic mass most likely reflecting a hemangioma  This could be more definitively characterized with contrast-enhanced CT or MRI  Alternatively, this could be evaluated with follow-up sonography to ensure stability  2  Normal-appearing gallbladder  3  Atrophic right kidney  The study was marked in EPIC for significant notification  Workstation performed: [id number]         CT abdomen pelvis wo contrast   Final Result by Celestina Caraballo MD (01/23 2041)      1  Atrophic native kidneys  2   Unremarkable appearance of a left-sided renal transplant  3   No evidence of acute abnormality in the abdomen or pelvis  Workstation performed: [id number]         XR chest 2 views   ED Interpretation by Elvira Rubio DO (01/23 2026)   No acute abnormality in the chest       Final Result by Lula Joy MD (01/24 2141)      No acute cardiopulmonary disease  This report is in agreement with the preliminary interpretation  Workstation performed: [id number]             PLAN / RECOMMENDATIONS      1  CKD stage 3 in the setting of renal transplant  Earlier patient was found to have FRANCES which was suspected due to intravascular volume depletion in the setting of diarrhea with elevated lipase level  Earlier patient has received IV fluid and renal function has improved    Patient has underlying CKD stage 3 and been followed by Dr Rusty Layne with baseline creatinine around 1 6-1 8  Current creatinine is 1 67 which is at baseline  Monitor renal function while in the hospital   Encouraged patient to increase oral fluid intake  Patient had underwent CT scan of abdomen and pelvis on admission which did not show hydronephrosis of transplant kidney  2  Renal transplant  Patient has received living related renal transplant in 2009 at [facility] and been followed by Dr Rusty Layne  Patient is found to have elevated tacrolimus level of 19 6 which is above the goal   Plan to decrease tacrolimus to 1 mg PO daily  Suspected higher tacrolimus level in the setting of diarrhea  Need to have repeat tacrolimus in 3-4 days  Continue  mg PO in the morning and 250 mg at night  3  Hypertension in chronic kidney disease  Overnight blood pressure has remained under good control and monitor all resolved Cardizem 120 mg PO BID  4  Hypomagnesemia  Suspected due to magnesium loss in the setting of diarrhea  Received magnesium supplementation yesterday and magnesium level has improved to 1 6 which is at goal   Monitor magnesium level at this point  Disposition:  Stable from renal standpoint for discharge  Patient need repeat tacrolimus level in 3 days  Advised patient to take tacrolimus 1 mg PO daily upon discharge and follow up with Dr Rusty Layne in 2-3 weeks       Overall above mentioned plan was also d/w Sheldon Olivia MD  Nephrology  1/28/2020        Portions of the record may have been created with voice recognition software  Occasional wrong word or "sound a like" substitutions may have occurred due to the inherent limitations of voice recognition software  Read the chart carefully and recognize, using context, where substitutions have occurred

## 2020-01-28 NOTE — ASSESSMENT & PLAN NOTE
· Pt with history of DVT, on Coumadin therapy, pt reports this will be lifelong   · Pt is maintained on Coumadin 7 5 mg daily, resumed on 1/26 as patient's INR was supratherapeutic on admission and was held   · INR 1 4 today, subtherapeutic, continue Coumadin at 7 5 mg daily as INR was elevated on admission and this is his home dose, confirmed at bedside  · Monitor INR in the AM, if improving can be discharged home with repeat INR in the next few days for any adjustments   · Goal INR 2-3

## 2020-01-28 NOTE — SOCIAL WORK
CM notified by RN that pt needs assistance with transportation back to residence today  CM met with pt to discuss lyft and pt signed waiver  CM contacted [facility], spoke with Hank Carrillo and scheduled lyft for 11:30 am  CM faxed lyft waiver to [facility] and placed it in medical records  CM notified RN of transport time

## 2020-01-28 NOTE — ASSESSMENT & PLAN NOTE
· FRANCES with cr  Of 3 42 on admission  · Likely secondary to hypovolemia from GI loss  · Baseline cr   Appears to be within 1 6-1 8  · Cr  1 53 today, within baseline  · Nephrology following,  · IVF have been discontinued  · Encourage good PO intake   · Pt is a renal transplant recipient in 2009, tacrolimus level repeat is pending, continue tacrolimus 1 mg BID

## 2020-01-28 NOTE — DISCHARGE INSTRUCTIONS
Please follow-up with your primary care provider within 1 week  Please obtain INR in the next 2 days and continue Coumadin at 7 5 mg daily  Please obtain tacrolimus level in 3-4 days and follow up with your nephrologist  Continue tacrolimus at 1 mg daily for now until repeat level  Please follow-up with GI for outpatient EGD/colonoscopy, stool studies and abdominal MRI for hemangioma  If you begin to have any worsening pain, vomiting, diarrhea, lower extremity swelling please come back to the hospital for further evaluation  Acute Kidney Injury   WHAT YOU NEED TO KNOW:   What is acute kidney injury? Acute kidney injury (FRANCES) is also called acute kidney failure, or acute renal failure  FRANCES happens when your kidneys suddenly stop working correctly  Normally, the kidneys remove fluid, chemicals, and waste from your blood  These wastes are turned into urine by your kidneys  FRANCES usually happens over hours or days  When you have FRANCES, your kidneys do not remove the waste, chemicals, or extra fluid from your body  A normal amount of urine is not produced  FRANCES is usually temporary, but it may become a chronic kidney condition  What causes FRANCES? · Decreased blood flow to the kidney, such as from hypercalcemia (high blood calcium level) or severe heart disease     · A disease or condition that affects the kidneys, such as hypertension (high blood pressure) or diabetes     · A blockage in the kidney or ureter, such as a kidney or bladder stone, enlarged prostate, or tumor  What increases my risk for FRANCES? · Being hospitalized with a serious illness, such as sepsis or severe burns    · Peripheral artery disease    · Older age in adults    · Kidney or liver diseases    · Medical conditions such as dehydration, hypertension, diabetes, or heart failure    · Certain medicines such as NSAIDs  What are the signs and symptoms of FRANCES? You may not have any symptoms with early or mild FRANCES   As FRANCES progresses, you may have any of the following:  · Decrease in the amount of urine or no urination    · Swelling in your arms, legs, or feet     · Weakness, drowsiness, or no appetite    · Nausea, flank pain, muscle twitching or muscle cramps    · Itchy skin, or your breath or body smells like urine    · Behavior changes, confusion, disorientation, or seizures  How is FRANCES diagnosed? There are many causes of FRANCES  To find the cause and to treat your FRANCES correctly, your healthcare provider may do any of the following:  · Blood and urine tests  show how well your kidneys are working  They may also show the cause of your FRANCES  · An x-ray or ultrasound  may show problems with your kidneys  Your healthcare provider may see a blockage in your kidneys  He or she may see narrowing of the artery that sends blood to your kidneys  You may be given contrast liquid to help your kidneys show up better in the pictures  Tell the healthcare provider if you have ever had an allergic reaction to contrast liquid  How is FRANCES treated? Treatment depends upon the cause of your acute kidney injury and how severe it is  Usually, FRANCES will be monitored in the hospital  If you have mild FRANCES, you may be able to go home to recover  Your healthcare providers will treat the cause of your FRANCES  You may need IV fluids if your FRANCES was caused by little or no fluid in your body  You may need dialysis to remove waste and extra fluid from your body  Your healthcare provider may tell you to eat food low in sodium (salt), potassium, phosphorus, or protein  You may need to see a dietitian before you are discharged to get help with planning your meals  How can I prevent FRANCES? · Manage other health conditions  such as diabetes, high blood pressure, or heart disease  These conditions increase your risk for acute kidney injury  Take your medicines for these conditions as directed  Also, monitor your blood sugar and blood pressure levels as directed   Contact your healthcare provider if your levels are not in the range he or she says it should be  · Talk to your healthcare provider before you take over-the-counter-medicine  NSAIDs, stomach medicine, or laxatives may harm your kidneys and increase your risk for acute kidney injury  If it is okay to take the medicine, follow the directions on the package  Do not take more than directed  · Tell healthcare providers if you have had FRANCES  before you get contrast liquid for an x-ray or CT scan  Your healthcare provider may give you medicine to prevent kidney problems caused by the liquid  CARE AGREEMENT:   You have the right to help plan your care  Learn about your health condition and how it may be treated  Discuss treatment options with your caregivers to decide what care you want to receive  You always have the right to refuse treatment  The above information is an  only  It is not intended as medical advice for individual conditions or treatments  Talk to your doctor, nurse or pharmacist before following any medical regimen to see if it is safe and effective for you  © 2017 [address] Information is for End User's use only and may not be sold, redistributed or otherwise used for commercial purposes  All illustrations and images included in CareNotes® are the copyrighted property of [employer]  or [employer]  Acute Nausea and Vomiting   WHAT YOU NEED TO KNOW:   What is acute nausea and vomiting? Acute nausea and vomiting starts suddenly, gets worse quickly, and lasts a short time  What are some common causes of acute nausea and vomiting?    · Food poisoning    · Large amounts of alcohol    · Certain medicines, too much of any medicine, or stopping a regular medicine too quickly    · Early stages of pregnancy    · Infection in the stomach, intestines, or other organs    · Trauma to the head    · Anxiety or stress    · Gastroparesis (a condition that prevents your stomach from emptying properly)    · Metabolic disorders, such as uremia or adrenal insufficiency  What causes acute nausea and vomiting with stomach pain? · Inflammation of the appendix, gallbladder, stomach, pancreas, kidneys, or other organs    · Gallstones    · Bacteria or a parasite in the digestive system    · Heart attack    · Stomach ulcers, or bowel blockage or twisting  What causes acute nausea and vomiting with other signs and symptoms? You may be sweating and have pale skin, problems with digestion, and more saliva than usual  These signs and symptoms may be caused by the following:  · Problems with your heart rate, blood flow to your heart muscle, blood pressure, or stomach fluid    · Increased pressure or bleeding in the brain    · Swelling of the tissue covering the brain    · Migraine or seizures    · Inner ear disorders that cause problems with balance  How is the cause of acute nausea and vomiting diagnosed? Your healthcare provider will examine you and ask about your medical history  Tell your provider about other signs and symptoms you have  Also tell your provider when you last had nausea and vomiting and how long it continued  Include if it happened before, during, or after a meal, and how much you ate  Your provider will need to know how much came out when you vomited, and if it was fast and forceful  Tell your provider if your vomit smelled like bowel movement or had blood or food in it  Tell your provider if the vomit was bright yellow  You may need any of the following:  · Blood tests  may be used to check for infection or inflammation  · X-ray, CT, or MRI pictures  may be used to find an injury or blockage  How may acute nausea and vomiting be treated? The first goal of treatment for nausea and vomiting is to prevent or treat dehydration  Treatment also depends on the cause of the nausea and vomiting  Any medical condition causing your nausea and vomiting will also be treated   Treatment is also aimed at stopping or preventing your signs and symptoms  You may need one or more of the following:  · Medicines  may be given to calm your stomach and stop your vomiting  You may also need medicines to help you feel more relaxed or to stop nausea and vomiting caused by motion sickness  Gastrointestinal stimulants may be used to help empty your stomach and bowels  This can help decrease your nausea and vomiting  · IV fluids  may be given to replace lost fluids and electrolytes  This may be needed it you cannot drink liquids  · Nasogastric (NG) tube: An NG tube is put into your nose, and passes down your throat until it reaches your stomach  Food and medicine may be given through an NG tube if you cannot take anything by mouth  The tube may instead be attached to suction if caregivers need to keep your stomach empty  What can I do to prevent or manage acute nausea and vomiting? · Do not drink alcohol  Alcohol may upset or irritate your stomach  Too much alcohol can also cause acute nausea and vomiting  · Control stress  Headaches due to stress may cause nausea and vomiting  Find ways to relax and manage your stress  Get more rest and sleep  · Drink more liquids as directed  Vomiting can lead to dehydration  It is important to drink more liquids to help replace lost body fluids  Ask your healthcare provider how much liquid to drink each day and which liquids are best for you  Your provider may recommend that you drink an oral rehydration solution (ORS)  ORS contains water, salts, and sugar that are needed to replace the lost body fluids  Ask what kind of ORS to use, how much to drink, and where to get it  · Eat smaller meals, more often  Eat small amounts of food every 2 to 3 hours, even if you are not hungry  Food in your stomach may decrease your nausea  · Talk to your healthcare provider before you take over-the-counter (OTC) medicines    These medicines can cause serious problems if you use certain other medicines, or you have a medical condition  You may have problems if you use too much or use them for longer than the label says  Follow directions on the label carefully  When should I seek immediate care? · You see blood in your vomit or your bowel movements  · You have sudden, severe pain in your chest and upper abdomen after hard vomiting or retching  · You have swelling in your neck and chest      · You are dizzy, cold, and thirsty, and your eyes and mouth are dry  · You are urinating very little or not at all  · You have muscle weakness, leg cramps, and trouble breathing  · Your heart is beating much faster than normal     · You continue to vomit for more than 48 hours  When should I contact my healthcare provider? · You have frequent dry heaves (vomiting but nothing comes out)  · Your nausea and vomiting does not get better or go away after you use medicine  · You have questions or concerns about your condition or care  CARE AGREEMENT:   You have the right to help plan your care  Learn about your health condition and how it may be treated  Discuss treatment options with your caregivers to decide what care you want to receive  You always have the right to refuse treatment  The above information is an  only  It is not intended as medical advice for individual conditions or treatments  Talk to your doctor, nurse or pharmacist before following any medical regimen to see if it is safe and effective for you  © 2017 [address] Information is for End User's use only and may not be sold, redistributed or otherwise used for commercial purposes  All illustrations and images included in CareNotes® are the copyrighted property of [employer]  or [employer]it Mayfield

## 2020-01-28 NOTE — ASSESSMENT & PLAN NOTE
· Pt with history of recurrent DVT, on Coumadin therapy, last episode was noted to be in March of 2018  · Pt is maintained on Coumadin 7 5 mg daily, resumed on 1/26 as patient's INR was supratherapeutic on admission and was held for multiple days  · INR 1 43 today, subtherapeutic, however would continue Coumadin at 7 5 mg daily and obtain INR in 2 days from discharge  · Goal INR 2-3

## 2020-01-28 NOTE — ASSESSMENT & PLAN NOTE
· FRANCES with cr  Of 3 42 on admission  · Likely secondary to hypovolemia from GI loss  · Baseline cr   Appears to be within 1 6-1 8  · Cr  1 67 today, within baseline  · Nephrology following,  · Continue to encourage good PO intake   · Repeat tacrolimus level is 19 6, decrease tacrolimus to 1 mg daily and repeat level in 3-4 days with results to be faxed to outpatient nephrologist   · Stable for discharge from nephrology standpoint

## 2020-01-28 NOTE — ASSESSMENT & PLAN NOTE
· POA, ongoing for the past month  · Improvement noted, pt tolerating a diet, no additional nausea or vomiting, diarrhea is slowly improving  · GI evaluated,  · C   Diff and enteric stool panel are negative  · O+P, Giardia and pancreatic elastase are pending, can follow up outpatient   · Recommending EGD/colonoscopy as an outpatient  · Pt did have elevated lipase on admission which normalized and no evidence of pancreatitis on CT scan, low suspicion for pancreatitis   · Will need outpatient MRI as well for possible hemangioma noted on liver imaging

## 2020-01-28 NOTE — ASSESSMENT & PLAN NOTE
· History of renal transplant secondary to IgA nephropathy in 2009  · Currently maintained on mycophenolate 500 mg QAM and 250 mg QPM and Tacrolimus 1 mg BID  · Nephrology following,  · Repeat tacrolimus level is pending  · Will continue at current dose for now  · Pt to follow up with nephrologist as an outpatient

## 2020-01-28 NOTE — PLAN OF CARE
Problem: Potential for Falls  Goal: Patient will remain free of falls  Description  INTERVENTIONS:  - Assess patient frequently for physical needs  -  Identify cognitive and physical deficits and behaviors that affect risk of falls  -  [place] fall precautions as indicated by assessment   - Educate patient/family on patient safety including physical limitations  - Instruct patient to call for assistance with activity based on assessment  - Modify environment to reduce risk of injury  - Consider OT/PT consult to assist with strengthening/mobility  Outcome: Completed     Problem: Nutrition/Hydration-ADULT  Goal: Nutrient/Hydration intake appropriate for improving, restoring or maintaining nutritional needs  Description  Monitor and assess patient's nutrition/hydration status for malnutrition  Collaborate with interdisciplinary team and initiate plan and interventions as ordered  Monitor patient's weight and dietary intake as ordered or per policy  Utilize nutrition screening tool and intervene as necessary  Determine patient's food preferences and provide high-protein, high-caloric foods as appropriate       INTERVENTIONS:  - Monitor oral intake, urinary output, labs, and treatment plans  - Assess nutrition and hydration status and recommend course of action  - Evaluate amount of meals eaten  - Assist patient with eating if necessary   - Allow adequate time for meals  - Recommend/ encourage appropriate diets, oral nutritional supplements, and vitamin/mineral supplements  - Order, calculate, and assess calorie counts as needed  - Recommend, monitor, and adjust tube feedings and TPN/PPN based on assessed needs  - Assess need for intravenous fluids  - Provide specific nutrition/hydration education as appropriate  - Include patient/family/caregiver in decisions related to nutrition  Outcome: Completed

## 2020-01-28 NOTE — ASSESSMENT & PLAN NOTE
· POA, ongoing for the past month  · Improvement noted, pt tolerating a diet, no additional nausea or vomiting, diarrhea is slowly improving  · GI evaluated,  · C   Diff and enteric stool panel are negative  · O+P, Giardia and pancreatic elastase are pending, can follow up outpatient   · Recommending EGD/colonoscopy as an outpatient  · Pt did have elevated lipase on admission which normalized and no evidence of pancreatitis on CT scan, low suspicion for pancreatitis   · Will need outpatient MRI as well for possible hemangioma noted on liver imaging  · Stable from GI standpoint for discharge

## 2020-01-28 NOTE — ASSESSMENT & PLAN NOTE
· Magnesium 1 6 today s/p IV mag sulfate  · Will likely need daily mag oxide supplementation, start at 400 mg daily

## 2020-01-28 NOTE — ASSESSMENT & PLAN NOTE
· Magnesium 1 5 today  · Will give additional 1 g of IV mag sulfate today  · Repeat mag in the AM  · Will likely need daily mag oxide supplementation, start at 400 mg daily

## 2020-01-29 NOTE — PROGRESS NOTES
1st attempt-LVM asking pt to return call for TCM documentation    TCM appt has been scheduled for 2/10 2:45 Dr Ally Aquino      By Yesica Rahman

## 2020-01-29 NOTE — UTILIZATION REVIEW
Notification of Inpatient Admission/Inpatient Authorization Request   This is a Notification of Inpatient Admission for [address]  Be advised that this patient was admitted to our facility under Inpatient Status  Contact Beverly Iqbal at [phone] for additional admission information  [facility] [phone]  Patient Name:   Rosy Xavier   YOB: 1975       [address]   [address]:   [address]   Tax ID: [id number]  NPI: [id number] Attending Provider/NPI: Heladio Santoro [[id number]]   Place of Service Code: 24     Place of Service Name:  [address] [place] [address]   Start Date: 1/23/20 2203     Discharge Date & Time: 1/28/2020 11:58 AM    Type of Admission: Inpatient Status Discharge Disposition   (if discharged): Home/Self Care   Patient Diagnoses: Dehydration [E86 0]  Hypokalemia [E87 6]  Pancreatitis [K85 90]  Abnormal laboratory test [R89 9]  Acute renal failure (ARF) ([facility] [address] ) [N17 9]     Orders: Admission Orders (From admission, onward)     Ordered        01/23/20 2203  Inpatient Admission  Once                    Assigned Utilization Review Contact: Beverly Iqbal  Utilization   Network Utilization Review Department  Phone: [phone]; Fax [phone]  Email: [email or web address]   ATTENTION PAYERS: Please call the assigned Utilization  directly with any questions or concerns ALL voicemails in the department are confidential  Send all requests for admission clinical reviews, approved or denied determinations and any other requests to dedicated fax number belonging to the campus where the patient is receiving treatment

## 2020-01-30 ENCOUNTER — TELEPHONE (OUTPATIENT)
Dept: INTERNAL MEDICINE CLINIC | Facility: CLINIC | Age: 45
End: 2020-01-30

## 2020-01-30 LAB
ELASTASE PANC STL-MCNT: 339 UG ELAST./G
O+P STL CONC: NORMAL

## 2020-01-30 NOTE — TELEPHONE ENCOUNTER
Patient said he rec'ed a call this morning    Didn't know who it was    They had some questions and wondering how he is doing after his hospital stay

## 2020-02-01 ENCOUNTER — TELEPHONE (OUTPATIENT)
Dept: INTERNAL MEDICINE CLINIC | Facility: CLINIC | Age: 45
End: 2020-02-01

## 2020-02-01 ENCOUNTER — ANTICOAG VISIT (OUTPATIENT)
Dept: INTERNAL MEDICINE CLINIC | Facility: CLINIC | Age: 45
End: 2020-02-01

## 2020-02-01 ENCOUNTER — APPOINTMENT (OUTPATIENT)
Dept: LAB | Facility: HOSPITAL | Age: 45
End: 2020-02-01
Attending: INTERNAL MEDICINE
Payer: COMMERCIAL

## 2020-02-01 DIAGNOSIS — Z94.0 HISTORY OF RENAL TRANSPLANT: ICD-10-CM

## 2020-02-01 DIAGNOSIS — Z86.718 HISTORY OF DVT (DEEP VEIN THROMBOSIS): ICD-10-CM

## 2020-02-01 LAB
INR PPP: 2.78 (ref 0.84–1.19)
PROTHROMBIN TIME: 29.7 SECONDS (ref 11.6–14.5)

## 2020-02-01 PROCEDURE — 85610 PROTHROMBIN TIME: CPT

## 2020-02-01 PROCEDURE — 80197 ASSAY OF TACROLIMUS: CPT

## 2020-02-01 PROCEDURE — 36415 COLL VENOUS BLD VENIPUNCTURE: CPT

## 2020-02-01 NOTE — TELEPHONE ENCOUNTER
Left a detailed message on  Pt vmail asking him to return our call to inform us of how much coumadin he is currently taking

## 2020-02-01 NOTE — TELEPHONE ENCOUNTER
----- Message from Maryana Hill DO sent at 2/1/2020 12:49 PM EST -----  This is Dr Joellen Augustin these patient  He was just in the hospital   How much Coumadin is he taking   ?

## 2020-02-01 NOTE — TELEPHONE ENCOUNTER
Pt informed as per Dr Ned Read verbal message    Take 7 5 mg of Coumadin today and tomorrow, recheck Monday

## 2020-02-01 NOTE — TELEPHONE ENCOUNTER
----- Message from Christiano Sung DO sent at 2/1/2020 12:49 PM EST -----  This is Dr Marleny Topete these patient  He was just in the hospital   How much Coumadin is he taking   ?

## 2020-02-02 LAB — TACROLIMUS BLD-MCNC: 12.8 NG/ML (ref 2–20)

## 2020-02-03 ENCOUNTER — ANTICOAG VISIT (OUTPATIENT)
Dept: INTERNAL MEDICINE CLINIC | Facility: CLINIC | Age: 45
End: 2020-02-03

## 2020-02-03 LAB — INR PPP: 2.7 (ref 0.84–1.19)

## 2020-02-10 ENCOUNTER — OFFICE VISIT (OUTPATIENT)
Dept: INTERNAL MEDICINE CLINIC | Facility: CLINIC | Age: 45
End: 2020-02-10
Payer: COMMERCIAL

## 2020-02-10 VITALS
DIASTOLIC BLOOD PRESSURE: 64 MMHG | WEIGHT: 170 LBS | BODY MASS INDEX: 27.32 KG/M2 | SYSTOLIC BLOOD PRESSURE: 102 MMHG | OXYGEN SATURATION: 97 % | HEART RATE: 73 BPM | HEIGHT: 66 IN

## 2020-02-10 DIAGNOSIS — Z94.0 RENAL TRANSPLANT RECIPIENT: ICD-10-CM

## 2020-02-10 DIAGNOSIS — E83.42 HYPOMAGNESEMIA: ICD-10-CM

## 2020-02-10 DIAGNOSIS — F33.2 SEVERE EPISODE OF RECURRENT MAJOR DEPRESSIVE DISORDER, WITHOUT PSYCHOTIC FEATURES (HCC): ICD-10-CM

## 2020-02-10 DIAGNOSIS — N17.9 AKI (ACUTE KIDNEY INJURY) (HCC): Primary | ICD-10-CM

## 2020-02-10 PROCEDURE — 99495 TRANSJ CARE MGMT MOD F2F 14D: CPT | Performed by: INTERNAL MEDICINE

## 2020-02-10 PROCEDURE — 1111F DSCHRG MED/CURRENT MED MERGE: CPT | Performed by: INTERNAL MEDICINE

## 2020-02-10 NOTE — PROGRESS NOTES
Assessment/Plan:     No problem-specific Assessment & Plan notes found for this encounter  Diagnoses and all orders for this visit:    FRANCES (acute kidney injury) ([facility] )  FU creatinine    Renal transplant recipient  FU with nephrology    Hypomagnesemia  Continue magnesium    Severe episode of recurrent major depressive disorder, without psychotic features ([facility] )  Continue medications       Subjective:     Patient ID: Saralyn Holter is a 53 y o  male  HPI  He was in the hospital recently with FRANCES secondary to dehydration and diarrhea  Now he is better  He follows up with nephrology regularly  Review of Systems   Constitutional: Positive for fatigue  Negative for chills, diaphoresis and fever  HENT: Negative for congestion, ear discharge, ear pain, hearing loss, postnasal drip, rhinorrhea, sinus pressure, sinus pain, sneezing, sore throat and voice change  Eyes: Negative for pain, discharge, redness and visual disturbance  Respiratory: Negative for cough, chest tightness, shortness of breath and wheezing  Cardiovascular: Negative for chest pain, palpitations and leg swelling  Gastrointestinal: Negative for abdominal distention, abdominal pain, blood in stool, constipation, diarrhea, nausea and vomiting  Endocrine: Negative for cold intolerance, heat intolerance, polydipsia, polyphagia and polyuria  Genitourinary: Negative for dysuria, flank pain, frequency, hematuria and urgency  Musculoskeletal: Negative for arthralgias, back pain, gait problem, joint swelling, myalgias, neck pain and neck stiffness  Skin: Negative for rash  Neurological: Negative for dizziness, tremors, syncope, facial asymmetry, speech difficulty, weakness, light-headedness, numbness and headaches  Hematological: Does not bruise/bleed easily  Psychiatric/Behavioral: Negative for behavioral problems, confusion and sleep disturbance  The patient is not nervous/anxious            Objective:     Physical Exam Constitutional: He is oriented to person, place, and time  He appears well-developed and well-nourished  No distress  HENT:   Head: Normocephalic and atraumatic  Right Ear: External ear normal    Left Ear: External ear normal    Nose: Nose normal    Mouth/Throat: Oropharynx is clear and moist    Eyes: Conjunctivae and EOM are normal  Right eye exhibits no discharge  Left eye exhibits no discharge  No scleral icterus  Neck: Normal range of motion  Neck supple  No JVD present  No tracheal deviation present  No thyromegaly present  Cardiovascular: Normal rate, regular rhythm, normal heart sounds and intact distal pulses  Exam reveals no gallop and no friction rub  No murmur heard  Pulmonary/Chest: Effort normal and breath sounds normal  No respiratory distress  He has no wheezes  He has no rales  He exhibits no tenderness  Abdominal: Soft  Bowel sounds are normal  He exhibits no distension  There is no tenderness  There is no rebound and no guarding  Musculoskeletal: Normal range of motion  He exhibits no edema or tenderness  Lymphadenopathy:     He has no cervical adenopathy  Neurological: He is alert and oriented to person, place, and time  No cranial nerve deficit  He exhibits normal muscle tone  Coordination normal    Skin: Skin is warm and dry  No rash noted  He is not diaphoretic  No erythema  Psychiatric: He has a normal mood and affect  Judgment normal          Vitals:    02/10/20 1319   BP: 102/64   BP Location: Left arm   Patient Position: Sitting   Cuff Size: Standard   Pulse: 73   SpO2: 97%   Weight: 77 1 kg (170 lb)   Height: 5' 6" (1 676 m)       Transitional Care Management Review:  Pedro Rizzo is a 53 y o  male here for TCM follow up       During the TCM phone call patient stated:    TCM Call (since 1/10/2020)     Date and time call was made  1/30/2020  2:54 PM    Hospital care reviewed  Records reviewed    Patient was hospitialized at  [facility]    Date of Admission 01/23/20    Date of discharge  01/28/20    Diagnosis   Acute kidney injury with associated nausea, vomiting and diarrhea    Disposition  Home    Were the patients medications reviewed and updated  Yes <img src='C:FILES (X86)    Current Symptoms  None <img src='C:FILES (X86)      TCM Call (since 1/10/2020)     Post hospital issues  None    Should patient be enrolled in anticoag monitoring? Yes <img src='C:FILES (X86)    Scheduled for follow up? Yes <img src='C:FILES (X86)    Patients specialists  Other (comment); Nephrologist    Nephrologist name  Uvaldo Plasencia MD  /  Leland Epperson MD    Nephrologist contact #  [phone]                  /  [phone]    Other specialists names  Sandy Doss MD    Other specialists contcat #  [phone]    Referrals needed  n/a    Did you obtain your prescribed medications  Yes <img src='C:FILES (X86)    Do you need help managing your prescriptions or medications  No <img src='C:FILES (X86)    Is transportation to your appointment needed  No    I have advised the patient to call PCP with any new or worsening symptoms  [address] or [employer] other; [address]; Children    The type of support provided  Emotional; Financial; Physical    Do you have social support  Yes, as much as I need    Are you recieving any outpatient services  No    Are you recieving home care services  No    Are you using any community resources  No    Current waiver services  No    Have you fallen in the last 12 months  Yes    How many times  once-w/o injury    Interperter language line needed  No    Counseling  Patient    Counseling topics  Importance of RX compliance    Comments  upcoming appt  2/10 2:45 w/H   MD Jason Vences MD

## 2020-02-11 ENCOUNTER — OFFICE VISIT (OUTPATIENT)
Dept: NEPHROLOGY | Facility: CLINIC | Age: 45
End: 2020-02-11
Payer: COMMERCIAL

## 2020-02-11 VITALS
TEMPERATURE: 97.6 F | DIASTOLIC BLOOD PRESSURE: 70 MMHG | RESPIRATION RATE: 16 BRPM | HEIGHT: 67 IN | WEIGHT: 173.8 LBS | BODY MASS INDEX: 27.28 KG/M2 | HEART RATE: 68 BPM | SYSTOLIC BLOOD PRESSURE: 110 MMHG

## 2020-02-11 DIAGNOSIS — N17.9 AKI (ACUTE KIDNEY INJURY) (HCC): ICD-10-CM

## 2020-02-11 DIAGNOSIS — N18.30 STAGE 3 CHRONIC KIDNEY DISEASE (HCC): ICD-10-CM

## 2020-02-11 DIAGNOSIS — M89.9 CHRONIC KIDNEY DISEASE-MINERAL AND BONE DISORDER: ICD-10-CM

## 2020-02-11 DIAGNOSIS — F33.2 SEVERE EPISODE OF RECURRENT MAJOR DEPRESSIVE DISORDER, WITHOUT PSYCHOTIC FEATURES (HCC): ICD-10-CM

## 2020-02-11 DIAGNOSIS — E83.9 CHRONIC KIDNEY DISEASE-MINERAL AND BONE DISORDER: ICD-10-CM

## 2020-02-11 DIAGNOSIS — T86.19 RECURRENT IGA NEPHROPATHY AFTER KIDNEY TRANSPLANTATION: ICD-10-CM

## 2020-02-11 DIAGNOSIS — I10 ESSENTIAL HYPERTENSION: ICD-10-CM

## 2020-02-11 DIAGNOSIS — N02.8 RECURRENT IGA NEPHROPATHY AFTER KIDNEY TRANSPLANTATION: ICD-10-CM

## 2020-02-11 DIAGNOSIS — N18.9 CHRONIC KIDNEY DISEASE-MINERAL AND BONE DISORDER: ICD-10-CM

## 2020-02-11 DIAGNOSIS — Z86.718 HISTORY OF DVT (DEEP VEIN THROMBOSIS): ICD-10-CM

## 2020-02-11 DIAGNOSIS — Z94.0 RENAL TRANSPLANT RECIPIENT: Primary | ICD-10-CM

## 2020-02-11 PROBLEM — N02.B9: Status: ACTIVE | Noted: 2020-02-11

## 2020-02-11 PROCEDURE — 3008F BODY MASS INDEX DOCD: CPT | Performed by: INTERNAL MEDICINE

## 2020-02-11 PROCEDURE — 99214 OFFICE O/P EST MOD 30 MIN: CPT | Performed by: INTERNAL MEDICINE

## 2020-02-11 PROCEDURE — 1111F DSCHRG MED/CURRENT MED MERGE: CPT | Performed by: INTERNAL MEDICINE

## 2020-02-11 PROCEDURE — 1036F TOBACCO NON-USER: CPT | Performed by: INTERNAL MEDICINE

## 2020-02-11 NOTE — ASSESSMENT & PLAN NOTE
His baseline creatinine is 1 6  Possibly IgA nephropathy in the transplanted kidney versus hypertensive nephrosclerosis    Advised continues hydration

## 2020-02-11 NOTE — ASSESSMENT & PLAN NOTE
He still being monitored by transplant nephrologist   His tacrolimus level is high though dose has been reduced and level is on the way down    Will continue to monitor

## 2020-02-11 NOTE — ASSESSMENT & PLAN NOTE
Improved with hydration in hospital and seems to be at baseline at this point    Importance of hydration and avoidance of nephrotoxic medicine discussed with

## 2020-02-11 NOTE — ASSESSMENT & PLAN NOTE
Blood pressure is within reasonable range and actually running low    May have to stop Cardizem as I do not think he needs it will continue to monitor at this point

## 2020-02-11 NOTE — PATIENT INSTRUCTIONS
Chronic Kidney Disease   AMBULATORY CARE:   Chronic kidney disease (CKD)  is the gradual and permanent loss of kidney function  Normally, the kidneys remove fluid, chemicals, and waste from your blood  These wastes are turned into urine by your kidneys  CKD may worsen over time and lead to kidney failure  Common symptoms include the following:   · Changes in how often you need to urinate    · Swelling in your arms, legs, or feet    · Shortness of breath    · Fatigue or weakness    · Bad or bitter taste in your mouth    · Nausea, vomiting, or loss of appetite  Seek care immediately if:   · You are confused and very drowsy  · You have a seizure  · You have shortness of breath  Contact your healthcare provider if:   · You suddenly gain or lose more weight than your healthcare provider has told you is okay  · You have itchy skin or a rash  · You urinate more or less than you normally do  · You have blood in your urine  · You have nausea and repeated vomiting  · You have fatigue or muscle weakness  · You have hiccups that will not stop  · You have questions or concerns about your condition or care  Treatment for CKD:  Medicines may be given to decrease blood pressure and get rid of extra fluid  You may also receive medicine to manage health conditions that may occur with CKD  Dialysis is a treatment to remove chemicals and waste from your blood when your kidneys can no longer do this  Surgery may be needed to create an arteriovenous fistula (AVF) in your arm or insert a catheter into your abdomen so that you can receive dialysis  A kidney transplant may be done if your CKD becomes severe  Manage CKD:   · Maintain a healthy weight  Ask your healthcare provider how much you should weigh  Ask him to help you create a weight loss plan if you are overweight  · Exercise 30 to 60 minutes a day, 4 to 7 times a week, or as directed  Ask about the best exercise plan for you   Regular exercise can help you manage CKD, high blood pressure, and diabetes  · Follow your healthcare provider's advice about what to eat and drink  He may tell you to eat food low in sodium (salt), potassium, phosphorus, or protein  You may need to see a dietitian if you need help planning meals  Ask how much liquid to drink each day and which liquids are best for you  · Limit alcohol  Ask how much alcohol is safe for you to drink  A drink of alcohol is 12 ounces of beer, 5 ounces of wine, or 1½ ounces of liquor  · Do not smoke  Nicotine and other chemicals in cigarettes and cigars can cause lung and kidney damage  Ask your healthcare provider for information if you currently smoke and need help to quit  E-cigarettes or smokeless tobacco still contain nicotine  Talk to your healthcare provider before you use these products  · Ask your healthcare provider if you need vaccines  Infections such as pneumonia, influenza, and hepatitis can be more harmful or more likely to occur in a person who has CKD  Vaccines reduce your risk of infection with these viruses  Follow up with your healthcare provider as directed:  Write down your questions so you remember to ask them during your visits  © 2017 [address] Information is for End User's use only and may not be sold, redistributed or otherwise used for commercial purposes  All illustrations and images included in CareNotes® are the copyrighted property of [employer]  or Mohit Mayfield  The above information is an  only  It is not intended as medical advice for individual conditions or treatments  Talk to your doctor, nurse or pharmacist before following any medical regimen to see if it is safe and effective for you

## 2020-02-11 NOTE — PROGRESS NOTES
NEPHROLOGY OFFICE FOLLOW UP  Marques Schultz 53 y o  male MRN: [id number]    Encounter: [id number] 2/11/2020    REASON FOR VISIT: Marques Schultz is a 53 y o  male who is here on 2/11/2020 for Follow-up and Kidney Transplant    HPI:    Uzma Escamilla came in today for follow-up of kidney transplant and as hospital discharge  He was hospitalized recently with acute kidney injury thought to be due to volume depletion  He got hydration and was discharged home at baseline creatinine 1 6    He is feeling well  He had been hospitalized frequently with different reason mainly sec at [facility] with depression  He is still being followed by transplant nephrologist in view of his acutely  He had transplant done in 10 years ago  Etiology of kidney failure was IgA nephropathy  Overall he is doing well otherwise    No chest pain no palpitation or shortness of breath        REVIEW OF SYSTEMS:    Review of Systems   Constitutional: Negative for activity change and fatigue  HENT: Negative for congestion and ear discharge  Eyes: Negative for photophobia and pain  Respiratory: Negative for apnea and choking  Cardiovascular: Negative for chest pain, palpitations and leg swelling  Gastrointestinal: Negative for abdominal distention, abdominal pain and blood in stool  Endocrine: Negative for heat intolerance and polyphagia  Genitourinary: Negative for difficulty urinating, flank pain and urgency  Musculoskeletal: Negative for arthralgias, back pain, neck pain and neck stiffness  Skin: Negative for color change and wound  Allergic/Immunologic: Negative for food allergies and immunocompromised state  Neurological: Negative for seizures and facial asymmetry  Hematological: Negative for adenopathy  Does not bruise/bleed easily  Psychiatric/Behavioral: Negative for self-injury and suicidal ideas           PAST MEDICAL HISTORY:  Past Medical History:   Diagnosis Date    ADD (attention deficit disorder)     Chronic kidney disease     Depression     D[facility]deep [facility] thrombosis) ([facility])     H/O immunosuppressive therapy     History of kidney disease     Hypertension     Nephropathy, IgA        PAST SURGICAL HISTORY:  Past Surgical History:   Procedure Laterality Date    NEPHRECTOMY TRANSPLANTED ORGAN         SOCIAL HISTORY:  Social History     Substance and Sexual Activity   Alcohol Use Not Currently    Frequency: Monthly or less    Binge frequency: Never    Comment: Occasionally     Social History     Substance and Sexual Activity   Drug Use Yes    Types: Marijuana    Comment: smokes marijuana a few times daily     Social History     Tobacco Use   Smoking Status Former Smoker    Years: 4 00   Smokeless Tobacco Never Used       FAMILY HISTORY:  Family History   Problem Relation Age of Onset    Deep vein thrombosis Father     Deep vein thrombosis Paternal Grandfather     Transient ischemic attack Mother        MEDICATIONS:    Current Outpatient Medications:     amphetamine-dextroamphetamine (ADDERALL) 20 mg tablet, TAKE 1 TABLET BY MOUTH TWICE A DAY(ONGOING TREATMENT), Disp: , Rfl: 0    Cholecalciferol (VITAMIN D-3) 1000 units CAPS, Take 2,000 Units by mouth daily, Disp: , Rfl:     diltiazem (CARDIZEM) 120 MG tablet, TAKE 1 TABLET TWICE DAILY, Disp: , Rfl:     LATUDA 60 MG TABS, Take 80 mg by mouth daily , Disp: , Rfl: 2    magnesium oxide (MAG-OX) 400 mg, Take 1 tablet (400 mg total) by mouth daily, Disp: 30 tablet, Rfl: 0    mycophenolate (CELLCEPT) 250 mg capsule, Take 2 in the AM, 1 in the PM , Disp: 90 capsule, Rfl: 5    sertraline (ZOLOFT) 50 mg tablet, Take 100 mg by mouth every morning , Disp: , Rfl: 0    tacrolimus (PROGRAF) 1 mg capsule, Take 1 capsule (1 mg total) by mouth daily, Disp: 60 capsule, Rfl: 0    warfarin (COUMADIN) 7 5 mg tablet, Take 1 tablet (7 5 mg total) by mouth daily, Disp: , Rfl:     PHYSICAL EXAM:  Vitals:    02/11/20 0857   BP: 110/70   BP Location: Right arm   Patient Position: Sitting   Pulse: 68   Resp: 16   Temp: 97 6 °F (36 4 °C)   TempSrc: Tympanic   Weight: 78 8 kg (173 lb 12 8 oz)   Height: 5' 7" (1 702 m)     Body mass index is 27 22 kg/m²  Physical Exam   Constitutional: He is oriented to person, place, and time  He appears well-developed  No distress  HENT:   Head: Normocephalic  Mouth/Throat: Oropharynx is clear and moist    Eyes: Pupils are equal, round, and reactive to light  Conjunctivae and EOM are normal  No scleral icterus  Neck: Normal range of motion  Neck supple  No JVD present  Cardiovascular: Normal rate, regular rhythm, normal heart sounds and intact distal pulses  No murmur heard  Pulmonary/Chest: Effort normal and breath sounds normal  He has no wheezes  Abdominal: Soft  Bowel sounds are normal  There is no tenderness  Musculoskeletal: Normal range of motion  He exhibits no edema  Neurological: He is alert and oriented to person, place, and time  Skin: Skin is warm  No rash noted  Psychiatric: He has a normal mood and affect  His behavior is normal        LAB RESULTS:  Results for orders placed or performed in visit on 02/03/20   Protime-INR   Result Value Ref Range    INR 2 70 (A) 0 84 - 1 19       ASSESSMENT and PLAN:      FRANCES (acute kidney injury) ([facility] [address] )  Improved with hydration in hospital and seems to be at baseline at this point  Importance of hydration and avoidance of nephrotoxic medicine discussed with    Stage 3 chronic kidney disease ([address] )  His baseline creatinine is 1 6  Possibly IgA nephropathy in the transplanted kidney versus hypertensive nephrosclerosis  Advised continues hydration    Recurrent IgA nephropathy after kidney transplantation  He does have IgA nephropathy of the native kidney and may have affected his transplanted kidney  Chronic kidney disease-mineral and bone disorder  PTH and phosphorus are within acceptable range  Will continue to monitor      Essential hypertension  Blood pressure is within reasonable range and actually running low  May have to stop Cardizem as I do not think he needs it will continue to monitor at this point    Renal transplant recipient  He still being monitored by transplant nephrologist   His tacrolimus level is high though dose has been reduced and level is on the way down  Will continue to monitor    History of DVT (deep vein thrombosis)  On Coumadin and being monitored by primary doctor    Severe episode of recurrent major depressive disorder, without psychotic features ([address] )  Seems to be stable and being monitored by psychiatrist and on multiple medication      Discussed everything at length with him  Blood work and medication also discussed with him  He will be seen by transplant nephrologist and I will see him back in 3 months again  Will get blood and urine test before that visit        Portions of the record may have been created with voice recognition software  Occasional wrong word or "sound a like" substitutions may have occurred due to the inherent limitations of voice recognition software  Read the chart carefully and recognize, using context, where substitutions have occurred  If you have any questions, please contact the dictating provider

## 2020-03-19 DIAGNOSIS — E83.42 HYPOMAGNESEMIA: ICD-10-CM

## 2020-03-20 ENCOUNTER — TELEPHONE (OUTPATIENT)
Dept: NEPHROLOGY | Facility: CLINIC | Age: 45
End: 2020-03-20

## 2020-03-20 RX ORDER — LANOLIN ALCOHOL/MO/W.PET/CERES
CREAM (GRAM) TOPICAL
Qty: 30 TABLET | Refills: 0 | OUTPATIENT
Start: 2020-03-20

## 2020-03-27 ENCOUNTER — TELEPHONE (OUTPATIENT)
Dept: NEPHROLOGY | Facility: CLINIC | Age: 45
End: 2020-03-27

## 2020-03-29 DIAGNOSIS — I10 ESSENTIAL HYPERTENSION: Primary | ICD-10-CM

## 2020-03-30 RX ORDER — DILTIAZEM HYDROCHLORIDE 120 MG/1
TABLET, FILM COATED ORAL
Qty: 60 TABLET | Refills: 2 | Status: SHIPPED | OUTPATIENT
Start: 2020-03-30 | End: 2020-06-15

## 2020-04-08 ENCOUNTER — TELEMEDICINE (OUTPATIENT)
Dept: NEPHROLOGY | Facility: CLINIC | Age: 45
End: 2020-04-08
Payer: COMMERCIAL

## 2020-04-08 DIAGNOSIS — Z94.0 KIDNEY TRANSPLANTED: ICD-10-CM

## 2020-04-08 DIAGNOSIS — D18.03 LIVER HEMANGIOMA: Primary | ICD-10-CM

## 2020-04-08 PROCEDURE — 99214 OFFICE O/P EST MOD 30 MIN: CPT | Performed by: INTERNAL MEDICINE

## 2020-04-08 RX ORDER — TACROLIMUS 1 MG/1
1 CAPSULE ORAL EVERY 12 HOURS SCHEDULED
Qty: 60 CAPSULE | Refills: 0
Start: 2020-04-08 | End: 2020-06-16 | Stop reason: SDUPTHER

## 2020-04-14 ENCOUNTER — TELEMEDICINE (OUTPATIENT)
Dept: GASTROENTEROLOGY | Facility: CLINIC | Age: 45
End: 2020-04-14
Payer: COMMERCIAL

## 2020-04-14 DIAGNOSIS — R74.8 INCREASED SERUM LIPASE LEVEL: Primary | ICD-10-CM

## 2020-04-14 DIAGNOSIS — D18.03 LIVER HEMANGIOMA: ICD-10-CM

## 2020-04-14 PROCEDURE — 99202 OFFICE O/P NEW SF 15 MIN: CPT | Performed by: INTERNAL MEDICINE

## 2020-04-23 DIAGNOSIS — Z94.0 KIDNEY TRANSPLANTED: ICD-10-CM

## 2020-04-23 RX ORDER — MYCOPHENOLATE MOFETIL 250 MG/1
CAPSULE ORAL
Qty: 90 CAPSULE | Refills: 5 | Status: SHIPPED | OUTPATIENT
Start: 2020-04-23 | End: 2020-10-20

## 2020-06-03 ENCOUNTER — TELEPHONE (OUTPATIENT)
Dept: NEPHROLOGY | Facility: CLINIC | Age: 45
End: 2020-06-03

## 2020-06-05 ENCOUNTER — TELEPHONE (OUTPATIENT)
Dept: NEPHROLOGY | Facility: CLINIC | Age: 45
End: 2020-06-05

## 2020-06-09 DIAGNOSIS — I82.4Z1 DEEP VEIN THROMBOSIS (DVT) OF DISTAL VEIN OF RIGHT LOWER EXTREMITY, UNSPECIFIED CHRONICITY (HCC): ICD-10-CM

## 2020-06-09 RX ORDER — WARFARIN SODIUM 7.5 MG/1
7.5 TABLET ORAL
Start: 2020-06-09 | End: 2020-06-10

## 2020-06-10 ENCOUNTER — TELEPHONE (OUTPATIENT)
Dept: INTERNAL MEDICINE CLINIC | Facility: CLINIC | Age: 45
End: 2020-06-10

## 2020-06-10 RX ORDER — GABAPENTIN 400 MG/1
400 CAPSULE ORAL 2 TIMES DAILY
COMMUNITY
End: 2021-12-21

## 2020-06-10 RX ORDER — WARFARIN SODIUM 7.5 MG/1
7.5 TABLET ORAL
Qty: 90 TABLET | Refills: 2 | Status: SHIPPED | OUTPATIENT
Start: 2020-06-10 | End: 2020-10-26 | Stop reason: ALTCHOICE

## 2020-06-10 RX ORDER — METHYLPHENIDATE HYDROCHLORIDE 36 MG/1
54 TABLET ORAL DAILY
COMMUNITY

## 2020-06-11 ENCOUNTER — OFFICE VISIT (OUTPATIENT)
Dept: INTERNAL MEDICINE CLINIC | Facility: CLINIC | Age: 45
End: 2020-06-11
Payer: COMMERCIAL

## 2020-06-11 ENCOUNTER — TELEPHONE (OUTPATIENT)
Dept: NEPHROLOGY | Facility: CLINIC | Age: 45
End: 2020-06-11

## 2020-06-11 VITALS
HEART RATE: 80 BPM | SYSTOLIC BLOOD PRESSURE: 110 MMHG | TEMPERATURE: 98.3 F | BODY MASS INDEX: 28.79 KG/M2 | OXYGEN SATURATION: 97 % | DIASTOLIC BLOOD PRESSURE: 86 MMHG | HEIGHT: 67 IN | WEIGHT: 183.4 LBS

## 2020-06-11 DIAGNOSIS — N39.41 URGE INCONTINENCE OF URINE: ICD-10-CM

## 2020-06-11 DIAGNOSIS — N18.30 STAGE 3 CHRONIC KIDNEY DISEASE (HCC): ICD-10-CM

## 2020-06-11 DIAGNOSIS — F33.2 SEVERE EPISODE OF RECURRENT MAJOR DEPRESSIVE DISORDER, WITHOUT PSYCHOTIC FEATURES (HCC): ICD-10-CM

## 2020-06-11 DIAGNOSIS — I10 ESSENTIAL HYPERTENSION: Primary | ICD-10-CM

## 2020-06-11 DIAGNOSIS — Z94.0 RENAL TRANSPLANT RECIPIENT: ICD-10-CM

## 2020-06-11 PROCEDURE — 1036F TOBACCO NON-USER: CPT | Performed by: INTERNAL MEDICINE

## 2020-06-11 PROCEDURE — 3008F BODY MASS INDEX DOCD: CPT | Performed by: INTERNAL MEDICINE

## 2020-06-11 PROCEDURE — 99214 OFFICE O/P EST MOD 30 MIN: CPT | Performed by: INTERNAL MEDICINE

## 2020-06-13 DIAGNOSIS — I10 ESSENTIAL HYPERTENSION: ICD-10-CM

## 2020-06-15 ENCOUNTER — TELEPHONE (OUTPATIENT)
Dept: NEPHROLOGY | Facility: CLINIC | Age: 45
End: 2020-06-15

## 2020-06-15 ENCOUNTER — LAB (OUTPATIENT)
Dept: LAB | Facility: HOSPITAL | Age: 45
End: 2020-06-15
Attending: INTERNAL MEDICINE
Payer: COMMERCIAL

## 2020-06-15 ENCOUNTER — TELEPHONE (OUTPATIENT)
Dept: INTERNAL MEDICINE CLINIC | Facility: CLINIC | Age: 45
End: 2020-06-15

## 2020-06-15 ENCOUNTER — ANTICOAG VISIT (OUTPATIENT)
Dept: INTERNAL MEDICINE CLINIC | Facility: CLINIC | Age: 45
End: 2020-06-15

## 2020-06-15 DIAGNOSIS — Z94.0 KIDNEY TRANSPLANTED: ICD-10-CM

## 2020-06-15 DIAGNOSIS — M89.9 CHRONIC KIDNEY DISEASE-MINERAL AND BONE DISORDER: ICD-10-CM

## 2020-06-15 DIAGNOSIS — N18.30 STAGE 3 CHRONIC KIDNEY DISEASE (HCC): ICD-10-CM

## 2020-06-15 DIAGNOSIS — N18.9 CHRONIC KIDNEY DISEASE-MINERAL AND BONE DISORDER: ICD-10-CM

## 2020-06-15 DIAGNOSIS — E83.9 CHRONIC KIDNEY DISEASE-MINERAL AND BONE DISORDER: ICD-10-CM

## 2020-06-15 DIAGNOSIS — I10 ESSENTIAL HYPERTENSION: ICD-10-CM

## 2020-06-15 DIAGNOSIS — N39.41 URGE INCONTINENCE OF URINE: ICD-10-CM

## 2020-06-15 LAB
25(OH)D3 SERPL-MCNC: 35.1 NG/ML (ref 30–100)
ALBUMIN SERPL BCP-MCNC: 3.5 G/DL (ref 3.5–5)
ALP SERPL-CCNC: 116 U/L (ref 46–116)
ALT SERPL W P-5'-P-CCNC: 20 U/L (ref 12–78)
ANION GAP SERPL CALCULATED.3IONS-SCNC: 10 MMOL/L (ref 4–13)
AST SERPL W P-5'-P-CCNC: 12 U/L (ref 5–45)
BACTERIA UR QL AUTO: ABNORMAL /HPF
BASOPHILS # BLD AUTO: 0.07 THOUSANDS/ΜL (ref 0–0.1)
BASOPHILS NFR BLD AUTO: 1 % (ref 0–1)
BILIRUB SERPL-MCNC: 0.3 MG/DL (ref 0.2–1)
BILIRUB UR QL STRIP: NEGATIVE
BUN SERPL-MCNC: 29 MG/DL (ref 5–25)
CALCIUM SERPL-MCNC: 9.4 MG/DL (ref 8.3–10.1)
CHLORIDE SERPL-SCNC: 105 MMOL/L (ref 100–108)
CHOLEST SERPL-MCNC: 194 MG/DL (ref 50–200)
CLARITY UR: CLEAR
CO2 SERPL-SCNC: 22 MMOL/L (ref 21–32)
COLOR UR: YELLOW
CREAT SERPL-MCNC: 1.57 MG/DL (ref 0.6–1.3)
CREAT UR-MCNC: 172 MG/DL
EOSINOPHIL # BLD AUTO: 0.13 THOUSAND/ΜL (ref 0–0.61)
EOSINOPHIL NFR BLD AUTO: 2 % (ref 0–6)
ERYTHROCYTE [DISTWIDTH] IN BLOOD BY AUTOMATED COUNT: 13 % (ref 11.6–15.1)
GFR SERPL CREATININE-BSD FRML MDRD: 52 ML/MIN/1.73SQ M
GLUCOSE P FAST SERPL-MCNC: 95 MG/DL (ref 65–99)
GLUCOSE UR STRIP-MCNC: NEGATIVE MG/DL
HCT VFR BLD AUTO: 46.8 % (ref 36.5–49.3)
HDLC SERPL-MCNC: 37 MG/DL
HGB BLD-MCNC: 15.2 G/DL (ref 12–17)
HGB UR QL STRIP.AUTO: ABNORMAL
IMM GRANULOCYTES # BLD AUTO: 0.02 THOUSAND/UL (ref 0–0.2)
IMM GRANULOCYTES NFR BLD AUTO: 0 % (ref 0–2)
INR PPP: 1.51 (ref 0.84–1.19)
KETONES UR STRIP-MCNC: NEGATIVE MG/DL
LDLC SERPL CALC-MCNC: 123 MG/DL (ref 0–100)
LEUKOCYTE ESTERASE UR QL STRIP: NEGATIVE
LYMPHOCYTES # BLD AUTO: 1.6 THOUSANDS/ΜL (ref 0.6–4.47)
LYMPHOCYTES NFR BLD AUTO: 19 % (ref 14–44)
MAGNESIUM SERPL-MCNC: 2.1 MG/DL (ref 1.6–2.6)
MCH RBC QN AUTO: 30.8 PG (ref 26.8–34.3)
MCHC RBC AUTO-ENTMCNC: 32.5 G/DL (ref 31.4–37.4)
MCV RBC AUTO: 95 FL (ref 82–98)
MONOCYTES # BLD AUTO: 0.78 THOUSAND/ΜL (ref 0.17–1.22)
MONOCYTES NFR BLD AUTO: 10 % (ref 4–12)
NEUTROPHILS # BLD AUTO: 5.63 THOUSANDS/ΜL (ref 1.85–7.62)
NEUTS SEG NFR BLD AUTO: 68 % (ref 43–75)
NITRITE UR QL STRIP: NEGATIVE
NON-SQ EPI CELLS URNS QL MICRO: ABNORMAL /HPF
NONHDLC SERPL-MCNC: 157 MG/DL
NRBC BLD AUTO-RTO: 0 /100 WBCS
PH UR STRIP.AUTO: 6 [PH]
PHOSPHATE SERPL-MCNC: 2.1 MG/DL (ref 2.7–4.5)
PLATELET # BLD AUTO: 220 THOUSANDS/UL (ref 149–390)
PMV BLD AUTO: 10.2 FL (ref 8.9–12.7)
POTASSIUM SERPL-SCNC: 4.2 MMOL/L (ref 3.5–5.3)
PROT SERPL-MCNC: 7.9 G/DL (ref 6.4–8.2)
PROT UR STRIP-MCNC: ABNORMAL MG/DL
PROT UR-MCNC: 79 MG/DL
PROT/CREAT UR: 0.46 MG/G{CREAT} (ref 0–0.1)
PROTHROMBIN TIME: 18.3 SECONDS (ref 11.6–14.5)
PTH-INTACT SERPL-MCNC: 108.8 PG/ML (ref 18.4–80.1)
RBC # BLD AUTO: 4.93 MILLION/UL (ref 3.88–5.62)
RBC #/AREA URNS AUTO: ABNORMAL /HPF
SODIUM SERPL-SCNC: 137 MMOL/L (ref 136–145)
SP GR UR STRIP.AUTO: 1.02 (ref 1–1.03)
TRIGL SERPL-MCNC: 171 MG/DL
TSH SERPL DL<=0.05 MIU/L-ACNC: 1.37 UIU/ML (ref 0.36–3.74)
UROBILINOGEN UR QL STRIP.AUTO: 0.2 E.U./DL
WBC # BLD AUTO: 8.23 THOUSAND/UL (ref 4.31–10.16)
WBC #/AREA URNS AUTO: ABNORMAL /HPF

## 2020-06-15 PROCEDURE — 80061 LIPID PANEL: CPT

## 2020-06-15 PROCEDURE — 81001 URINALYSIS AUTO W/SCOPE: CPT

## 2020-06-15 PROCEDURE — 84443 ASSAY THYROID STIM HORMONE: CPT

## 2020-06-15 PROCEDURE — 85610 PROTHROMBIN TIME: CPT

## 2020-06-15 PROCEDURE — 87086 URINE CULTURE/COLONY COUNT: CPT

## 2020-06-15 PROCEDURE — 82306 VITAMIN D 25 HYDROXY: CPT

## 2020-06-15 PROCEDURE — 84156 ASSAY OF PROTEIN URINE: CPT

## 2020-06-15 PROCEDURE — 82570 ASSAY OF URINE CREATININE: CPT

## 2020-06-15 PROCEDURE — 83970 ASSAY OF PARATHORMONE: CPT

## 2020-06-15 PROCEDURE — 85025 COMPLETE CBC W/AUTO DIFF WBC: CPT

## 2020-06-15 PROCEDURE — 83735 ASSAY OF MAGNESIUM: CPT

## 2020-06-15 PROCEDURE — 36415 COLL VENOUS BLD VENIPUNCTURE: CPT

## 2020-06-15 PROCEDURE — 80197 ASSAY OF TACROLIMUS: CPT

## 2020-06-15 PROCEDURE — 80053 COMPREHEN METABOLIC PANEL: CPT

## 2020-06-15 PROCEDURE — 84100 ASSAY OF PHOSPHORUS: CPT

## 2020-06-15 RX ORDER — DILTIAZEM HYDROCHLORIDE 120 MG/1
TABLET, FILM COATED ORAL
Qty: 60 TABLET | Refills: 2 | Status: SHIPPED | OUTPATIENT
Start: 2020-06-15 | End: 2020-10-26 | Stop reason: SDUPTHER

## 2020-06-16 ENCOUNTER — TELEPHONE (OUTPATIENT)
Dept: NEPHROLOGY | Facility: CLINIC | Age: 45
End: 2020-06-16

## 2020-06-16 ENCOUNTER — TELEPHONE (OUTPATIENT)
Dept: INTERNAL MEDICINE CLINIC | Facility: CLINIC | Age: 45
End: 2020-06-16

## 2020-06-16 ENCOUNTER — TELEMEDICINE (OUTPATIENT)
Dept: NEPHROLOGY | Facility: CLINIC | Age: 45
End: 2020-06-16
Payer: COMMERCIAL

## 2020-06-16 VITALS
RESPIRATION RATE: 16 BRPM | HEIGHT: 66 IN | SYSTOLIC BLOOD PRESSURE: 117 MMHG | WEIGHT: 180 LBS | DIASTOLIC BLOOD PRESSURE: 90 MMHG | BODY MASS INDEX: 28.93 KG/M2 | HEART RATE: 78 BPM

## 2020-06-16 DIAGNOSIS — I10 ESSENTIAL HYPERTENSION: ICD-10-CM

## 2020-06-16 DIAGNOSIS — E83.9 CHRONIC KIDNEY DISEASE-MINERAL AND BONE DISORDER: ICD-10-CM

## 2020-06-16 DIAGNOSIS — T86.19 RECURRENT IGA NEPHROPATHY AFTER KIDNEY TRANSPLANTATION: ICD-10-CM

## 2020-06-16 DIAGNOSIS — Z94.0 KIDNEY TRANSPLANTED: ICD-10-CM

## 2020-06-16 DIAGNOSIS — Z94.0 HISTORY OF RENAL TRANSPLANT: ICD-10-CM

## 2020-06-16 DIAGNOSIS — N18.30 STAGE 3 CHRONIC KIDNEY DISEASE (HCC): Primary | ICD-10-CM

## 2020-06-16 DIAGNOSIS — F33.2 SEVERE EPISODE OF RECURRENT MAJOR DEPRESSIVE DISORDER, WITHOUT PSYCHOTIC FEATURES (HCC): ICD-10-CM

## 2020-06-16 DIAGNOSIS — N02.8 RECURRENT IGA NEPHROPATHY AFTER KIDNEY TRANSPLANTATION: ICD-10-CM

## 2020-06-16 DIAGNOSIS — Z94.0 RENAL TRANSPLANT RECIPIENT: Primary | ICD-10-CM

## 2020-06-16 DIAGNOSIS — N18.9 CHRONIC KIDNEY DISEASE-MINERAL AND BONE DISORDER: ICD-10-CM

## 2020-06-16 DIAGNOSIS — M89.9 CHRONIC KIDNEY DISEASE-MINERAL AND BONE DISORDER: ICD-10-CM

## 2020-06-16 LAB
BACTERIA UR CULT: NORMAL
TACROLIMUS BLD-MCNC: <1 NG/ML (ref 2–20)

## 2020-06-16 PROCEDURE — 99214 OFFICE O/P EST MOD 30 MIN: CPT | Performed by: INTERNAL MEDICINE

## 2020-06-16 RX ORDER — TACROLIMUS 1 MG/1
1 CAPSULE ORAL EVERY 12 HOURS SCHEDULED
Qty: 60 CAPSULE | Refills: 4
Start: 2020-06-16 | End: 2020-06-19 | Stop reason: SDUPTHER

## 2020-06-17 DIAGNOSIS — Z94.0 KIDNEY TRANSPLANTED: ICD-10-CM

## 2020-06-17 DIAGNOSIS — Z94.0 HISTORY OF RENAL TRANSPLANT: ICD-10-CM

## 2020-06-17 DIAGNOSIS — N18.30 STAGE 3 CHRONIC KIDNEY DISEASE (HCC): Primary | ICD-10-CM

## 2020-06-18 ENCOUNTER — TELEPHONE (OUTPATIENT)
Dept: NEPHROLOGY | Facility: CLINIC | Age: 45
End: 2020-06-18

## 2020-06-19 RX ORDER — TACROLIMUS 1 MG/1
1 CAPSULE ORAL EVERY 12 HOURS SCHEDULED
Qty: 60 CAPSULE | Refills: 5 | Status: SHIPPED | OUTPATIENT
Start: 2020-06-19 | End: 2020-11-01 | Stop reason: SDUPTHER

## 2020-06-29 ENCOUNTER — ANTICOAG VISIT (OUTPATIENT)
Dept: INTERNAL MEDICINE CLINIC | Facility: CLINIC | Age: 45
End: 2020-06-29

## 2020-06-29 ENCOUNTER — LAB (OUTPATIENT)
Dept: LAB | Facility: HOSPITAL | Age: 45
End: 2020-06-29
Attending: INTERNAL MEDICINE
Payer: COMMERCIAL

## 2020-06-29 ENCOUNTER — TELEPHONE (OUTPATIENT)
Dept: INTERNAL MEDICINE CLINIC | Facility: CLINIC | Age: 45
End: 2020-06-29

## 2020-06-29 DIAGNOSIS — Z94.0 HISTORY OF RENAL TRANSPLANT: ICD-10-CM

## 2020-06-29 DIAGNOSIS — N18.30 STAGE 3 CHRONIC KIDNEY DISEASE (HCC): ICD-10-CM

## 2020-06-29 LAB — INR PPP: 1.39 (ref 0.84–1.19)

## 2020-06-30 ENCOUNTER — TELEPHONE (OUTPATIENT)
Dept: NEPHROLOGY | Facility: CLINIC | Age: 45
End: 2020-06-30

## 2020-06-30 ENCOUNTER — TELEPHONE (OUTPATIENT)
Dept: INTERNAL MEDICINE CLINIC | Facility: CLINIC | Age: 45
End: 2020-06-30

## 2020-06-30 DIAGNOSIS — Z94.0 KIDNEY TRANSPLANTED: Primary | ICD-10-CM

## 2020-06-30 DIAGNOSIS — I82.4Z1 DEEP VEIN THROMBOSIS (DVT) OF DISTAL VEIN OF RIGHT LOWER EXTREMITY, UNSPECIFIED CHRONICITY (HCC): Primary | ICD-10-CM

## 2020-06-30 RX ORDER — WARFARIN SODIUM 3 MG/1
TABLET ORAL
Qty: 30 TABLET | Refills: 2 | Status: SHIPPED | OUTPATIENT
Start: 2020-06-30 | End: 2020-10-26 | Stop reason: ALTCHOICE

## 2020-06-30 RX ORDER — WARFARIN SODIUM 6 MG/1
TABLET ORAL
Qty: 30 TABLET | Refills: 2 | Status: SHIPPED | OUTPATIENT
Start: 2020-06-30 | End: 2020-10-26 | Stop reason: ALTCHOICE

## 2020-07-02 ENCOUNTER — TELEMEDICINE (OUTPATIENT)
Dept: NEPHROLOGY | Facility: CLINIC | Age: 45
End: 2020-07-02
Payer: COMMERCIAL

## 2020-07-02 DIAGNOSIS — Z94.0 RENAL TRANSPLANT RECIPIENT: Primary | ICD-10-CM

## 2020-07-02 PROCEDURE — 1036F TOBACCO NON-USER: CPT | Performed by: INTERNAL MEDICINE

## 2020-07-02 PROCEDURE — 99215 OFFICE O/P EST HI 40 MIN: CPT | Performed by: INTERNAL MEDICINE

## 2020-07-02 NOTE — PATIENT INSTRUCTIONS
1) Avoid NSAIDS - (Example - motrin, advil, ibuprofen, aleve, exederin, etc)  2) Always follow a low salt diet  3) continue your same medications  4) please call your pharmacy 2 weeks prior to the last dose of your tacrolimus to make sure they have enough for refill    If they do not, please call our office as we will need to switch you to envarsus  5) labwork end of July

## 2020-07-02 NOTE — LETTER
July 2, 2020     Joshua Guerra, [address]    Patient: Bashir Cueto   YOB: 1975   Date of Visit: 7/2/2020       Dear Dr Lizbeth Magdaleno:    Thank you for referring Bashir Cueto to me for evaluation  Below are my notes for this consultation  If you have questions, please do not hesitate to call me  I look forward to following your patient along with you  Sincerely,        Dharmesh Ambrocio MD        CC: MD Dharmesh Donis MD  7/2/2020  1:49 PM  Sign at close encounter    Virtual Regular Visit      Assessment/Plan:    Problem List Items Addressed This Visit     None             Reason for visit is   Chief Complaint   Patient presents with    Virtual Regular Visit        Encounter provider Dharmesh Ambrocio MD    Provider located at [address] [place] [id number]      Recent Visits  Date Type Provider Dept   06/30/20 Telephone 1027 [facility]   Showing recent visits within past 7 days and meeting all other requirements     Today's Visits  Date Type Provider Dept   07/02/20 Telemedicine Dharmesh Ambrocio MD [facility]   Showing today's visits and meeting all other requirements     Future Appointments  No visits were found meeting these conditions  Showing future appointments within next 150 days and meeting all other requirements      The patient was identified by name and date of birth  Bashir Cueto was informed that this is a telemedicine visit and that the visit is being conducted through teams but video did not work so converted to phone and patient was informed that this is not a secure, HIPAA-complaint platform  He agrees to proceed     My office door was closed  No one else was in the room  He acknowledged consent and understanding of privacy and security of the video platform   The patient has agreed to participate and understands they can discontinue the visit at any time  It was my intent to perform this visit via video technology but the patient was not able to do a video connection so the visit was completed via audio telephone only  Was able to connect to teams thru audio but video did not work on patient end  So, converted to phone  Patient is aware this is a billable service  Subjective  Yosvany Blue is a 53 y o  male who denies complaints  No fevers, chills, nausea, vomiting, diarrhea        HPI     Past Medical History:   Diagnosis Date    ADD (attention deficit disorder)     Chronic kidney disease     Depression     DVT (deep venous thrombosis) (HCC)     H/O immunosuppressive therapy     History of kidney disease     HTN (hypertension) 12/17/2017    Hypertension     Nephropathy, IgA        Past Surgical History:   Procedure Laterality Date    NEPHRECTOMY TRANSPLANTED ORGAN         Current Outpatient Medications   Medication Sig Dispense Refill    Cholecalciferol (VITAMIN D-3) 1000 units CAPS Take 2,000 Units by mouth daily      diltiazem (CARDIZEM) 120 MG tablet TAKE 1 TABLET (120 MG TOTAL) BY MOUTH 2 (TWO) TIMES A DAY 60 tablet 2    gabapentin (NEURONTIN) 400 mg capsule Take 400 mg by mouth 2 (two) times a day      LATUDA 60 MG TABS Take 80 mg by mouth daily   2    methylphenidate (CONCERTA) 36 MG ER tablet Take 36 mg by mouth daily      mycophenolate (CELLCEPT) 250 mg capsule TAKE 2 CAPSULES EVERY MORNING, 1 CAPSULE EVERY EVENING 90 capsule 5    sertraline (ZOLOFT) 50 mg tablet Take 150 mg by mouth every morning  0    Tacrolimus (Envarsus XR) 1 MG TB24 Take 2 tablets (2 mg total) by mouth daily 60 tablet 3    tacrolimus (Prograf) 1 mg capsule Take 1 capsule (1 mg total) by mouth every 12 (twelve) hours 60 capsule 5    warfarin (COUMADIN) 3 mg tablet Take 1 tablet daily along with 6 mg of warfarin 30 tablet 2    warfarin (COUMADIN) 6 mg tablet Take 1 tablet daily along with 3 mg of warfarin 30 tablet 2    warfarin (COUMADIN) 7 5 mg tablet Take 1 tablet (7 5 mg total) by mouth daily 90 tablet 2     No current facility-administered medications for this visit  Allergies   Allergen Reactions    Penicillins Rash       Review of Systems   Constitutional: Negative  Negative for appetite change and fatigue  HENT: Negative  Eyes: Negative  Respiratory: Negative  Negative for shortness of breath  Cardiovascular: Negative  Negative for leg swelling  Gastrointestinal: Negative  Endocrine: Negative  Genitourinary: Negative  Negative for difficulty urinating  Musculoskeletal: Negative  Allergic/Immunologic: Negative  Neurological: Negative  Hematological: Negative  Psychiatric/Behavioral: Negative  All other systems reviewed and are negative  Video Exam    There were no vitals filed for this visit      Physical Exam     28-year-old male with a past medical history of 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 (diagnosed at 15 yo), living related from kidney swap renal transplant in 2009 at [facility] (patient's brother was donor involved in swap), was on peritoneal dialysis for 9 months prior to transplant, DVT X 4 prior, HTN, depression, ADHD, admission in December of 2017 for hematuria and at that time was treated for urinary tract infection/pyelonephritis  Then admission in January 2020 for FRANCES with Cr to 3 4 mg/dL in setting of n/v/d       CXR - 1/23/2020 - no acute disease     CT abd/pel - atrophic native kidneys, unremarkable renal transplant     U/s liver - 2 2 cm R hepatic echogenic mass reflecting hemangioma     1) CKD III, renal transplant -      History obtained from [facility], and [facility] and here at [facility] records:     -Baseline Cr 1 3-1 8 mg/dL; June 2014 1 8  -Tac levels from 2009 - 2014 were 7-10  -Levels have been fluctuating significantly last few months  -Patient follows at [facility] - followed with Dr Arturo Urrutia  -Prior baseline Cr 1 4-1 5 mg/dL  -pt had early CMV - on EGD  -Called [facility] - last time seen was in May 2017  -[address] - no known rejection ([phone])  -Has never had renal biopsy post transplant     Old Labwork review:     - UPCR 0 3  - CT abd/pel without hydro or nephrolithiasis on transplanted kidney  - to note, patient has had microscopic hematuria as far back as 2014 on UA  - UPCR 0 3 on 1/25/2019  - UA 10-20 RBC  - UA repeat on 2/23 was with 2-4 RBC, 1-2 WBC  - pt has had multiple episodes of Cr below goal in 2017 and 2018  - BK and CMV in process  - urine eos 0%     Since last being seen:     -   patient had undetectable tacrolimus levels in the setting of not having tacrolimus  This issue was resolved  Repeat tacrolimus level is appropriate   -creatinine 1 6 mg/dL on June 29th which is within the patient's baseline   -U PCR 0 31 which is stable  -urinalysis with 2-4 RBC which is not new   -tacrolimus 7 3      Important Medication notes:   - on diltiazem to note (interaction with tacrolimus)     Plan:  - no changes to medications today  -lab work end of July  -patient knows to call right away if the pharmacy cannot refill short-acting tacrolimus as we will need to change the patient to envarsus at that juncture  - if hematuria rises, proteinuria rises, Cr rises ---> will need to check UA (history of Pyelonephritis), check DSA (we will need to obtain kits from [facility] if needed), and would need to consider biopsy if no pyelo (per [facility] can complete renal biopsy locally)  - no changes to diltiazem today     2) Immune Suppression     - was on tacrolimus 1 mg BID and lowered to 1 mg daily while in hosp  Restart 1 mg BID in January  - June 2020 - pt did not have tacrolimus for a few days because he was waiting on refill and did not realize that it was backordered   Issue resolved and restarted tacrolimus  -  in AM and 250 mg PM  - not on steroid regimen  - history CMV  - pt has had labile tacrolimus levels  - discussed compliance important    3) DVT - recurrent DVT  Most recent march 2018     - 4 prior DVTs  - 1 RUE post PICC  - then 3 in LE  - further plans per primary team  - last INR low  PCP aware       4) Vaccines     - up to date on vaccine with flu vaccine  - PNA vaccine - pt will review with Primary Physician  - no live vaccines     5) Age appropriate Ca screening     - saw Derm 11/2019 - to continue yearly f/u  - has history of psoriaform dermatitis      6) HTN     - pt is on diltiazem  - BP stable 668-601 systolic at home     7) HPL     - further plan per primary team   -would continue to monitor lipid panel closely  -consider statin if needed     9) Depression     -prior suicide attempts - prior 2003  - pt knows to go to the hospital  - will be attempting ECT  - patient follows with Psychiatry     10) Hb - monitor     11) IgA nephropathy native disease      monitor for recurrence     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     - defer to Primary Nephrology for f/u     13) liver lesion on u/s in hospital     - see above for GI f/u  - saw GI in April  - MRI pending - pt knows to schedule after next labs as long as GFR stable     It was a pleasure evaluating your patient  Thank you for allowing our team to participate in the care of Mr Arnav Joshi  Please do not hesitate to contact our team if further issues/questions shall arise in the interim      As a result of this visit, I have not referred the patient for further respiratory evaluation  I spent 10 minutes directly with the patient during this visit      VIRTUAL VISIT DISCLAIMER    Elisa Helene acknowledges that he has consented to an online visit or consultation  He understands that the online visit is based solely on information provided by him, and that, in the absence of a face-to-face physical evaluation by the physician, the diagnosis he receives is both limited and provisional in terms of accuracy and completeness   This is not intended to replace a full medical face-to-face evaluation by the physician  Mathieu Grullon understands and accepts these terms

## 2020-07-02 NOTE — PROGRESS NOTES
Virtual Regular Visit      Assessment/Plan:    Problem List Items Addressed This Visit     None             Reason for visit is   Chief Complaint   Patient presents with    Virtual Regular Visit        Encounter provider Efraín Brewster MD    Provider located at [address] [place] [address]  [address] [place] [id number]      Recent Visits  Date Type Provider Dept   06/30/20 Telephone 1027 [facility]   Showing recent visits within past 7 days and meeting all other requirements     Today's Visits  Date Type Provider Dept   07/02/20 Telemedicine Efraín Brewster MD [facility]   Showing today's visits and meeting all other requirements     Future Appointments  No visits were found meeting these conditions  Showing future appointments within next 150 days and meeting all other requirements      The patient was identified by name and date of birth  Aramis Stone was informed that this is a telemedicine visit and that the visit is being conducted through teams but video did not work so converted to phone and patient was informed that this is not a secure, HIPAA-complaint platform  He agrees to proceed     My office door was closed  No one else was in the room  He acknowledged consent and understanding of privacy and security of the video platform  The patient has agreed to participate and understands they can discontinue the visit at any time  It was my intent to perform this visit via video technology but the patient was not able to do a video connection so the visit was completed via audio telephone only  Was able to connect to teams thru audio but video did not work on patient end  So, converted to phone  Patient is aware this is a billable service  Subjective  Aramis Stone is a 53 y o  male who denies complaints  No fevers, chills, nausea, vomiting, diarrhea        HPI     Past Medical History:   Diagnosis Date    ADD (attention deficit disorder)     Chronic kidney disease     Depression     [facility] ([facility])     H/O immunosuppressive therapy     History of kidney disease     HTN (hypertension) 12/17/2017    Hypertension     Nephropathy, IgA        Past Surgical History:   Procedure Laterality Date    NEPHRECTOMY TRANSPLANTED ORGAN         Current Outpatient Medications   Medication Sig Dispense Refill    Cholecalciferol (VITAMIN D-3) 1000 units CAPS Take 2,000 Units by mouth daily      diltiazem (CARDIZEM) 120 MG tablet TAKE 1 TABLET (120 MG TOTAL) BY MOUTH 2 (TWO) TIMES A DAY 60 tablet 2    gabapentin (NEURONTIN) 400 mg capsule Take 400 mg by mouth 2 (two) times a day      LATUDA 60 MG TABS Take 80 mg by mouth daily   2    methylphenidate (CONCERTA) 36 MG ER tablet Take 36 mg by mouth daily      mycophenolate (CELLCEPT) 250 mg capsule TAKE 2 CAPSULES EVERY MORNING, 1 CAPSULE EVERY EVENING 90 capsule 5    sertraline (ZOLOFT) 50 mg tablet Take 150 mg by mouth every morning  0    Tacrolimus (Envarsus XR) 1 MG TB24 Take 2 tablets (2 mg total) by mouth daily 60 tablet 3    tacrolimus (Prograf) 1 mg capsule Take 1 capsule (1 mg total) by mouth every 12 (twelve) hours 60 capsule 5    warfarin (COUMADIN) 3 mg tablet Take 1 tablet daily along with 6 mg of warfarin 30 tablet 2    warfarin (COUMADIN) 6 mg tablet Take 1 tablet daily along with 3 mg of warfarin 30 tablet 2    warfarin (COUMADIN) 7 5 mg tablet Take 1 tablet (7 5 mg total) by mouth daily 90 tablet 2     No current facility-administered medications for this visit  Allergies   Allergen Reactions    Penicillins Rash       Review of Systems   Constitutional: Negative  Negative for appetite change and fatigue  HENT: Negative  Eyes: Negative  Respiratory: Negative  Negative for shortness of breath  Cardiovascular: Negative  Negative for leg swelling  Gastrointestinal: Negative  Endocrine: Negative      Genitourinary: Negative  Negative for difficulty urinating  Musculoskeletal: Negative  Allergic/Immunologic: Negative  Neurological: Negative  Hematological: Negative  Psychiatric/Behavioral: Negative  All other systems reviewed and are negative  Video Exam    There were no vitals filed for this visit      Physical Exam     78-year-old male with a past medical history of 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 (diagnosed at 13 yo), living related from kidney swap renal transplant in 2009 at [facility] (patient's brother was donor involved in swap), was on peritoneal dialysis for 9 months prior to transplant, DVT X 4 prior, HTN, depression, ADHD, admission in December of 2017 for hematuria and at that time was treated for urinary tract infection/pyelonephritis  Then admission in January 2020 for FRANCES with Cr to 3 4 mg/dL in setting of n/v/d       CXR - 1/23/2020 - no acute disease     CT abd/pel - atrophic native kidneys, unremarkable renal transplant     U/s liver - 2 2 cm R hepatic echogenic mass reflecting hemangioma     1) CKD III, renal transplant -      History obtained from [facility], and [facility] and here at [facility] records:     -Baseline Cr 1 3-1 8 mg/dL; June 2014 1 8  -Tac levels from 2009 - 2014 were 7-10  -Levels have been fluctuating significantly last few months  -Patient follows at [facility] - followed with Dr Arturo Urrutia  -Prior baseline Cr 1 4-1 5 mg/dL  -pt had early CMV - on EGD  -Called [place] [facility] - last time seen was in May 2017  -[facility] - no known rejection ([phone])  -Has never had renal biopsy post transplant     Old Labwork review:     - UPCR 0 3  - CT abd/pel without hydro or nephrolithiasis on transplanted kidney  - to note, patient has had microscopic hematuria as far back as 2014 on UA  - UPCR 0 3 on 1/25/2019  - UA 10-20 RBC  - UA repeat on 2/23 was with 2-4 RBC, 1-2 WBC  - pt has had multiple episodes of Cr below goal in 2017 and 2018  - BK and CMV in process  - urine eos 0%     Since last being seen:     -  patient had undetectable tacrolimus levels in the setting of not having tacrolimus  This issue was resolved  Repeat tacrolimus level is appropriate   -creatinine 1 6 mg/dL on June 29th which is within the patient's baseline   -U PCR 0 31 which is stable  -urinalysis with 2-4 RBC which is not new   -tacrolimus 7 3      Important Medication notes:   - on diltiazem to note (interaction with tacrolimus)     Plan:  - no changes to medications today  -lab work end of July  -patient knows to call right away if the pharmacy cannot refill short-acting tacrolimus as we will need to change the patient to envarsus at that juncture  - if hematuria rises, proteinuria rises, Cr rises ---> will need to check UA (history of Pyelonephritis), check DSA (we will need to obtain kits from [facility] if needed), and would need to consider biopsy if no pyelo (per [facility] can complete renal biopsy locally)  - no changes to diltiazem today     2) Immune Suppression     - was on tacrolimus 1 mg BID and lowered to 1 mg daily while in hosp  Restart 1 mg BID in January  - June 2020 - pt did not have tacrolimus for a few days because he was waiting on refill and did not realize that it was backordered  Issue resolved and restarted tacrolimus  -  in AM and 250 mg PM  - not on steroid regimen  - history CMV  - pt has had labile tacrolimus levels  - discussed compliance important    3) DVT - recurrent DVT  Most recent march 2018     - 4 prior DVTs  - 1 RUE post PICC  - then 3 in LE  - further plans per primary team  - last INR low   PCP aware       4) Vaccines     - up to date on vaccine with flu vaccine  - PNA vaccine - pt will review with Primary Physician  - no live vaccines     5) Age appropriate Ca screening     - saw Derm 11/2019 - to continue yearly f/u  - has history of psoriaform dermatitis      6) HTN     - pt is on diltiazem  - BP stable 386-882 systolic at home     7) HPL     - further plan per primary team   -would continue to monitor lipid panel closely  -consider statin if needed     9) Depression     -prior suicide attempts - prior 2003  - pt knows to go to the hospital  - will be attempting ECT  - patient follows with Psychiatry     10) Hb - monitor     11) IgA nephropathy native disease      monitor for recurrence     12) MBD     - defer to Primary Nephrology for f/u     13) liver lesion on u/s in hospital     - see above for GI f/u  - saw GI in April  - MRI pending - pt knows to schedule after next labs as long as GFR stable     It was a pleasure evaluating your patient  Thank you for allowing our team to participate in the care of Mr Arnav Joshi  Please do not hesitate to contact our team if further issues/questions shall arise in the interim      As a result of this visit, I have not referred the patient for further respiratory evaluation  I spent 10 minutes directly with the patient during this visit      VIRTUAL VISIT DISCLAIMER    Phil Zelaya acknowledges that he has consented to an online visit or consultation  He understands that the online visit is based solely on information provided by him, and that, in the absence of a face-to-face physical evaluation by the physician, the diagnosis he receives is both limited and provisional in terms of accuracy and completeness  This is not intended to replace a full medical face-to-face evaluation by the physician  Phil Zelaya understands and accepts these terms

## 2020-07-21 ENCOUNTER — TELEMEDICINE (OUTPATIENT)
Dept: UROLOGY | Facility: CLINIC | Age: 45
End: 2020-07-21
Payer: COMMERCIAL

## 2020-07-21 DIAGNOSIS — N39.41 URGE INCONTINENCE OF URINE: ICD-10-CM

## 2020-07-21 PROCEDURE — 99213 OFFICE O/P EST LOW 20 MIN: CPT | Performed by: PHYSICIAN ASSISTANT

## 2020-07-21 NOTE — PROGRESS NOTES
Virtual Brief Visit    Assessment/Plan:    Problem List Items Addressed This Visit     None      Visit Diagnoses     Urge incontinence of urine                    Reason for visit is   Chief Complaint   Patient presents with    Virtual Brief Visit        Encounter provider Nai Fernandes PA-C    Provider located at [address] [facility] [address]  [address] [facility] [address] [place] [address] [phone]    Recent Visits  No visits were found meeting these conditions  Showing recent visits within past 7 days and meeting all other requirements     Today's Visits  Date Type Provider Dept   07/21/20 Tavonruben Cummins PA-C [facility]   Showing today's visits and meeting all other requirements     Future Appointments  No visits were found meeting these conditions  Showing future appointments within next 150 days and meeting all other requirements        After connecting through telephone and patient was informed that this is not a secure, HIPAA-complaint platform  He agrees to proceed  , the patient was identified by name and date of birth  Jewel Lanier was informed that this is a telemedicine visit and that the visit is being conducted through telephone and patient was informed that this is not a secure, HIPAA-complaint platform  He agrees to proceed     My office door was closed  No one else was in the room  He acknowledged consent and understanding of privacy and security of the platform  The patient has agreed to participate and understands he can discontinue the visit at any time  Patient is aware this is a billable service  Subjective    Jewel Lanier is a 53 y o  male patient establishing care with [facility] for urge urinary incontinence  He reports that the symptoms started a couple of months ago and have actually improved over the last 2 weeks  He continues to have some issues getting to the bathroom in time overnight    He has 3 episodes of nocturia nightly which is normal for him and has been going on his whole life  He is having a small amount of urinary leakage at these times and does not wear a pad for this  He did recently have urine testing that was positive for red blood cells  He has had persistent microscopic hematuria for quite some time  He reports that he has been told that this is due to his native kidneys being atrophic and present as well as a kidney transplant performed in 2009 for IgA nephropathy  He reports that he drinks a few cups of coffee daily  He also has a diagnosis of sleep apnea but has not previously tolerated the CPAP machine        HPI     Past Medical History:   Diagnosis Date    ADD (attention deficit disorder)     Chronic kidney disease     Depression     [facility]     H/O immunosuppressive therapy     History of kidney disease     HTN (hypertension) 12/17/2017    Hypertension     Nephropathy, IgA        Past Surgical History:   Procedure Laterality Date    NEPHRECTOMY TRANSPLANTED ORGAN         Current Outpatient Medications   Medication Sig Dispense Refill    Cholecalciferol (VITAMIN D-3) 1000 units CAPS Take 2,000 Units by mouth daily      diltiazem (CARDIZEM) 120 MG tablet TAKE 1 TABLET (120 MG TOTAL) BY MOUTH 2 (TWO) TIMES A DAY 60 tablet 2    gabapentin (NEURONTIN) 400 mg capsule Take 400 mg by mouth 2 (two) times a day      LATUDA 60 MG TABS Take 80 mg by mouth daily   2    methylphenidate (CONCERTA) 36 MG ER tablet Take 36 mg by mouth daily      mycophenolate (CELLCEPT) 250 mg capsule TAKE 2 CAPSULES EVERY MORNING, 1 CAPSULE EVERY EVENING 90 capsule 5    sertraline (ZOLOFT) 50 mg tablet Take 150 mg by mouth every morning  0    tacrolimus (Prograf) 1 mg capsule Take 1 capsule (1 mg total) by mouth every 12 (twelve) hours 60 capsule 5    warfarin (COUMADIN) 3 mg tablet Take 1 tablet daily along with 6 mg of warfarin 30 tablet 2    warfarin (COUMADIN) 6 mg tablet Take 1 tablet daily along with 3 mg of warfarin 30 tablet 2    warfarin (COUMADIN) 7 5 mg tablet Take 1 tablet (7 5 mg total) by mouth daily 90 tablet 2     No current facility-administered medications for this visit  Allergies   Allergen Reactions    Penicillins Rash       Review of Systems   Constitutional: Negative for chills and fever  HENT: Negative  Eyes: Negative  Respiratory: Negative for cough and shortness of breath  Cardiovascular: Negative for chest pain  Gastrointestinal: Negative for constipation, diarrhea, nausea and vomiting  Endocrine: Negative  Genitourinary: Negative for difficulty urinating, dysuria, enuresis, flank pain, frequency, hematuria and urgency  Musculoskeletal: Negative  Skin: Negative  There were no vitals filed for this visit  1  Urge urinary incontinence  - Patient has had improvement in his urge urinary incontinence over the last 2 weeks  - We discussed dietary modifications to decrease bladder irritants  - I also encouraged the patient to work on finding a tolerable solution to using CPAP as he does have sleep apnea and his primary symptoms are now at night   - He was encouraged to avoid hydration a couple hours prior to bedtime   - He reports that the dietary modifications will be very helpful for him and would like to follow up in the future on an as-needed basis  I spent 15 minutes directly with the patient during this visit    VIRTUAL VISIT DISCLAIMER    Varsha Knight acknowledges that he has consented to an online visit or consultation  He understands that the online visit is based solely on information provided by him, and that, in the absence of a face-to-face physical evaluation by the physician, the diagnosis he receives is both limited and provisional in terms of accuracy and completeness  This is not intended to replace a full medical face-to-face evaluation by the physician   Varsha Knight understands and accepts these terms

## 2020-08-06 ENCOUNTER — TELEPHONE (OUTPATIENT)
Dept: INTERNAL MEDICINE CLINIC | Facility: CLINIC | Age: 45
End: 2020-08-06

## 2020-08-06 ENCOUNTER — ANTICOAG VISIT (OUTPATIENT)
Dept: INTERNAL MEDICINE CLINIC | Facility: CLINIC | Age: 45
End: 2020-08-06

## 2020-08-06 ENCOUNTER — LAB (OUTPATIENT)
Dept: LAB | Facility: HOSPITAL | Age: 45
End: 2020-08-06
Attending: INTERNAL MEDICINE
Payer: COMMERCIAL

## 2020-08-06 DIAGNOSIS — Z86.718 HISTORY OF DVT (DEEP VEIN THROMBOSIS): Primary | ICD-10-CM

## 2020-08-06 DIAGNOSIS — Z94.0 KIDNEY TRANSPLANTED: ICD-10-CM

## 2020-08-06 DIAGNOSIS — Z86.718 HISTORY OF DVT (DEEP VEIN THROMBOSIS): ICD-10-CM

## 2020-08-06 LAB
ALBUMIN SERPL BCP-MCNC: 3.7 G/DL (ref 3.5–5)
ALP SERPL-CCNC: 117 U/L (ref 46–116)
ALT SERPL W P-5'-P-CCNC: 12 U/L (ref 12–78)
ANION GAP SERPL CALCULATED.3IONS-SCNC: 10 MMOL/L (ref 4–13)
AST SERPL W P-5'-P-CCNC: <5 U/L (ref 5–45)
BILIRUB SERPL-MCNC: 0.3 MG/DL (ref 0.2–1)
BUN SERPL-MCNC: 28 MG/DL (ref 5–25)
CALCIUM SERPL-MCNC: 9.7 MG/DL (ref 8.3–10.1)
CHLORIDE SERPL-SCNC: 106 MMOL/L (ref 100–108)
CO2 SERPL-SCNC: 25 MMOL/L (ref 21–32)
CREAT SERPL-MCNC: 1.77 MG/DL (ref 0.6–1.3)
GFR SERPL CREATININE-BSD FRML MDRD: 45 ML/MIN/1.73SQ M
GLUCOSE P FAST SERPL-MCNC: 86 MG/DL (ref 65–99)
INR PPP: 2.34 (ref 0.84–1.19)
POTASSIUM SERPL-SCNC: 4.8 MMOL/L (ref 3.5–5.3)
PROT SERPL-MCNC: 8.1 G/DL (ref 6.4–8.2)
PROTHROMBIN TIME: 25.9 SECONDS (ref 11.6–14.5)
SODIUM SERPL-SCNC: 141 MMOL/L (ref 136–145)

## 2020-08-06 PROCEDURE — 36415 COLL VENOUS BLD VENIPUNCTURE: CPT

## 2020-08-06 PROCEDURE — 85610 PROTHROMBIN TIME: CPT

## 2020-08-06 PROCEDURE — 80053 COMPREHEN METABOLIC PANEL: CPT

## 2020-08-06 PROCEDURE — 80197 ASSAY OF TACROLIMUS: CPT

## 2020-08-06 NOTE — TELEPHONE ENCOUNTER
----- Message from Mariah Gordon MD sent at 8/6/2020  5:15 PM EDT -----  Please continue the same dose of warfarin    Repeat labs in 1 month

## 2020-08-07 ENCOUNTER — TELEPHONE (OUTPATIENT)
Dept: NEPHROLOGY | Facility: CLINIC | Age: 45
End: 2020-08-07

## 2020-08-07 DIAGNOSIS — Z94.0 RENAL TRANSPLANT RECIPIENT: ICD-10-CM

## 2020-08-07 DIAGNOSIS — I10 ESSENTIAL HYPERTENSION: ICD-10-CM

## 2020-08-07 DIAGNOSIS — I12.9 HYPERTENSIVE CHRONIC KIDNEY DISEASE, UNSPECIFIED CKD STAGE: ICD-10-CM

## 2020-08-07 DIAGNOSIS — N18.30 STAGE 3 CHRONIC KIDNEY DISEASE (HCC): Primary | ICD-10-CM

## 2020-08-07 LAB — TACROLIMUS BLD-MCNC: 9 NG/ML (ref 2–20)

## 2020-08-07 NOTE — TELEPHONE ENCOUNTER
----- Message from Escobar Flores MD sent at 8/7/2020  4:32 PM EDT -----  Hello    Patient normally is followed up by Ms Deanna Angela  Please let the patient know that the tacrolimus level is slightly above goal   Renal function is stable  Please ask the patient if this was an accurate 11-13 hour trough   Any diarrhea?  - please have pt repeat BMP and tacrolimus in 2-3 weeks    Thank you    np

## 2020-08-07 NOTE — TELEPHONE ENCOUNTER
I spoke to the patient he advised he held the medication for at least 11-13 hours prior to the test  Patient aware renal function stable  Mailed out BMP/ Tac due in 2 weeks

## 2020-08-07 NOTE — RESULT ENCOUNTER NOTE
Hello    Patient normally is followed up by Ms Diannah Libman  Please let the patient know that the tacrolimus level is slightly above goal   Renal function is stable  Please ask the patient if this was an accurate 11-13 hour trough   Any diarrhea?  - please have pt repeat BMP and tacrolimus in 2-3 weeks    Thank you    np

## 2020-09-21 ENCOUNTER — TELEPHONE (OUTPATIENT)
Dept: NEPHROLOGY | Facility: CLINIC | Age: 45
End: 2020-09-21

## 2020-09-27 DIAGNOSIS — I10 ESSENTIAL HYPERTENSION: ICD-10-CM

## 2020-09-28 RX ORDER — DILTIAZEM HYDROCHLORIDE 120 MG/1
TABLET, FILM COATED ORAL
Qty: 180 TABLET | Refills: 0 | OUTPATIENT
Start: 2020-09-28

## 2020-10-19 ENCOUNTER — TELEPHONE (OUTPATIENT)
Dept: NEPHROLOGY | Facility: CLINIC | Age: 45
End: 2020-10-19

## 2020-10-19 DIAGNOSIS — N18.30 STAGE 3 CHRONIC KIDNEY DISEASE, UNSPECIFIED WHETHER STAGE 3A OR 3B CKD (HCC): Primary | ICD-10-CM

## 2020-10-19 DIAGNOSIS — Z94.0 RENAL TRANSPLANT RECIPIENT: ICD-10-CM

## 2020-10-19 DIAGNOSIS — Z94.0 KIDNEY TRANSPLANTED: ICD-10-CM

## 2020-10-20 ENCOUNTER — TELEPHONE (OUTPATIENT)
Dept: NEPHROLOGY | Facility: CLINIC | Age: 45
End: 2020-10-20

## 2020-10-20 RX ORDER — MYCOPHENOLATE MOFETIL 250 MG/1
CAPSULE ORAL
Qty: 90 CAPSULE | Refills: 5 | Status: SHIPPED | OUTPATIENT
Start: 2020-10-20 | End: 2020-12-11 | Stop reason: SDUPTHER

## 2020-10-22 ENCOUNTER — TRANSCRIBE ORDERS (OUTPATIENT)
Dept: ADMINISTRATIVE | Facility: HOSPITAL | Age: 45
End: 2020-10-22

## 2020-10-22 ENCOUNTER — ANTICOAG VISIT (OUTPATIENT)
Dept: INTERNAL MEDICINE CLINIC | Facility: CLINIC | Age: 45
End: 2020-10-22

## 2020-10-22 ENCOUNTER — LAB (OUTPATIENT)
Dept: LAB | Facility: HOSPITAL | Age: 45
End: 2020-10-22
Attending: INTERNAL MEDICINE
Payer: COMMERCIAL

## 2020-10-22 DIAGNOSIS — Z00.00 ROUTINE GENERAL MEDICAL EXAMINATION AT A HEALTH CARE FACILITY: Primary | ICD-10-CM

## 2020-10-22 DIAGNOSIS — Z94.0 RENAL TRANSPLANT RECIPIENT: Primary | ICD-10-CM

## 2020-10-22 DIAGNOSIS — I82.4Z1 DEEP VEIN THROMBOSIS (DVT) OF DISTAL VEIN OF RIGHT LOWER EXTREMITY, UNSPECIFIED CHRONICITY (HCC): Primary | ICD-10-CM

## 2020-10-22 DIAGNOSIS — N18.30 STAGE 3 CHRONIC KIDNEY DISEASE, UNSPECIFIED WHETHER STAGE 3A OR 3B CKD (HCC): ICD-10-CM

## 2020-10-22 DIAGNOSIS — Z94.0 RENAL TRANSPLANT RECIPIENT: ICD-10-CM

## 2020-10-22 DIAGNOSIS — Z00.00 ROUTINE GENERAL MEDICAL EXAMINATION AT A HEALTH CARE FACILITY: ICD-10-CM

## 2020-10-22 LAB
25(OH)D3 SERPL-MCNC: 39.9 NG/ML (ref 30–100)
ANION GAP SERPL CALCULATED.3IONS-SCNC: 9 MMOL/L (ref 4–13)
BACTERIA UR QL AUTO: ABNORMAL /HPF
BASOPHILS # BLD AUTO: 0.05 THOUSANDS/ΜL (ref 0–0.1)
BASOPHILS NFR BLD AUTO: 1 % (ref 0–1)
BILIRUB UR QL STRIP: NEGATIVE
BUN SERPL-MCNC: 44 MG/DL (ref 5–25)
CALCIUM SERPL-MCNC: 9.6 MG/DL (ref 8.3–10.1)
CHLORIDE SERPL-SCNC: 103 MMOL/L (ref 100–108)
CLARITY UR: CLEAR
CO2 SERPL-SCNC: 24 MMOL/L (ref 21–32)
COLOR UR: YELLOW
CREAT SERPL-MCNC: 2.13 MG/DL (ref 0.6–1.3)
CREAT UR-MCNC: 167 MG/DL
EOSINOPHIL # BLD AUTO: 0.11 THOUSAND/ΜL (ref 0–0.61)
EOSINOPHIL NFR BLD AUTO: 2 % (ref 0–6)
ERYTHROCYTE [DISTWIDTH] IN BLOOD BY AUTOMATED COUNT: 12.5 % (ref 11.6–15.1)
GFR SERPL CREATININE-BSD FRML MDRD: 36 ML/MIN/1.73SQ M
GLUCOSE P FAST SERPL-MCNC: 94 MG/DL (ref 65–99)
GLUCOSE UR STRIP-MCNC: NEGATIVE MG/DL
HCT VFR BLD AUTO: 49.4 % (ref 36.5–49.3)
HGB BLD-MCNC: 16.4 G/DL (ref 12–17)
HGB UR QL STRIP.AUTO: ABNORMAL
IMM GRANULOCYTES # BLD AUTO: 0.01 THOUSAND/UL (ref 0–0.2)
IMM GRANULOCYTES NFR BLD AUTO: 0 % (ref 0–2)
INR PPP: 1.31 (ref 0.84–1.19)
KETONES UR STRIP-MCNC: NEGATIVE MG/DL
LEUKOCYTE ESTERASE UR QL STRIP: NEGATIVE
LYMPHOCYTES # BLD AUTO: 1.83 THOUSANDS/ΜL (ref 0.6–4.47)
LYMPHOCYTES NFR BLD AUTO: 27 % (ref 14–44)
MCH RBC QN AUTO: 32.2 PG (ref 26.8–34.3)
MCHC RBC AUTO-ENTMCNC: 33.2 G/DL (ref 31.4–37.4)
MCV RBC AUTO: 97 FL (ref 82–98)
MONOCYTES # BLD AUTO: 0.67 THOUSAND/ΜL (ref 0.17–1.22)
MONOCYTES NFR BLD AUTO: 10 % (ref 4–12)
NEUTROPHILS # BLD AUTO: 4.19 THOUSANDS/ΜL (ref 1.85–7.62)
NEUTS SEG NFR BLD AUTO: 60 % (ref 43–75)
NITRITE UR QL STRIP: NEGATIVE
NON-SQ EPI CELLS URNS QL MICRO: ABNORMAL /HPF
NRBC BLD AUTO-RTO: 0 /100 WBCS
PH UR STRIP.AUTO: 5.5 [PH]
PLATELET # BLD AUTO: 201 THOUSANDS/UL (ref 149–390)
PMV BLD AUTO: 10.5 FL (ref 8.9–12.7)
POTASSIUM SERPL-SCNC: 4.7 MMOL/L (ref 3.5–5.3)
PROT UR STRIP-MCNC: ABNORMAL MG/DL
PROT UR-MCNC: 46 MG/DL
PROT/CREAT UR: 0.28 MG/G{CREAT} (ref 0–0.1)
PROTHROMBIN TIME: 15.7 SECONDS (ref 11.6–14.5)
PTH-INTACT SERPL-MCNC: 90.2 PG/ML (ref 18.4–80.1)
RBC # BLD AUTO: 5.1 MILLION/UL (ref 3.88–5.62)
RBC #/AREA URNS AUTO: ABNORMAL /HPF
SODIUM SERPL-SCNC: 136 MMOL/L (ref 136–145)
SP GR UR STRIP.AUTO: 1.02 (ref 1–1.03)
UROBILINOGEN UR QL STRIP.AUTO: 0.2 E.U./DL
WBC # BLD AUTO: 6.86 THOUSAND/UL (ref 4.31–10.16)
WBC #/AREA URNS AUTO: ABNORMAL /HPF

## 2020-10-22 PROCEDURE — 84156 ASSAY OF PROTEIN URINE: CPT

## 2020-10-22 PROCEDURE — 81001 URINALYSIS AUTO W/SCOPE: CPT | Performed by: INTERNAL MEDICINE

## 2020-10-22 PROCEDURE — 80048 BASIC METABOLIC PNL TOTAL CA: CPT

## 2020-10-22 PROCEDURE — 85025 COMPLETE CBC W/AUTO DIFF WBC: CPT

## 2020-10-22 PROCEDURE — 82570 ASSAY OF URINE CREATININE: CPT

## 2020-10-22 PROCEDURE — 83970 ASSAY OF PARATHORMONE: CPT

## 2020-10-22 PROCEDURE — 82306 VITAMIN D 25 HYDROXY: CPT

## 2020-10-22 PROCEDURE — 80197 ASSAY OF TACROLIMUS: CPT | Performed by: INTERNAL MEDICINE

## 2020-10-22 PROCEDURE — 36415 COLL VENOUS BLD VENIPUNCTURE: CPT

## 2020-10-22 PROCEDURE — 85610 PROTHROMBIN TIME: CPT

## 2020-10-22 RX ORDER — WARFARIN SODIUM 4 MG/1
TABLET ORAL
Qty: 60 TABLET | Refills: 3 | Status: SHIPPED | OUTPATIENT
Start: 2020-10-22 | End: 2020-12-18 | Stop reason: SDUPTHER

## 2020-10-22 NOTE — RESULT ENCOUNTER NOTE
Hello    Patient normally is followed up by Ms Jonny Wren  Please let pt know that creat is higher than baseline  Tacrolimus level was not run - unclear why  - I tried to add on tac today  Can you please call lab and see if they were able to run this and let me know  If not, if he can go for CMP and tacrolimus  Has to be accurate 11-13 hour trough level for tac level  - please let PCP office know about pt low INR and also to let pt know  - please see how pt is feeling  Any recent diarrhea, nausea, vomiting, fevers, chills  Is patient taking all of his meds? - please update me on what pt states       Thank you    np

## 2020-10-23 ENCOUNTER — TELEPHONE (OUTPATIENT)
Dept: OTHER | Facility: HOSPITAL | Age: 45
End: 2020-10-23

## 2020-10-23 ENCOUNTER — DOCUMENTATION (OUTPATIENT)
Dept: NEPHROLOGY | Facility: CLINIC | Age: 45
End: 2020-10-23

## 2020-10-23 ENCOUNTER — TELEPHONE (OUTPATIENT)
Dept: NEPHROLOGY | Facility: CLINIC | Age: 45
End: 2020-10-23

## 2020-10-23 DIAGNOSIS — N18.32 CHRONIC KIDNEY DISEASE (CKD) STAGE G3B/A3, MODERATELY DECREASED GLOMERULAR FILTRATION RATE (GFR) BETWEEN 30-44 ML/MIN/1.73 SQUARE METER AND ALBUMINURIA CREATININE RATIO GREATER THAN 300 MG/G (HCC): Primary | ICD-10-CM

## 2020-10-23 DIAGNOSIS — Z94.0 RENAL TRANSPLANT RECIPIENT: ICD-10-CM

## 2020-10-23 LAB — TACROLIMUS BLD-MCNC: <1 NG/ML (ref 2–20)

## 2020-10-23 RX ORDER — SODIUM CHLORIDE 9 MG/ML
20 INJECTION, SOLUTION INTRAVENOUS ONCE
Status: CANCELLED | OUTPATIENT
Start: 2020-10-24

## 2020-10-24 ENCOUNTER — HOSPITAL ENCOUNTER (OUTPATIENT)
Dept: INFUSION CENTER | Facility: HOSPITAL | Age: 45
Discharge: HOME | End: 2020-10-24
Attending: INTERNAL MEDICINE
Payer: COMMERCIAL

## 2020-10-24 VITALS — HEART RATE: 80 BPM | TEMPERATURE: 97.9 F | SYSTOLIC BLOOD PRESSURE: 135 MMHG | DIASTOLIC BLOOD PRESSURE: 64 MMHG

## 2020-10-24 DIAGNOSIS — N17.9 AKI (ACUTE KIDNEY INJURY) (HCC): Primary | ICD-10-CM

## 2020-10-24 LAB
ALBUMIN SERPL BCP-MCNC: 3.5 G/DL (ref 3.5–5)
ALP SERPL-CCNC: 101 U/L (ref 46–116)
ALT SERPL W P-5'-P-CCNC: 16 U/L (ref 12–78)
ANION GAP SERPL CALCULATED.3IONS-SCNC: 4 MMOL/L (ref 4–13)
AST SERPL W P-5'-P-CCNC: 15 U/L (ref 5–45)
BILIRUB SERPL-MCNC: 0.27 MG/DL (ref 0.2–1)
BUN SERPL-MCNC: 33 MG/DL (ref 5–25)
CALCIUM SERPL-MCNC: 9.8 MG/DL (ref 8.3–10.1)
CHLORIDE SERPL-SCNC: 115 MMOL/L (ref 100–108)
CO2 SERPL-SCNC: 21 MMOL/L (ref 21–32)
CREAT SERPL-MCNC: 1.64 MG/DL (ref 0.6–1.3)
GFR SERPL CREATININE-BSD FRML MDRD: 49 ML/MIN/1.73SQ M
GLUCOSE SERPL-MCNC: 84 MG/DL (ref 65–140)
POTASSIUM SERPL-SCNC: 4.7 MMOL/L (ref 3.5–5.3)
PROT SERPL-MCNC: 7.6 G/DL (ref 6.4–8.2)
SODIUM SERPL-SCNC: 140 MMOL/L (ref 136–145)

## 2020-10-24 PROCEDURE — 96365 THER/PROPH/DIAG IV INF INIT: CPT

## 2020-10-24 PROCEDURE — 80053 COMPREHEN METABOLIC PANEL: CPT | Performed by: INTERNAL MEDICINE

## 2020-10-24 RX ORDER — SODIUM CHLORIDE 9 MG/ML
20 INJECTION, SOLUTION INTRAVENOUS ONCE
Status: COMPLETED | OUTPATIENT
Start: 2020-10-24 | End: 2020-10-24

## 2020-10-24 RX ORDER — SODIUM CHLORIDE 9 MG/ML
20 INJECTION, SOLUTION INTRAVENOUS ONCE
Status: CANCELLED | OUTPATIENT
Start: 2020-10-25

## 2020-10-24 RX ADMIN — SODIUM CHLORIDE 500 MG: 0.9 INJECTION, SOLUTION INTRAVENOUS at 11:37

## 2020-10-24 RX ADMIN — SODIUM CHLORIDE 20 ML/HR: 0.9 INJECTION, SOLUTION INTRAVENOUS at 11:15

## 2020-10-26 ENCOUNTER — TELEPHONE (OUTPATIENT)
Dept: NEPHROLOGY | Facility: CLINIC | Age: 45
End: 2020-10-26

## 2020-10-26 ENCOUNTER — HOSPITAL ENCOUNTER (OUTPATIENT)
Dept: INFUSION CENTER | Facility: HOSPITAL | Age: 45
Discharge: HOME | End: 2020-10-26
Attending: INTERNAL MEDICINE
Payer: COMMERCIAL

## 2020-10-26 ENCOUNTER — OFFICE VISIT (OUTPATIENT)
Dept: NEPHROLOGY | Facility: CLINIC | Age: 45
End: 2020-10-26
Payer: COMMERCIAL

## 2020-10-26 VITALS
RESPIRATION RATE: 16 BRPM | HEIGHT: 67 IN | DIASTOLIC BLOOD PRESSURE: 100 MMHG | HEART RATE: 68 BPM | BODY MASS INDEX: 31.11 KG/M2 | SYSTOLIC BLOOD PRESSURE: 140 MMHG | TEMPERATURE: 97.6 F | WEIGHT: 198.2 LBS

## 2020-10-26 VITALS
HEART RATE: 78 BPM | SYSTOLIC BLOOD PRESSURE: 152 MMHG | DIASTOLIC BLOOD PRESSURE: 88 MMHG | RESPIRATION RATE: 18 BRPM | TEMPERATURE: 97.4 F

## 2020-10-26 DIAGNOSIS — Z94.0 RENAL TRANSPLANT RECIPIENT: ICD-10-CM

## 2020-10-26 DIAGNOSIS — N18.32 STAGE 3B CHRONIC KIDNEY DISEASE (HCC): Primary | ICD-10-CM

## 2020-10-26 DIAGNOSIS — I10 ESSENTIAL HYPERTENSION: ICD-10-CM

## 2020-10-26 DIAGNOSIS — N18.9 CHRONIC KIDNEY DISEASE-MINERAL AND BONE DISORDER: ICD-10-CM

## 2020-10-26 DIAGNOSIS — E83.9 CHRONIC KIDNEY DISEASE-MINERAL AND BONE DISORDER: ICD-10-CM

## 2020-10-26 DIAGNOSIS — N17.9 AKI (ACUTE KIDNEY INJURY) (HCC): Primary | ICD-10-CM

## 2020-10-26 DIAGNOSIS — F33.2 SEVERE EPISODE OF RECURRENT MAJOR DEPRESSIVE DISORDER, WITHOUT PSYCHOTIC FEATURES (HCC): ICD-10-CM

## 2020-10-26 DIAGNOSIS — N17.9 AKI (ACUTE KIDNEY INJURY) (HCC): ICD-10-CM

## 2020-10-26 DIAGNOSIS — T86.19 RECURRENT IGA NEPHROPATHY AFTER KIDNEY TRANSPLANTATION: ICD-10-CM

## 2020-10-26 DIAGNOSIS — M89.9 CHRONIC KIDNEY DISEASE-MINERAL AND BONE DISORDER: ICD-10-CM

## 2020-10-26 DIAGNOSIS — N02.8 RECURRENT IGA NEPHROPATHY AFTER KIDNEY TRANSPLANTATION: ICD-10-CM

## 2020-10-26 LAB
ALBUMIN SERPL BCP-MCNC: 3.5 G/DL (ref 3.5–5)
ALP SERPL-CCNC: 99 U/L (ref 46–116)
ALT SERPL W P-5'-P-CCNC: 26 U/L (ref 12–78)
ANION GAP SERPL CALCULATED.3IONS-SCNC: 3 MMOL/L (ref 4–13)
AST SERPL W P-5'-P-CCNC: 22 U/L (ref 5–45)
BILIRUB SERPL-MCNC: 0.33 MG/DL (ref 0.2–1)
BUN SERPL-MCNC: 24 MG/DL (ref 5–25)
CALCIUM SERPL-MCNC: 9 MG/DL (ref 8.3–10.1)
CHLORIDE SERPL-SCNC: 111 MMOL/L (ref 100–108)
CO2 SERPL-SCNC: 24 MMOL/L (ref 21–32)
CREAT SERPL-MCNC: 1.62 MG/DL (ref 0.6–1.3)
GFR SERPL CREATININE-BSD FRML MDRD: 50 ML/MIN/1.73SQ M
GLUCOSE SERPL-MCNC: 81 MG/DL (ref 65–140)
POTASSIUM SERPL-SCNC: 4.7 MMOL/L (ref 3.5–5.3)
PROT SERPL-MCNC: 7.8 G/DL (ref 6.4–8.2)
SODIUM SERPL-SCNC: 138 MMOL/L (ref 136–145)

## 2020-10-26 PROCEDURE — 96365 THER/PROPH/DIAG IV INF INIT: CPT

## 2020-10-26 PROCEDURE — 99214 OFFICE O/P EST MOD 30 MIN: CPT | Performed by: INTERNAL MEDICINE

## 2020-10-26 PROCEDURE — 80053 COMPREHEN METABOLIC PANEL: CPT | Performed by: INTERNAL MEDICINE

## 2020-10-26 RX ORDER — SODIUM CHLORIDE 9 MG/ML
20 INJECTION, SOLUTION INTRAVENOUS ONCE
Status: CANCELLED | OUTPATIENT
Start: 2020-10-26

## 2020-10-26 RX ORDER — DILTIAZEM HYDROCHLORIDE 120 MG/1
120 TABLET, FILM COATED ORAL EVERY 12 HOURS SCHEDULED
Qty: 180 TABLET | Refills: 2 | Status: SHIPPED | OUTPATIENT
Start: 2020-10-26 | End: 2021-08-30 | Stop reason: SDUPTHER

## 2020-10-26 RX ORDER — SODIUM CHLORIDE 9 MG/ML
20 INJECTION, SOLUTION INTRAVENOUS ONCE
Status: CANCELLED | OUTPATIENT
Start: 2020-10-27

## 2020-10-26 RX ORDER — SODIUM CHLORIDE 9 MG/ML
20 INJECTION, SOLUTION INTRAVENOUS ONCE
Status: COMPLETED | OUTPATIENT
Start: 2020-10-26 | End: 2020-10-26

## 2020-10-26 RX ADMIN — SODIUM CHLORIDE 500 MG: 0.9 INJECTION, SOLUTION INTRAVENOUS at 13:38

## 2020-10-26 RX ADMIN — SODIUM CHLORIDE 20 ML/HR: 0.9 INJECTION, SOLUTION INTRAVENOUS at 13:16

## 2020-10-27 ENCOUNTER — HOSPITAL ENCOUNTER (OUTPATIENT)
Dept: INFUSION CENTER | Facility: HOSPITAL | Age: 45
Discharge: HOME | End: 2020-10-27
Payer: COMMERCIAL

## 2020-10-27 VITALS
TEMPERATURE: 98 F | DIASTOLIC BLOOD PRESSURE: 98 MMHG | RESPIRATION RATE: 18 BRPM | HEART RATE: 70 BPM | SYSTOLIC BLOOD PRESSURE: 142 MMHG

## 2020-10-27 DIAGNOSIS — N17.9 AKI (ACUTE KIDNEY INJURY) (HCC): Primary | ICD-10-CM

## 2020-10-27 LAB
ALBUMIN SERPL BCP-MCNC: 3.4 G/DL (ref 3.5–5)
ALP SERPL-CCNC: 103 U/L (ref 46–116)
ALT SERPL W P-5'-P-CCNC: 24 U/L (ref 12–78)
ANION GAP SERPL CALCULATED.3IONS-SCNC: 5 MMOL/L (ref 4–13)
AST SERPL W P-5'-P-CCNC: 14 U/L (ref 5–45)
BILIRUB SERPL-MCNC: 0.28 MG/DL (ref 0.2–1)
BUN SERPL-MCNC: 29 MG/DL (ref 5–25)
CALCIUM ALBUM COR SERPL-MCNC: 10.4 MG/DL (ref 8.3–10.1)
CALCIUM SERPL-MCNC: 9.9 MG/DL (ref 8.3–10.1)
CHLORIDE SERPL-SCNC: 112 MMOL/L (ref 100–108)
CO2 SERPL-SCNC: 22 MMOL/L (ref 21–32)
CREAT SERPL-MCNC: 1.87 MG/DL (ref 0.6–1.3)
GFR SERPL CREATININE-BSD FRML MDRD: 42 ML/MIN/1.73SQ M
GLUCOSE SERPL-MCNC: 100 MG/DL (ref 65–140)
POTASSIUM SERPL-SCNC: 4.7 MMOL/L (ref 3.5–5.3)
PROT SERPL-MCNC: 7.8 G/DL (ref 6.4–8.2)
SODIUM SERPL-SCNC: 139 MMOL/L (ref 136–145)

## 2020-10-27 PROCEDURE — 80053 COMPREHEN METABOLIC PANEL: CPT | Performed by: INTERNAL MEDICINE

## 2020-10-27 PROCEDURE — 96365 THER/PROPH/DIAG IV INF INIT: CPT

## 2020-10-27 RX ORDER — SODIUM CHLORIDE 9 MG/ML
20 INJECTION, SOLUTION INTRAVENOUS ONCE
Status: COMPLETED | OUTPATIENT
Start: 2020-10-27 | End: 2020-10-27

## 2020-10-27 RX ORDER — SODIUM CHLORIDE 9 MG/ML
20 INJECTION, SOLUTION INTRAVENOUS ONCE
Status: CANCELLED | OUTPATIENT
Start: 2020-10-28

## 2020-10-27 RX ADMIN — SODIUM CHLORIDE 500 MG: 0.9 INJECTION, SOLUTION INTRAVENOUS at 11:40

## 2020-10-27 RX ADMIN — SODIUM CHLORIDE 20 ML/HR: 0.9 INJECTION, SOLUTION INTRAVENOUS at 11:41

## 2020-10-28 ENCOUNTER — OFFICE VISIT (OUTPATIENT)
Dept: INTERNAL MEDICINE CLINIC | Facility: CLINIC | Age: 45
End: 2020-10-28
Payer: COMMERCIAL

## 2020-10-28 VITALS
BODY MASS INDEX: 31.23 KG/M2 | TEMPERATURE: 97.4 F | HEART RATE: 71 BPM | WEIGHT: 199 LBS | OXYGEN SATURATION: 97 % | HEIGHT: 67 IN | DIASTOLIC BLOOD PRESSURE: 100 MMHG | SYSTOLIC BLOOD PRESSURE: 142 MMHG

## 2020-10-28 DIAGNOSIS — Z00.00 ENCOUNTER FOR WELLNESS EXAMINATION IN ADULT: Primary | ICD-10-CM

## 2020-10-28 DIAGNOSIS — Z23 ENCOUNTER FOR IMMUNIZATION: ICD-10-CM

## 2020-10-28 PROCEDURE — 90686 IIV4 VACC NO PRSV 0.5 ML IM: CPT | Performed by: INTERNAL MEDICINE

## 2020-10-28 PROCEDURE — 90471 IMMUNIZATION ADMIN: CPT | Performed by: INTERNAL MEDICINE

## 2020-10-28 PROCEDURE — 99396 PREV VISIT EST AGE 40-64: CPT | Performed by: INTERNAL MEDICINE

## 2020-10-29 ENCOUNTER — OFFICE VISIT (OUTPATIENT)
Dept: NEPHROLOGY | Facility: CLINIC | Age: 45
End: 2020-10-29
Payer: COMMERCIAL

## 2020-10-29 ENCOUNTER — TELEPHONE (OUTPATIENT)
Dept: NEPHROLOGY | Facility: CLINIC | Age: 45
End: 2020-10-29

## 2020-10-29 VITALS
HEIGHT: 67 IN | BODY MASS INDEX: 31.55 KG/M2 | SYSTOLIC BLOOD PRESSURE: 130 MMHG | WEIGHT: 201 LBS | TEMPERATURE: 97.2 F | DIASTOLIC BLOOD PRESSURE: 80 MMHG

## 2020-10-29 DIAGNOSIS — Z94.0 RENAL TRANSPLANT RECIPIENT: Primary | ICD-10-CM

## 2020-10-29 PROCEDURE — 99215 OFFICE O/P EST HI 40 MIN: CPT | Performed by: INTERNAL MEDICINE

## 2020-10-30 ENCOUNTER — LAB (OUTPATIENT)
Dept: LAB | Facility: HOSPITAL | Age: 45
End: 2020-10-30
Attending: INTERNAL MEDICINE
Payer: COMMERCIAL

## 2020-10-30 ENCOUNTER — ANTICOAG VISIT (OUTPATIENT)
Dept: INTERNAL MEDICINE CLINIC | Facility: CLINIC | Age: 45
End: 2020-10-30

## 2020-10-30 DIAGNOSIS — Z94.0 RENAL TRANSPLANT RECIPIENT: ICD-10-CM

## 2020-10-30 LAB
ALBUMIN SERPL BCP-MCNC: 3.1 G/DL (ref 3.5–5)
ALP SERPL-CCNC: 90 U/L (ref 46–116)
ALT SERPL W P-5'-P-CCNC: 28 U/L (ref 12–78)
ANION GAP SERPL CALCULATED.3IONS-SCNC: 11 MMOL/L (ref 4–13)
AST SERPL W P-5'-P-CCNC: 11 U/L (ref 5–45)
BILIRUB SERPL-MCNC: 0.3 MG/DL (ref 0.2–1)
BUN SERPL-MCNC: 40 MG/DL (ref 5–25)
CALCIUM ALBUM COR SERPL-MCNC: 9.7 MG/DL (ref 8.3–10.1)
CALCIUM SERPL-MCNC: 9 MG/DL (ref 8.3–10.1)
CHLORIDE SERPL-SCNC: 104 MMOL/L (ref 100–108)
CO2 SERPL-SCNC: 23 MMOL/L (ref 21–32)
CREAT SERPL-MCNC: 1.63 MG/DL (ref 0.6–1.3)
ERYTHROCYTE [DISTWIDTH] IN BLOOD BY AUTOMATED COUNT: 12.8 % (ref 11.6–15.1)
GFR SERPL CREATININE-BSD FRML MDRD: 49 ML/MIN/1.73SQ M
GLUCOSE P FAST SERPL-MCNC: 84 MG/DL (ref 65–99)
HCT VFR BLD AUTO: 47.1 % (ref 36.5–49.3)
HGB BLD-MCNC: 15.2 G/DL (ref 12–17)
INR PPP: 2.36 (ref 0.84–1.19)
MCH RBC QN AUTO: 30.8 PG (ref 26.8–34.3)
MCHC RBC AUTO-ENTMCNC: 32.3 G/DL (ref 31.4–37.4)
MCV RBC AUTO: 96 FL (ref 82–98)
PLATELET # BLD AUTO: 226 THOUSANDS/UL (ref 149–390)
PMV BLD AUTO: 10.2 FL (ref 8.9–12.7)
POTASSIUM SERPL-SCNC: 3.9 MMOL/L (ref 3.5–5.3)
PROT SERPL-MCNC: 7 G/DL (ref 6.4–8.2)
PROTHROMBIN TIME: 26.1 SECONDS (ref 11.6–14.5)
RBC # BLD AUTO: 4.93 MILLION/UL (ref 3.88–5.62)
SODIUM SERPL-SCNC: 138 MMOL/L (ref 136–145)
WBC # BLD AUTO: 9.34 THOUSAND/UL (ref 4.31–10.16)

## 2020-10-30 PROCEDURE — 80197 ASSAY OF TACROLIMUS: CPT

## 2020-10-30 PROCEDURE — 85027 COMPLETE CBC AUTOMATED: CPT

## 2020-10-30 PROCEDURE — 80053 COMPREHEN METABOLIC PANEL: CPT

## 2020-10-30 PROCEDURE — 85610 PROTHROMBIN TIME: CPT

## 2020-10-30 PROCEDURE — 87799 DETECT AGENT NOS DNA QUANT: CPT

## 2020-10-30 PROCEDURE — 36415 COLL VENOUS BLD VENIPUNCTURE: CPT

## 2020-10-31 LAB — TACROLIMUS BLD-MCNC: 1.7 NG/ML (ref 2–20)

## 2020-11-01 ENCOUNTER — TELEPHONE (OUTPATIENT)
Dept: NEPHROLOGY | Facility: CLINIC | Age: 45
End: 2020-11-01

## 2020-11-01 DIAGNOSIS — Z94.0 KIDNEY TRANSPLANTED: ICD-10-CM

## 2020-11-01 RX ORDER — TACROLIMUS 1 MG/1
2 CAPSULE ORAL EVERY 12 HOURS SCHEDULED
Qty: 60 CAPSULE | Refills: 5
Start: 2020-11-01 | End: 2020-11-16 | Stop reason: SDUPTHER

## 2020-11-05 ENCOUNTER — LAB (OUTPATIENT)
Dept: LAB | Facility: HOSPITAL | Age: 45
End: 2020-11-05
Attending: INTERNAL MEDICINE
Payer: COMMERCIAL

## 2020-11-05 ENCOUNTER — OFFICE VISIT (OUTPATIENT)
Dept: DERMATOLOGY | Facility: CLINIC | Age: 45
End: 2020-11-05
Payer: COMMERCIAL

## 2020-11-05 ENCOUNTER — TRANSCRIBE ORDERS (OUTPATIENT)
Dept: ADMINISTRATIVE | Facility: HOSPITAL | Age: 45
End: 2020-11-05

## 2020-11-05 VITALS — TEMPERATURE: 98.8 F

## 2020-11-05 DIAGNOSIS — Z94.0 KIDNEY TRANSPLANTED: Primary | ICD-10-CM

## 2020-11-05 DIAGNOSIS — Z13.89 SCREENING FOR SKIN CONDITION: ICD-10-CM

## 2020-11-05 DIAGNOSIS — Z92.25 HISTORY OF IMMUNOSUPPRESSIVE THERAPY: ICD-10-CM

## 2020-11-05 DIAGNOSIS — Z94.0 KIDNEY TRANSPLANTED: ICD-10-CM

## 2020-11-05 DIAGNOSIS — L82.1 SEBORRHEIC KERATOSIS: Primary | ICD-10-CM

## 2020-11-05 LAB
ALBUMIN SERPL BCP-MCNC: 3.3 G/DL (ref 3.5–5)
ALP SERPL-CCNC: 103 U/L (ref 46–116)
ALT SERPL W P-5'-P-CCNC: 36 U/L (ref 12–78)
ANION GAP SERPL CALCULATED.3IONS-SCNC: 10 MMOL/L (ref 4–13)
AST SERPL W P-5'-P-CCNC: 16 U/L (ref 5–45)
BILIRUB SERPL-MCNC: 0.4 MG/DL (ref 0.2–1)
BUN SERPL-MCNC: 37 MG/DL (ref 5–25)
CALCIUM ALBUM COR SERPL-MCNC: 10.4 MG/DL (ref 8.3–10.1)
CALCIUM SERPL-MCNC: 9.8 MG/DL (ref 8.3–10.1)
CHLORIDE SERPL-SCNC: 103 MMOL/L (ref 100–108)
CO2 SERPL-SCNC: 22 MMOL/L (ref 21–32)
CREAT SERPL-MCNC: 2.04 MG/DL (ref 0.6–1.3)
GFR SERPL CREATININE-BSD FRML MDRD: 38 ML/MIN/1.73SQ M
GLUCOSE P FAST SERPL-MCNC: 86 MG/DL (ref 65–99)
POTASSIUM SERPL-SCNC: 5 MMOL/L (ref 3.5–5.3)
PROT SERPL-MCNC: 7.4 G/DL (ref 6.4–8.2)
SODIUM SERPL-SCNC: 135 MMOL/L (ref 136–145)

## 2020-11-05 PROCEDURE — 36415 COLL VENOUS BLD VENIPUNCTURE: CPT

## 2020-11-05 PROCEDURE — 80053 COMPREHEN METABOLIC PANEL: CPT

## 2020-11-05 PROCEDURE — 99213 OFFICE O/P EST LOW 20 MIN: CPT | Performed by: DERMATOLOGY

## 2020-11-05 PROCEDURE — 80197 ASSAY OF TACROLIMUS: CPT

## 2020-11-07 LAB — TACROLIMUS BLD-MCNC: 14.1 NG/ML (ref 2–20)

## 2020-11-08 ENCOUNTER — TELEPHONE (OUTPATIENT)
Dept: OTHER | Facility: HOSPITAL | Age: 45
End: 2020-11-08

## 2020-11-09 ENCOUNTER — DOCUMENTATION (OUTPATIENT)
Dept: NEPHROLOGY | Facility: CLINIC | Age: 45
End: 2020-11-09

## 2020-11-09 ENCOUNTER — TELEPHONE (OUTPATIENT)
Dept: NEPHROLOGY | Facility: CLINIC | Age: 45
End: 2020-11-09

## 2020-11-09 DIAGNOSIS — Z94.0 RENAL TRANSPLANT RECIPIENT: Primary | ICD-10-CM

## 2020-11-09 LAB — MISCELLANEOUS LAB TEST RESULT: NORMAL

## 2020-11-16 DIAGNOSIS — Z94.0 KIDNEY TRANSPLANTED: ICD-10-CM

## 2020-11-16 RX ORDER — TACROLIMUS 1 MG/1
1 CAPSULE ORAL EVERY 12 HOURS SCHEDULED
Qty: 180 CAPSULE | Refills: 3 | Status: SHIPPED | OUTPATIENT
Start: 2020-11-16 | End: 2021-11-19 | Stop reason: SDUPTHER

## 2020-12-10 ENCOUNTER — TELEPHONE (OUTPATIENT)
Dept: NEPHROLOGY | Facility: CLINIC | Age: 45
End: 2020-12-10

## 2020-12-11 ENCOUNTER — TELEPHONE (OUTPATIENT)
Dept: NEPHROLOGY | Facility: CLINIC | Age: 45
End: 2020-12-11

## 2020-12-11 DIAGNOSIS — Z94.0 KIDNEY TRANSPLANTED: ICD-10-CM

## 2020-12-12 RX ORDER — MYCOPHENOLATE MOFETIL 250 MG/1
CAPSULE ORAL
Qty: 270 CAPSULE | Refills: 3 | Status: SHIPPED | OUTPATIENT
Start: 2020-12-12 | End: 2021-11-10 | Stop reason: SDUPTHER

## 2020-12-18 DIAGNOSIS — I82.4Z1 DEEP VEIN THROMBOSIS (DVT) OF DISTAL VEIN OF RIGHT LOWER EXTREMITY, UNSPECIFIED CHRONICITY (HCC): ICD-10-CM

## 2020-12-18 RX ORDER — WARFARIN SODIUM 4 MG/1
TABLET ORAL
Qty: 60 TABLET | Refills: 3 | Status: SHIPPED | OUTPATIENT
Start: 2020-12-18 | End: 2021-01-06 | Stop reason: SDUPTHER

## 2020-12-29 ENCOUNTER — TELEMEDICINE (OUTPATIENT)
Dept: NEPHROLOGY | Facility: CLINIC | Age: 45
End: 2020-12-29
Payer: COMMERCIAL

## 2020-12-29 DIAGNOSIS — Z94.0 RENAL TRANSPLANT RECIPIENT: Primary | ICD-10-CM

## 2020-12-29 DIAGNOSIS — N17.9 AKI (ACUTE KIDNEY INJURY) (HCC): ICD-10-CM

## 2020-12-29 PROCEDURE — 99214 OFFICE O/P EST MOD 30 MIN: CPT | Performed by: INTERNAL MEDICINE

## 2020-12-31 ENCOUNTER — LAB (OUTPATIENT)
Dept: LAB | Facility: HOSPITAL | Age: 45
End: 2020-12-31
Attending: INTERNAL MEDICINE
Payer: COMMERCIAL

## 2020-12-31 LAB
ALBUMIN SERPL BCP-MCNC: 3.6 G/DL (ref 3.5–5)
ALP SERPL-CCNC: 103 U/L (ref 46–116)
ALT SERPL W P-5'-P-CCNC: 21 U/L (ref 12–78)
ANION GAP SERPL CALCULATED.3IONS-SCNC: 11 MMOL/L (ref 4–13)
AST SERPL W P-5'-P-CCNC: 11 U/L (ref 5–45)
BACTERIA UR QL AUTO: ABNORMAL /HPF
BILIRUB SERPL-MCNC: 0.3 MG/DL (ref 0.2–1)
BILIRUB UR QL STRIP: NEGATIVE
BUN SERPL-MCNC: 25 MG/DL (ref 5–25)
CALCIUM SERPL-MCNC: 9.5 MG/DL (ref 8.3–10.1)
CHLORIDE SERPL-SCNC: 106 MMOL/L (ref 100–108)
CLARITY UR: CLEAR
CO2 SERPL-SCNC: 22 MMOL/L (ref 21–32)
COLOR UR: YELLOW
CREAT SERPL-MCNC: 1.85 MG/DL (ref 0.6–1.3)
CREAT UR-MCNC: 250 MG/DL
ERYTHROCYTE [DISTWIDTH] IN BLOOD BY AUTOMATED COUNT: 12.3 % (ref 11.6–15.1)
GFR SERPL CREATININE-BSD FRML MDRD: 42 ML/MIN/1.73SQ M
GLUCOSE P FAST SERPL-MCNC: 97 MG/DL (ref 65–99)
GLUCOSE UR STRIP-MCNC: NEGATIVE MG/DL
HCT VFR BLD AUTO: 46.3 % (ref 36.5–49.3)
HGB BLD-MCNC: 15.4 G/DL (ref 12–17)
HGB UR QL STRIP.AUTO: ABNORMAL
KETONES UR STRIP-MCNC: NEGATIVE MG/DL
LEUKOCYTE ESTERASE UR QL STRIP: ABNORMAL
MAGNESIUM SERPL-MCNC: 1.9 MG/DL (ref 1.6–2.6)
MCH RBC QN AUTO: 31.6 PG (ref 26.8–34.3)
MCHC RBC AUTO-ENTMCNC: 33.3 G/DL (ref 31.4–37.4)
MCV RBC AUTO: 95 FL (ref 82–98)
MUCOUS THREADS UR QL AUTO: ABNORMAL
NITRITE UR QL STRIP: NEGATIVE
NON-SQ EPI CELLS URNS QL MICRO: ABNORMAL /HPF
PH UR STRIP.AUTO: 5.5 [PH]
PHOSPHATE SERPL-MCNC: 2.1 MG/DL (ref 2.7–4.5)
PLATELET # BLD AUTO: 207 THOUSANDS/UL (ref 149–390)
PMV BLD AUTO: 10.2 FL (ref 8.9–12.7)
POTASSIUM SERPL-SCNC: 4.9 MMOL/L (ref 3.5–5.3)
PROT SERPL-MCNC: 7.6 G/DL (ref 6.4–8.2)
PROT UR STRIP-MCNC: ABNORMAL MG/DL
PROT UR-MCNC: 54 MG/DL
PROT/CREAT UR: 0.22 MG/G{CREAT} (ref 0–0.1)
RBC # BLD AUTO: 4.88 MILLION/UL (ref 3.88–5.62)
RBC #/AREA URNS AUTO: ABNORMAL /HPF
SODIUM SERPL-SCNC: 139 MMOL/L (ref 136–145)
SP GR UR STRIP.AUTO: 1.02 (ref 1–1.03)
UROBILINOGEN UR QL STRIP.AUTO: 0.2 E.U./DL
WBC # BLD AUTO: 10.88 THOUSAND/UL (ref 4.31–10.16)
WBC #/AREA URNS AUTO: ABNORMAL /HPF

## 2020-12-31 PROCEDURE — 84156 ASSAY OF PROTEIN URINE: CPT

## 2020-12-31 PROCEDURE — 84100 ASSAY OF PHOSPHORUS: CPT | Performed by: INTERNAL MEDICINE

## 2020-12-31 PROCEDURE — 81001 URINALYSIS AUTO W/SCOPE: CPT

## 2020-12-31 PROCEDURE — 36415 COLL VENOUS BLD VENIPUNCTURE: CPT | Performed by: INTERNAL MEDICINE

## 2020-12-31 PROCEDURE — 80053 COMPREHEN METABOLIC PANEL: CPT | Performed by: INTERNAL MEDICINE

## 2020-12-31 PROCEDURE — 82570 ASSAY OF URINE CREATININE: CPT

## 2020-12-31 PROCEDURE — 83735 ASSAY OF MAGNESIUM: CPT | Performed by: INTERNAL MEDICINE

## 2020-12-31 PROCEDURE — 85027 COMPLETE CBC AUTOMATED: CPT | Performed by: INTERNAL MEDICINE

## 2020-12-31 PROCEDURE — 80197 ASSAY OF TACROLIMUS: CPT | Performed by: INTERNAL MEDICINE

## 2021-01-01 LAB — TACROLIMUS BLD-MCNC: 10.9 NG/ML (ref 2–20)

## 2021-01-04 DIAGNOSIS — Z94.0 RENAL TRANSPLANT RECIPIENT: Primary | ICD-10-CM

## 2021-01-04 NOTE — TELEPHONE ENCOUNTER
----- Message from Chinita Castleman, MD sent at 1/3/2021  8:03 PM EST -----  Hello    Patient normally is followed up by Ms Aracely Fitzgerald  Please let pt know creat improving but not yet to baseline  Is at 1 8 mg/dL  This is likely due to tac level being elevated slightly still at 10 9  but this is improving from 14  - pt is on tacrolimus 1 mg twice daily - please confirm dose and please confirm this was 11-13 hour trough  - if pt is on 1 mg twice daily tac and this level was accurate, please ask pt to change tacrolimus to 1 mg in AM and 0 5 mg in PM  - please send pt 0 5 mg capsules of tacrolimus to allow for evening dose  - please send me message with answers to above  - please also make sure pt know to go have CMP and tacrolimus levels check in one week     - please send pt lab slips    Thank you    np

## 2021-01-05 ENCOUNTER — APPOINTMENT (OUTPATIENT)
Dept: LAB | Facility: CLINIC | Age: 46
End: 2021-01-05
Payer: COMMERCIAL

## 2021-01-05 ENCOUNTER — DOCUMENTATION (OUTPATIENT)
Dept: NEPHROLOGY | Facility: CLINIC | Age: 46
End: 2021-01-05

## 2021-01-05 ENCOUNTER — OFFICE VISIT (OUTPATIENT)
Dept: INTERNAL MEDICINE CLINIC | Facility: CLINIC | Age: 46
End: 2021-01-05
Payer: COMMERCIAL

## 2021-01-05 VITALS
WEIGHT: 193.8 LBS | HEART RATE: 85 BPM | TEMPERATURE: 99.2 F | DIASTOLIC BLOOD PRESSURE: 80 MMHG | BODY MASS INDEX: 30.42 KG/M2 | SYSTOLIC BLOOD PRESSURE: 100 MMHG | OXYGEN SATURATION: 97 % | HEIGHT: 67 IN

## 2021-01-05 DIAGNOSIS — E83.9 CHRONIC KIDNEY DISEASE-MINERAL AND BONE DISORDER: ICD-10-CM

## 2021-01-05 DIAGNOSIS — N18.32 STAGE 3B CHRONIC KIDNEY DISEASE (HCC): ICD-10-CM

## 2021-01-05 DIAGNOSIS — R10.33 PERIUMBILICAL ABDOMINAL PAIN: ICD-10-CM

## 2021-01-05 DIAGNOSIS — M89.9 CHRONIC KIDNEY DISEASE-MINERAL AND BONE DISORDER: ICD-10-CM

## 2021-01-05 DIAGNOSIS — Z79.01 ANTICOAGULATED: ICD-10-CM

## 2021-01-05 DIAGNOSIS — K85.90 ACUTE PANCREATITIS, UNSPECIFIED COMPLICATION STATUS, UNSPECIFIED PANCREATITIS TYPE: ICD-10-CM

## 2021-01-05 DIAGNOSIS — I10 ESSENTIAL HYPERTENSION: Primary | ICD-10-CM

## 2021-01-05 DIAGNOSIS — N18.9 CHRONIC KIDNEY DISEASE-MINERAL AND BONE DISORDER: ICD-10-CM

## 2021-01-05 DIAGNOSIS — Z94.0 RENAL TRANSPLANT RECIPIENT: Primary | ICD-10-CM

## 2021-01-05 DIAGNOSIS — Z94.0 RENAL TRANSPLANT RECIPIENT: ICD-10-CM

## 2021-01-05 DIAGNOSIS — E83.42 HYPOMAGNESEMIA: ICD-10-CM

## 2021-01-05 LAB
BACTERIA UR QL AUTO: NORMAL /HPF
BASOPHILS # BLD AUTO: 0.04 THOUSANDS/ΜL (ref 0–0.1)
BASOPHILS NFR BLD AUTO: 0 % (ref 0–1)
BILIRUB UR QL STRIP: NEGATIVE
CLARITY UR: CLEAR
COLOR UR: YELLOW
EOSINOPHIL # BLD AUTO: 0.35 THOUSAND/ΜL (ref 0–0.61)
EOSINOPHIL NFR BLD AUTO: 4 % (ref 0–6)
ERYTHROCYTE [DISTWIDTH] IN BLOOD BY AUTOMATED COUNT: 12.5 % (ref 11.6–15.1)
GLUCOSE UR STRIP-MCNC: NEGATIVE MG/DL
HCT VFR BLD AUTO: 44.4 % (ref 36.5–49.3)
HGB BLD-MCNC: 15.8 G/DL (ref 12–17)
HGB UR QL STRIP.AUTO: ABNORMAL
HYALINE CASTS #/AREA URNS LPF: NORMAL /LPF
IMM GRANULOCYTES # BLD AUTO: 0.04 THOUSAND/UL (ref 0–0.2)
IMM GRANULOCYTES NFR BLD AUTO: 0 % (ref 0–2)
INR PPP: 1.75 (ref 0.84–1.19)
KETONES UR STRIP-MCNC: NEGATIVE MG/DL
LEUKOCYTE ESTERASE UR QL STRIP: NEGATIVE
LIPASE SERPL-CCNC: 138 U/L (ref 73–393)
LYMPHOCYTES # BLD AUTO: 2.65 THOUSANDS/ΜL (ref 0.6–4.47)
LYMPHOCYTES NFR BLD AUTO: 26 % (ref 14–44)
MCH RBC QN AUTO: 33.4 PG (ref 26.8–34.3)
MCHC RBC AUTO-ENTMCNC: 35.6 G/DL (ref 31.4–37.4)
MCV RBC AUTO: 94 FL (ref 82–98)
MONOCYTES # BLD AUTO: 0.91 THOUSAND/ΜL (ref 0.17–1.22)
MONOCYTES NFR BLD AUTO: 9 % (ref 4–12)
NEUTROPHILS # BLD AUTO: 6.05 THOUSANDS/ΜL (ref 1.85–7.62)
NEUTS SEG NFR BLD AUTO: 61 % (ref 43–75)
NITRITE UR QL STRIP: NEGATIVE
NON-SQ EPI CELLS URNS QL MICRO: NORMAL /HPF
NRBC BLD AUTO-RTO: 0 /100 WBCS
PH UR STRIP.AUTO: 6 [PH]
PLATELET # BLD AUTO: 215 THOUSANDS/UL (ref 149–390)
PMV BLD AUTO: 10.9 FL (ref 8.9–12.7)
PROT UR STRIP-MCNC: NEGATIVE MG/DL
PROTHROMBIN TIME: 20.4 SECONDS (ref 11.6–14.5)
RBC # BLD AUTO: 4.73 MILLION/UL (ref 3.88–5.62)
RBC #/AREA URNS AUTO: NORMAL /HPF
SP GR UR STRIP.AUTO: 1.01 (ref 1–1.03)
UROBILINOGEN UR QL STRIP.AUTO: 0.2 E.U./DL
WBC # BLD AUTO: 10.04 THOUSAND/UL (ref 4.31–10.16)
WBC #/AREA URNS AUTO: NORMAL /HPF

## 2021-01-05 PROCEDURE — 99214 OFFICE O/P EST MOD 30 MIN: CPT | Performed by: PHYSICIAN ASSISTANT

## 2021-01-05 PROCEDURE — 81001 URINALYSIS AUTO W/SCOPE: CPT | Performed by: PHYSICIAN ASSISTANT

## 2021-01-05 PROCEDURE — 85610 PROTHROMBIN TIME: CPT | Performed by: PHYSICIAN ASSISTANT

## 2021-01-05 PROCEDURE — 85025 COMPLETE CBC W/AUTO DIFF WBC: CPT

## 2021-01-05 PROCEDURE — 83690 ASSAY OF LIPASE: CPT

## 2021-01-05 PROCEDURE — 36415 COLL VENOUS BLD VENIPUNCTURE: CPT | Performed by: PHYSICIAN ASSISTANT

## 2021-01-05 RX ORDER — TACROLIMUS 0.5 MG/1
0.5 CAPSULE ORAL DAILY
Qty: 90 CAPSULE | Refills: 3 | Status: SHIPPED | OUTPATIENT
Start: 2021-01-05 | End: 2021-06-17 | Stop reason: SDUPTHER

## 2021-01-05 NOTE — TELEPHONE ENCOUNTER
Tac level accurate, pt agreeable to lower tac to 1 mg am and 0 5 mg pm   Lab slips mailed  Pt also wanted to let Elsa Christy know that yesterday he experienced symptoms like when he had pancreatitis and will see his PCP today

## 2021-01-05 NOTE — PROGRESS NOTES
Assessment/Plan: symptoms of pancreatitis yesterday now asymptomatic  He had pancreatitis symptoms a year ago combined with renal failure that resolved  Pancreas imaging was negative  Will get GI evaluation checking chemistry CBC a protime a lipase  For now no new imaging  Physical exam is unremarkable  Follow-up based on his labs or symptoms       Diagnoses and all orders for this visit:    Essential hypertension    Chronic kidney disease-mineral and bone disorder  -     Cancel: Ambulatory referral to Gastroenterology; Future  -     Lipase; Future  -     CBC and differential; Future  -     UA w Reflex to Microscopic w Reflex to Culture  -     Protime-INR; Standing  -     Protime-INR    Stage 3b chronic kidney disease    Hypomagnesemia    Renal transplant recipient    Periumbilical abdominal pain  -     Ambulatory referral to Gastroenterology; Future    Acute pancreatitis, unspecified complication status, unspecified pancreatitis type  -     Cancel: Ambulatory referral to Gastroenterology; Future  -     Lipase; Future  -     CBC and differential; Future  -     UA w Reflex to Microscopic w Reflex to Culture  -     Protime-INR; Standing  -     Protime-INR  -     Ambulatory referral to Gastroenterology; Future    Anticoagulated  -     Protime-INR; Standing  -     Protime-INR        No problem-specific Assessment & Plan notes found for this encounter  Subjective:      Patient ID: [place] is a 50 y o  male  He developed sharp pain in the center of his abdomen yesterday at noon  He had 1 episode of vomiting  This occurred 4 or 5 hours after eating breakfast   He kept himself n p o  except for water and his abdominal pain resolved by 8 p m     Today he feels well drinking fluids without any problems he has not tried to have any solid food but he feels hungry  Had a similar episode of abdominal pain a year ago  This was on off for a month finally became severe and he was hospitalized    He had acute renal failure  That resolved  CT imaging showed normal pancreas architecture ham 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  He has a  Kidney transplant he is followed by Nephrology and his creatinine has been going up from his baseline  History of recurrent DVT chronically on Coumadin but he has been very lax about checking his protime  No blood in his urine or stool  With his present problem no diarrhea  History of significant depression which is now well controlled with meds      The following portions of the patient's history were reviewed and updated as appropriate:   He has a past medical history of ADD (attention deficit disorder), FRANCES (acute kidney injury) ([facility] [address] ) (1/24/2020), Chronic kidney disease, Depression, DVT (deep venous thrombosis) ([facility] ), H/O immunosuppressive therapy, History of kidney disease, HTN (hypertension) (12/17/2017), Hypertension, and Nephropathy, IgA ,  does not have any pertinent problems on file  ,   has a past surgical history that includes Nephrectomy transplanted organ ,  family history includes Deep vein thrombosis in his father and paternal grandfather; Transient ischemic attack in his mother  ,   reports that he quit smoking about 21 years ago  His smoking use included cigarettes  He has a 2 00 pack-year smoking history  He has never used smokeless tobacco  He reports current alcohol use  He reports current drug use  Drug: Marijuana  ,  is allergic to penicillins     Current Outpatient Medications   Medication Sig Dispense Refill    Cholecalciferol (VITAMIN D-3) 1000 units CAPS Take 2,000 Units by mouth daily      diltiazem (CARDIZEM) 120 MG tablet Take 1 tablet (120 mg total) by mouth every 12 (twelve) hours 180 tablet 2    gabapentin (NEURONTIN) 400 mg capsule Take 400 mg by mouth 2 (two) times a day      LATUDA 60 MG TABS Take 80 mg by mouth daily   2    methylphenidate (CONCERTA) 36 MG ER tablet Take 36 mg by mouth daily      mycophenolate (CELLCEPT) 250 mg capsule Take 500 mg in AM and 250 mg in PM  270 capsule 3    sertraline (ZOLOFT) 50 mg tablet Take 150 mg by mouth every morning  0    tacrolimus (PROGRAF) 0 5 mg capsule Take 1 capsule (0 5 mg total) by mouth daily Take 1 mg in AM and 0 5 mg in PM 90 capsule 3    tacrolimus (Prograf) 1 mg capsule Take 1 capsule (1 mg total) by mouth every 12 (twelve) hours (Patient taking differently: Take 1 mg by mouth 1 mg in am and 0 5 mg in pm) 180 capsule 3    warfarin (COUMADIN) 4 mg tablet Take 8 mg daily 60 tablet 3     No current facility-administered medications for this visit  Review of Systems   Constitutional: Negative for chills and fever  HENT: Negative for ear pain and sore throat  Eyes: Negative for pain and visual disturbance  Respiratory: Negative for cough and shortness of breath  Cardiovascular: Negative for chest pain and palpitations  Gastrointestinal: Positive for abdominal pain  Negative for vomiting  Genitourinary: Negative for dysuria and hematuria  Musculoskeletal: Negative for arthralgias and back pain  Skin: Negative for color change and rash  Neurological: Negative for seizures and syncope  All other systems reviewed and are negative  Objective:  Vitals:    01/05/21 1524   BP: 100/80   BP Location: Left arm   Patient Position: Sitting   Cuff Size: Standard   Pulse: 85   Temp: 99 2 °F (37 3 °C)   TempSrc: Temporal   SpO2: 97%   Weight: 87 9 kg (193 lb 12 8 oz)   Height: 5' 6 75" (1 695 m)     Body mass index is 30 58 kg/m²  Physical Exam  Vitals signs reviewed  Constitutional:       Appearance: He is well-developed  He is obese  HENT:      Head: Normocephalic  Right Ear: Tympanic membrane and external ear normal       Left Ear: Tympanic membrane and external ear normal       Nose: Nose normal       Mouth/Throat:      Mouth: Mucous membranes are moist    Eyes:      Extraocular Movements: Extraocular movements intact        Conjunctiva/sclera: Conjunctivae normal       Pupils: Pupils are equal, round, and reactive to light  Neck:      Musculoskeletal: Normal range of motion and neck supple  Thyroid: No thyromegaly  Cardiovascular:      Rate and Rhythm: Normal rate and regular rhythm  Pulses: Normal pulses  Heart sounds: Normal heart sounds  No murmur  Pulmonary:      Effort: Pulmonary effort is normal  No respiratory distress  Breath sounds: Normal breath sounds  No stridor  No wheezing, rhonchi or rales  Abdominal:      General: Abdomen is flat  Bowel sounds are normal  There is no distension  Palpations: Abdomen is soft  There is no mass  Tenderness: There is no abdominal tenderness  There is no right CVA tenderness, left CVA tenderness, guarding or rebound  Hernia: No hernia is present  Musculoskeletal: Normal range of motion  General: No swelling  Skin:     General: Skin is warm and dry  Coloration: Skin is not jaundiced  Neurological:      General: No focal deficit present  Mental Status: He is alert and oriented to person, place, and time  Deep Tendon Reflexes: Reflexes are normal and symmetric  Psychiatric:         Mood and Affect: Mood normal          Thought Content:  Thought content normal          Judgment: Judgment normal

## 2021-01-06 DIAGNOSIS — I82.4Z1 DEEP VEIN THROMBOSIS (DVT) OF DISTAL VEIN OF RIGHT LOWER EXTREMITY, UNSPECIFIED CHRONICITY (HCC): ICD-10-CM

## 2021-01-06 RX ORDER — WARFARIN SODIUM 4 MG/1
TABLET ORAL
Qty: 60 TABLET | Refills: 3
Start: 2021-01-06 | End: 2021-02-20 | Stop reason: SDUPTHER

## 2021-01-22 ENCOUNTER — ANTICOAG VISIT (OUTPATIENT)
Dept: INTERNAL MEDICINE CLINIC | Facility: CLINIC | Age: 46
End: 2021-01-22

## 2021-01-22 ENCOUNTER — LAB (OUTPATIENT)
Dept: LAB | Facility: HOSPITAL | Age: 46
End: 2021-01-22
Attending: INTERNAL MEDICINE
Payer: COMMERCIAL

## 2021-01-22 DIAGNOSIS — Z79.01 ANTICOAGULATED: ICD-10-CM

## 2021-01-22 DIAGNOSIS — Z86.718 HISTORY OF DVT (DEEP VEIN THROMBOSIS): Primary | ICD-10-CM

## 2021-01-22 DIAGNOSIS — N18.9 CHRONIC KIDNEY DISEASE-MINERAL AND BONE DISORDER: ICD-10-CM

## 2021-01-22 DIAGNOSIS — Z94.0 RENAL TRANSPLANT RECIPIENT: ICD-10-CM

## 2021-01-22 DIAGNOSIS — K85.90 ACUTE PANCREATITIS, UNSPECIFIED COMPLICATION STATUS, UNSPECIFIED PANCREATITIS TYPE: ICD-10-CM

## 2021-01-22 DIAGNOSIS — M89.9 CHRONIC KIDNEY DISEASE-MINERAL AND BONE DISORDER: ICD-10-CM

## 2021-01-22 DIAGNOSIS — E83.9 CHRONIC KIDNEY DISEASE-MINERAL AND BONE DISORDER: ICD-10-CM

## 2021-01-22 LAB
INR PPP: 1.87 (ref 0.84–1.19)
PROTHROMBIN TIME: 21.7 SECONDS (ref 11.6–14.5)

## 2021-01-22 PROCEDURE — 85610 PROTHROMBIN TIME: CPT

## 2021-01-25 ENCOUNTER — TELEPHONE (OUTPATIENT)
Dept: NEPHROLOGY | Facility: CLINIC | Age: 46
End: 2021-01-25

## 2021-01-25 DIAGNOSIS — Z94.0 RENAL TRANSPLANT RECIPIENT: Primary | ICD-10-CM

## 2021-01-25 NOTE — TELEPHONE ENCOUNTER
----- Message from Yenifer Davila MD sent at 1/24/2021  9:32 PM EST -----  Hello    Patient normally is followed up by Ms Celeste Bhandari  Please let the patient know that the creatinine is slightly above baseline 1 9  Electrolytes are stable  Tacrolimus level is at goal 5 8    Urine with small amount of red cells which is not new for the patient  -please have the patient repeat a CMP, tacrolimus, urinalysis, urine protein creatinine ratio in 3-4 weeks    Thank you    np

## 2021-01-26 ENCOUNTER — OFFICE VISIT (OUTPATIENT)
Dept: NEPHROLOGY | Facility: CLINIC | Age: 46
End: 2021-01-26
Payer: COMMERCIAL

## 2021-01-26 VITALS
HEART RATE: 68 BPM | BODY MASS INDEX: 31.33 KG/M2 | SYSTOLIC BLOOD PRESSURE: 110 MMHG | DIASTOLIC BLOOD PRESSURE: 70 MMHG | HEIGHT: 67 IN | WEIGHT: 199.6 LBS | TEMPERATURE: 97 F | RESPIRATION RATE: 16 BRPM

## 2021-01-26 DIAGNOSIS — N18.32 STAGE 3B CHRONIC KIDNEY DISEASE (HCC): ICD-10-CM

## 2021-01-26 DIAGNOSIS — M89.9 CHRONIC KIDNEY DISEASE-MINERAL AND BONE DISORDER: ICD-10-CM

## 2021-01-26 DIAGNOSIS — N18.9 CHRONIC KIDNEY DISEASE-MINERAL AND BONE DISORDER: ICD-10-CM

## 2021-01-26 DIAGNOSIS — Z94.0 HISTORY OF RENAL TRANSPLANT: Primary | ICD-10-CM

## 2021-01-26 DIAGNOSIS — F33.2 SEVERE EPISODE OF RECURRENT MAJOR DEPRESSIVE DISORDER, WITHOUT PSYCHOTIC FEATURES (HCC): ICD-10-CM

## 2021-01-26 DIAGNOSIS — I10 ESSENTIAL HYPERTENSION: ICD-10-CM

## 2021-01-26 DIAGNOSIS — N02.8 RECURRENT IGA NEPHROPATHY AFTER KIDNEY TRANSPLANTATION: ICD-10-CM

## 2021-01-26 DIAGNOSIS — E83.9 CHRONIC KIDNEY DISEASE-MINERAL AND BONE DISORDER: ICD-10-CM

## 2021-01-26 DIAGNOSIS — T86.19 RECURRENT IGA NEPHROPATHY AFTER KIDNEY TRANSPLANTATION: ICD-10-CM

## 2021-01-26 PROCEDURE — 99214 OFFICE O/P EST MOD 30 MIN: CPT | Performed by: INTERNAL MEDICINE

## 2021-01-26 NOTE — PROGRESS NOTES
NEPHROLOGY OFFICE FOLLOW UP  Myke Killian 50 y o  male MRN: [id number]    Encounter: [id number] 1/26/2021    REASON FOR VISIT: Myke Killian is a 50 y o  male who is here on 1/26/2021 for Follow-up and Kidney Transplant    HPI:     Kamron Pedraza came in today for follow-up of CKD and kidney transplant  He is overall doing well  He is being closely monitored by Dr Mray Andre with transplant nephrologist     He denies any new complaint  No chest pain no palpitation or shortness of breath     Taking medicine regularly though still get some problem with the medication off and on      REVIEW OF SYSTEMS:    Review of Systems   Constitutional: Negative for activity change and fatigue  HENT: Negative for congestion and ear discharge  Eyes: Negative for photophobia and pain  Respiratory: Negative for apnea and choking  Cardiovascular: Negative for chest pain and palpitations  Gastrointestinal: Negative for abdominal distention and blood in stool  Endocrine: Negative for heat intolerance and polyphagia  Genitourinary: Negative for flank pain and urgency  Musculoskeletal: Negative for neck pain and neck stiffness  Skin: Negative for color change and wound  Allergic/Immunologic: Negative for food allergies and immunocompromised state  Neurological: Negative for seizures and facial asymmetry  Hematological: Negative for adenopathy  Does not bruise/bleed easily  Psychiatric/Behavioral: Negative for self-injury and suicidal ideas           PAST MEDICAL HISTORY:  Past Medical History:   Diagnosis Date    ADD (attention deficit disorder)     [facility] 1/24/2020    Chronic kidney disease     Depression     [facility]     H/O immunosuppressive therapy     History of kidney disease     HTN (hypertension) 12/17/2017    Hypertension     Nephropathy, IgA        PAST SURGICAL HISTORY:  Past Surgical History:   Procedure Laterality Date    NEPHRECTOMY TRANSPLANTED ORGAN SOCIAL HISTORY:  Social History     Substance and Sexual Activity   Alcohol Use Yes    Frequency: Monthly or less    Drinks per session: 1 or 2    Binge frequency: Never    Comment: on occasion     Social History     Substance and Sexual Activity   Drug Use Yes    Types: Marijuana    Comment: smokes marijuana a few times daily     Social History     Tobacco Use   Smoking Status Former Smoker    Packs/day: 0 50    Years: 4 00    Pack years: 2 00    Types: Cigarettes    Quit date:     Years since quittin 0   Smokeless Tobacco Never Used       FAMILY HISTORY:  Family History   Problem Relation Age of Onset    Deep vein thrombosis Father     Deep vein thrombosis Paternal Grandfather     Transient ischemic attack Mother        MEDICATIONS:    Current Outpatient Medications:     Cholecalciferol (VITAMIN D-3) 1000 units CAPS, Take 2,000 Units by mouth daily, Disp: , Rfl:     diltiazem (CARDIZEM) 120 MG tablet, Take 1 tablet (120 mg total) by mouth every 12 (twelve) hours, Disp: 180 tablet, Rfl: 2    gabapentin (NEURONTIN) 400 mg capsule, Take 400 mg by mouth 2 (two) times a day, Disp: , Rfl:     LATUDA 60 MG TABS, Take 80 mg by mouth daily , Disp: , Rfl: 2    methylphenidate (CONCERTA) 36 MG ER tablet, Take 36 mg by mouth daily, Disp: , Rfl:     mycophenolate (CELLCEPT) 250 mg capsule, Take 500 mg in AM and 250 mg in PM , Disp: 270 capsule, Rfl: 3    sertraline (ZOLOFT) 50 mg tablet, Take 150 mg by mouth every morning, Disp: , Rfl: 0    tacrolimus (PROGRAF) 0 5 mg capsule, Take 1 capsule (0 5 mg total) by mouth daily Take 1 mg in AM and 0 5 mg in PM, Disp: 90 capsule, Rfl: 3    warfarin (COUMADIN) 4 mg tablet, Take 9 mg daily, Disp: 60 tablet, Rfl: 3    tacrolimus (Prograf) 1 mg capsule, Take 1 capsule (1 mg total) by mouth every 12 (twelve) hours (Patient not taking: Reported on 2021), Disp: 180 capsule, Rfl: 3    PHYSICAL EXAM:  Vitals:    21 1530   BP: 110/70   BP Location: Right arm   Patient Position: Sitting   Pulse: 68   Resp: 16   Temp: (!) 97 °F (36 1 °C)   TempSrc: Temporal   Weight: 90 5 kg (199 lb 9 6 oz)   Height: 5' 7" (1 702 m)     Body mass index is 31 26 kg/m²  Physical Exam  Constitutional:       General: He is not in acute distress  Appearance: He is well-developed  HENT:      Head: Normocephalic  Eyes:      General: No scleral icterus  Conjunctiva/sclera: Conjunctivae normal    Neck:      Musculoskeletal: Neck supple  Vascular: No JVD  Cardiovascular:      Rate and Rhythm: Normal rate  Heart sounds: Normal heart sounds  Pulmonary:      Effort: Pulmonary effort is normal       Breath sounds: No wheezing  Abdominal:      Palpations: Abdomen is soft  Tenderness: There is no abdominal tenderness  Musculoskeletal: Normal range of motion  Skin:     General: Skin is warm  Findings: No rash  Neurological:      Mental Status: He is alert and oriented to person, place, and time  Psychiatric:         Behavior: Behavior normal          LAB RESULTS:  Results for orders placed or performed in visit on 01/22/21   Protime-INR   Result Value Ref Range    Protime 21 7 (H) 11 6 - 14 5 seconds    INR 1 87 (H) 0 84 - 1 19       ASSESSMENT and PLAN:      Stage 3 chronic kidney disease (HCC)  Lab Results   Component Value Date    EGFR 40 01/22/2021    EGFR 42 12/31/2020    EGFR 38 11/05/2020    CREATININE 1 93 (H) 01/22/2021    CREATININE 1 85 (H) 12/31/2020    CREATININE 2 04 (H) 11/05/2020    patient GFR is stable at 40  Some fluctuation is there    I discussed hydration with him and avoiding any nephrotoxic medicine also discussed compliant with present medication which is taking    Chronic kidney disease-mineral and bone disorder  Lab Results   Component Value Date    EGFR 40 01/22/2021    EGFR 42 12/31/2020    EGFR 38 11/05/2020    CREATININE 1 93 (H) 01/22/2021    CREATININE 1 85 (H) 12/31/2020    CREATININE 2 04 (H) 11/05/2020 PTH and phosphorus and vitamin-D are within acceptable range and will continue to monitor    History of renal transplant   Patient does have kidney transplants but recurrence of IgA nephropathy in transplanted kidney  He is overall seems to be stable  Dr Arden Summers is monitor him closely at this point  May need kidney biopsy if kidney function deteriorate  He is immunosuppressive level is within therapeutic range    Essential hypertension   Quite well control  Severe episode of recurrent major depressive disorder, without psychotic features ([address] )    Overall seems to be stable  Being monitored by psychiatrist       discussed everything the patient  I will see him back in 6 months as he is being monitored by Dr Arden Summers also  He is also getting regular blood test by him so I will keep a eye on his kidney through that blood test        Portions of the record may have been created with voice recognition software  Occasional wrong word or "sound a like" substitutions may have occurred due to the inherent limitations of voice recognition software  Read the chart carefully and recognize, using context, where substitutions have occurred  If you have any questions, please contact the dictating provider

## 2021-01-26 NOTE — ASSESSMENT & PLAN NOTE
Lab Results   Component Value Date    EGFR 40 01/22/2021    EGFR 42 12/31/2020    EGFR 38 11/05/2020    CREATININE 1 93 (H) 01/22/2021    CREATININE 1 85 (H) 12/31/2020    CREATININE 2 04 (H) 11/05/2020    patient GFR is stable at 40  Some fluctuation is there    I discussed hydration with him and avoiding any nephrotoxic medicine also discussed compliant with present medication which is taking

## 2021-01-26 NOTE — ASSESSMENT & PLAN NOTE
Patient does have kidney transplants but recurrence of IgA nephropathy in transplanted kidney  He is overall seems to be stable  Dr Inga Viera is monitor him closely at this point  May need kidney biopsy if kidney function deteriorate    He is immunosuppressive level is within therapeutic range

## 2021-01-26 NOTE — PATIENT INSTRUCTIONS

## 2021-01-26 NOTE — ASSESSMENT & PLAN NOTE
Lab Results   Component Value Date    EGFR 40 01/22/2021    EGFR 42 12/31/2020    EGFR 38 11/05/2020    CREATININE 1 93 (H) 01/22/2021    CREATININE 1 85 (H) 12/31/2020    CREATININE 2 04 (H) 11/05/2020    PTH and phosphorus and vitamin-D are within acceptable range and will continue to monitor

## 2021-02-20 ENCOUNTER — TELEPHONE (OUTPATIENT)
Dept: INTERNAL MEDICINE CLINIC | Facility: CLINIC | Age: 46
End: 2021-02-20

## 2021-02-20 DIAGNOSIS — I82.4Z1 DEEP VEIN THROMBOSIS (DVT) OF DISTAL VEIN OF RIGHT LOWER EXTREMITY, UNSPECIFIED CHRONICITY (HCC): ICD-10-CM

## 2021-02-20 RX ORDER — WARFARIN SODIUM 4 MG/1
TABLET ORAL
Qty: 7 TABLET | Refills: 0 | Status: SHIPPED | OUTPATIENT
Start: 2021-02-20 | End: 2021-02-22 | Stop reason: SDUPTHER

## 2021-02-22 DIAGNOSIS — I82.4Z1 DEEP VEIN THROMBOSIS (DVT) OF DISTAL VEIN OF RIGHT LOWER EXTREMITY, UNSPECIFIED CHRONICITY (HCC): ICD-10-CM

## 2021-02-22 RX ORDER — WARFARIN SODIUM 4 MG/1
TABLET ORAL
Qty: 30 TABLET | Refills: 4 | Status: SHIPPED | OUTPATIENT
Start: 2021-02-22 | End: 2021-02-25 | Stop reason: SDUPTHER

## 2021-02-22 NOTE — TELEPHONE ENCOUNTER
warfarin (COUMADIN) 4 mg tablet    Call back in regards to directions/qt not correct    cb # [phone]

## 2021-02-25 ENCOUNTER — LAB (OUTPATIENT)
Dept: LAB | Facility: HOSPITAL | Age: 46
End: 2021-02-25
Attending: INTERNAL MEDICINE
Payer: COMMERCIAL

## 2021-02-25 ENCOUNTER — ANTICOAG VISIT (OUTPATIENT)
Dept: INTERNAL MEDICINE CLINIC | Facility: CLINIC | Age: 46
End: 2021-02-25

## 2021-02-25 DIAGNOSIS — I82.4Z1 DEEP VEIN THROMBOSIS (DVT) OF DISTAL VEIN OF RIGHT LOWER EXTREMITY, UNSPECIFIED CHRONICITY (HCC): ICD-10-CM

## 2021-02-25 DIAGNOSIS — Z86.718 HISTORY OF DVT (DEEP VEIN THROMBOSIS): ICD-10-CM

## 2021-02-25 DIAGNOSIS — Z94.0 RENAL TRANSPLANT RECIPIENT: ICD-10-CM

## 2021-02-25 LAB
BACTERIA UR QL AUTO: ABNORMAL /HPF
BILIRUB UR QL STRIP: NEGATIVE
CLARITY UR: CLEAR
COLOR UR: YELLOW
CREAT UR-MCNC: 157 MG/DL
GLUCOSE UR STRIP-MCNC: NEGATIVE MG/DL
HGB UR QL STRIP.AUTO: ABNORMAL
INR PPP: 3.67 (ref 0.84–1.19)
KETONES UR STRIP-MCNC: NEGATIVE MG/DL
LEUKOCYTE ESTERASE UR QL STRIP: NEGATIVE
NITRITE UR QL STRIP: NEGATIVE
NON-SQ EPI CELLS URNS QL MICRO: ABNORMAL /HPF
PH UR STRIP.AUTO: 6 [PH]
PROT UR STRIP-MCNC: NEGATIVE MG/DL
PROT UR-MCNC: 25 MG/DL
PROT/CREAT UR: 0.16 MG/G{CREAT} (ref 0–0.1)
PROTHROMBIN TIME: 34.8 SECONDS (ref 11.6–14.5)
RBC #/AREA URNS AUTO: ABNORMAL /HPF
SP GR UR STRIP.AUTO: 1.02 (ref 1–1.03)
UROBILINOGEN UR QL STRIP.AUTO: 0.2 E.U./DL
WBC #/AREA URNS AUTO: ABNORMAL /HPF

## 2021-02-25 PROCEDURE — 84156 ASSAY OF PROTEIN URINE: CPT

## 2021-02-25 PROCEDURE — 85610 PROTHROMBIN TIME: CPT

## 2021-02-25 PROCEDURE — 82570 ASSAY OF URINE CREATININE: CPT

## 2021-02-25 PROCEDURE — 81001 URINALYSIS AUTO W/SCOPE: CPT

## 2021-02-25 RX ORDER — WARFARIN SODIUM 4 MG/1
TABLET ORAL
Qty: 60 TABLET | Refills: 4 | Status: SHIPPED | OUTPATIENT
Start: 2021-02-25 | End: 2021-09-10 | Stop reason: SDUPTHER

## 2021-02-25 NOTE — TELEPHONE ENCOUNTER
CALLED PT  AND HE NEEDS REFILL OF 4 MG COUMADIN AS HE ONLY HAS 7 PILLS AND HE NEEDS TO BE TAKING 8MG DAILY PENDED TO   FOR APPROVAL

## 2021-02-25 NOTE — PROGRESS NOTES
2/25/21 PER DR Breanna Mukherjee PT TO HOLD COUMADIN TODAY THEN TAKE 8MG DAILY AND RECHECK IN 10 DAYS/SF

## 2021-02-25 NOTE — TELEPHONE ENCOUNTER
Patient asked to spk to [facility]    he said the directions for Warfarin that he was given is different then what is on the pill bottle & said he needs to spk to you about that

## 2021-02-26 ENCOUNTER — TELEPHONE (OUTPATIENT)
Dept: NEPHROLOGY | Facility: CLINIC | Age: 46
End: 2021-02-26

## 2021-02-26 NOTE — TELEPHONE ENCOUNTER
Pt advised that labs are stable; CR slightly above baseline at 1 9; no changes per Dr Madelin Zapien  Also reminded pt to have transplant labs done before his March visit with Dr Madelin Zapien       ----- Message from Brittany Rockwell MD sent at 2/26/2021  4:02 PM EST -----  Hello    Patient normally is followed up by Ms Kamille Moncada  Please let the patient know that the creatinine is stable 1 9  Very slightly above baseline  Electrolytes are stable    No changes for now   -please have the patient go for transplant lab work-CMP, CBC, UA, U PCR, magnesium, phosphorus, tacrolimus level before his next appointment next month    Thank you    np

## 2021-03-10 DIAGNOSIS — Z23 ENCOUNTER FOR IMMUNIZATION: ICD-10-CM

## 2021-03-30 ENCOUNTER — TELEPHONE (OUTPATIENT)
Dept: NEPHROLOGY | Facility: CLINIC | Age: 46
End: 2021-03-30

## 2021-03-30 ENCOUNTER — OFFICE VISIT (OUTPATIENT)
Dept: NEPHROLOGY | Facility: CLINIC | Age: 46
End: 2021-03-30
Payer: COMMERCIAL

## 2021-03-30 VITALS
DIASTOLIC BLOOD PRESSURE: 84 MMHG | BODY MASS INDEX: 31.04 KG/M2 | SYSTOLIC BLOOD PRESSURE: 124 MMHG | WEIGHT: 197.8 LBS | HEIGHT: 67 IN

## 2021-03-30 DIAGNOSIS — D18.00 HEMANGIOMA, UNSPECIFIED SITE: ICD-10-CM

## 2021-03-30 DIAGNOSIS — Z94.0 RENAL TRANSPLANT RECIPIENT: Primary | ICD-10-CM

## 2021-03-30 PROCEDURE — 99215 OFFICE O/P EST HI 40 MIN: CPT | Performed by: INTERNAL MEDICINE

## 2021-03-30 NOTE — TELEPHONE ENCOUNTER
Sherry returned office's call and got patient in on 4/1/21 at 1pm  Office then called patient to let them know about the sooner appointment  Patient then stated the sooner appointment did not work for them  I told the patient I would called Gastro back and leave patient's number to speak directly with them to schedule an appointment that works for them

## 2021-03-30 NOTE — PROGRESS NOTES
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 at 13 yo), living related from kidney swap renal transplant in 2009 at [place] (patient's brother was donor involved in swap), was on peritoneal dialysis for 9 months prior to transplant, DVT X 4 prior, HTN, depression, ADHD, admission in December of 2017 for hematuria and at that time was treated for urinary tract infection/pyelonephritis  Then admission in January 2020 for FRANCES with Cr to 3 4 mg/dL in setting of n/v/d  End of October 2020, patient had missed tacrolimus for one week and creat had increased to 2 1 requiring steroid therapy   Pt presenting for f/u visit       CXR - 1/23/2020 - no acute disease     CT abd/pel - atrophic native kidneys, unremarkable renal transplant     U/s liver - 2 2 cm R hepatic echogenic mass reflecting hemangioma     1) CKD III, renal transplant -      History obtained from [facility], and [place] and here at [facility] records:     -Baseline Cr  Progressing to 1 6-1 9 mg/dL mg/dL;  -Tac levels from 2009 - 2014 were 7-10  -Patient followed at [facility] - followed with Dr Artur Earl  -Prior baseline Cr 1 4-1 5 mg/dL and had been rising to 1 8 mg/dL  -pt had early CMV - on EGD  - last time seen was in May 2017 at [facility] - no known rejection ([phone])  -Has never had renal biopsy post transplant     Old Labwork review:     - CT abd/pel without hydro or nephrolithiasis on transplanted kidney  - to note, patient has had microscopic hematuria as far back as 2014 on UA  - BK and CMV negative 10/2020  - urine eos 0%     October 2020:       -  pt had undetectable tac level end of October  Pt states missed one week of tac due to not being able to obtain the medication  - creat had increased to 2 1 end of October   Patient was given three doses of Solu-Medrol starting October 24th   -  HLA completed on November 5th-no class 2 DSA, no class 1 DSA, PRA 15%, patient has watch antibodies      since then, the creatinine has ranged from approximately 1 8-2 mg/dL  Patient has had episodes of supratherapeutic tacrolimus levels with adjustment of tacrolimus dose in January      Important Medication notes:   - on diltiazem to note (interaction with tacrolimus)    3/2021 - labwork review - creat 1 9, which is stable, UA with 0-1 RBC, UPCR 0 16 gm/gm, tac 5 9  electrolytes stable  Hb stable     Plan:    - overall, the patient has slow progression of renal dysfunction  Currently renal function is stable and at baseline  Proteinuria is minimal   No significant microscopic hematuria  Patient is more compliant with his medications per his reporting   -  We will see the patient in 6 months  -  Continue interval care with primary nephrologist Dr Martinez Beach as doing  - labwork every 2-3 months   -  Patient did not go for blood work that was due for this appointment but will go in the coming couple weeks  - to note, if biopsy is needed  In the futurewill need bridge with heparin as is on coumadin for mult DVT prior  - to note, pt is on diltiazem     2) Immune Suppression     - June 2020 - pt did not have tacrolimus for a few days because he was waiting on refill and did not realize that it was backordered  Issue resolved and restarted tacrolimus  - 10/2020 - did not have tac for one week and see above    - January 2021-tacrolimus dose was lowered to 1 mg in the morning and 0 5 mg in the evening due to  supratherapeutic level  -  in AM and 250 mg PM  - history CMV  - pt has had labile tacrolimus levels  - discussed compliance important    3) DVT - recurrent DVT   Most recent march 2018     - 4 prior DVTs  - 1 RUE post PICC  - then 3 in LE  - further plans per primary team  - INR per Primary team     4) Vaccines     - stay up to date on vaccine with flu vaccine  - PNA vaccine - pt will review with Primary Physician  - no live vaccines  - reviewed with the patient that it is okay to receive the COVID-19 vaccine with either pfizer or moderna     5) Age appropriate Ca screening     - saw Derm  November 2020- to continue yearly f/u  - has history of psoriaform dermatitis      6) HTN     - pt is on diltiazem     7) HPL     - further plan per primary team   -would continue to monitor lipid panel closely  -consider statin if needed     9) Depression     -prior suicide attempts - prior 2003  - pt knows to go to the hospital  - will be attempting ECT  - patient follows with Psychiatry     10) Hb - monitor     11) IgA nephropathy native disease      monitor for recurrence     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 But on holding of the breath, did not appreciate a true murmur  Monitor for now      It was a pleasure evaluating your patient  Thank you for allowing our team to participate in the care of Mr Arnav Joshi  Please do not hesitate to contact our team if further issues/questions shall arise in the interim      No problem-specific Assessment & Plan notes found for this encounter  HPI:      Patient denies complaints  No fevers, chills, nausea, vomiting  Motivation is still poor  But is not feeling depressed overall      PATIENT INSTRUCTIONS:    Patient Instructions   1) Avoid NSAIDS - (Example - motrin, advil, ibuprofen, aleve, exederin, etc)  2) Always follow a low salt diet  3) Please check with [facility] team to make sure your vaccine is either [employer] or Moderna and NOT the [employer]  4) PLEASE SEE THE GI TEAM REGARDING ABDOMINAL PAIN AND THE FINDING OF LIVER LESION ON ULTRASOUND PRIOR  5) please complete right upper quadrant ultrasound  6) labwork in coming week or two then every 2-3 months  7) appointment in 6 months  8) no changes to your medications today        OBJECTIVE:  Current Weight: Weight - Scale: 89 7 kg (197 lb 12 8 oz)  Vitals:    03/30/21 1329   BP: 124/84   BP Location: Right arm   Patient Position: Sitting   Cuff Size: Standard   Weight: 89 7 kg (197 lb 12 8 oz)   Height: 5' 7" (1 702 m)    Body mass index is 30 98 kg/m²  REVIEW OF SYSTEMS:    Review of Systems   Constitutional: Negative  Negative for appetite change and fatigue  HENT: Negative  Eyes: Negative  Respiratory: Negative  Negative for shortness of breath  Cardiovascular: Negative  Negative for leg swelling  Gastrointestinal: Negative  Endocrine: Negative  Genitourinary: Negative  Negative for difficulty urinating  Musculoskeletal: Negative  Allergic/Immunologic: Negative  Neurological: Negative  Hematological: Negative  Psychiatric/Behavioral: Negative  All other systems reviewed and are negative  PHYSICAL EXAM:      Physical Exam  Vitals signs and nursing note reviewed  Constitutional:       General: He is not in acute distress  Appearance: He is well-developed  He is not diaphoretic  HENT:      Head: Normocephalic and atraumatic  Eyes:      General: No scleral icterus  Right eye: No discharge  Left eye: No discharge  Conjunctiva/sclera: Conjunctivae normal    Neck:      Musculoskeletal: Normal range of motion and neck supple  Vascular: No JVD  Cardiovascular:      Rate and Rhythm: Normal rate and regular rhythm  Heart sounds: Normal heart sounds  No murmur  No friction rub  No gallop  Pulmonary:      Effort: Pulmonary effort is normal  No respiratory distress        Breath sounds: Normal breath sounds  No wheezing or rales  Chest:      Chest wall: No tenderness  Abdominal:      General: Bowel sounds are normal  There is no distension  Palpations: Abdomen is soft  Tenderness: There is no abdominal tenderness  There is no rebound  Musculoskeletal: Normal range of motion  General: No tenderness or deformity  Skin:     General: Skin is warm and dry  Coloration: Skin is not pale  Findings: No erythema or rash  Neurological:      Mental Status: He is alert and oriented to person, place, and time  Cranial Nerves: No cranial nerve deficit  Coordination: Coordination normal       Deep Tendon Reflexes: Reflexes are normal and symmetric  Psychiatric:         Behavior: Behavior normal          Thought Content:  Thought content normal          Judgment: Judgment normal          Medications:    Current Outpatient Medications:     Cholecalciferol (VITAMIN D-3) 1000 units CAPS, Take 2,000 Units by mouth daily, Disp: , Rfl:     diltiazem (CARDIZEM) 120 MG tablet, Take 1 tablet (120 mg total) by mouth every 12 (twelve) hours, Disp: 180 tablet, Rfl: 2    gabapentin (NEURONTIN) 400 mg capsule, Take 400 mg by mouth 2 (two) times a day, Disp: , Rfl:     LATUDA 60 MG TABS, Take 80 mg by mouth daily , Disp: , Rfl: 2    methylphenidate (CONCERTA) 36 MG ER tablet, Take 36 mg by mouth daily, Disp: , Rfl:     mycophenolate (CELLCEPT) 250 mg capsule, Take 500 mg in AM and 250 mg in PM , Disp: 270 capsule, Rfl: 3    sertraline (ZOLOFT) 50 mg tablet, Take 150 mg by mouth every morning, Disp: , Rfl: 0    tacrolimus (PROGRAF) 0 5 mg capsule, Take 1 capsule (0 5 mg total) by mouth daily Take 1 mg in AM and 0 5 mg in PM, Disp: 90 capsule, Rfl: 3    warfarin (COUMADIN) 4 mg tablet, PT T O TAKE 2 TABS DAILY, Disp: 60 tablet, Rfl: 4    tacrolimus (Prograf) 1 mg capsule, Take 1 capsule (1 mg total) by mouth every 12 (twelve) hours (Patient not taking: Reported on 1/26/2021), Disp: 180 capsule, Rfl: 3    Laboratory Results:        Invalid input(s): ALBUMIN    Results for orders placed or performed in visit on 02/25/21   Protime-INR   Result Value Ref Range    Protime 34 8 (H) 11 6 - 14 5 seconds    INR 3 67 (H) 0 84 - 1 19

## 2021-03-30 NOTE — LETTER
March 30, 2021     Clemente Dillon MD  [address] [place] [id number]    Patient: Cathy Richardson   YOB: 1975   Date of Visit: 3/30/2021       Dear Dr Sunday Goltz:    Thank you for referring Cathy Richardson to me for evaluation  Below are my notes for this consultation  If you have questions, please do not hesitate to call me  I look forward to following your patient along with you  Sincerely,        Bethann Hammans, MD        CC: Sheral Hose, MD Bethann Hammans, MD  3/30/2021  2:11 PM  Sign when Signing Visit  NEPHROLOGY OFFICE VISIT   Cathy Richardson 50 y o  male MRN: [id number]  3/30/2021    Reason for Visit:  Renal transplant follow-up    ASSESSMENT and PLAN:    I had the pleasure of seeing Mr Nereida Rm today in the renal clinic for the continued management of  Renal transplant follow-up  18-year-old male with a past medical history of 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 (diagnosed at 15 yo), living related from kidney swap renal transplant in 2009 at [facility] (patient's brother was donor involved in swap), was on peritoneal dialysis for 9 months prior to transplant, DVT X 4 prior, HTN, depression, ADHD, admission in December of 2017 for hematuria and at that time was treated for urinary tract infection/pyelonephritis  Then admission in January 2020 for FRANCES with Cr to 3 4 mg/dL in setting of n/v/d  End of October 2020, patient had missed tacrolimus for one week and creat had increased to 2 1 requiring steroid therapy   Pt presenting for f/u visit       CXR - 1/23/2020 - no acute disease     CT abd/pel - atrophic native kidneys, unremarkable renal transplant     U/s liver - 2 2 cm R hepatic echogenic mass reflecting hemangioma     1) CKD III, renal transplant -      History obtained from [facility], and [facility] and here at [facility] records:     -Baseline Cr  Progressing to 1 6-1 9 mg/dL mg/dL;  -Tac levels from 2009 - 2014 were 7-10  -Patient followed at [facility] - followed with Dr Davon Iverson  -Prior baseline Cr 1 4-1 5 mg/dL and had been rising to 1 8 mg/dL  -pt had early CMV - on EGD  - last time seen was in May 2017 at [facility] - no known rejection ([phone])  -Has never had renal biopsy post transplant     Old Labwork review:     - CT abd/pel without hydro or nephrolithiasis on transplanted kidney  - to note, patient has had microscopic hematuria as far back as 2014 on UA  - BK and CMV negative 10/2020  - urine eos 0%     October 2020:       -  pt had undetectable tac level end of October  Pt states missed one week of tac due to not being able to obtain the medication  - creat had increased to 2 1 end of October   Patient was given three doses of Solu-Medrol starting October 24th   -  HLA completed on November 5th-no class 2 DSA, no class 1 DSA, PRA 15%, patient has watch antibodies      since then, the creatinine has ranged from approximately 1 8-2 mg/dL  Patient has had episodes of supratherapeutic tacrolimus levels with adjustment of tacrolimus dose in January      Important Medication notes:   - on diltiazem to note (interaction with tacrolimus)    3/2021 - labwork review - creat 1 9, which is stable, UA with 0-1 RBC, UPCR 0 16 gm/gm, tac 5 9  electrolytes stable  Hb stable     Plan:    - overall, the patient has slow progression of renal dysfunction  Currently renal function is stable and at baseline  Proteinuria is minimal   No significant microscopic hematuria    Patient is more compliant with his medications per his reporting   -  We will see the patient in 6 months  -  Continue interval care with primary nephrologist Dr Nory Duque as doing  - labwork every 2-3 months   -  Patient did not go for blood work that was due for this appointment but will go in the coming couple weeks  - to note, if biopsy is needed  In the futurewill need bridge with heparin as is on coumadin for mult DVT prior  - to note, pt is on diltiazem     2) Immune Suppression     - June 2020 - pt did not have tacrolimus for a few days because he was waiting on refill and did not realize that it was backordered  Issue resolved and restarted tacrolimus  - 10/2020 - did not have tac for one week and see above    - January 2021-tacrolimus dose was lowered to 1 mg in the morning and 0 5 mg in the evening due to  supratherapeutic level  -  in AM and 250 mg PM  - history CMV  - pt has had labile tacrolimus levels  - discussed compliance important    3) DVT - recurrent DVT  Most recent march 2018     - 4 prior DVTs  - 1 RUE post PICC  - then 3 in LE  - further plans per primary team  - INR per Primary team     4) Vaccines     - stay up to date on vaccine with flu vaccine  - PNA vaccine - pt will review with Primary Physician  - no live vaccines  - reviewed with the patient that it is okay to receive the COVID-19 vaccine with either pfizer or moderna     5) Age appropriate Ca screening     - saw Derm  November 2020- to continue yearly f/u  - has history of psoriaform dermatitis      6) HTN     - pt is on diltiazem     7) HPL     - further plan per primary team   -would continue to monitor lipid panel closely  -consider statin if needed     9) Depression     -prior suicide attempts - prior 2003  - pt knows to go to the hospital  - will be attempting ECT  - patient follows with Psychiatry     10) Hb - monitor     11) IgA nephropathy native disease      monitor for recurrence     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 But on holding of the breath, did not appreciate a true murmur  Monitor for now      It was a pleasure evaluating your patient  Thank you for allowing our team to participate in the care of Mr Arnav Joshi   Please do not hesitate to contact our team if further issues/questions shall arise in the interim      No problem-specific Assessment & Plan notes found for this encounter  HPI:      Patient denies complaints  No fevers, chills, nausea, vomiting  Motivation is still poor  But is not feeling depressed overall  PATIENT INSTRUCTIONS:    Patient Instructions   1) Avoid NSAIDS - (Example - motrin, advil, ibuprofen, aleve, exederin, etc)  2) Always follow a low salt diet  3) Please check with [facility] team to make sure your vaccine is either Pfizer or [employer] and NOT the [employer]  4) PLEASE SEE THE GI TEAM REGARDING ABDOMINAL PAIN AND THE FINDING OF LIVER LESION ON ULTRASOUND PRIOR  5) please complete right upper quadrant ultrasound  6) labwork in coming week or two then every 2-3 months  7) appointment in 6 months  8) no changes to your medications today        OBJECTIVE:  Current Weight: Weight - Scale: 89 7 kg (197 lb 12 8 oz)  Vitals:    03/30/21 1329   BP: 124/84   BP Location: Right arm   Patient Position: Sitting   Cuff Size: Standard   Weight: 89 7 kg (197 lb 12 8 oz)   Height: 5' 7" (1 702 m)    Body mass index is 30 98 kg/m²  REVIEW OF SYSTEMS:    Review of Systems   Constitutional: Negative  Negative for appetite change and fatigue  HENT: Negative  Eyes: Negative  Respiratory: Negative  Negative for shortness of breath  Cardiovascular: Negative  Negative for leg swelling  Gastrointestinal: Negative  Endocrine: Negative  Genitourinary: Negative  Negative for difficulty urinating  Musculoskeletal: Negative  Allergic/Immunologic: Negative  Neurological: Negative  Hematological: Negative  Psychiatric/Behavioral: Negative  All other systems reviewed and are negative  PHYSICAL EXAM:      Physical Exam  Vitals signs and nursing note reviewed  Constitutional:       General: He is not in acute distress  Appearance: He is well-developed   He is not diaphoretic  HENT:      Head: Normocephalic and atraumatic  Eyes:      General: No scleral icterus  Right eye: No discharge  Left eye: No discharge  Conjunctiva/sclera: Conjunctivae normal    Neck:      Musculoskeletal: Normal range of motion and neck supple  Vascular: No JVD  Cardiovascular:      Rate and Rhythm: Normal rate and regular rhythm  Heart sounds: Normal heart sounds  No murmur  No friction rub  No gallop  Pulmonary:      Effort: Pulmonary effort is normal  No respiratory distress  Breath sounds: Normal breath sounds  No wheezing or rales  Chest:      Chest wall: No tenderness  Abdominal:      General: Bowel sounds are normal  There is no distension  Palpations: Abdomen is soft  Tenderness: There is no abdominal tenderness  There is no rebound  Musculoskeletal: Normal range of motion  General: No tenderness or deformity  Skin:     General: Skin is warm and dry  Coloration: Skin is not pale  Findings: No erythema or rash  Neurological:      Mental Status: He is alert and oriented to person, place, and time  Cranial Nerves: No cranial nerve deficit  Coordination: Coordination normal       Deep Tendon Reflexes: Reflexes are normal and symmetric  Psychiatric:         Behavior: Behavior normal          Thought Content:  Thought content normal          Judgment: Judgment normal          Medications:    Current Outpatient Medications:     Cholecalciferol (VITAMIN D-3) 1000 units CAPS, Take 2,000 Units by mouth daily, Disp: , Rfl:     diltiazem (CARDIZEM) 120 MG tablet, Take 1 tablet (120 mg total) by mouth every 12 (twelve) hours, Disp: 180 tablet, Rfl: 2    gabapentin (NEURONTIN) 400 mg capsule, Take 400 mg by mouth 2 (two) times a day, Disp: , Rfl:     LATUDA 60 MG TABS, Take 80 mg by mouth daily , Disp: , Rfl: 2    methylphenidate (CONCERTA) 36 MG ER tablet, Take 36 mg by mouth daily, Disp: , Rfl:    mycophenolate (CELLCEPT) 250 mg capsule, Take 500 mg in AM and 250 mg in PM , Disp: 270 capsule, Rfl: 3    sertraline (ZOLOFT) 50 mg tablet, Take 150 mg by mouth every morning, Disp: , Rfl: 0    tacrolimus (PROGRAF) 0 5 mg capsule, Take 1 capsule (0 5 mg total) by mouth daily Take 1 mg in AM and 0 5 mg in PM, Disp: 90 capsule, Rfl: 3    warfarin (COUMADIN) 4 mg tablet, PT T O TAKE 2 TABS DAILY, Disp: 60 tablet, Rfl: 4    tacrolimus (Prograf) 1 mg capsule, Take 1 capsule (1 mg total) by mouth every 12 (twelve) hours (Patient not taking: Reported on 1/26/2021), Disp: 180 capsule, Rfl: 3    Laboratory Results:        Invalid input(s): ALBUMIN    Results for orders placed or performed in visit on 02/25/21   Protime-INR   Result Value Ref Range    Protime 34 8 (H) 11 6 - 14 5 seconds    INR 3 67 (H) 0 84 - 1 19

## 2021-03-30 NOTE — PATIENT INSTRUCTIONS
1) Avoid NSAIDS - (Example - motrin, advil, ibuprofen, aleve, exederin, etc)  2) Always follow a low salt diet  3) Please check with [facility] team to make sure your vaccine is either Pfizer or Pairy and NOT the [employer]  4) PLEASE SEE THE GI TEAM REGARDING ABDOMINAL PAIN AND THE FINDING OF LIVER LESION ON ULTRASOUND PRIOR  5) please complete right upper quadrant ultrasound  6) labwork in coming week or two then every 2-3 months  7) appointment in 6 months  8) no changes to your medications today

## 2021-04-11 ENCOUNTER — HOSPITAL ENCOUNTER (OUTPATIENT)
Dept: ULTRASOUND IMAGING | Facility: HOSPITAL | Age: 46
Discharge: HOME | End: 2021-04-11
Attending: INTERNAL MEDICINE
Payer: COMMERCIAL

## 2021-04-11 DIAGNOSIS — D18.00 HEMANGIOMA, UNSPECIFIED SITE: ICD-10-CM

## 2021-04-11 PROCEDURE — 76705 ECHO EXAM OF ABDOMEN: CPT

## 2021-04-18 NOTE — RESULT ENCOUNTER NOTE
Hello    Hope you are well    You are seeing pt soon  Here is the ultrasound for your visit   Look forward to your thoughts    Thank you    np

## 2021-04-20 ENCOUNTER — OFFICE VISIT (OUTPATIENT)
Dept: GASTROENTEROLOGY | Facility: CLINIC | Age: 46
End: 2021-04-20
Payer: COMMERCIAL

## 2021-04-20 VITALS
WEIGHT: 195.6 LBS | SYSTOLIC BLOOD PRESSURE: 98 MMHG | HEART RATE: 67 BPM | BODY MASS INDEX: 30.7 KG/M2 | HEIGHT: 67 IN | DIASTOLIC BLOOD PRESSURE: 80 MMHG

## 2021-04-20 DIAGNOSIS — D18.03 LIVER HEMANGIOMA: Primary | ICD-10-CM

## 2021-04-20 PROCEDURE — 99213 OFFICE O/P EST LOW 20 MIN: CPT | Performed by: PHYSICIAN ASSISTANT

## 2021-04-20 NOTE — PROGRESS NOTES
[facility] - Outpatient Follow-up Note  Maryellen Conti 50 y o  male MRN: [id number]  Encounter: [id number]          ASSESSMENT AND PLAN:      1  Liver hemangioma  -Will plan MRI of the liver to confirm hemangioma  This case has been discussed with patient's Nephrology team as long as a class 2 contrast agent is used his risk of NSF is low   -Will follow up with patient after MRI is complete   ______________________________________________________________________    SUBJECTIVE:   64-year-old male who presents to the office today for follow-up of a liver hemangioma  Patient was initially seen by the GI group when he was admitted at [facility] with possible pancreatitis  Patient did have evidence of a liver hemangioma that time an MRI was ordered last year  Patient presents back to the office today after repeat  Ultrasound  Ultrasound from April 11, 2021 was reviewed that shows an echogenic mass in the right hepatic lobe decreased in size compared to the prior study  At the present time patient denies any abdominal pain, nausea, vomiting  Patient reports that over the last year since this questionable pancreatitis he has had small minor flare ups but nothing to the point where he had a return to the hospital   Patient denies any diarrhea or constipation  Patient denies any melena or rectal bleeding  Patient is status post kidney transplant  This case has been discussed with patient's Nephrology team     REVIEW OF SYSTEMS IS OTHERWISE NEGATIVE        Historical Information   Past Medical History:   Diagnosis Date    ADD (attention deficit disorder)     FRANCES (acute kidney injury) ([facility] [address] ) 1/24/2020    Chronic kidney disease     Depression     DVT (deep venous thrombosis) (HCC)     H/O immunosuppressive therapy     History of kidney disease     HTN (hypertension) 12/17/2017    Hypertension     Nephropathy, IgA      Past Surgical History:   Procedure Laterality Date  NEPHRECTOMY TRANSPLANTED ORGAN       Social History   Social History     Substance and Sexual Activity   Alcohol Use Yes    Frequency: Monthly or less    Drinks per session: 1 or 2    Binge frequency: Never    Comment: on occasion     Social History     Substance and Sexual Activity   Drug Use Yes    Types: Marijuana    Comment: smokes marijuana a few times daily     Social History     Tobacco Use   Smoking Status Former Smoker    Packs/day: 0 50    Years: 4 00    Pack years: 2 00    Types: Cigarettes    Quit date:     Years since quittin 3   Smokeless Tobacco Never Used     Family History   Problem Relation Age of Onset    Deep vein thrombosis Father     Deep vein thrombosis Paternal Grandfather     Transient ischemic attack Mother        Meds/Allergies       Current Outpatient Medications:     Cholecalciferol (VITAMIN D-3) 1000 units CAPS    gabapentin (NEURONTIN) 400 mg capsule    LATUDA 60 MG TABS    methylphenidate (CONCERTA) 36 MG ER tablet    mycophenolate (CELLCEPT) 250 mg capsule    sertraline (ZOLOFT) 50 mg tablet    tacrolimus (PROGRAF) 0 5 mg capsule    tacrolimus (Prograf) 1 mg capsule    warfarin (COUMADIN) 4 mg tablet    diltiazem (CARDIZEM) 120 MG tablet    Allergies   Allergen Reactions    Penicillins Rash           Objective     Blood pressure 98/80, pulse 67, height 5' 7" (1 702 m), weight 88 7 kg (195 lb 9 6 oz)  Body mass index is 30 64 kg/m²  PHYSICAL EXAM:      General Appearance:   Alert, cooperative, no distress   HEENT:   Normocephalic, atraumatic, anicteric      Neck:  Supple, symmetrical, trachea midline   Lungs:   Clear to auscultation bilaterally; no rales, rhonchi or wheezing; respirations unlabored    Heart[de-identified]   Regular rate and rhythm; no murmur, rub, or gallop     Abdomen:   Soft, non-tender, non-distended; normal bowel sounds; no masses, no organomegaly    Genitalia:   Deferred    Rectal:   Deferred    Extremities:  No cyanosis, clubbing or edema    Pulses:  2+ and symmetric    Skin:  No jaundice, rashes, or lesions    Lymph nodes:  No palpable cervical lymphadenopathy        Lab Results:   No visits with results within 1 Day(s) from this visit  Latest known visit with results is:   Lab on 02/25/2021   Component Date Value    Protime 02/25/2021 34 8*    INR 02/25/2021 3 67*         Radiology Results:   Us Right Upper Quadrant    Result Date: 4/16/2021  Narrative: RIGHT UPPER QUADRANT ULTRASOUND INDICATION:     D18 00: Hemangioma unspecified site  COMPARISON:  January 24, 2020 TECHNIQUE:   Real-time ultrasound of the right upper quadrant was performed with a curvilinear transducer with both volumetric sweeps and still imaging techniques  FINDINGS: PANCREAS:  Portions of the pancreas are obscured by bowel gas  Visualized portions of the pancreas are unremarkable  AORTA AND IVC:  Visualized portions are normal for patient age  LIVER: Size:  Within normal range  The liver measures 15 6 cm in the midclavicular line  Contour:  Surface contour is smooth  Parenchyma: There is mild diffuse increased echogenicity with smooth echotexture, without significant beam attenuation or loss of periportal echogenicity  Most consistent with mild hepatic steatosis  There is a well-circumscribed subcapsular diffusely homogeneously echogenic hepatic mass in the right hepatic dome measuring 1 6 x 1 8 x 1 2 cm, slightly decreased in size compared to prior study (previously measuring 2 2 x 1 9 x 1 6 cm  Limited imaging of the main portal vein shows it to be patent and hepatopetal   BILIARY: No gallbladder findings  No intrahepatic biliary dilatation  CBD measures 3 mm  No choledocholithiasis  KIDNEY: Right kidney measures 8 3 x 3 5 x 3 7 cm  Diffusely increased echotexture consistent with medical renal disease  ASCITES:   None  Impression: Echogenic mass in the right hepatic lobe decreased in size compared to prior study, likely representing a hemangioma    Continued follow-up with ultrasound or confirmation with a CT scan or MRI with contrast can be obtained  Atrophic right kidney   Workstation performed: [id number]

## 2021-04-20 NOTE — LETTER
April 20, 2021     Lian Cramer MD  [facility]    Patient: Bashir Cueto   YOB: 1975   Date of Visit: 4/20/2021       Dear Dr Jill Gustafson:    Thank you for referring Bashir Cueto to me for evaluation  Below are my notes for this consultation  If you have questions, please do not hesitate to call me  I look forward to following your patient along with you  Sincerely,        Allyssa Richardson PA-C        CC: No Recipients  Conchita Pierson  4/20/2021 10:41 AM  Sign when Signing Visit  [facility] - Outpatient Follow-up Note  Bashir Cueto 50 y o  male MRN: [id number]  Encounter: [id number]          ASSESSMENT AND PLAN:      1  Liver hemangioma  -Will plan MRI of the liver to confirm hemangioma  This case has been discussed with patient's Nephrology team as long as a class 2 contrast agent is used his risk of NSF is low   -Will follow up with patient after MRI is complete   ______________________________________________________________________    SUBJECTIVE:   13-year-old male who presents to the office today for follow-up of a liver hemangioma  Patient was initially seen by the GI group when he was admitted at [facility] with possible pancreatitis  Patient did have evidence of a liver hemangioma that time an MRI was ordered last year  Patient presents back to the office today after repeat  Ultrasound  Ultrasound from April 11, 2021 was reviewed that shows an echogenic mass in the right hepatic lobe decreased in size compared to the prior study  At the present time patient denies any abdominal pain, nausea, vomiting  Patient reports that over the last year since this questionable pancreatitis he has had small minor flare ups but nothing to the point where he had a return to the hospital   Patient denies any diarrhea or constipation  Patient denies any melena or rectal bleeding    Patient is status post kidney transplant  This case has been discussed with patient's Nephrology team     REVIEW OF SYSTEMS IS OTHERWISE NEGATIVE  Historical Information   Past Medical History:   Diagnosis Date    ADD (attention deficit disorder)     FRANCES [facility] kidney injury) ([facility] [address] ) 2020    Chronic kidney disease     Depression     [facility]deep venous thrombosis) ([facility])     H/O immunosuppressive therapy     History of kidney disease     HTN (hypertension) 2017    Hypertension     Nephropathy, IgA      Past Surgical History:   Procedure Laterality Date    NEPHRECTOMY TRANSPLANTED ORGAN       Social History   Social History     Substance and Sexual Activity   Alcohol Use Yes    Frequency: Monthly or less    Drinks per session: 1 or 2    Binge frequency: Never    Comment: on occasion     Social History     Substance and Sexual Activity   Drug Use Yes    Types: Marijuana    Comment: smokes marijuana a few times daily     Social History     Tobacco Use   Smoking Status Former Smoker    Packs/day: 0 50    Years: 4 00    Pack years: 2 00    Types: Cigarettes    Quit date:     Years since quittin 3   Smokeless Tobacco Never Used     Family History   Problem Relation Age of Onset    Deep vein thrombosis Father     Deep vein thrombosis Paternal Grandfather     Transient ischemic attack Mother        Meds/Allergies       Current Outpatient Medications:     Cholecalciferol (VITAMIN D-3) 1000 units CAPS    gabapentin (NEURONTIN) 400 mg capsule    LATUDA 60 MG TABS    methylphenidate (CONCERTA) 36 MG ER tablet    mycophenolate (CELLCEPT) 250 mg capsule    sertraline (ZOLOFT) 50 mg tablet    tacrolimus (PROGRAF) 0 5 mg capsule    tacrolimus (Prograf) 1 mg capsule    warfarin (COUMADIN) 4 mg tablet    diltiazem (CARDIZEM) 120 MG tablet    Allergies   Allergen Reactions    Penicillins Rash           Objective     Blood pressure 98/80, pulse 67, height 5' 7" (1 702 m), weight 88 7 kg (195 lb 9 6 oz)   Body mass index is 30 64 kg/m²  PHYSICAL EXAM:      General Appearance:   Alert, cooperative, no distress   HEENT:   Normocephalic, atraumatic, anicteric      Neck:  Supple, symmetrical, trachea midline   Lungs:   Clear to auscultation bilaterally; no rales, rhonchi or wheezing; respirations unlabored    Heart[de-identified]   Regular rate and rhythm; no murmur, rub, or gallop  Abdomen:   Soft, non-tender, non-distended; normal bowel sounds; no masses, no organomegaly    Genitalia:   Deferred    Rectal:   Deferred    Extremities:  No cyanosis, clubbing or edema    Pulses:  2+ and symmetric    Skin:  No jaundice, rashes, or lesions    Lymph nodes:  No palpable cervical lymphadenopathy        Lab Results:   No visits with results within 1 Day(s) from this visit  Latest known visit with results is:   Lab on 02/25/2021   Component Date Value    Protime 02/25/2021 34 8*    INR 02/25/2021 3 67*         Radiology Results:   Us Right Upper Quadrant    Result Date: 4/16/2021  Narrative: RIGHT UPPER QUADRANT ULTRASOUND INDICATION:     D18 00: Hemangioma unspecified site  COMPARISON:  January 24, 2020 TECHNIQUE:   Real-time ultrasound of the right upper quadrant was performed with a curvilinear transducer with both volumetric sweeps and still imaging techniques  FINDINGS: PANCREAS:  Portions of the pancreas are obscured by bowel gas  Visualized portions of the pancreas are unremarkable  AORTA AND IVC:  Visualized portions are normal for patient age  LIVER: Size:  Within normal range  The liver measures 15 6 cm in the midclavicular line  Contour:  Surface contour is smooth  Parenchyma: There is mild diffuse increased echogenicity with smooth echotexture, without significant beam attenuation or loss of periportal echogenicity  Most consistent with mild hepatic steatosis   There is a well-circumscribed subcapsular diffusely homogeneously echogenic hepatic mass in the right hepatic dome measuring 1 6 x 1 8 x 1 2 cm, slightly decreased in size compared to prior study (previously measuring 2 2 x 1 9 x 1 6 cm  Limited imaging of the main portal vein shows it to be patent and hepatopetal   BILIARY: No gallbladder findings  No intrahepatic biliary dilatation  CBD measures 3 mm  No choledocholithiasis  KIDNEY: Right kidney measures 8 3 x 3 5 x 3 7 cm  Diffusely increased echotexture consistent with medical renal disease  ASCITES:   None  Impression: Echogenic mass in the right hepatic lobe decreased in size compared to prior study, likely representing a hemangioma  Continued follow-up with ultrasound or confirmation with a CT scan or MRI with contrast can be obtained  Atrophic right kidney   Workstation performed: [id number]

## 2021-05-12 ENCOUNTER — HOSPITAL ENCOUNTER (OUTPATIENT)
Dept: MRI IMAGING | Facility: HOSPITAL | Age: 46
Discharge: HOME | End: 2021-05-12
Payer: COMMERCIAL

## 2021-05-12 DIAGNOSIS — D18.03 LIVER HEMANGIOMA: ICD-10-CM

## 2021-05-12 PROCEDURE — 74183 MRI ABD W/O CNTR FLWD CNTR: CPT

## 2021-05-12 PROCEDURE — A9585 GADOBUTROL INJECTION: HCPCS | Performed by: PHYSICIAN ASSISTANT

## 2021-05-12 PROCEDURE — G1004 CDSM NDSC: HCPCS

## 2021-05-12 RX ADMIN — GADOBUTROL 8 ML: 604.72 INJECTION INTRAVENOUS at 15:29

## 2021-05-19 ENCOUNTER — TELEPHONE (OUTPATIENT)
Dept: GASTROENTEROLOGY | Facility: CLINIC | Age: 46
End: 2021-05-19

## 2021-05-19 NOTE — TELEPHONE ENCOUNTER
----- Message from Savannah Keith PA-C sent at 5/19/2021  2:16 PM EDT -----  Please inform patient that his liver shows no lesions Thank you!

## 2021-05-21 ENCOUNTER — TELEPHONE (OUTPATIENT)
Dept: GASTROENTEROLOGY | Facility: CLINIC | Age: 46
End: 2021-05-21

## 2021-05-21 NOTE — TELEPHONE ENCOUNTER
----- Message from Von Santiago DO sent at 5/19/2021  3:58 PM EDT -----  Please call the patient with the MRI findings  The MRI does not show evidence of a hemangioma or anything worrisome liver  His suspected that the previous scans were picking up an area of focal fat  No further testing is required on this suspected lesion

## 2021-06-17 DIAGNOSIS — Z94.0 RENAL TRANSPLANT RECIPIENT: ICD-10-CM

## 2021-06-17 RX ORDER — TACROLIMUS 0.5 MG/1
CAPSULE ORAL
Qty: 90 CAPSULE | Refills: 3 | Status: SHIPPED | OUTPATIENT
Start: 2021-06-17 | End: 2021-12-21

## 2021-07-08 ENCOUNTER — TELEPHONE (OUTPATIENT)
Dept: NEPHROLOGY | Facility: CLINIC | Age: 46
End: 2021-07-08

## 2021-07-14 ENCOUNTER — APPOINTMENT (OUTPATIENT)
Dept: LAB | Facility: HOSPITAL | Age: 46
End: 2021-07-14
Attending: INTERNAL MEDICINE
Payer: COMMERCIAL

## 2021-07-14 ENCOUNTER — ANTICOAG VISIT (OUTPATIENT)
Dept: INTERNAL MEDICINE CLINIC | Facility: CLINIC | Age: 46
End: 2021-07-14

## 2021-07-15 ENCOUNTER — TELEPHONE (OUTPATIENT)
Dept: NEPHROLOGY | Facility: CLINIC | Age: 46
End: 2021-07-15

## 2021-07-15 NOTE — TELEPHONE ENCOUNTER
----- Message from Yesenia Montes De Oca MD sent at 7/15/2021 12:37 PM EDT -----  Hello    Patient normally is followed up by Ms Trudy Breen  Renal MA team-please let the patient know that the creatinine is stable 1 6  Electrolytes are stable  Hemoglobin is stable 15 6  Protein in the urine stable  Tacrolimus level okay at 6 6    No changes for now   -patient should continue lab work every 3 months  -patient has appointment with Dr Daryrl Tamez end of this month for f/u  - from transplant standpoint, please schedule pt to see me in 6 months    Thank you    np

## 2021-07-15 NOTE — TELEPHONE ENCOUNTER
I spoke the patient he is aware all labs are stable on our end and will continue to monitor every 3 months as scheduled  Will be placed on recall for January 2022 with Dr Vegaveronica       Aware he has a scheduled visit on 7/28 with Dr Carrera

## 2021-07-27 ENCOUNTER — TELEPHONE (OUTPATIENT)
Dept: NEPHROLOGY | Facility: CLINIC | Age: 46
End: 2021-07-27

## 2021-08-30 DIAGNOSIS — I10 ESSENTIAL HYPERTENSION: ICD-10-CM

## 2021-08-30 NOTE — TELEPHONE ENCOUNTER
Patient of Dr Michaela Voss called stating that he needs a refill for Diltiazem (Cardizem) 120 mg tablet twice a day with a 3 month supply  Please sent Rx to [address] [place] [address] @ [phone]  Patient states that he only has 2 pill left  If any questions please call patient @ [phone]   Anette Short,

## 2021-08-31 RX ORDER — DILTIAZEM HYDROCHLORIDE 120 MG/1
120 TABLET, FILM COATED ORAL EVERY 12 HOURS SCHEDULED
Qty: 180 TABLET | Refills: 2 | Status: SHIPPED | OUTPATIENT
Start: 2021-08-31 | End: 2022-06-29

## 2021-08-31 NOTE — TELEPHONE ENCOUNTER
Called pt and informed him that as per dr Barrie Corey he did refill this med but he should have an appt   Pt is scheduled for 11/2

## 2021-09-10 DIAGNOSIS — I82.4Z1 DEEP VEIN THROMBOSIS (DVT) OF DISTAL VEIN OF RIGHT LOWER EXTREMITY, UNSPECIFIED CHRONICITY (HCC): ICD-10-CM

## 2021-09-13 RX ORDER — WARFARIN SODIUM 4 MG/1
TABLET ORAL
Qty: 60 TABLET | Refills: 0 | Status: SHIPPED | OUTPATIENT
Start: 2021-09-13 | End: 2021-10-15 | Stop reason: SDUPTHER

## 2021-10-15 ENCOUNTER — NURSE TRIAGE (OUTPATIENT)
Dept: OTHER | Facility: OTHER | Age: 46
End: 2021-10-15

## 2021-10-15 DIAGNOSIS — I82.4Z1 DEEP VEIN THROMBOSIS (DVT) OF DISTAL VEIN OF RIGHT LOWER EXTREMITY, UNSPECIFIED CHRONICITY (HCC): ICD-10-CM

## 2021-10-15 RX ORDER — WARFARIN SODIUM 4 MG/1
TABLET ORAL
Qty: 7 TABLET | Refills: 0 | Status: SHIPPED | OUTPATIENT
Start: 2021-10-15 | End: 2021-10-18 | Stop reason: SDUPTHER

## 2021-10-18 RX ORDER — WARFARIN SODIUM 4 MG/1
TABLET ORAL
Qty: 30 TABLET | Refills: 5 | Status: SHIPPED | OUTPATIENT
Start: 2021-10-18 | End: 2021-11-19 | Stop reason: SDUPTHER

## 2021-10-19 ENCOUNTER — TELEPHONE (OUTPATIENT)
Dept: NEPHROLOGY | Facility: CLINIC | Age: 46
End: 2021-10-19

## 2021-10-27 ENCOUNTER — TELEPHONE (OUTPATIENT)
Dept: NEPHROLOGY | Facility: CLINIC | Age: 46
End: 2021-10-27

## 2021-10-28 ENCOUNTER — TELEPHONE (OUTPATIENT)
Dept: INTERNAL MEDICINE CLINIC | Facility: CLINIC | Age: 46
End: 2021-10-28

## 2021-10-28 ENCOUNTER — APPOINTMENT (OUTPATIENT)
Dept: LAB | Facility: HOSPITAL | Age: 46
End: 2021-10-28
Payer: COMMERCIAL

## 2021-10-28 ENCOUNTER — ANTICOAG VISIT (OUTPATIENT)
Dept: INTERNAL MEDICINE CLINIC | Facility: CLINIC | Age: 46
End: 2021-10-28

## 2021-11-01 ENCOUNTER — TELEPHONE (OUTPATIENT)
Dept: NEPHROLOGY | Facility: CLINIC | Age: 46
End: 2021-11-01

## 2021-11-02 ENCOUNTER — OFFICE VISIT (OUTPATIENT)
Dept: NEPHROLOGY | Facility: CLINIC | Age: 46
End: 2021-11-02
Payer: COMMERCIAL

## 2021-11-02 ENCOUNTER — TELEPHONE (OUTPATIENT)
Dept: NEPHROLOGY | Facility: CLINIC | Age: 46
End: 2021-11-02

## 2021-11-02 VITALS
HEIGHT: 67 IN | BODY MASS INDEX: 27.03 KG/M2 | RESPIRATION RATE: 16 BRPM | SYSTOLIC BLOOD PRESSURE: 120 MMHG | DIASTOLIC BLOOD PRESSURE: 80 MMHG | WEIGHT: 172.2 LBS | HEART RATE: 68 BPM | TEMPERATURE: 96.2 F

## 2021-11-02 DIAGNOSIS — N02.8 RECURRENT IGA NEPHROPATHY AFTER KIDNEY TRANSPLANTATION: ICD-10-CM

## 2021-11-02 DIAGNOSIS — N18.9 CHRONIC KIDNEY DISEASE-MINERAL AND BONE DISORDER: ICD-10-CM

## 2021-11-02 DIAGNOSIS — E83.9 CHRONIC KIDNEY DISEASE-MINERAL AND BONE DISORDER: ICD-10-CM

## 2021-11-02 DIAGNOSIS — F33.2 SEVERE EPISODE OF RECURRENT MAJOR DEPRESSIVE DISORDER, WITHOUT PSYCHOTIC FEATURES (HCC): ICD-10-CM

## 2021-11-02 DIAGNOSIS — N18.32 STAGE 3B CHRONIC KIDNEY DISEASE (HCC): Primary | ICD-10-CM

## 2021-11-02 DIAGNOSIS — I12.9 HYPERTENSIVE CHRONIC KIDNEY DISEASE, UNSPECIFIED CKD STAGE: ICD-10-CM

## 2021-11-02 DIAGNOSIS — I10 ESSENTIAL HYPERTENSION: ICD-10-CM

## 2021-11-02 DIAGNOSIS — M89.9 CHRONIC KIDNEY DISEASE-MINERAL AND BONE DISORDER: ICD-10-CM

## 2021-11-02 DIAGNOSIS — Z94.0 HISTORY OF RENAL TRANSPLANT: ICD-10-CM

## 2021-11-02 DIAGNOSIS — T86.19 RECURRENT IGA NEPHROPATHY AFTER KIDNEY TRANSPLANTATION: ICD-10-CM

## 2021-11-02 DIAGNOSIS — Z94.0 RENAL TRANSPLANT RECIPIENT: ICD-10-CM

## 2021-11-02 PROCEDURE — 99214 OFFICE O/P EST MOD 30 MIN: CPT | Performed by: INTERNAL MEDICINE

## 2021-11-02 PROCEDURE — 3008F BODY MASS INDEX DOCD: CPT | Performed by: INTERNAL MEDICINE

## 2021-11-05 ENCOUNTER — APPOINTMENT (OUTPATIENT)
Dept: LAB | Facility: HOSPITAL | Age: 46
End: 2021-11-05
Payer: COMMERCIAL

## 2021-11-05 ENCOUNTER — TELEPHONE (OUTPATIENT)
Dept: INTERNAL MEDICINE CLINIC | Facility: CLINIC | Age: 46
End: 2021-11-05

## 2021-11-05 ENCOUNTER — ANTICOAG VISIT (OUTPATIENT)
Dept: INTERNAL MEDICINE CLINIC | Facility: CLINIC | Age: 46
End: 2021-11-05

## 2021-11-05 DIAGNOSIS — N02.8 RECURRENT IGA NEPHROPATHY AFTER KIDNEY TRANSPLANTATION: ICD-10-CM

## 2021-11-05 DIAGNOSIS — Z94.0 RENAL TRANSPLANT RECIPIENT: ICD-10-CM

## 2021-11-05 DIAGNOSIS — T86.19 RECURRENT IGA NEPHROPATHY AFTER KIDNEY TRANSPLANTATION: ICD-10-CM

## 2021-11-05 DIAGNOSIS — N18.32 STAGE 3B CHRONIC KIDNEY DISEASE (HCC): ICD-10-CM

## 2021-11-05 LAB
25(OH)D3 SERPL-MCNC: 32.1 NG/ML (ref 30–100)
ANION GAP SERPL CALCULATED.3IONS-SCNC: 9 MMOL/L (ref 4–13)
BACTERIA UR QL AUTO: NORMAL /HPF
BASOPHILS # BLD AUTO: 0.06 THOUSANDS/ΜL (ref 0–0.1)
BASOPHILS NFR BLD AUTO: 1 % (ref 0–1)
BILIRUB UR QL STRIP: NEGATIVE
BUN SERPL-MCNC: 25 MG/DL (ref 5–25)
CALCIUM SERPL-MCNC: 9.7 MG/DL (ref 8.3–10.1)
CHLORIDE SERPL-SCNC: 105 MMOL/L (ref 100–108)
CLARITY UR: CLEAR
CO2 SERPL-SCNC: 27 MMOL/L (ref 21–32)
COLOR UR: YELLOW
CREAT SERPL-MCNC: 1.66 MG/DL (ref 0.6–1.3)
CREAT UR-MCNC: 61.2 MG/DL
EOSINOPHIL # BLD AUTO: 0.14 THOUSAND/ΜL (ref 0–0.61)
EOSINOPHIL NFR BLD AUTO: 3 % (ref 0–6)
ERYTHROCYTE [DISTWIDTH] IN BLOOD BY AUTOMATED COUNT: 12.9 % (ref 11.6–15.1)
GFR SERPL CREATININE-BSD FRML MDRD: 48 ML/MIN/1.73SQ M
GLUCOSE P FAST SERPL-MCNC: 91 MG/DL (ref 65–99)
GLUCOSE UR STRIP-MCNC: NEGATIVE MG/DL
HCT VFR BLD AUTO: 44.8 % (ref 36.5–49.3)
HGB BLD-MCNC: 15 G/DL (ref 12–17)
HGB UR QL STRIP.AUTO: ABNORMAL
IMM GRANULOCYTES # BLD AUTO: 0.01 THOUSAND/UL (ref 0–0.2)
IMM GRANULOCYTES NFR BLD AUTO: 0 % (ref 0–2)
KETONES UR STRIP-MCNC: NEGATIVE MG/DL
LEUKOCYTE ESTERASE UR QL STRIP: NEGATIVE
LYMPHOCYTES # BLD AUTO: 1.57 THOUSANDS/ΜL (ref 0.6–4.47)
LYMPHOCYTES NFR BLD AUTO: 32 % (ref 14–44)
MCH RBC QN AUTO: 32.4 PG (ref 26.8–34.3)
MCHC RBC AUTO-ENTMCNC: 33.5 G/DL (ref 31.4–37.4)
MCV RBC AUTO: 97 FL (ref 82–98)
MONOCYTES # BLD AUTO: 0.51 THOUSAND/ΜL (ref 0.17–1.22)
MONOCYTES NFR BLD AUTO: 10 % (ref 4–12)
NEUTROPHILS # BLD AUTO: 2.68 THOUSANDS/ΜL (ref 1.85–7.62)
NEUTS SEG NFR BLD AUTO: 54 % (ref 43–75)
NITRITE UR QL STRIP: NEGATIVE
NON-SQ EPI CELLS URNS QL MICRO: NORMAL /HPF
NRBC BLD AUTO-RTO: 0 /100 WBCS
PH UR STRIP.AUTO: 5.5 [PH]
PHOSPHATE SERPL-MCNC: 1.9 MG/DL (ref 2.7–4.5)
PLATELET # BLD AUTO: 250 THOUSANDS/UL (ref 149–390)
PMV BLD AUTO: 10.6 FL (ref 8.9–12.7)
POTASSIUM SERPL-SCNC: 4.9 MMOL/L (ref 3.5–5.3)
PROT UR STRIP-MCNC: NEGATIVE MG/DL
PROT UR-MCNC: 16 MG/DL
PROT/CREAT UR: 0.26 MG/G{CREAT} (ref 0–0.1)
PTH-INTACT SERPL-MCNC: 100.3 PG/ML (ref 18.4–80.1)
RBC # BLD AUTO: 4.63 MILLION/UL (ref 3.88–5.62)
RBC #/AREA URNS AUTO: NORMAL /HPF
SODIUM SERPL-SCNC: 141 MMOL/L (ref 136–145)
SP GR UR STRIP.AUTO: 1.02 (ref 1–1.03)
UROBILINOGEN UR QL STRIP.AUTO: 0.2 E.U./DL
WBC # BLD AUTO: 4.97 THOUSAND/UL (ref 4.31–10.16)
WBC #/AREA URNS AUTO: NORMAL /HPF

## 2021-11-05 PROCEDURE — 80197 ASSAY OF TACROLIMUS: CPT

## 2021-11-05 PROCEDURE — 84156 ASSAY OF PROTEIN URINE: CPT

## 2021-11-05 PROCEDURE — 82306 VITAMIN D 25 HYDROXY: CPT

## 2021-11-05 PROCEDURE — 81001 URINALYSIS AUTO W/SCOPE: CPT

## 2021-11-05 PROCEDURE — 82570 ASSAY OF URINE CREATININE: CPT

## 2021-11-05 PROCEDURE — 84100 ASSAY OF PHOSPHORUS: CPT

## 2021-11-05 PROCEDURE — 85025 COMPLETE CBC W/AUTO DIFF WBC: CPT

## 2021-11-05 PROCEDURE — 80048 BASIC METABOLIC PNL TOTAL CA: CPT

## 2021-11-05 PROCEDURE — 83970 ASSAY OF PARATHORMONE: CPT

## 2021-11-06 LAB — TACROLIMUS BLD-MCNC: 3.4 NG/ML (ref 2–20)

## 2021-11-10 DIAGNOSIS — Z94.0 KIDNEY TRANSPLANTED: ICD-10-CM

## 2021-11-10 RX ORDER — MYCOPHENOLATE MOFETIL 250 MG/1
CAPSULE ORAL
Qty: 270 CAPSULE | Refills: 3 | Status: SHIPPED | OUTPATIENT
Start: 2021-11-10

## 2021-11-18 ENCOUNTER — OFFICE VISIT (OUTPATIENT)
Dept: DERMATOLOGY | Facility: CLINIC | Age: 46
End: 2021-11-18
Payer: COMMERCIAL

## 2021-11-18 DIAGNOSIS — Z13.89 SCREENING FOR SKIN CONDITION: ICD-10-CM

## 2021-11-18 DIAGNOSIS — Z92.25 HISTORY OF IMMUNOSUPPRESSIVE THERAPY: ICD-10-CM

## 2021-11-18 DIAGNOSIS — L82.1 SEBORRHEIC KERATOSIS: Primary | ICD-10-CM

## 2021-11-18 DIAGNOSIS — L73.9 FOLLICULITIS: ICD-10-CM

## 2021-11-18 PROCEDURE — 87070 CULTURE OTHR SPECIMN AEROBIC: CPT | Performed by: DERMATOLOGY

## 2021-11-18 PROCEDURE — 99213 OFFICE O/P EST LOW 20 MIN: CPT | Performed by: DERMATOLOGY

## 2021-11-18 PROCEDURE — 87205 SMEAR GRAM STAIN: CPT | Performed by: DERMATOLOGY

## 2021-11-18 RX ORDER — DOXYCYCLINE HYCLATE 100 MG/1
100 CAPSULE ORAL EVERY 12 HOURS SCHEDULED
Qty: 28 CAPSULE | Refills: 0 | Status: SHIPPED | OUTPATIENT
Start: 2021-11-18 | End: 2021-11-18 | Stop reason: ALTCHOICE

## 2021-11-18 RX ORDER — DOXYCYCLINE HYCLATE 100 MG/1
100 CAPSULE ORAL EVERY 12 HOURS SCHEDULED
Qty: 14 CAPSULE | Refills: 0 | Status: SHIPPED | OUTPATIENT
Start: 2021-11-18 | End: 2021-12-02

## 2021-11-19 DIAGNOSIS — I82.4Z1 DEEP VEIN THROMBOSIS (DVT) OF DISTAL VEIN OF RIGHT LOWER EXTREMITY, UNSPECIFIED CHRONICITY (HCC): ICD-10-CM

## 2021-11-19 DIAGNOSIS — Z94.0 KIDNEY TRANSPLANTED: ICD-10-CM

## 2021-11-19 RX ORDER — WARFARIN SODIUM 4 MG/1
TABLET ORAL
Qty: 30 TABLET | Refills: 0 | Status: SHIPPED | OUTPATIENT
Start: 2021-11-19 | End: 2021-11-29

## 2021-11-20 RX ORDER — TACROLIMUS 0.5 MG/1
0.5 CAPSULE ORAL EVERY 12 HOURS SCHEDULED
Qty: 90 CAPSULE | Refills: 3 | Status: SHIPPED | OUTPATIENT
Start: 2021-11-20 | End: 2022-06-06

## 2021-11-21 LAB
BACTERIA WND AEROBE CULT: NORMAL
GRAM STN SPEC: NORMAL

## 2021-11-22 ENCOUNTER — TELEPHONE (OUTPATIENT)
Dept: DERMATOLOGY | Facility: CLINIC | Age: 46
End: 2021-11-22

## 2021-11-29 DIAGNOSIS — I82.4Z1 DEEP VEIN THROMBOSIS (DVT) OF DISTAL VEIN OF RIGHT LOWER EXTREMITY, UNSPECIFIED CHRONICITY (HCC): ICD-10-CM

## 2021-11-29 RX ORDER — WARFARIN SODIUM 4 MG/1
TABLET ORAL
Qty: 30 TABLET | Refills: 0 | Status: SHIPPED | OUTPATIENT
Start: 2021-11-29 | End: 2021-12-14

## 2021-12-14 DIAGNOSIS — I82.4Z1 DEEP VEIN THROMBOSIS (DVT) OF DISTAL VEIN OF RIGHT LOWER EXTREMITY, UNSPECIFIED CHRONICITY (HCC): ICD-10-CM

## 2021-12-14 RX ORDER — WARFARIN SODIUM 4 MG/1
TABLET ORAL
Qty: 30 TABLET | Refills: 0 | Status: SHIPPED | OUTPATIENT
Start: 2021-12-14 | End: 2021-12-21 | Stop reason: SDUPTHER

## 2021-12-21 ENCOUNTER — OFFICE VISIT (OUTPATIENT)
Dept: INTERNAL MEDICINE CLINIC | Facility: CLINIC | Age: 46
End: 2021-12-21
Payer: COMMERCIAL

## 2021-12-21 VITALS
WEIGHT: 173 LBS | HEIGHT: 67 IN | HEART RATE: 73 BPM | DIASTOLIC BLOOD PRESSURE: 80 MMHG | BODY MASS INDEX: 27.15 KG/M2 | TEMPERATURE: 97.8 F | OXYGEN SATURATION: 98 % | RESPIRATION RATE: 14 BRPM | SYSTOLIC BLOOD PRESSURE: 122 MMHG

## 2021-12-21 DIAGNOSIS — F33.2 SEVERE EPISODE OF RECURRENT MAJOR DEPRESSIVE DISORDER, WITHOUT PSYCHOTIC FEATURES (HCC): ICD-10-CM

## 2021-12-21 DIAGNOSIS — E66.3 OVERWEIGHT: ICD-10-CM

## 2021-12-21 DIAGNOSIS — I10 ESSENTIAL HYPERTENSION: Primary | ICD-10-CM

## 2021-12-21 DIAGNOSIS — I82.4Z1 DEEP VEIN THROMBOSIS (DVT) OF DISTAL VEIN OF RIGHT LOWER EXTREMITY, UNSPECIFIED CHRONICITY (HCC): ICD-10-CM

## 2021-12-21 DIAGNOSIS — N18.31 STAGE 3A CHRONIC KIDNEY DISEASE (HCC): ICD-10-CM

## 2021-12-21 DIAGNOSIS — Z94.0 HISTORY OF RENAL TRANSPLANT: ICD-10-CM

## 2021-12-21 DIAGNOSIS — Z23 ENCOUNTER FOR IMMUNIZATION: ICD-10-CM

## 2021-12-21 PROBLEM — R45.851 SUICIDAL IDEATIONS: Status: RESOLVED | Noted: 2019-06-14 | Resolved: 2021-12-21

## 2021-12-21 PROBLEM — R10.32 LEFT LOWER QUADRANT PAIN: Status: RESOLVED | Noted: 2019-01-31 | Resolved: 2021-12-21

## 2021-12-21 PROBLEM — E83.42 HYPOMAGNESEMIA: Status: RESOLVED | Noted: 2020-01-25 | Resolved: 2021-12-21

## 2021-12-21 PROBLEM — N17.9 AKI (ACUTE KIDNEY INJURY) (HCC): Status: RESOLVED | Noted: 2020-01-24 | Resolved: 2021-12-21

## 2021-12-21 PROCEDURE — 99214 OFFICE O/P EST MOD 30 MIN: CPT | Performed by: INTERNAL MEDICINE

## 2021-12-21 PROCEDURE — 90471 IMMUNIZATION ADMIN: CPT | Performed by: INTERNAL MEDICINE

## 2021-12-21 PROCEDURE — 90686 IIV4 VACC NO PRSV 0.5 ML IM: CPT | Performed by: INTERNAL MEDICINE

## 2021-12-21 PROCEDURE — 3008F BODY MASS INDEX DOCD: CPT | Performed by: INTERNAL MEDICINE

## 2021-12-21 RX ORDER — WARFARIN SODIUM 4 MG/1
TABLET ORAL
Qty: 30 TABLET | Refills: 0 | Status: SHIPPED | OUTPATIENT
Start: 2021-12-21 | End: 2022-01-11

## 2022-01-06 ENCOUNTER — TELEPHONE (OUTPATIENT)
Dept: INTERNAL MEDICINE CLINIC | Facility: CLINIC | Age: 47
End: 2022-01-06

## 2022-01-06 DIAGNOSIS — J06.9 UPPER RESPIRATORY TRACT INFECTION, UNSPECIFIED TYPE: Primary | ICD-10-CM

## 2022-01-06 NOTE — TELEPHONE ENCOUNTER
COVID test was ordered  Please tell him to come at 3:00 p m  tomorrow    Depending on the results which we will get on Monday, we can give him the letter for return to work

## 2022-01-06 NOTE — TELEPHONE ENCOUNTER
Started yesterday with symptoms:  Fever of: 100 5   Temp went down today: 99 1  Headache  Runny nose    Can a test for COVID be ordered? Please call the pt and let him know- when it's ordered  Pt trying to get back to work  Needs a letter for his work for [employer] time, when he was tested and the results

## 2022-01-07 ENCOUNTER — TELEPHONE (OUTPATIENT)
Dept: INTERNAL MEDICINE CLINIC | Facility: CLINIC | Age: 47
End: 2022-01-07

## 2022-01-11 ENCOUNTER — TELEPHONE (OUTPATIENT)
Dept: NEPHROLOGY | Facility: CLINIC | Age: 47
End: 2022-01-11

## 2022-01-11 NOTE — TELEPHONE ENCOUNTER
I left message for patient offering for his appointment to be virtual on 1/17 with Dr Divya Hagan recommended for this appointment to be virtual due to the patient being a transplant patient  Patient still can come in for this appointment if the patient prefers to come in office  Patient must have video capabilities for a virtual appointment

## 2022-01-12 ENCOUNTER — TELEPHONE (OUTPATIENT)
Dept: NEPHROLOGY | Facility: CLINIC | Age: 47
End: 2022-01-12

## 2022-01-12 ENCOUNTER — TELEPHONE (OUTPATIENT)
Dept: OTHER | Facility: OTHER | Age: 47
End: 2022-01-12

## 2022-01-12 ENCOUNTER — APPOINTMENT (OUTPATIENT)
Dept: LAB | Facility: HOSPITAL | Age: 47
End: 2022-01-12
Payer: COMMERCIAL

## 2022-01-12 DIAGNOSIS — E83.39 HYPOPHOSPHATEMIA: Primary | ICD-10-CM

## 2022-01-12 DIAGNOSIS — I10 ESSENTIAL HYPERTENSION: ICD-10-CM

## 2022-01-12 DIAGNOSIS — Z86.718 HISTORY OF DVT (DEEP VEIN THROMBOSIS): Primary | ICD-10-CM

## 2022-01-12 LAB
CHOLEST SERPL-MCNC: 202 MG/DL
HDLC SERPL-MCNC: 44 MG/DL
LDLC SERPL CALC-MCNC: 127 MG/DL (ref 0–100)
NONHDLC SERPL-MCNC: 158 MG/DL
TRIGL SERPL-MCNC: 154 MG/DL
TSH SERPL DL<=0.05 MIU/L-ACNC: 1.39 UIU/ML (ref 0.36–3.74)

## 2022-01-12 PROCEDURE — 84443 ASSAY THYROID STIM HORMONE: CPT

## 2022-01-12 PROCEDURE — 80061 LIPID PANEL: CPT

## 2022-01-12 NOTE — TELEPHONE ENCOUNTER
Hello    Ok, then, Please ask pt to start kphos supplement - one tab twice daily   I sent to local pharmacy    Please ask pt to repeat BMP, phos, CBC (WBC slightly lower so need to trend) and tac level in one week    Thanks    np

## 2022-01-12 NOTE — TELEPHONE ENCOUNTER
----- Message from Viktor Ryan MD sent at 1/12/2022  1:06 PM EST -----  Hello    Patient normally is followed up by Ms Herbert Parra  Please let pt know that phos is low   Can drink one can of diet coke once a day until his appt next week  - rest of labs pending  - dona yen    Thank you    np

## 2022-01-12 NOTE — TELEPHONE ENCOUNTER
Called pt   And stated he did not have pt/inr done as order  order added called lab to add to blood from today can not be done as needs blue top tube , called pt  and was notified and will have done next week as he is working this week

## 2022-01-12 NOTE — TELEPHONE ENCOUNTER
Left message for pt regarding the above, pt has appt 1/18 with Dr Glory Harp, will give lab orders at that time

## 2022-01-17 ENCOUNTER — TELEPHONE (OUTPATIENT)
Dept: NEPHROLOGY | Facility: CLINIC | Age: 47
End: 2022-01-17

## 2022-01-18 ENCOUNTER — OFFICE VISIT (OUTPATIENT)
Dept: NEPHROLOGY | Facility: CLINIC | Age: 47
End: 2022-01-18
Payer: COMMERCIAL

## 2022-01-18 VITALS
SYSTOLIC BLOOD PRESSURE: 114 MMHG | WEIGHT: 162.2 LBS | HEIGHT: 66 IN | BODY MASS INDEX: 26.07 KG/M2 | DIASTOLIC BLOOD PRESSURE: 82 MMHG

## 2022-01-18 DIAGNOSIS — E78.5 HYPERLIPIDEMIA, UNSPECIFIED HYPERLIPIDEMIA TYPE: Primary | ICD-10-CM

## 2022-01-18 DIAGNOSIS — Z94.0 RENAL TRANSPLANT RECIPIENT: ICD-10-CM

## 2022-01-18 PROCEDURE — 99215 OFFICE O/P EST HI 40 MIN: CPT | Performed by: INTERNAL MEDICINE

## 2022-01-18 RX ORDER — ROSUVASTATIN CALCIUM 5 MG/1
5 TABLET, COATED ORAL DAILY
Qty: 90 TABLET | Refills: 3 | Status: SHIPPED | OUTPATIENT
Start: 2022-01-18

## 2022-01-18 NOTE — PROGRESS NOTES
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 (diagnosed at 15 yo), living related from kidney swap renal transplant in 2009 at [facility] (patient's brother was donor involved in swap), was on peritoneal dialysis for 9 months prior to transplant, DVT X 4 prior, HTN, depression, ADHD, admission in December of 2017 for hematuria and at that time was treated for urinary tract infection/pyelonephritis  Then admission in January 2020 for FRANCES with Cr to 3 4 mg/dL in setting of n/v/d  End of October 2020, patient had missed tacrolimus for one week and creat had increased to 2 1 requiring steroid therapy  Pt presenting for f/u visit       CXR - 1/23/2020 - no acute disease     CT abd/pel - atrophic native kidneys, unremarkable renal transplant     U/s liver - 2 2 cm R hepatic echogenic mass reflecting hemangioma     1) CKD III, renal transplant -      History obtained from [facility], and [facility] and here at [facility] [place] [place]:     -Baseline Cr 1 6-1 9 mg/dL mg/dL;  -Tac levels from 2009 - 2014 were 7-10  -Patient followed at [facility] - followed with Dr Tunde Ballesteros  -Prior baseline Cr 1 4-1 5 mg/dL and had been rising to 1 8 mg/dL  -pt had early CMV - on EGD  - last time seen was in May 2017 at [facility] - no known rejection ([phone])  -Has never had renal biopsy post transplant     Old Labwork review:     - CT abd/pel without hydro or nephrolithiasis on transplanted kidney  - to note, patient has had microscopic hematuria as far back as 2014 on UA  - BK and CMV negative 10/2020  - urine eos 0%     October 2020:       -  pt had undetectable tac level end of October   Pt states missed one week of tac due to not being able to obtain the medication  - creat had increased to 2 1 end of October   Patient was given three doses of Solu-Medrol starting October 24th  - Alverto Rudolph completed on November 5th-no class 2 DSA, no class 1 DSA, PRA 15%, patient has watch antibodies      since then, the creatinine has ranged from approximately 1 6-2 mg/dL        Important Medication notes:   - on diltiazem to note (interaction with tacrolimus)     1/2022 - creat stable 1 68 mg/dL  Stable sodium, potassium  Phos low 1 8  WBC 3 76  UA no RBC, no WBC  UPCR 0 52, slightly higher  Tac level 4 8       Plan:    - phos low but kphos backordered at pt's pharmacy but has now  Pt will  today  - start crestor low dose  Check CPK next labs  - WBC slightly lower - repeat in 2-3 weeks  - overall, the patient has slow progression of renal dysfunction  Currently renal function is stable and at baseline  Proteinuria is minimal   No significant microscopic hematuria  Patient is more compliant with his medications per his reporting   -  We will see the patient in 6 months  -  Continue interval care with primary nephrologist Dr Alek Cheung as doing  - labwork every 2-3 months   - to note, if biopsy is needed  In the future will need bridge with heparin as is on coumadin for mult DVT prior  - to note, pt is on diltiazem  - INR check needed     2) Immune Suppression     - June 2020 - pt did not have tacrolimus for a few days because he was waiting on refill and did not realize that it was backordered  Issue resolved and restarted tacrolimus  - 10/2020 - did not have tac for one week and see above    - January 2021-tacrolimus dose was lowered to 1 mg in the morning and 0 5 mg in the evening due to  supratherapeutic level  -  in AM and 250 mg PM  - history CMV  - pt has had labile tacrolimus levels  - discussed compliance importance    3) DVT - recurrent DVT   Most recent march 2018     - 4 prior DVTs  - 1 RUE post PICC  - then 3 in LE  - further plans per primary team  - INR per Primary team --> last INR 11/2021, no recent     4) Vaccines     - stay up to date on vaccine with flu vaccine  - PNA vaccine - pt will review with Primary Physician  - no live vaccines  - pt had 2 inj for covid vaccine     5) Age appropriate Ca screening     - saw Derm  11/2021  Was placed on doxy for 1 week due to folliculitis concerning for MRSA  - has history of psoriaform dermatitis      6) HTN     - pt is on diltiazem     7) HPL     - further plan per primary team   -would continue to monitor lipid panel closely  -consider statin if needed     9) Depression     -prior suicide attempts - prior 2003  - pt knows to go to the hospital  - will be attempting ECT  - patient follows with Psychiatry     10) Hb - monitor     11) IgA nephropathy native disease      monitor for recurrence     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     - defer to Primary Nephrology for f/u     13) liver lesion on u/s in hospital     - see above for GI f/u  - saw GI in April  -  Repeat renal ultrasound  - pt saw GI 4/2021 --> MRI completed in may - no evidence of hemangioma or mass;     14) COVID infection in end of dec 2021 - advised booster march or April  90 days after infection      It was a pleasure evaluating your patient  Thank you for allowing our team to participate in the care of Mr Arnav Joshi  Please do not hesitate to contact our team if further issues/questions shall arise in the interim      No problem-specific Assessment & Plan notes found for this encounter  HPI:    Pt denies complaints  States may have had covid infection end of December  Not tested but family had and pt had mild symptoms  PATIENT INSTRUCTIONS:    Patient Instructions   1) Avoid NSAIDS - (Example - motrin, advil, ibuprofen, aleve, exederin, etc)  2) Always follow a low salt diet  3) please have third injection for covid vaccine end of march/early April  4) start crestor one tablet every night   If you have muscle aches or pains, stop the medicine and call  5) please start phosphorous tablet one tablet twice daily   6) labwork in 3-4 weeks  7) then labs every 2-3 months  8) appointment in 6 months        OBJECTIVE:  Current Weight: Weight - Scale: 73 6 kg (162 lb 3 2 oz)  Vitals:    01/18/22 0808   BP: 114/82   BP Location: Right arm   Patient Position: Sitting   Cuff Size: Adult   Weight: 73 6 kg (162 lb 3 2 oz)   Height: 5' 6" (1 676 m)    Body mass index is 26 18 kg/m²  REVIEW OF SYSTEMS:    Review of Systems   Constitutional: Negative  Negative for appetite change and fatigue  HENT: Negative  Eyes: Negative  Respiratory: Negative  Negative for shortness of breath  Cardiovascular: Negative  Negative for leg swelling  Gastrointestinal: Negative  Endocrine: Negative  Genitourinary: Negative  Negative for difficulty urinating  Musculoskeletal: Negative  Allergic/Immunologic: Negative  Neurological: Negative  Hematological: Negative  Psychiatric/Behavioral: Negative  All other systems reviewed and are negative  PHYSICAL EXAM:      Physical Exam  Vitals and nursing note reviewed  Constitutional:       General: He is not in acute distress  Appearance: He is well-developed  He is not diaphoretic  HENT:      Head: Normocephalic and atraumatic  Eyes:      General: No scleral icterus  Right eye: No discharge  Left eye: No discharge  Conjunctiva/sclera: Conjunctivae normal    Neck:      Vascular: No JVD  Cardiovascular:      Rate and Rhythm: Normal rate and regular rhythm  Heart sounds: Normal heart sounds  No murmur heard  No friction rub  No gallop  Pulmonary:      Effort: Pulmonary effort is normal  No respiratory distress  Breath sounds: Normal breath sounds  No wheezing or rales  Chest:      Chest wall: No tenderness  Abdominal:      General: Bowel sounds are normal  There is no distension  Palpations: Abdomen is soft  Tenderness: There is no abdominal tenderness  There is no rebound  Musculoskeletal:         General: No tenderness or deformity  Normal range of motion  Cervical back: Normal range of motion and neck supple  Skin:     General: Skin is warm and dry  Coloration: Skin is not pale  Findings: No erythema or rash  Neurological:      Mental Status: He is alert and oriented to person, place, and time  Cranial Nerves: No cranial nerve deficit  Coordination: Coordination normal       Deep Tendon Reflexes: Reflexes are normal and symmetric  Psychiatric:         Behavior: Behavior normal          Thought Content:  Thought content normal          Judgment: Judgment normal          Medications:    Current Outpatient Medications:     Cholecalciferol (VITAMIN D-3) 1000 units CAPS, Take 2,000 Units by mouth daily, Disp: , Rfl:     diltiazem (CARDIZEM) 120 MG tablet, Take 1 tablet (120 mg total) by mouth every 12 (twelve) hours, Disp: 180 tablet, Rfl: 2    LATUDA 60 MG TABS, Take 80 mg by mouth daily , Disp: , Rfl: 2    methylphenidate (CONCERTA) 36 MG ER tablet, Take 54 mg by mouth daily , Disp: , Rfl:     mycophenolate (CELLCEPT) 250 mg capsule, Take 500 mg in AM and 250 mg in PM , Disp: 270 capsule, Rfl: 3    potassium phosphate, monobasic, (K-PHOS) 500 MG tablet, Take 1 tablet (500 mg total) by mouth 2 (two) times a day, Disp: 180 tablet, Rfl: 3    sertraline (ZOLOFT) 50 mg tablet, Take 200 mg by mouth daily , Disp: , Rfl: 0    tacrolimus (PROGRAF) 0 5 mg capsule, Take 1 capsule (0 5 mg total) by mouth every 12 (twelve) hours, Disp: 90 capsule, Rfl: 3    warfarin (COUMADIN) 4 mg tablet, TAKE 2 TABLETS BY MOUTH EVERY DAY, Disp: 60 tablet, Rfl: 0    rosuvastatin (CRESTOR) 5 mg tablet, Take 1 tablet (5 mg total) by mouth daily, Disp: 90 tablet, Rfl: 3    Laboratory Results:  Results from last 7 days   Lab Units 01/12/22  1204   WBC Thousand/uL 3 76*   HEMOGLOBIN g/dL 15 2   HEMATOCRIT % 46 4 PLATELETS Thousands/uL 198   POTASSIUM mmol/L 4 0   CHLORIDE mmol/L 103   CO2 mmol/L 25   BUN mg/dL 17   CREATININE mg/dL 1 68*   CALCIUM mg/dL 9 7   MAGNESIUM mg/dL 2 0   PHOSPHORUS mg/dL 1 8*       Results for orders placed or performed in visit on 01/12/22   Lipid panel   Result Value Ref Range    Cholesterol 202 (H) See Comment mg/dL    Triglycerides 154 (H) See Comment mg/dL    HDL, Direct 44 >=40 mg/dL    LDL Calculated 127 (H) 0 - 100 mg/dL    Non-HDL-Chol (CHOL-HDL) 158 mg/dl   TSH, 3rd generation   Result Value Ref Range    TSH 3RD GENERATON 1 389 0 358 - 3 740 uIU/mL

## 2022-01-18 NOTE — PATIENT INSTRUCTIONS
1) Avoid NSAIDS - (Example - motrin, advil, ibuprofen, aleve, exederin, etc)  2) Always follow a low salt diet  3) please have third injection for covid vaccine end of march/early April  4) start crestor one tablet every night   If you have muscle aches or pains, stop the medicine and call  5) please start phosphorous tablet one tablet twice daily   6) labwork in 3-4 weeks  7) then labs every 2-3 months  8) appointment in 6 months

## 2022-01-18 NOTE — LETTER
January 18, 2022     Cordell Zhang MD  [address] [address]    Patient: Abril Hall   YOB: 1975   Date of Visit: 1/18/2022       Dear Dr Lula Smith:    Thank you for referring Abril Hall to me for evaluation  Below are my notes for this consultation  If you have questions, please do not hesitate to call me  I look forward to following your patient along with you  Sincerely,        Escobar Flores MD        CC: Lazarus Hooker, MD Rober Stair, MD  1/18/2022  8:33 AM  Sign when Signing Visit  NEPHROLOGY OFFICE VISIT   Abril Hall 48 y o  male MRN: [id number]  1/18/2022    Reason for Visit: renal transplant recipient    ASSESSMENT and PLAN:    I had the pleasure of seeing Mr Omayra Barton today in the renal clinic for the continued management of renal transplant recipient  58-year-old male with a past medical history of 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 (diagnosed at 15 yo), living related from kidney swap renal transplant in 2009 at [facility] (patient's brother was donor involved in swap), was on peritoneal dialysis for 9 months prior to transplant, DVT X 4 prior, HTN, depression, ADHD, admission in December of 2017 for hematuria and at that time was treated for urinary tract infection/pyelonephritis  Then admission in January 2020 for FRANCES with Cr to 3 4 mg/dL in setting of n/v/d  End of October 2020, patient had missed tacrolimus for one week and creat had increased to 2 1 requiring steroid therapy   Pt presenting for f/u visit       CXR - 1/23/2020 - no acute disease     CT abd/pel - atrophic native kidneys, unremarkable renal transplant     U/s liver - 2 2 cm R hepatic echogenic mass reflecting hemangioma     1) CKD III, renal transplant -      History obtained from [facility], and [facility] and here at [address] [place] [address] Blvd records:     -Baseline Cr 1 6-1 9 mg/dL mg/dL;  -Tac levels from 2009 - 2014 were 7-10  -Patient followed at [facility] - followed with  Luis   -Prior baseline Cr 1 4-1 5 mg/dL and had been rising to 1 8 mg/dL  -pt had early CMV - on EGD  - last time seen was in May 2017 at [facility] - no known rejection ([phone])  -Has never had renal biopsy post transplant     Old Labwork review:     - CT abd/pel without hydro or nephrolithiasis on transplanted kidney  - to note, patient has had microscopic hematuria as far back as 2014 on UA  - BK and CMV negative 10/2020  - urine eos 0%     October 2020:       -  pt had undetectable tac level end of October  Pt states missed one week of tac due to not being able to obtain the medication  - creat had increased to 2 1 end of October   Patient was given three doses of Solu-Medrol starting October 24th  - Verna Brewster completed on November 5th-no class 2 DSA, no class 1 DSA, PRA 15%, patient has watch antibodies      since then, the creatinine has ranged from approximately 1 6-2 mg/dL        Important Medication notes:   - on diltiazem to note (interaction with tacrolimus)     1/2022 - creat stable 1 68 mg/dL  Stable sodium, potassium  Phos low 1 8  WBC 3 76  UA no RBC, no WBC  UPCR 0 52, slightly higher  Tac level 4 8       Plan:    - phos low but kphos backordered at pt's pharmacy but has now  Pt will  today  - start crestor low dose  Check CPK next labs  - WBC slightly lower - repeat in 2-3 weeks  - overall, the patient has slow progression of renal dysfunction  Currently renal function is stable and at baseline  Proteinuria is minimal   No significant microscopic hematuria    Patient is more compliant with his medications per his reporting   -  We will see the patient in 6 months  -  Continue interval care with primary nephrologist Dr Mahesh Fernando as doing  - labwork every 2-3 months   - to note, if biopsy is needed  In the future will need bridge with heparin as is on coumadin for mult DVT prior  - to note, pt is on diltiazem  - INR check needed     2) Immune Suppression     - June 2020 - pt did not have tacrolimus for a few days because he was waiting on refill and did not realize that it was backordered  Issue resolved and restarted tacrolimus  - 10/2020 - did not have tac for one week and see above    - January 2021-tacrolimus dose was lowered to 1 mg in the morning and 0 5 mg in the evening due to  supratherapeutic level  -  in AM and 250 mg PM  - history CMV  - pt has had labile tacrolimus levels  - discussed compliance importance    3) DVT - recurrent DVT  Most recent march 2018     - 4 prior DVTs  - 1 RUE post PICC  - then 3 in LE  - further plans per primary team  - INR per Primary team --> last INR 11/2021, no recent     4) Vaccines     - stay up to date on vaccine with flu vaccine  - PNA vaccine - pt will review with Primary Physician  - no live vaccines  - pt had 2 inj for covid vaccine     5) Age appropriate Ca screening     - saw Derm  11/2021  Was placed on doxy for 1 week due to folliculitis concerning for MRSA  - has history of psoriaform dermatitis      6) HTN     - pt is on diltiazem     7) HPL     - further plan per primary team   -would continue to monitor lipid panel closely  -consider statin if needed     9) Depression     -prior suicide attempts - prior 2003  - pt knows to go to the hospital  - will be attempting ECT  - patient follows with Psychiatry     10) Hb - monitor     11) IgA nephropathy native disease      monitor for recurrence     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     - defer to Primary Nephrology for f/u     13) liver lesion on u/s in hospital     - see above for GI f/u  - saw GI in April  -  Repeat renal ultrasound  - pt saw GI 4/2021 --> MRI completed in may - no evidence of hemangioma or mass;     14) COVID infection in end of dec 2021 - advised booster march or April  90 days after infection      It was a pleasure evaluating your patient  Thank you for allowing our team to participate in the care of Mr Arnav Joshi   Please do not hesitate to contact our team if further issues/questions Previous Labs: Yes shall arise in the interim      No problem-specific Assessment & Plan notes found for this encounter  HPI:    Pt denies complaints  States may have had covid infection end of December  Not tested but family had and pt had mild symptoms  PATIENT INSTRUCTIONS:    Patient Instructions   1) Avoid NSAIDS - (Example - motrin, advil, ibuprofen, aleve, exederin, etc)  2) Always follow a low salt diet  3) please have third injection for covid vaccine end of march/early April  4) start crestor one tablet every night  If you have muscle aches or pains, stop the medicine and call  5) please start phosphorous tablet one tablet twice daily   6) labwork in 3-4 weeks  7) then labs every 2-3 months  8) appointment in 6 months        OBJECTIVE:  Current Weight: Weight - Scale: 73 6 kg (162 lb 3 2 oz)  Vitals:    01/18/22 0808   BP: 114/82   BP Location: Right arm   Patient Position: Sitting   Cuff Size: Adult   Weight: 73 6 kg (162 lb 3 2 oz)   Height: 5' 6" (1 676 m)    Body mass index is 26 18 kg/m²  REVIEW OF SYSTEMS:    Review of Systems   Constitutional: Negative  Negative for appetite change and fatigue  HENT: Negative  Eyes: Negative  Respiratory: Negative  Negative for shortness of breath  Cardiovascular: Negative  Negative for leg swelling  Gastrointestinal: Negative  Endocrine: Negative  Genitourinary: Negative  Negative for difficulty urinating  Musculoskeletal: Negative  Allergic/Immunologic: Negative  Neurological: Negative  Hematological: Negative  Psychiatric/Behavioral: Negative  All other systems reviewed and are negative  PHYSICAL EXAM:      Physical Exam  Vitals and nursing note reviewed  Constitutional:       General: He is not in acute distress  Appearance: He is well-developed  He is not diaphoretic  HENT:      Head: Normocephalic and atraumatic  Eyes:      General: No scleral icterus  Right eye: No discharge           Left eye: No When Were The Labs Drawn? (Drawn...): [facility] discharge  Conjunctiva/sclera: Conjunctivae normal    Neck:      Vascular: No JVD  Cardiovascular:      Rate and Rhythm: Normal rate and regular rhythm  Heart sounds: Normal heart sounds  No murmur heard  No friction rub  No gallop  Pulmonary:      Effort: Pulmonary effort is normal  No respiratory distress  Breath sounds: Normal breath sounds  No wheezing or rales  Chest:      Chest wall: No tenderness  Abdominal:      General: Bowel sounds are normal  There is no distension  Palpations: Abdomen is soft  Tenderness: There is no abdominal tenderness  There is no rebound  Musculoskeletal:         General: No tenderness or deformity  Normal range of motion  Cervical back: Normal range of motion and neck supple  Skin:     General: Skin is warm and dry  Coloration: Skin is not pale  Findings: No erythema or rash  Neurological:      Mental Status: He is alert and oriented to person, place, and time  Cranial Nerves: No cranial nerve deficit  Coordination: Coordination normal       Deep Tendon Reflexes: Reflexes are normal and symmetric  Psychiatric:         Behavior: Behavior normal          Thought Content:  Thought content normal          Judgment: Judgment normal          Medications:    Current Outpatient Medications:     Cholecalciferol (VITAMIN D-3) 1000 units CAPS, Take 2,000 Units by mouth daily, Disp: , Rfl:     diltiazem (CARDIZEM) 120 MG tablet, Take 1 tablet (120 mg total) by mouth every 12 (twelve) hours, Disp: 180 tablet, Rfl: 2    LATUDA 60 MG TABS, Take 80 mg by mouth daily , Disp: , Rfl: 2    methylphenidate (CONCERTA) 36 MG ER tablet, Take 54 mg by mouth daily , Disp: , Rfl:     mycophenolate (CELLCEPT) 250 mg capsule, Take 500 mg in AM and 250 mg in PM , Disp: 270 capsule, Rfl: 3    potassium phosphate, monobasic, (K-PHOS) 500 MG tablet, Take 1 tablet (500 mg total) by mouth 2 (two) times a day, Disp: 180 tablet, Rfl: 3   sertraline (ZOLOFT) 50 mg tablet, Take 200 mg by mouth daily , Disp: , Rfl: 0    tacrolimus (PROGRAF) 0 5 mg capsule, Take 1 capsule (0 5 mg total) by mouth every 12 (twelve) hours, Disp: 90 capsule, Rfl: 3    warfarin (COUMADIN) 4 mg tablet, TAKE 2 TABLETS BY MOUTH EVERY DAY, Disp: 60 tablet, Rfl: 0    rosuvastatin (CRESTOR) 5 mg tablet, Take 1 tablet (5 mg total) by mouth daily, Disp: 90 tablet, Rfl: 3    Laboratory Results:  Results from last 7 days   Lab Units 01/12/22  1204   WBC Thousand/uL 3 76*   HEMOGLOBIN g/dL 15 2   HEMATOCRIT % 46 4   PLATELETS Thousands/uL 198   POTASSIUM mmol/L 4 0   CHLORIDE mmol/L 103   CO2 mmol/L 25   BUN mg/dL 17   CREATININE mg/dL 1 68*   CALCIUM mg/dL 9 7   MAGNESIUM mg/dL 2 0   PHOSPHORUS mg/dL 1 8*       Results for orders placed or performed in visit on 01/12/22   Lipid panel   Result Value Ref Range    Cholesterol 202 (H) See Comment mg/dL    Triglycerides 154 (H) See Comment mg/dL    HDL, Direct 44 >=40 mg/dL    LDL Calculated 127 (H) 0 - 100 mg/dL    Non-HDL-Chol (CHOL-HDL) 158 mg/dl   TSH, 3rd generation   Result Value Ref Range    TSH 3RD GENERATON 1 389 0 358 - 3 740 uIU/mL

## 2022-03-24 ENCOUNTER — TELEPHONE (OUTPATIENT)
Dept: NEPHROLOGY | Facility: CLINIC | Age: 47
End: 2022-03-24

## 2022-03-30 DIAGNOSIS — I82.4Z1 DEEP VEIN THROMBOSIS (DVT) OF DISTAL VEIN OF RIGHT LOWER EXTREMITY, UNSPECIFIED CHRONICITY (HCC): ICD-10-CM

## 2022-03-31 RX ORDER — WARFARIN SODIUM 4 MG/1
TABLET ORAL
Qty: 60 TABLET | Refills: 0 | Status: SHIPPED | OUTPATIENT
Start: 2022-03-31 | End: 2022-04-27

## 2022-04-26 DIAGNOSIS — I82.4Z1 DEEP VEIN THROMBOSIS (DVT) OF DISTAL VEIN OF RIGHT LOWER EXTREMITY, UNSPECIFIED CHRONICITY (HCC): ICD-10-CM

## 2022-04-27 RX ORDER — WARFARIN SODIUM 4 MG/1
TABLET ORAL
Qty: 60 TABLET | Refills: 0 | Status: SHIPPED | OUTPATIENT
Start: 2022-04-27 | End: 2022-05-13

## 2022-06-02 ENCOUNTER — TELEPHONE (OUTPATIENT)
Dept: NEPHROLOGY | Facility: CLINIC | Age: 47
End: 2022-06-02

## 2022-06-02 ENCOUNTER — APPOINTMENT (OUTPATIENT)
Dept: LAB | Facility: HOSPITAL | Age: 47
End: 2022-06-02
Payer: COMMERCIAL

## 2022-06-02 ENCOUNTER — ANTICOAG VISIT (OUTPATIENT)
Dept: INTERNAL MEDICINE CLINIC | Facility: CLINIC | Age: 47
End: 2022-06-02

## 2022-06-02 DIAGNOSIS — Z94.0 RENAL TRANSPLANT RECIPIENT: Primary | ICD-10-CM

## 2022-06-02 DIAGNOSIS — Z94.0 RENAL TRANSPLANT RECIPIENT: ICD-10-CM

## 2022-06-02 LAB
25(OH)D3 SERPL-MCNC: 38.1 NG/ML (ref 30–100)
ANION GAP SERPL CALCULATED.3IONS-SCNC: 9 MMOL/L (ref 4–13)
BACTERIA UR QL AUTO: ABNORMAL /HPF
BASOPHILS # BLD AUTO: 0.06 THOUSANDS/ΜL (ref 0–0.1)
BASOPHILS NFR BLD AUTO: 1 % (ref 0–1)
BILIRUB UR QL STRIP: NEGATIVE
BUN SERPL-MCNC: 33 MG/DL (ref 5–25)
CALCIUM SERPL-MCNC: 9.3 MG/DL (ref 8.3–10.1)
CHLORIDE SERPL-SCNC: 105 MMOL/L (ref 100–108)
CLARITY UR: CLEAR
CO2 SERPL-SCNC: 23 MMOL/L (ref 21–32)
COLOR UR: YELLOW
CREAT SERPL-MCNC: 1.51 MG/DL (ref 0.6–1.3)
CREAT UR-MCNC: 114 MG/DL
EOSINOPHIL # BLD AUTO: 0.13 THOUSAND/ΜL (ref 0–0.61)
EOSINOPHIL NFR BLD AUTO: 2 % (ref 0–6)
ERYTHROCYTE [DISTWIDTH] IN BLOOD BY AUTOMATED COUNT: 12.7 % (ref 11.6–15.1)
GFR SERPL CREATININE-BSD FRML MDRD: 53 ML/MIN/1.73SQ M
GLUCOSE P FAST SERPL-MCNC: 85 MG/DL (ref 65–99)
GLUCOSE UR STRIP-MCNC: NEGATIVE MG/DL
HCT VFR BLD AUTO: 46.4 % (ref 36.5–49.3)
HGB BLD-MCNC: 15.7 G/DL (ref 12–17)
HGB UR QL STRIP.AUTO: ABNORMAL
IMM GRANULOCYTES # BLD AUTO: 0.01 THOUSAND/UL (ref 0–0.2)
IMM GRANULOCYTES NFR BLD AUTO: 0 % (ref 0–2)
KETONES UR STRIP-MCNC: NEGATIVE MG/DL
LEUKOCYTE ESTERASE UR QL STRIP: NEGATIVE
LYMPHOCYTES # BLD AUTO: 1.98 THOUSANDS/ΜL (ref 0.6–4.47)
LYMPHOCYTES NFR BLD AUTO: 32 % (ref 14–44)
MCH RBC QN AUTO: 32.1 PG (ref 26.8–34.3)
MCHC RBC AUTO-ENTMCNC: 33.8 G/DL (ref 31.4–37.4)
MCV RBC AUTO: 95 FL (ref 82–98)
MONOCYTES # BLD AUTO: 0.59 THOUSAND/ΜL (ref 0.17–1.22)
MONOCYTES NFR BLD AUTO: 10 % (ref 4–12)
NEUTROPHILS # BLD AUTO: 3.44 THOUSANDS/ΜL (ref 1.85–7.62)
NEUTS SEG NFR BLD AUTO: 55 % (ref 43–75)
NITRITE UR QL STRIP: NEGATIVE
NON-SQ EPI CELLS URNS QL MICRO: ABNORMAL /HPF
NRBC BLD AUTO-RTO: 0 /100 WBCS
PH UR STRIP.AUTO: 6 [PH]
PHOSPHATE SERPL-MCNC: 2.1 MG/DL (ref 2.7–4.5)
PLATELET # BLD AUTO: 205 THOUSANDS/UL (ref 149–390)
PMV BLD AUTO: 10.9 FL (ref 8.9–12.7)
POTASSIUM SERPL-SCNC: 4.8 MMOL/L (ref 3.5–5.3)
PROT UR STRIP-MCNC: NEGATIVE MG/DL
PROT UR-MCNC: 20 MG/DL
PROT/CREAT UR: 0.18 MG/G{CREAT} (ref 0–0.1)
PTH-INTACT SERPL-MCNC: 101.5 PG/ML (ref 18.4–80.1)
RBC # BLD AUTO: 4.89 MILLION/UL (ref 3.88–5.62)
RBC #/AREA URNS AUTO: ABNORMAL /HPF
SODIUM SERPL-SCNC: 137 MMOL/L (ref 136–145)
SP GR UR STRIP.AUTO: 1.02 (ref 1–1.03)
UROBILINOGEN UR QL STRIP.AUTO: 0.2 E.U./DL
WBC # BLD AUTO: 6.21 THOUSAND/UL (ref 4.31–10.16)
WBC #/AREA URNS AUTO: ABNORMAL /HPF

## 2022-06-02 PROCEDURE — 80048 BASIC METABOLIC PNL TOTAL CA: CPT

## 2022-06-02 PROCEDURE — 81001 URINALYSIS AUTO W/SCOPE: CPT

## 2022-06-02 PROCEDURE — 82570 ASSAY OF URINE CREATININE: CPT

## 2022-06-02 PROCEDURE — 85025 COMPLETE CBC W/AUTO DIFF WBC: CPT

## 2022-06-02 PROCEDURE — 80197 ASSAY OF TACROLIMUS: CPT

## 2022-06-02 PROCEDURE — 82306 VITAMIN D 25 HYDROXY: CPT

## 2022-06-02 PROCEDURE — 83970 ASSAY OF PARATHORMONE: CPT

## 2022-06-02 PROCEDURE — 84156 ASSAY OF PROTEIN URINE: CPT

## 2022-06-02 PROCEDURE — 84100 ASSAY OF PHOSPHORUS: CPT

## 2022-06-03 LAB — TACROLIMUS BLD-MCNC: 4.3 NG/ML (ref 2–20)

## 2022-06-06 DIAGNOSIS — Z94.0 KIDNEY TRANSPLANTED: ICD-10-CM

## 2022-06-06 RX ORDER — TACROLIMUS 0.5 MG/1
0.5 CAPSULE ORAL EVERY 12 HOURS SCHEDULED
Qty: 60 CAPSULE | Refills: 5 | Status: SHIPPED | OUTPATIENT
Start: 2022-06-06 | End: 2022-07-04

## 2022-06-08 ENCOUNTER — OFFICE VISIT (OUTPATIENT)
Dept: NEPHROLOGY | Facility: CLINIC | Age: 47
End: 2022-06-08
Payer: COMMERCIAL

## 2022-06-08 VITALS
OXYGEN SATURATION: 97 % | BODY MASS INDEX: 26.76 KG/M2 | HEIGHT: 68 IN | DIASTOLIC BLOOD PRESSURE: 80 MMHG | TEMPERATURE: 97.3 F | SYSTOLIC BLOOD PRESSURE: 110 MMHG | RESPIRATION RATE: 16 BRPM | WEIGHT: 176.6 LBS | HEART RATE: 84 BPM

## 2022-06-08 DIAGNOSIS — N18.31 STAGE 3A CHRONIC KIDNEY DISEASE (HCC): ICD-10-CM

## 2022-06-08 DIAGNOSIS — E83.9 CHRONIC KIDNEY DISEASE-MINERAL AND BONE DISORDER: ICD-10-CM

## 2022-06-08 DIAGNOSIS — N02.8 RECURRENT IGA NEPHROPATHY AFTER KIDNEY TRANSPLANTATION: ICD-10-CM

## 2022-06-08 DIAGNOSIS — Z94.0 RENAL TRANSPLANT RECIPIENT: Primary | ICD-10-CM

## 2022-06-08 DIAGNOSIS — T86.19 RECURRENT IGA NEPHROPATHY AFTER KIDNEY TRANSPLANTATION: ICD-10-CM

## 2022-06-08 DIAGNOSIS — M89.9 CHRONIC KIDNEY DISEASE-MINERAL AND BONE DISORDER: ICD-10-CM

## 2022-06-08 DIAGNOSIS — I10 ESSENTIAL HYPERTENSION: ICD-10-CM

## 2022-06-08 DIAGNOSIS — N18.9 CHRONIC KIDNEY DISEASE-MINERAL AND BONE DISORDER: ICD-10-CM

## 2022-06-08 PROCEDURE — 99214 OFFICE O/P EST MOD 30 MIN: CPT | Performed by: INTERNAL MEDICINE

## 2022-06-08 NOTE — ASSESSMENT & PLAN NOTE
Lab Results   Component Value Date    EGFR 53 06/02/2022    EGFR 47 01/12/2022    EGFR 48 11/05/2021    CREATININE 1 51 (H) 06/02/2022    CREATININE 1 68 (H) 01/12/2022    CREATININE 1 66 (H) 11/05/2021   PTH and vitamin-D are within acceptable range    Phosphorus is low and patient is on replacement which I will continue

## 2022-06-08 NOTE — ASSESSMENT & PLAN NOTE
Renal function overall stable with stage III CKD  Tacrolimus level is quite acceptable range  Advised to continue what is doing    Advised to follow up with transplant nephrologist also

## 2022-06-08 NOTE — PROGRESS NOTES
NEPHROLOGY OFFICE FOLLOW UP  La Kendrick 48 y o  male MRN: [id number]    Encounter: [id number] 6/8/2022    REASON FOR VISIT: La Kendrick is a 48 y o  male who is here on 6/8/2022 for Chronic Kidney Disease and Follow-up    HPI:    Gerda Garsia came in today for follow-up of kidney transplant I seen him all 6 months ago in between he was seen by Dr Latisha Correa transplant nephrologist    Overall is doing well  He does have psychiatric problem overall seems to be stable     He denies any acute complaint  No chest pain no palpitation or shortness of breath     No nausea no vomiting no abdominal discomfort     Denies any urinary complaint      REVIEW OF SYSTEMS:    Review of Systems   Constitutional: Negative for activity change and fatigue  HENT: Negative for congestion and ear discharge  Eyes: Negative for photophobia and pain  Respiratory: Negative for apnea and choking  Cardiovascular: Negative for chest pain and palpitations  Gastrointestinal: Negative for abdominal distention and blood in stool  Endocrine: Negative for heat intolerance and polyphagia  Genitourinary: Negative for flank pain and urgency  Musculoskeletal: Negative for neck pain and neck stiffness  Skin: Negative for color change and wound  Allergic/Immunologic: Negative for food allergies and immunocompromised state  Neurological: Negative for seizures and facial asymmetry  Hematological: Negative for adenopathy  Does not bruise/bleed easily  Psychiatric/Behavioral: Negative for self-injury and suicidal ideas           PAST MEDICAL HISTORY:  Past Medical History:   Diagnosis Date    ADD (attention deficit disorder)     [facility]/24/2020    Chronic kidney disease     Depression     [facility]     H/O immunosuppressive therapy     History of kidney disease     HTN (hypertension) 12/17/2017    Hypertension     Hypomagnesemia 1/25/2020    Left lower quadrant pain 1/31/2019    Nephropathy, IgA     Suicidal ideations 2019       PAST SURGICAL HISTORY:  Past Surgical History:   Procedure Laterality Date    NEPHRECTOMY TRANSPLANTED ORGAN         SOCIAL HISTORY:  Social History     Substance and Sexual Activity   Alcohol Use Yes    Comment: on occasion     Social History     Substance and Sexual Activity   Drug Use Not Currently    Types: Marijuana    Comment: smokes marijuana a few times daily     Social History     Tobacco Use   Smoking Status Former Smoker    Packs/day: 0 50    Years: 4 00    Pack years: 2 00    Types: Cigarettes    Quit date:     Years since quittin 4   Smokeless Tobacco Never Used       FAMILY HISTORY:  Family History   Problem Relation Age of Onset    Deep vein thrombosis Father     Deep vein thrombosis Paternal Grandfather     Transient ischemic attack Mother        MEDICATIONS:    Current Outpatient Medications:     Cholecalciferol (VITAMIN D-3) 1000 units CAPS, Take 2,000 Units by mouth daily, Disp: , Rfl:     diltiazem (CARDIZEM) 120 MG tablet, Take 1 tablet (120 mg total) by mouth every 12 (twelve) hours, Disp: 180 tablet, Rfl: 2    LATUDA 60 MG TABS, Take 80 mg by mouth daily , Disp: , Rfl: 2    methylphenidate (CONCERTA) 36 MG ER tablet, Take 54 mg by mouth daily , Disp: , Rfl:     mycophenolate (CELLCEPT) 250 mg capsule, Take 500 mg in AM and 250 mg in PM , Disp: 270 capsule, Rfl: 3    potassium phosphate, monobasic, (K-PHOS) 500 MG tablet, Take 1 tablet (500 mg total) by mouth 2 (two) times a day, Disp: 180 tablet, Rfl: 3    rosuvastatin (CRESTOR) 5 mg tablet, Take 1 tablet (5 mg total) by mouth daily, Disp: 90 tablet, Rfl: 3    sertraline (ZOLOFT) 50 mg tablet, Take 200 mg by mouth daily , Disp: , Rfl: 0    tacrolimus (PROGRAF) 0 5 mg capsule, TAKE 1 CAPSULE (0 5 MG TOTAL) BY MOUTH EVERY 12 (TWELVE) HOURS, Disp: 60 capsule, Rfl: 5    warfarin (COUMADIN) 4 mg tablet, TAKE 2 TABLETS BY MOUTH EVERY DAY, Disp: 60 tablet, Rfl: 0    PHYSICAL EXAM:  Vitals:    06/08/22 1440   BP: 110/80   BP Location: Right arm   Patient Position: Sitting   Pulse: 84   Resp: 16   Temp: (!) 97 3 °F (36 3 °C)   TempSrc: Temporal   SpO2: 97%   Weight: 80 1 kg (176 lb 9 6 oz)   Height: 5' 7 5" (1 715 m)     Body mass index is 27 25 kg/m²  Physical Exam  Constitutional:       General: He is not in acute distress  Appearance: He is well-developed  HENT:      Head: Normocephalic  Eyes:      General: No scleral icterus  Conjunctiva/sclera: Conjunctivae normal    Neck:      Vascular: No JVD  Cardiovascular:      Rate and Rhythm: Normal rate  Heart sounds: Murmur heard  Pulmonary:      Effort: Pulmonary effort is normal       Breath sounds: No wheezing  Abdominal:      Palpations: Abdomen is soft  Tenderness: There is no abdominal tenderness  Musculoskeletal:         General: Normal range of motion  Cervical back: Neck supple  Skin:     General: Skin is warm  Findings: No rash  Neurological:      Mental Status: He is alert and oriented to person, place, and time     Psychiatric:         Behavior: Behavior normal          LAB RESULTS:  Results for orders placed or performed in visit on 44/24/64   Basic metabolic panel   Result Value Ref Range    Sodium 137 136 - 145 mmol/L    Potassium 4 8 3 5 - 5 3 mmol/L    Chloride 105 100 - 108 mmol/L    CO2 23 21 - 32 mmol/L    ANION GAP 9 4 - 13 mmol/L    BUN 33 (H) 5 - 25 mg/dL    Creatinine 1 51 (H) 0 60 - 1 30 mg/dL    Glucose, Fasting 85 65 - 99 mg/dL    Calcium 9 3 8 3 - 10 1 mg/dL    eGFR 53 ml/min/1 73sq m   Phosphorus   Result Value Ref Range    Phosphorus 2 1 (L) 2 7 - 4 5 mg/dL   CBC and differential   Result Value Ref Range    WBC 6 21 4 31 - 10 16 Thousand/uL    RBC 4 89 3 88 - 5 62 Million/uL    Hemoglobin 15 7 12 0 - 17 0 g/dL    Hematocrit 46 4 36 5 - 49 3 %    MCV 95 82 - 98 fL    MCH 32 1 26 8 - 34 3 pg    MCHC 33 8 31 4 - 37 4 g/dL    RDW 12 7 11 6 - 15 1 %    MPV 10 9 8 9 - 12 7 fL    Platelets 433 796 - 020 Thousands/uL    nRBC 0 /100 WBCs    Neutrophils Relative 55 43 - 75 %    Immat GRANS % 0 0 - 2 %    Lymphocytes Relative 32 14 - 44 %    Monocytes Relative 10 4 - 12 %    Eosinophils Relative 2 0 - 6 %    Basophils Relative 1 0 - 1 %    Neutrophils Absolute 3 44 1 85 - 7 62 Thousands/µL    Immature Grans Absolute 0 01 0 00 - 0 20 Thousand/uL    Lymphocytes Absolute 1 98 0 60 - 4 47 Thousands/µL    Monocytes Absolute 0 59 0 17 - 1 22 Thousand/µL    Eosinophils Absolute 0 13 0 00 - 0 61 Thousand/µL    Basophils Absolute 0 06 0 00 - 0 10 Thousands/µL   PTH, intact   Result Value Ref Range     5 (H) 18 4 - 80 1 pg/mL   Vitamin D 25 hydroxy   Result Value Ref Range    Vit D, 25-Hydroxy 38 1 30 0 - 100 0 ng/mL   Tacrolimus level   Result Value Ref Range    TACROLIMUS 4 3 2 0 - 20 0 ng/mL       ASSESSMENT and PLAN:      Renal transplant recipient  Renal function overall stable with stage III CKD  Tacrolimus level is quite acceptable range  Advised to continue what is doing  Advised to follow up with transplant nephrologist also    Stage 3 chronic kidney disease [facility])  Lab Results   Component Value Date    EGFR 53 06/02/2022    EGFR 47 01/12/2022    EGFR 48 11/05/2021    CREATININE 1 51 (H) 06/02/2022    CREATININE 1 68 (H) 01/12/2022    CREATININE 1 66 (H) 11/05/2021   Renal function is stable overall with GFR of about 53  Asymptomatic and progressive nature discussed with him  He does have IgA nephropathy by biopsy    Recurrent IgA nephropathy after kidney transplantation  He had a biopsy done  Overall seems to be stable    Chronic kidney disease-mineral and bone disorder  Lab Results   Component Value Date    EGFR 53 06/02/2022    EGFR 47 01/12/2022    EGFR 48 11/05/2021    CREATININE 1 51 (H) 06/02/2022    CREATININE 1 68 (H) 01/12/2022    CREATININE 1 66 (H) 11/05/2021   PTH and vitamin-D are within acceptable range    Phosphorus is low and patient is on replacement which I will continue    Essential hypertension  Quite well control at this point      Everything discussed at length with the patient  I will see him back in 6 months but will get blood work in 3 months and in 6 month        Portions of the record may have been created with voice recognition software  Occasional wrong word or "sound a like" substitutions may have occurred due to the inherent limitations of voice recognition software  Read the chart carefully and recognize, using context, where substitutions have occurred  If you have any questions, please contact the dictating provider

## 2022-06-08 NOTE — ASSESSMENT & PLAN NOTE
Lab Results   Component Value Date    EGFR 53 06/02/2022    EGFR 47 01/12/2022    EGFR 48 11/05/2021    CREATININE 1 51 (H) 06/02/2022    CREATININE 1 68 (H) 01/12/2022    CREATININE 1 66 (H) 11/05/2021   Renal function is stable overall with GFR of about 53  Asymptomatic and progressive nature discussed with him    He does have IgA nephropathy by biopsy

## 2022-06-10 ENCOUNTER — TELEPHONE (OUTPATIENT)
Dept: NEPHROLOGY | Facility: CLINIC | Age: 47
End: 2022-06-10

## 2022-06-22 DIAGNOSIS — I82.4Z1 DEEP VEIN THROMBOSIS (DVT) OF DISTAL VEIN OF RIGHT LOWER EXTREMITY, UNSPECIFIED CHRONICITY (HCC): ICD-10-CM

## 2022-06-22 RX ORDER — WARFARIN SODIUM 4 MG/1
TABLET ORAL
Qty: 60 TABLET | Refills: 0 | Status: SHIPPED | OUTPATIENT
Start: 2022-06-22 | End: 2022-07-22

## 2022-06-29 DIAGNOSIS — I10 ESSENTIAL HYPERTENSION: ICD-10-CM

## 2022-06-29 RX ORDER — DILTIAZEM HYDROCHLORIDE 120 MG/1
TABLET, FILM COATED ORAL
Qty: 180 TABLET | Refills: 1 | Status: SHIPPED | OUTPATIENT
Start: 2022-06-29

## 2022-07-21 DIAGNOSIS — I82.4Z1 DEEP VEIN THROMBOSIS (DVT) OF DISTAL VEIN OF RIGHT LOWER EXTREMITY, UNSPECIFIED CHRONICITY (HCC): ICD-10-CM

## 2022-07-22 RX ORDER — WARFARIN SODIUM 4 MG/1
TABLET ORAL
Qty: 180 TABLET | Refills: 1 | Status: SHIPPED | OUTPATIENT
Start: 2022-07-22

## 2022-08-24 ENCOUNTER — TELEPHONE (OUTPATIENT)
Dept: INTERNAL MEDICINE CLINIC | Facility: CLINIC | Age: 47
End: 2022-08-24

## 2022-08-24 ENCOUNTER — ANTICOAG VISIT (OUTPATIENT)
Dept: INTERNAL MEDICINE CLINIC | Facility: CLINIC | Age: 47
End: 2022-08-24

## 2022-08-24 ENCOUNTER — APPOINTMENT (OUTPATIENT)
Dept: LAB | Facility: HOSPITAL | Age: 47
End: 2022-08-24
Payer: COMMERCIAL

## 2022-08-24 DIAGNOSIS — Z94.0 RENAL TRANSPLANT RECIPIENT: ICD-10-CM

## 2022-08-24 DIAGNOSIS — E78.5 HYPERLIPIDEMIA, UNSPECIFIED HYPERLIPIDEMIA TYPE: ICD-10-CM

## 2022-08-24 LAB
ALBUMIN SERPL BCP-MCNC: 3.8 G/DL (ref 3.5–5)
ALP SERPL-CCNC: 94 U/L (ref 46–116)
ALT SERPL W P-5'-P-CCNC: 24 U/L (ref 12–78)
ANION GAP SERPL CALCULATED.3IONS-SCNC: 11 MMOL/L (ref 4–13)
AST SERPL W P-5'-P-CCNC: 23 U/L (ref 5–45)
BACTERIA UR QL AUTO: ABNORMAL /HPF
BILIRUB SERPL-MCNC: 0.42 MG/DL (ref 0.2–1)
BILIRUB UR QL STRIP: NEGATIVE
BUN SERPL-MCNC: 29 MG/DL (ref 5–25)
CALCIUM SERPL-MCNC: 9.3 MG/DL (ref 8.3–10.1)
CHLORIDE SERPL-SCNC: 104 MMOL/L (ref 96–108)
CHOLEST SERPL-MCNC: 204 MG/DL
CK SERPL-CCNC: 101 U/L (ref 39–308)
CLARITY UR: CLEAR
CO2 SERPL-SCNC: 21 MMOL/L (ref 21–32)
COLOR UR: YELLOW
CREAT SERPL-MCNC: 1.55 MG/DL (ref 0.6–1.3)
CREAT UR-MCNC: 224 MG/DL
ERYTHROCYTE [DISTWIDTH] IN BLOOD BY AUTOMATED COUNT: 13.1 % (ref 11.6–15.1)
GFR SERPL CREATININE-BSD FRML MDRD: 52 ML/MIN/1.73SQ M
GLUCOSE P FAST SERPL-MCNC: 91 MG/DL (ref 65–99)
GLUCOSE UR STRIP-MCNC: NEGATIVE MG/DL
HCT VFR BLD AUTO: 44.1 % (ref 36.5–49.3)
HDLC SERPL-MCNC: 46 MG/DL
HGB BLD-MCNC: 14.9 G/DL (ref 12–17)
HGB UR QL STRIP.AUTO: ABNORMAL
KETONES UR STRIP-MCNC: NEGATIVE MG/DL
LDLC SERPL CALC-MCNC: 122 MG/DL (ref 0–100)
LEUKOCYTE ESTERASE UR QL STRIP: NEGATIVE
MAGNESIUM SERPL-MCNC: 1.9 MG/DL (ref 1.6–2.6)
MCH RBC QN AUTO: 32.8 PG (ref 26.8–34.3)
MCHC RBC AUTO-ENTMCNC: 33.8 G/DL (ref 31.4–37.4)
MCV RBC AUTO: 97 FL (ref 82–98)
NITRITE UR QL STRIP: NEGATIVE
NON-SQ EPI CELLS URNS QL MICRO: ABNORMAL /HPF
NONHDLC SERPL-MCNC: 158 MG/DL
PH UR STRIP.AUTO: 5.5 [PH]
PHOSPHATE SERPL-MCNC: 2.2 MG/DL (ref 2.7–4.5)
PLATELET # BLD AUTO: 207 THOUSANDS/UL (ref 149–390)
PMV BLD AUTO: 10.7 FL (ref 8.9–12.7)
POTASSIUM SERPL-SCNC: 4.3 MMOL/L (ref 3.5–5.3)
PROT SERPL-MCNC: 7.8 G/DL (ref 6.4–8.4)
PROT UR STRIP-MCNC: NEGATIVE MG/DL
PROT UR-MCNC: 43 MG/DL
PROT/CREAT UR: 0.19 MG/G{CREAT} (ref 0–0.1)
RBC # BLD AUTO: 4.54 MILLION/UL (ref 3.88–5.62)
RBC #/AREA URNS AUTO: ABNORMAL /HPF
SODIUM SERPL-SCNC: 136 MMOL/L (ref 135–147)
SP GR UR STRIP.AUTO: 1.02 (ref 1–1.03)
TACROLIMUS BLD-MCNC: 3.5 NG/ML (ref 2–20)
TRIGL SERPL-MCNC: 178 MG/DL
UROBILINOGEN UR QL STRIP.AUTO: 0.2 E.U./DL
WBC # BLD AUTO: 6.36 THOUSAND/UL (ref 4.31–10.16)
WBC #/AREA URNS AUTO: ABNORMAL /HPF

## 2022-08-24 PROCEDURE — 80061 LIPID PANEL: CPT

## 2022-08-24 PROCEDURE — 82550 ASSAY OF CK (CPK): CPT

## 2022-08-24 NOTE — TELEPHONE ENCOUNTER
----- Message from Edwige Mckeon MD sent at 8/24/2022  1:44 PM EDT -----  Continue warfarin at the same dose, rpt labs in a week

## 2022-08-25 ENCOUNTER — TELEPHONE (OUTPATIENT)
Dept: NEPHROLOGY | Facility: CLINIC | Age: 47
End: 2022-08-25

## 2022-08-25 NOTE — TELEPHONE ENCOUNTER
----- Message from Claudette Frames, MD sent at 8/25/2022  1:04 PM EDT -----  Hello    Patient normally is followed up by Ms Naila Roberts  Can you please let the patient know that the creatinine stable 1 55 mg/dL  Phosphorus is okay but borderline low at 2 2  No changes for now  Tacrolimus level is low though at 3 5  Can you confirm that this was accurate 11-13 hour trough  -if the trough level is accurate 11-13 hour trough-please ask the patient to increase tacrolimus to 1 mg in the morning and continue 0 5 mg in the evening    I believe he is currently on 0 5 mg twice a day if you can confirm  -if the trough level is not accurate, no changes for now  -repeat tacrolimus level, CMP, phosphorus in 1 week    Thank you    np

## 2022-08-30 ENCOUNTER — OFFICE VISIT (OUTPATIENT)
Dept: NEPHROLOGY | Facility: CLINIC | Age: 47
End: 2022-08-30
Payer: COMMERCIAL

## 2022-08-30 VITALS — HEIGHT: 68 IN | WEIGHT: 174 LBS | BODY MASS INDEX: 26.37 KG/M2

## 2022-08-30 DIAGNOSIS — Z94.0 KIDNEY TRANSPLANTED: ICD-10-CM

## 2022-08-30 PROCEDURE — 99215 OFFICE O/P EST HI 40 MIN: CPT | Performed by: INTERNAL MEDICINE

## 2022-08-30 RX ORDER — TACROLIMUS 0.5 MG/1
CAPSULE ORAL
Qty: 270 CAPSULE | Refills: 3 | Status: SHIPPED | OUTPATIENT
Start: 2022-08-30

## 2022-08-30 NOTE — PATIENT INSTRUCTIONS
1) Avoid NSAIDS - (Example - motrin, advil, ibuprofen, aleve, exederin, etc)  2) Always follow a low salt diet  3) please complete 3rd covid vaccine injection now and then the fourth vaccine injection in January 2023  4) please read on [facility] fact sheet and call in one week with your decision to accept it or not  5) increase tacrolimus to 1 mg total in AM and continue 0 5 mg in PM  6) labwork in one week and then every 2 months as you are  7) appointment in 6 months

## 2022-08-30 NOTE — PROGRESS NOTES
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), living related from kidney swap renal transplant in 2009 at [facility] (patient's brother was donor involved in swap), was on peritoneal dialysis for 9 months prior to transplant, DVT X 4 prior, HTN, depression, ADHD, admission in December of 2017 for hematuria and at that time was treated for urinary tract infection/pyelonephritis  Then admission in January 2020 for FRANCES with Cr to 3 4 mg/dL in setting of n/v/d  End of October 2020, patient had missed tacrolimus for one week and creat had increased to 2 1 requiring steroid therapy  Pt presenting for f/u visit       CXR - 1/23/2020 - no acute disease     CT abd/pel - atrophic native kidneys, unremarkable renal transplant     U/s liver - 2 2 cm R hepatic echogenic mass reflecting hemangioma     1) CKD III, renal transplant -      History obtained from [facility], and [facility] and here at [facility] records:     -Baseline Cr 1 6-1 9 mg/dL mg/dL;  -Tac levels from 2009 - 2014 were 7-10  -Patient followed at [facility] - followed with Dr Walker Stiles  -Prior baseline Cr 1 4-1 5 mg/dL and had been rising to 1 8 mg/dL  -pt had early CMV - on EGD  - last time seen was in May 2017 at [facility] - no known rejection ([phone])  -Has never had renal biopsy post transplant     Old Labwork review:     - CT abd/pel without hydro or nephrolithiasis on transplanted kidney  - to note, patient has had microscopic hematuria as far back as 2014 on UA  - BK and CMV negative 10/2020  - urine eos 0%     October 2020:       -  pt had undetectable tac level end of October   Pt states missed one week of tac due to not being able to obtain the medication  - creat had increased to 2 1 end of October   Patient was given three doses of Solu-Medrol starting October 24th  - Nery Garcia completed on November 5th-no class 2 DSA, no class 1 DSA, PRA 15%, patient has watch antibodies      since then, the creatinine has ranged from approximately 1 6-2 mg/dL        Important Medication notes:   - on diltiazem to note (interaction with tacrolimus)     August 2022-patient's creatinine stable 1 55 mg/dL  Electrolytes are stable with the exception of phosphorus that is slowly improving but slightly low  Hemoglobin is appropriate  Urinalysis is relatively bland with the exception 2-4 RBC which is currently being monitored  Tacrolimus level low  Was accurate trough    Plan:    -increase tacrolimus to 1 mg in the morning and continue 0 5 mg in the evening  -continue  mg in the morning and 250 mg in the evening   -continue statin   -check repeat tacrolimus level and phosphorus in 1 week   -  We will see the patient in 6 months  -  Continue interval care with primary nephrologist Dr Nisa Navarro as doing  - labwork every 2-3 months   - to note, if biopsy is needed  In the future will need bridge with heparin as is on coumadin for mult DVT prior  - to note, pt is on diltiazem  - educated pt on marquita  - advised pt to have 3rd covid vaccine dose now     2) Immune Suppression-on 2 drug regimen with tacrolimus 1 mg in the morning and 0 5 mg in the evening and mycophenolate 500 mg in the morning and 250 mg in the evening     - June 2020 - pt did not have tacrolimus for a few days because he was waiting on refill and did not realize that it was backordered  Issue resolved and restarted tacrolimus  - 10/2020 - did not have tac for one week and see above     -  in AM and 250 mg PM  - history CMV  - pt has had labile tacrolimus levels    3) DVT - recurrent DVT   Most recent march 2018     - 4 prior DVTs  - 1 RUE post PICC  - then 3 in LE  - further plans per primary team  -Yifan Yost per Primary team --> last INR 11/2021, no recent     4) Vaccines     - stay up to date on vaccine with flu vaccine  - PNA vaccine - pt will review with Primary Physician  - no live vaccines  - pt had 2 inj for covid vaccine--> advised to complete 3rd vaccine dose now and 4th vaccine dose in 3-4 months      5) Age appropriate Ca screening     - saw Derm  11/2021  Was placed on doxy for 1 week due to folliculitis concerning for MRSA  - has history of psoriaform dermatitis      6) HTN     - pt is on diltiazem     7) HPL     - further plan per primary team   -would continue to monitor lipid panel closely  -consider statin if needed     9) Depression     -prior suicide attempts - prior 2003  - patient follows with Psychiatry     10) Hb - monitor     11) IgA nephropathy native disease      monitor for recurrence     12) MBD     - defer to Primary Nephrology for f/u     13) liver lesion on u/s in hospital     - see above for GI f/u  - saw GI in April  -  Repeat renal ultrasound  - pt saw GI 4/2021 --> MRI completed in may - no evidence of hemangioma or mass;      14) COVID infection in end of dec 2021     It was a pleasure evaluating your patient  Thank you for allowing our team to participate in the care of Mr Arnav Joshi  Please do not hesitate to contact our team if further issues/questions shall arise in the interim      No problem-specific Assessment & Plan notes found for this encounter  HPI:    Patient denies complaints  No fevers, chills, nausea, vomiting      PATIENT INSTRUCTIONS:    Patient Instructions   1) Avoid NSAIDS - (Example - motrin, advil, ibuprofen, aleve, exederin, etc)  2) Always follow a low salt diet  3) please complete 3rd covid vaccine injection now and then the fourth vaccine injection in January 2023  4) please read on [facility] fact sheet and call in one week with your decision to accept it or not  5) increase tacrolimus to 1 mg total in AM and continue 0 5 mg in PM  6) labwork in one week and then every 2 months as you are  7) appointment in 6 months        OBJECTIVE:  Current Weight: Weight - Scale: 78 9 kg (174 lb)  Vitals:    08/30/22 1531   Weight: 78 9 kg (174 lb)   Height: 5' 7 5" (1 715 m)    Body mass index is 26 85 kg/m²  REVIEW OF SYSTEMS:    Review of Systems   Constitutional: Negative  Negative for appetite change and fatigue  HENT: Negative  Eyes: Negative  Respiratory: Negative  Negative for shortness of breath  Cardiovascular: Negative  Negative for leg swelling  Gastrointestinal: Negative  Endocrine: Negative  Genitourinary: Negative  Negative for difficulty urinating  Musculoskeletal: Negative  Allergic/Immunologic: Negative  Neurological: Negative  Hematological: Negative  Psychiatric/Behavioral: Negative  All other systems reviewed and are negative  PHYSICAL EXAM:      Physical Exam  Vitals and nursing note reviewed  Constitutional:       General: He is not in acute distress  Appearance: He is well-developed  He is not diaphoretic  HENT:      Head: Normocephalic and atraumatic  Eyes:      General: No scleral icterus  Right eye: No discharge  Left eye: No discharge  Conjunctiva/sclera: Conjunctivae normal    Neck:      Vascular: No JVD  Cardiovascular:      Rate and Rhythm: Normal rate and regular rhythm  Heart sounds: Normal heart sounds  No murmur heard  No friction rub  No gallop  Pulmonary:      Effort: Pulmonary effort is normal  No respiratory distress  Breath sounds: Normal breath sounds  No wheezing or rales  Chest:      Chest wall: No tenderness  Abdominal:      General: Bowel sounds are normal  There is no distension  Palpations: Abdomen is soft  Tenderness: There is no abdominal tenderness  There is no rebound  Musculoskeletal:         General: No tenderness or deformity  Normal range of motion  Cervical back: Normal range of motion and neck supple  Skin:     General: Skin is warm and dry  Coloration: Skin is not pale  Findings: No erythema or rash  Neurological:      Mental Status: He is alert and oriented to person, place, and time  Cranial Nerves: No cranial nerve deficit  Coordination: Coordination normal       Deep Tendon Reflexes: Reflexes are normal and symmetric  Psychiatric:         Behavior: Behavior normal          Thought Content:  Thought content normal          Judgment: Judgment normal          Medications:    Current Outpatient Medications:     Cholecalciferol (VITAMIN D-3) 1000 units CAPS, Take 2,000 Units by mouth daily, Disp: , Rfl:     diltiazem (CARDIZEM) 120 MG tablet, TAKE 1 TABLET BY MOUTH EVERY 12 HOURS, Disp: 180 tablet, Rfl: 1    LATUDA 60 MG TABS, Take 80 mg by mouth daily , Disp: , Rfl: 2    methylphenidate (CONCERTA) 36 MG ER tablet, Take 54 mg by mouth daily , Disp: , Rfl:     mycophenolate (CELLCEPT) 250 mg capsule, Take 500 mg in AM and 250 mg in PM , Disp: 270 capsule, Rfl: 3    potassium phosphate, monobasic, (K-PHOS) 500 MG tablet, Take 1 tablet (500 mg total) by mouth 2 (two) times a day, Disp: 180 tablet, Rfl: 3    rosuvastatin (CRESTOR) 5 mg tablet, Take 1 tablet (5 mg total) by mouth daily, Disp: 90 tablet, Rfl: 3    sertraline (ZOLOFT) 50 mg tablet, Take 200 mg by mouth daily , Disp: , Rfl: 0    tacrolimus (PROGRAF) 0 5 mg capsule, 2 tabs of 0 5 in am and 1 of 0 5 mg in PM for total 1 mg in AM and 0 5 mg in PM, Disp: 270 capsule, Rfl: 3    warfarin (COUMADIN) 4 mg tablet, TAKE 2 TABLETS BY MOUTH EVERY DAY, Disp: 180 tablet, Rfl: 1    Laboratory Results:  Results from last 7 days   Lab Units 08/24/22  1154   WBC Thousand/uL 6 36   HEMOGLOBIN g/dL 14 9   HEMATOCRIT % 44 1   PLATELETS Thousands/uL 207   POTASSIUM mmol/L 4 3   CHLORIDE mmol/L 104   CO2 mmol/L 21   BUN mg/dL 29*   CREATININE mg/dL 1 55*   CALCIUM mg/dL 9 3   MAGNESIUM mg/dL 1 9   PHOSPHORUS mg/dL 2 2*       Results for orders placed or performed in visit on 08/24/22   Lipid panel   Result Value Ref Range    Cholesterol 204 (H) See Comment mg/dL    Triglycerides 178 (H) See Comment mg/dL    HDL, Direct 46 >=40 mg/dL    LDL Calculated 122 (H) 0 - 100 mg/dL    Non-HDL-Chol (CHOL-HDL) 158 mg/dl   CK (with reflex to MB)   Result Value Ref Range    Total  39 - 308 U/L

## 2022-08-30 NOTE — LETTER
August 30, 2022     Shaan Starkey MD  [address]    Patient: Tiffanie Dorman   YOB: 1975   Date of Visit: 8/30/2022       Dear Dr Terra Cheek:    Thank you for referring Tiffanie Dorman to me for evaluation  Below are my notes for this consultation  If you have questions, please do not hesitate to call me  I look forward to following your patient along with you  Sincerely,        Thomas Hammonds MD        CC: No Recipients  Thomas Hammonds MD  8/30/2022  4:04 PM  Sign when Signing Visit  NEPHROLOGY OFFICE VISIT   Tiffanie Dorman 48 y o  male MRN: [id number]  8/30/2022    Reason for Visit:  renal transplant recipient    ASSESSMENT and PLAN:    I had the pleasure of seeing Mr Carlos Clements today in the renal clinic for the continued management of renal transplant recipient  78-year-old male with a past medical history of 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 (diagnosed at 13 yo), living related from kidney swap renal transplant in 2009 at [facility] (patient's brother was donor involved in swap), was on peritoneal dialysis for 9 months prior to transplant, DVT X 4 prior, HTN, depression, ADHD, admission in December of 2017 for hematuria and at that time was treated for urinary tract infection/pyelonephritis  Then admission in January 2020 for FRANCES with Cr to 3 4 mg/dL in setting of n/v/d  End of October 2020, patient had missed tacrolimus for one week and creat had increased to 2 1 requiring steroid therapy   Pt presenting for f/u visit       CXR - 1/23/2020 - no acute disease     CT abd/pel - atrophic native kidneys, unremarkable renal transplant     U/s liver - 2 2 cm R hepatic echogenic mass reflecting hemangioma     1) CKD III, renal transplant -      History obtained from [facility], and [facility] and here at [facility] records:     -Baseline Cr 1 6-1 9 mg/dL mg/dL;  -Tac levels from 2009 - 2014 were 7-10  -Patient followed at [facility] - followed with  Luis   -Prior baseline Cr 1 4-1 5 mg/dL and had been rising to 1 8 mg/dL  -pt had early CMV - on EGD  - last time seen was in May 2017 at [facility] - no known rejection ([phone])  -Has never had renal biopsy post transplant     Old Labwork review:     - CT abd/pel without hydro or nephrolithiasis on transplanted kidney  - to note, patient has had microscopic hematuria as far back as 2014 on UA  - BK and CMV negative 10/2020  - urine eos 0%     October 2020:       -  pt had undetectable tac level end of October  Pt states missed one week of tac due to not being able to obtain the medication  - creat had increased to 2 1 end of October   Patient was given three doses of Solu-Medrol starting October 24th  - Jaden Rice completed on November 5th-no class 2 DSA, no class 1 DSA, PRA 15%, patient has watch antibodies      since then, the creatinine has ranged from approximately 1 6-2 mg/dL        Important Medication notes:   - on diltiazem to note (interaction with tacrolimus)     August 2022-patient's creatinine stable 1 55 mg/dL  Electrolytes are stable with the exception of phosphorus that is slowly improving but slightly low  Hemoglobin is appropriate  Urinalysis is relatively bland with the exception 2-4 RBC which is currently being monitored  Tacrolimus level low    Was accurate trough    Plan:    -increase tacrolimus to 1 mg in the morning and continue 0 5 mg in the evening  -continue  mg in the morning and 250 mg in the evening   -continue statin   -check repeat tacrolimus level and phosphorus in 1 week   -  We will see the patient in 6 months  -  Continue interval care with primary nephrologist Dr Silvestre Peañ as doing  - labwork every 2-3 months   - to note, if biopsy is needed  In the future will need bridge with heparin as is on coumadin for mult DVT prior  - to note, pt is on diltiazem  - educated pt on evusheld  - advised pt to have 3rd covid vaccine dose now     2) Immune Suppression-on 2 drug regimen with tacrolimus 1 mg in the morning and 0 5 mg in the evening and mycophenolate 500 mg in the morning and 250 mg in the evening     - June 2020 - pt did not have tacrolimus for a few days because he was waiting on refill and did not realize that it was backordered  Issue resolved and restarted tacrolimus  - 10/2020 - did not have tac for one week and see above     -  in AM and 250 mg PM  - history CMV  - pt has had labile tacrolimus levels    3) DVT - recurrent DVT  Most recent march 2018     - 4 prior DVTs  - 1 RUE post PICC  - then 3 in LE  - further plans per primary team  - INR per Primary team --> last INR 11/2021, no recent     4) Vaccines     - stay up to date on vaccine with flu vaccine  - PNA vaccine - pt will review with Primary Physician  - no live vaccines  - pt had 2 inj for covid vaccine--> advised to complete 3rd vaccine dose now and 4th vaccine dose in 3-4 months      5) Age appropriate Ca screening     - saw Derm  11/2021  Was placed on doxy for 1 week due to folliculitis concerning for MRSA  - has history of psoriaform dermatitis      6) HTN     - pt is on diltiazem     7) HPL     - further plan per primary team   -would continue to monitor lipid panel closely  -consider statin if needed     9) Depression     -prior suicide attempts - prior 2003  - patient follows with Psychiatry     10) Hb - monitor     11) IgA nephropathy native disease      monitor for recurrence     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     - defer to Primary Nephrology for f/u     13) liver lesion on u/s in hospital     - see above for GI f/u  - saw GI in April  -  Repeat renal ultrasound  - pt saw GI 4/2021 --> MRI completed in may - no evidence of hemangioma or mass;      14) COVID infection in end of dec 2021     It was a pleasure evaluating your patient  Thank you for allowing our team to participate in the care of Mr Arnav Joshi   Please do not hesitate to contact our team if further issues/questions shall arise in the interim      No problem-specific Assessment & Plan notes found for this encounter  HPI:    Patient denies complaints  No fevers, chills, nausea, vomiting  PATIENT INSTRUCTIONS:    Patient Instructions   1) Avoid NSAIDS - (Example - motrin, advil, ibuprofen, aleve, exederin, etc)  2) Always follow a low salt diet  3) please complete 3rd covid vaccine injection now and then the fourth vaccine injection in January 2023  4) please read on evusheld fact sheet and call in one week with your decision to accept it or not  5) increase tacrolimus to 1 mg total in AM and continue 0 5 mg in PM  6) labwork in one week and then every 2 months as you are  7) appointment in 6 months        OBJECTIVE:  Current Weight: Weight - Scale: 78 9 kg (174 lb)  Vitals:    08/30/22 1531   Weight: 78 9 kg (174 lb)   Height: 5' 7 5" (1 715 m)    Body mass index is 26 85 kg/m²  REVIEW OF SYSTEMS:    Review of Systems   Constitutional: Negative  Negative for appetite change and fatigue  HENT: Negative  Eyes: Negative  Respiratory: Negative  Negative for shortness of breath  Cardiovascular: Negative  Negative for leg swelling  Gastrointestinal: Negative  Endocrine: Negative  Genitourinary: Negative  Negative for difficulty urinating  Musculoskeletal: Negative  Allergic/Immunologic: Negative  Neurological: Negative  Hematological: Negative  Psychiatric/Behavioral: Negative  All other systems reviewed and are negative  PHYSICAL EXAM:      Physical Exam  Vitals and nursing note reviewed  Constitutional:       General: He is not in acute distress  Appearance: He is well-developed  He is not diaphoretic  HENT:      Head: Normocephalic and atraumatic  Eyes:      General: No scleral icterus  Right eye: No discharge  Left eye: No discharge  Conjunctiva/sclera: Conjunctivae normal    Neck:      Vascular: No JVD     Cardiovascular:      Rate and Rhythm: Normal rate and regular rhythm  Heart sounds: Normal heart sounds  No murmur heard  No friction rub  No gallop  Pulmonary:      Effort: Pulmonary effort is normal  No respiratory distress  Breath sounds: Normal breath sounds  No wheezing or rales  Chest:      Chest wall: No tenderness  Abdominal:      General: Bowel sounds are normal  There is no distension  Palpations: Abdomen is soft  Tenderness: There is no abdominal tenderness  There is no rebound  Musculoskeletal:         General: No tenderness or deformity  Normal range of motion  Cervical back: Normal range of motion and neck supple  Skin:     General: Skin is warm and dry  Coloration: Skin is not pale  Findings: No erythema or rash  Neurological:      Mental Status: He is alert and oriented to person, place, and time  Cranial Nerves: No cranial nerve deficit  Coordination: Coordination normal       Deep Tendon Reflexes: Reflexes are normal and symmetric  Psychiatric:         Behavior: Behavior normal          Thought Content:  Thought content normal          Judgment: Judgment normal          Medications:    Current Outpatient Medications:     Cholecalciferol (VITAMIN D-3) 1000 units CAPS, Take 2,000 Units by mouth daily, Disp: , Rfl:     diltiazem (CARDIZEM) 120 MG tablet, TAKE 1 TABLET BY MOUTH EVERY 12 HOURS, Disp: 180 tablet, Rfl: 1    LATUDA 60 MG TABS, Take 80 mg by mouth daily , Disp: , Rfl: 2    methylphenidate (CONCERTA) 36 MG ER tablet, Take 54 mg by mouth daily , Disp: , Rfl:     mycophenolate (CELLCEPT) 250 mg capsule, Take 500 mg in AM and 250 mg in PM , Disp: 270 capsule, Rfl: 3    potassium phosphate, monobasic, (K-PHOS) 500 MG tablet, Take 1 tablet (500 mg total) by mouth 2 (two) times a day, Disp: 180 tablet, Rfl: 3    rosuvastatin (CRESTOR) 5 mg tablet, Take 1 tablet (5 mg total) by mouth daily, Disp: 90 tablet, Rfl: 3    sertraline (ZOLOFT) 50 mg tablet, Take 200 mg by mouth daily , Disp: , Rfl: 0    tacrolimus (PROGRAF) 0 5 mg capsule, 2 tabs of 0 5 in am and 1 of 0 5 mg in PM for total 1 mg in AM and 0 5 mg in PM, Disp: 270 capsule, Rfl: 3    warfarin (COUMADIN) 4 mg tablet, TAKE 2 TABLETS BY MOUTH EVERY DAY, Disp: 180 tablet, Rfl: 1    Laboratory Results:  Results from last 7 days   Lab Units 08/24/22  1154   WBC Thousand/uL 6 36   HEMOGLOBIN g/dL 14 9   HEMATOCRIT % 44 1   PLATELETS Thousands/uL 207   POTASSIUM mmol/L 4 3   CHLORIDE mmol/L 104   CO2 mmol/L 21   BUN mg/dL 29*   CREATININE mg/dL 1 55*   CALCIUM mg/dL 9 3   MAGNESIUM mg/dL 1 9   PHOSPHORUS mg/dL 2 2*       Results for orders placed or performed in visit on 08/24/22   Lipid panel   Result Value Ref Range    Cholesterol 204 (H) See Comment mg/dL    Triglycerides 178 (H) See Comment mg/dL    HDL, Direct 46 >=40 mg/dL    LDL Calculated 122 (H) 0 - 100 mg/dL    Non-HDL-Chol (CHOL-HDL) 158 mg/dl   CK (with reflex to MB)   Result Value Ref Range    Total  39 - 308 U/L

## 2022-09-08 ENCOUNTER — ANTICOAG VISIT (OUTPATIENT)
Dept: INTERNAL MEDICINE CLINIC | Facility: CLINIC | Age: 47
End: 2022-09-08

## 2022-09-08 ENCOUNTER — APPOINTMENT (OUTPATIENT)
Dept: LAB | Facility: HOSPITAL | Age: 47
End: 2022-09-08
Payer: COMMERCIAL

## 2022-09-08 DIAGNOSIS — Z94.0 KIDNEY TRANSPLANTED: ICD-10-CM

## 2022-09-12 DIAGNOSIS — E83.39 HYPOPHOSPHATEMIA: ICD-10-CM

## 2022-09-12 NOTE — TELEPHONE ENCOUNTER
----- Message from Efraín Brewster MD sent at 9/9/2022  4:51 PM EDT -----  Hello    Patient normally is followed up by Ms Jonny Wren  Can you please let pt know that creatinine stable 1 4 mg/dL  Phosphorus is lower at 1 8  Potassium moderately hemolyzed and is elevated but because it is hemolyzed it is not accurate    Tacrolimus is at goal 5 7   -can you please have the patient increase phosphorus supplement to 2 tablets twice a day  -repeat BMP and phosphorus level in 1 week to monitor phosphorus and potassium closely    Thank you    np

## 2022-09-12 NOTE — TELEPHONE ENCOUNTER
Left a detailed message for pt with lab results and med changes  Asked that he calls office to confirm that was received and if any questions  Lab orders in the system

## 2022-11-19 DIAGNOSIS — Z94.0 KIDNEY TRANSPLANTED: ICD-10-CM

## 2022-11-19 RX ORDER — MYCOPHENOLATE MOFETIL 250 MG/1
CAPSULE ORAL
Qty: 90 CAPSULE | Refills: 11 | Status: SHIPPED | OUTPATIENT
Start: 2022-11-19

## 2022-12-06 ENCOUNTER — TELEPHONE (OUTPATIENT)
Dept: NEPHROLOGY | Facility: CLINIC | Age: 47
End: 2022-12-06

## 2022-12-06 NOTE — TELEPHONE ENCOUNTER
I called Raphael Latonia6 @ [phone] (Automated System) to check eligible for the patient and as of 12/6/2022 the patient has current active coverage as of 12/6/2022   Jeny Pinzon,

## 2022-12-13 ENCOUNTER — TELEPHONE (OUTPATIENT)
Dept: NEPHROLOGY | Facility: CLINIC | Age: 47
End: 2022-12-13

## 2022-12-14 DIAGNOSIS — Z94.0 KIDNEY TRANSPLANTED: ICD-10-CM

## 2022-12-14 RX ORDER — MYCOPHENOLATE MOFETIL 250 MG/1
CAPSULE ORAL
Qty: 270 CAPSULE | Refills: 4 | Status: SHIPPED | OUTPATIENT
Start: 2022-12-14

## 2022-12-19 ENCOUNTER — OFFICE VISIT (OUTPATIENT)
Dept: DERMATOLOGY | Facility: CLINIC | Age: 47
End: 2022-12-19

## 2022-12-19 ENCOUNTER — ANTICOAG VISIT (OUTPATIENT)
Dept: INTERNAL MEDICINE CLINIC | Facility: CLINIC | Age: 47
End: 2022-12-19

## 2022-12-19 ENCOUNTER — TELEPHONE (OUTPATIENT)
Dept: INTERNAL MEDICINE CLINIC | Facility: CLINIC | Age: 47
End: 2022-12-19

## 2022-12-19 ENCOUNTER — APPOINTMENT (OUTPATIENT)
Dept: LAB | Facility: HOSPITAL | Age: 47
End: 2022-12-19

## 2022-12-19 ENCOUNTER — TELEPHONE (OUTPATIENT)
Dept: NEPHROLOGY | Facility: CLINIC | Age: 47
End: 2022-12-19

## 2022-12-19 VITALS — BODY MASS INDEX: 26.37 KG/M2 | WEIGHT: 174 LBS | HEIGHT: 68 IN

## 2022-12-19 DIAGNOSIS — E83.9 CHRONIC KIDNEY DISEASE-MINERAL AND BONE DISORDER: ICD-10-CM

## 2022-12-19 DIAGNOSIS — Z92.25 HISTORY OF IMMUNOSUPPRESSIVE THERAPY: ICD-10-CM

## 2022-12-19 DIAGNOSIS — T86.19 RECURRENT IGA NEPHROPATHY AFTER KIDNEY TRANSPLANTATION: ICD-10-CM

## 2022-12-19 DIAGNOSIS — Z13.89 SCREENING FOR SKIN CONDITION: ICD-10-CM

## 2022-12-19 DIAGNOSIS — M89.9 CHRONIC KIDNEY DISEASE-MINERAL AND BONE DISORDER: ICD-10-CM

## 2022-12-19 DIAGNOSIS — N18.9 CHRONIC KIDNEY DISEASE-MINERAL AND BONE DISORDER: ICD-10-CM

## 2022-12-19 DIAGNOSIS — N02.8 RECURRENT IGA NEPHROPATHY AFTER KIDNEY TRANSPLANTATION: ICD-10-CM

## 2022-12-19 DIAGNOSIS — L82.1 SEBORRHEIC KERATOSIS: Primary | ICD-10-CM

## 2022-12-19 DIAGNOSIS — N18.31 STAGE 3A CHRONIC KIDNEY DISEASE (HCC): ICD-10-CM

## 2022-12-19 DIAGNOSIS — Z94.0 RENAL TRANSPLANT RECIPIENT: ICD-10-CM

## 2022-12-19 LAB
25(OH)D3 SERPL-MCNC: 36 NG/ML (ref 30–100)
ANION GAP SERPL CALCULATED.3IONS-SCNC: 8 MMOL/L (ref 4–13)
BACTERIA UR QL AUTO: ABNORMAL /HPF
BASOPHILS # BLD AUTO: 0.07 THOUSANDS/ÂΜL (ref 0–0.1)
BASOPHILS NFR BLD AUTO: 1 % (ref 0–1)
BILIRUB UR QL STRIP: NEGATIVE
BUN SERPL-MCNC: 39 MG/DL (ref 5–25)
CALCIUM SERPL-MCNC: 9.5 MG/DL (ref 8.3–10.1)
CHLORIDE SERPL-SCNC: 103 MMOL/L (ref 96–108)
CLARITY UR: CLEAR
CO2 SERPL-SCNC: 24 MMOL/L (ref 21–32)
COLOR UR: YELLOW
CREAT SERPL-MCNC: 1.76 MG/DL (ref 0.6–1.3)
CREAT UR-MCNC: 275 MG/DL
EOSINOPHIL # BLD AUTO: 0.16 THOUSAND/ÂΜL (ref 0–0.61)
EOSINOPHIL NFR BLD AUTO: 2 % (ref 0–6)
ERYTHROCYTE [DISTWIDTH] IN BLOOD BY AUTOMATED COUNT: 12 % (ref 11.6–15.1)
GFR SERPL CREATININE-BSD FRML MDRD: 44 ML/MIN/1.73SQ M
GLUCOSE P FAST SERPL-MCNC: 90 MG/DL (ref 65–99)
GLUCOSE UR STRIP-MCNC: NEGATIVE MG/DL
HCT VFR BLD AUTO: 44.6 % (ref 36.5–49.3)
HGB BLD-MCNC: 14.6 G/DL (ref 12–17)
HGB UR QL STRIP.AUTO: ABNORMAL
HYALINE CASTS #/AREA URNS LPF: ABNORMAL /LPF
IMM GRANULOCYTES # BLD AUTO: 0.01 THOUSAND/UL (ref 0–0.2)
IMM GRANULOCYTES NFR BLD AUTO: 0 % (ref 0–2)
KETONES UR STRIP-MCNC: NEGATIVE MG/DL
LEUKOCYTE ESTERASE UR QL STRIP: NEGATIVE
LYMPHOCYTES # BLD AUTO: 2.62 THOUSANDS/ÂΜL (ref 0.6–4.47)
LYMPHOCYTES NFR BLD AUTO: 35 % (ref 14–44)
MCH RBC QN AUTO: 30.7 PG (ref 26.8–34.3)
MCHC RBC AUTO-ENTMCNC: 32.7 G/DL (ref 31.4–37.4)
MCV RBC AUTO: 94 FL (ref 82–98)
MONOCYTES # BLD AUTO: 0.76 THOUSAND/ÂΜL (ref 0.17–1.22)
MONOCYTES NFR BLD AUTO: 10 % (ref 4–12)
MUCOUS THREADS UR QL AUTO: ABNORMAL
NEUTROPHILS # BLD AUTO: 3.81 THOUSANDS/ÂΜL (ref 1.85–7.62)
NEUTS SEG NFR BLD AUTO: 52 % (ref 43–75)
NITRITE UR QL STRIP: NEGATIVE
NON-SQ EPI CELLS URNS QL MICRO: ABNORMAL /HPF
NRBC BLD AUTO-RTO: 0 /100 WBCS
PH UR STRIP.AUTO: 5.5 [PH]
PHOSPHATE SERPL-MCNC: 2.7 MG/DL (ref 2.7–4.5)
PLATELET # BLD AUTO: 212 THOUSANDS/UL (ref 149–390)
PMV BLD AUTO: 10.1 FL (ref 8.9–12.7)
POTASSIUM SERPL-SCNC: 4.5 MMOL/L (ref 3.5–5.3)
PROT UR STRIP-MCNC: ABNORMAL MG/DL
PROT UR-MCNC: 64 MG/DL
PROT/CREAT UR: 0.23 MG/G{CREAT} (ref 0–0.1)
PTH-INTACT SERPL-MCNC: 93.1 PG/ML (ref 18.4–80.1)
RBC # BLD AUTO: 4.76 MILLION/UL (ref 3.88–5.62)
RBC #/AREA URNS AUTO: ABNORMAL /HPF
SODIUM SERPL-SCNC: 135 MMOL/L (ref 135–147)
SP GR UR STRIP.AUTO: 1.02 (ref 1–1.03)
UROBILINOGEN UR STRIP-ACNC: <2 MG/DL
WBC # BLD AUTO: 7.43 THOUSAND/UL (ref 4.31–10.16)
WBC #/AREA URNS AUTO: ABNORMAL /HPF

## 2022-12-19 NOTE — TELEPHONE ENCOUNTER
----- Message from Nic Zhu MD sent at 12/19/2022  5:21 PM EST -----   Continue the same dose of warfarin, repeat labs in 1 month  ----- Message -----  From: Lab, Background User  Sent: 12/19/2022   3:01 PM EST  To: Nic Zhu MD

## 2022-12-19 NOTE — TELEPHONE ENCOUNTER
Patient aware of inr and will Continue the same dose of warfarin, repeat labs in 1 month 1/9/2023 per Dr Constantine Shin

## 2022-12-19 NOTE — PROGRESS NOTES
[facility] Note     Patient Name: Whitley Danielson  Encounter Date: December 19, 2022     Have you been cared for by a [place] [address] Dermatologist in the last 3 years and, if so, which description applies to you? Yes  I have been here within the last 3 years, and my medical history has NOT changed since that time  I am MALE/not capable of bearing children  REVIEW OF SYSTEMS:  Have you recently had or currently have any of the following? · No changes in my recent health  PAST MEDICAL HISTORY:  Have you personally ever had or currently have any of the following? If "YES," then please provide more detail  · No changes in my medical history  FAMILY HISTORY:  Any "first degree relatives" (parent, brother, sister, or child) with the following? • No changes in my family's known health  PATIENT EXPERIENCE:    • Do you want the Dermatologist to perform a COMPLETE skin exam today including a clinical examination under the "bra and underwear" areas? Yes  • If necessary, do we have your permission to call and leave a detailed message on your Preferred Phone number that includes your specific medical information?   Yes      Allergies   Allergen Reactions   • Penicillins Rash      Current Outpatient Medications:   •  Cholecalciferol (VITAMIN D-3) 1000 units CAPS, Take 2,000 Units by mouth daily, Disp: , Rfl:   •  diltiazem (CARDIZEM) 120 MG tablet, TAKE 1 TABLET BY MOUTH EVERY 12 HOURS, Disp: 180 tablet, Rfl: 1  •  LATUDA 60 MG TABS, Take 80 mg by mouth daily , Disp: , Rfl: 2  •  methylphenidate (CONCERTA) 36 MG ER tablet, Take 54 mg by mouth daily , Disp: , Rfl:   •  mycophenolate (CELLCEPT) 250 mg capsule, TAKE 2 CAPSULES BY MOUTH IN AM AND 1 CAPSULES IN THE PM, Disp: 270 capsule, Rfl: 4  •  potassium phosphate, monobasic, (K-PHOS) 500 MG tablet, Take 2 tablets (1,000 mg total) by mouth 2 (two) times a day, Disp: 360 tablet, Rfl: 3  •  rosuvastatin (CRESTOR) 5 mg tablet, Take 1 tablet (5 mg total) by mouth daily, Disp: 90 tablet, Rfl: 3  •  sertraline (ZOLOFT) 50 mg tablet, Take 200 mg by mouth daily , Disp: , Rfl: 0  •  tacrolimus (PROGRAF) 0 5 mg capsule, 2 tabs of 0 5 in am and 1 of 0 5 mg in PM for total 1 mg in AM and 0 5 mg in PM, Disp: 270 capsule, Rfl: 3  •  warfarin (COUMADIN) 4 mg tablet, TAKE 2 TABLETS BY MOUTH EVERY DAY, Disp: 180 tablet, Rfl: 1          • Whom besides the patient is providing clinical information about today's encounter?   o NO ADDITIONAL HISTORIAN (patient alone provided history)    Physical Exam and Assessment/Plan by Diagnosis:

## 2022-12-19 NOTE — PATIENT INSTRUCTIONS
Seborrheic Keratosis  Patient reasurred these are normal growths we acquire with age no treatment needed  History of immunosuppressive therapy patient advised cause of the medications that he is on he is at higher risk for potential skin cancers  If any changes growths have occurred patient should notify us  Also patient should be extremely careful with sun exposure  Yearly follow-up recommended    Screening for Dermatologic Disorders: Nothing else of concern noted on complete exam follow up in 1 year

## 2022-12-19 NOTE — PROGRESS NOTES
[facility] DERMATOLOGY  [address] [place] [id number]  [phone]  [phone]     MRN: [id number] : 1975  Encounter: [id number]  Patient Information: Charles Jacob  Chief complaint: Yearly checkup    History of present illness: 40-year-old male with history of kidney transplant presents for overall skin check immunosuppressive therapy no specific concerns noted  Past Medical History:   Diagnosis Date   • ADD (attention deficit disorder)    • [facility]   • Chronic kidney disease    • Depression    • DVT (deep venous thrombosis) ([facility] )    • H/O immunosuppressive therapy    • History of kidney disease    • HTN (hypertension) 2017   • Hypertension    • Hypomagnesemia 2020   • Left lower quadrant pain 2019   • Nephropathy, IgA    • Suicidal ideations 2019     Past Surgical History:   Procedure Laterality Date   • NEPHRECTOMY TRANSPLANTED ORGAN       Social History   Social History     Substance and Sexual Activity   Alcohol Use Yes    Comment: on occasion     Social History     Substance and Sexual Activity   Drug Use Not Currently   • Types: Marijuana    Comment: smokes marijuana a few times daily     Social History     Tobacco Use   Smoking Status Former   • Packs/day: 0 50   • Years: 4 00   • Pack years: 2 00   • Types: Cigarettes   • Quit date:    • Years since quittin 9   Smokeless Tobacco Never     Family History   Problem Relation Age of Onset   • Deep vein thrombosis Father    • Deep vein thrombosis Paternal Grandfather    • Transient ischemic attack Mother      Meds/Allergies   Allergies   Allergen Reactions   • Penicillins Rash       Meds:  Prior to Admission medications    Medication Sig Start Date End Date Taking?  Authorizing Provider   Cholecalciferol (VITAMIN D-3) 1000 units CAPS Take 2,000 Units by mouth daily   Yes Historical Provider, MD   diltiazem (CARDIZEM) 120 MG tablet TAKE 1 TABLET BY MOUTH EVERY 12 HOURS 6/29/22  Yes Alexia Mccain MD   LATUDA 60 MG TABS Take 80 mg by mouth daily  7/18/19  Yes Historical Provider, MD   methylphenidate (CONCERTA) 36 MG ER tablet Take 54 mg by mouth daily    Yes Historical Provider, MD   mycophenolate (CELLCEPT) 250 mg capsule TAKE 2 CAPSULES BY MOUTH IN AM AND 1 CAPSULES IN THE PM 12/14/22  Yes [facility] JONATAN Samaniego   potassium phosphate, monobasic, (K-PHOS) 500 MG tablet Take 2 tablets (1,000 mg total) by mouth 2 (two) times a day 9/12/22  Yes Marla Richmond MD   rosuvastatin (CRESTOR) 5 mg tablet Take 1 tablet (5 mg total) by mouth daily 1/18/22  Yes Marla Richmond MD   sertraline (ZOLOFT) 50 mg tablet Take 200 mg by mouth daily  7/8/19  Yes Historical Provider, MD   tacrolimus (PROGRAF) 0 5 mg capsule 2 tabs of 0 5 in am and 1 of 0 5 mg in PM for total 1 mg in AM and 0 5 mg in PM 8/30/22  Yes Marla Richmond MD   warfarin (COUMADIN) 4 mg tablet TAKE 2 TABLETS BY MOUTH EVERY DAY 7/22/22  Yes Surya Dozier MD       Subjective:     Review of Systems:    General: negative for - chills, fatigue, fever,  weight gain or weight loss  Psychological: negative for - anxiety, behavioral disorder, concentration difficulties, decreased libido, depression, irritability, memory difficulties, mood swings, sleep disturbances or suicidal ideation  ENT: negative for - hearing difficulties , nasal congestion, nasal discharge, oral lesions, sinus pain, sneezing, sore throat  Allergy and Immunology: negative for - hives, insect bite sensitivity,  Hematological and Lymphatic: negative for - bleeding problems, blood clots,bruising, swollen lymph nodes  Endocrine: negative for - hair pattern changes, hot flashes, malaise/lethargy, mood swings, palpitations, polydipsia/polyuria, skin changes, temperature intolerance or unexpected weight change  Respiratory: negative for - cough, hemoptysis, orthopnea, shortness of breath, or wheezing  Cardiovascular: negative for - chest pain, dyspnea on exertion, edema,  Gastrointestinal: negative for - abdominal pain, nausea/vomiting  Genito-Urinary: negative for - dysuria, incontinence, irregular/heavy menses or urinary frequency/urgency  Musculoskeletal: negative for - gait disturbance, joint pain, joint stiffness, joint swelling, muscle pain, muscular weakness  Dermatological:  As in HPI  Neurological: negative for confusion, dizziness, headaches, impaired coordination/balance, memory loss, numbness/tingling, seizures, speech problems, tremors or weakness       Objective:   Ht 5' 7 5" (1 715 m)   Wt 78 9 kg (174 lb)   BMI 26 85 kg/m²     Physical Exam:    General Appearance:    Alert, cooperative, no distress   Head:    Normocephalic, without obvious abnormality, atraumatic           Skin:   A full skin exam was performed including scalp, head scalp, eyes, ears, nose, lips, neck, chest, axilla, abdomen, back, buttocks, bilateral upper extremities, bilateral lower extremities, hands, feet, fingers, toes, fingernails, and toenails normal keratotic papules greasy stuck on appearance nothing atypical noted on complete exam     Assessment:     1  Seborrheic keratosis        2  History of immunosuppressive therapy        3  Screening for skin condition              Plan:   Seborrheic Keratosis  Patient reasurred these are normal growths we acquire with age no treatment needed  History of immunosuppressive therapy patient advised cause of the medications that he is on he is at higher risk for potential skin cancers  If any changes growths have occurred patient should notify us  Also patient should be extremely careful with sun exposure  Yearly follow-up recommended    Screening for Dermatologic Disorders: Nothing else of concern noted on complete exam follow up in 1 year       Ana Maria Thomas MD  12/19/2022,4:19 PM    Portions of the record may have been created with voice recognition software   Occasional wrong word or "sound a like" substitutions may have occurred due to the inherent limitations of voice recognition software   Read the chart carefully and recognize, using context, where substitutions have occurred

## 2022-12-20 ENCOUNTER — OFFICE VISIT (OUTPATIENT)
Dept: NEPHROLOGY | Facility: CLINIC | Age: 47
End: 2022-12-20

## 2022-12-20 VITALS
BODY MASS INDEX: 28.37 KG/M2 | TEMPERATURE: 97.5 F | RESPIRATION RATE: 16 BRPM | SYSTOLIC BLOOD PRESSURE: 110 MMHG | HEART RATE: 81 BPM | WEIGHT: 187.2 LBS | HEIGHT: 68 IN | OXYGEN SATURATION: 98 % | DIASTOLIC BLOOD PRESSURE: 70 MMHG

## 2022-12-20 DIAGNOSIS — N02.8 RECURRENT IGA NEPHROPATHY AFTER KIDNEY TRANSPLANTATION: ICD-10-CM

## 2022-12-20 DIAGNOSIS — Z94.0 RENAL TRANSPLANT RECIPIENT: ICD-10-CM

## 2022-12-20 DIAGNOSIS — I10 ESSENTIAL HYPERTENSION: ICD-10-CM

## 2022-12-20 DIAGNOSIS — I12.9 HYPERTENSIVE CHRONIC KIDNEY DISEASE, UNSPECIFIED CKD STAGE: ICD-10-CM

## 2022-12-20 DIAGNOSIS — F33.2 SEVERE EPISODE OF RECURRENT MAJOR DEPRESSIVE DISORDER, WITHOUT PSYCHOTIC FEATURES (HCC): ICD-10-CM

## 2022-12-20 DIAGNOSIS — T86.19 RECURRENT IGA NEPHROPATHY AFTER KIDNEY TRANSPLANTATION: ICD-10-CM

## 2022-12-20 DIAGNOSIS — M89.9 CHRONIC KIDNEY DISEASE-MINERAL AND BONE DISORDER: ICD-10-CM

## 2022-12-20 DIAGNOSIS — N18.31 STAGE 3A CHRONIC KIDNEY DISEASE (HCC): Primary | ICD-10-CM

## 2022-12-20 DIAGNOSIS — N18.9 CHRONIC KIDNEY DISEASE-MINERAL AND BONE DISORDER: ICD-10-CM

## 2022-12-20 DIAGNOSIS — E83.9 CHRONIC KIDNEY DISEASE-MINERAL AND BONE DISORDER: ICD-10-CM

## 2022-12-20 DIAGNOSIS — E83.39 HYPOPHOSPHATEMIA: ICD-10-CM

## 2022-12-20 LAB — TACROLIMUS BLD-MCNC: 5.8 NG/ML (ref 2–20)

## 2022-12-20 NOTE — ASSESSMENT & PLAN NOTE
Lab Results   Component Value Date    EGFR 44 12/19/2022    EGFR 58 09/08/2022    EGFR 52 08/24/2022    CREATININE 1 76 (H) 12/19/2022    CREATININE 1 40 (H) 09/08/2022    CREATININE 1 55 (H) 08/24/2022   PTH and phos along with vitamin D are within acceptable range    Will renew prescription for K-Phos phorus

## 2022-12-20 NOTE — ASSESSMENT & PLAN NOTE
Tacrolimus is within therapeutic range    We will continue to monitor along with transplant nephrologist

## 2022-12-20 NOTE — PROGRESS NOTES
NEPHROLOGY OFFICE FOLLOW UP  Kasi Zamora 50 y o  male MRN: [id number]    Encounter: [id number] 12/20/2022    REASON FOR VISIT: Kasi Zamora is a 50 y o  male who is here on 12/20/2022 for Chronic Kidney Disease and Follow-up    HPI:    Maciej Huynh came in today for follow-up of CKD and kidney transplant  27-year-old gentleman with a kidney transplant status post rejection with stage III CKD being monitored by transplant nephrologist    Patient also problem with major depression and was hospitalized before    At present seems to be stable denies any acute complaint    No chest pain no palpitation or shortness of breath    No nausea no vomiting      REVIEW OF SYSTEMS:    Review of Systems   Constitutional: Negative for activity change and fatigue  HENT: Negative for congestion and ear discharge  Eyes: Negative for photophobia and pain  Respiratory: Negative for apnea and choking  Cardiovascular: Negative for chest pain and palpitations  Gastrointestinal: Negative for abdominal distention and blood in stool  Endocrine: Negative for heat intolerance and polyphagia  Genitourinary: Negative for flank pain and urgency  Musculoskeletal: Negative for neck pain and neck stiffness  Skin: Negative for color change and wound  Allergic/Immunologic: Negative for food allergies and immunocompromised state  Neurological: Negative for seizures and facial asymmetry  Hematological: Negative for adenopathy  Does not bruise/bleed easily  Psychiatric/Behavioral: Negative for self-injury and suicidal ideas           PAST MEDICAL HISTORY:  Past Medical History:   Diagnosis Date   • ADD (attention deficit disorder)    • [facility] 1/24/2020   • Chronic kidney disease    • Depression    • DVT (deep venous thrombosis) ([facility])    • H/O immunosuppressive therapy    • History of kidney disease    • HTN (hypertension) 12/17/2017   • Hypertension    • Hypomagnesemia 1/25/2020   • Left lower quadrant pain 2019   • Nephropathy, IgA    • Suicidal ideations 2019       PAST SURGICAL HISTORY:  Past Surgical History:   Procedure Laterality Date   • NEPHRECTOMY TRANSPLANTED ORGAN         SOCIAL HISTORY:  Social History     Substance and Sexual Activity   Alcohol Use Yes    Comment: on occasion     Social History     Substance and Sexual Activity   Drug Use Not Currently   • Types: Marijuana    Comment: smokes marijuana a few times daily     Social History     Tobacco Use   Smoking Status Former   • Packs/day: 0 50   • Years: 4 00   • Pack years: 2 00   • Types: Cigarettes   • Quit date:    • Years since quittin 9   Smokeless Tobacco Never       FAMILY HISTORY:  Family History   Problem Relation Age of Onset   • Deep vein thrombosis Father    • Deep vein thrombosis Paternal Grandfather    • Transient ischemic attack Mother        MEDICATIONS:    Current Outpatient Medications:   •  Cholecalciferol (VITAMIN D-3) 1000 units CAPS, Take 2,000 Units by mouth daily, Disp: , Rfl:   •  diltiazem (CARDIZEM) 120 MG tablet, TAKE 1 TABLET BY MOUTH EVERY 12 HOURS, Disp: 180 tablet, Rfl: 1  •  LATUDA 60 MG TABS, Take 80 mg by mouth daily , Disp: , Rfl: 2  •  methylphenidate (CONCERTA) 36 MG ER tablet, Take 54 mg by mouth daily , Disp: , Rfl:   •  mycophenolate (CELLCEPT) 250 mg capsule, TAKE 2 CAPSULES BY MOUTH IN AM AND 1 CAPSULES IN THE PM, Disp: 270 capsule, Rfl: 4  •  potassium phosphate, monobasic, (K-PHOS) 500 MG tablet, Take 2 tablets (1,000 mg total) by mouth 2 (two) times a day, Disp: 360 tablet, Rfl: 3  •  rosuvastatin (CRESTOR) 5 mg tablet, Take 1 tablet (5 mg total) by mouth daily, Disp: 90 tablet, Rfl: 3  •  sertraline (ZOLOFT) 50 mg tablet, Take 50 mg by mouth 2 (two) times a day, Disp: , Rfl: 0  •  tacrolimus (PROGRAF) 0 5 mg capsule, 2 tabs of 0 5 in am and 1 of 0 5 mg in PM for total 1 mg in AM and 0 5 mg in PM, Disp: 270 capsule, Rfl: 3  •  warfarin (COUMADIN) 4 mg tablet, TAKE 2 TABLETS BY MOUTH EVERY DAY, Disp: 180 tablet, Rfl: 1    PHYSICAL EXAM:  Vitals:    12/20/22 1023   BP: 110/70   BP Location: Right arm   Patient Position: Sitting   Pulse: 81   Resp: 16   Temp: 97 5 °F (36 4 °C)   TempSrc: Temporal   SpO2: 98%   Weight: 84 9 kg (187 lb 3 2 oz)   Height: 5' 7 5" (1 715 m)     Body mass index is 28 89 kg/m²  Physical Exam  Constitutional:       General: He is not in acute distress  Appearance: He is well-developed  HENT:      Head: Normocephalic  Eyes:      General: No scleral icterus  Conjunctiva/sclera: Conjunctivae normal    Neck:      Vascular: No JVD  Cardiovascular:      Rate and Rhythm: Normal rate  Heart sounds: Normal heart sounds  Pulmonary:      Effort: Pulmonary effort is normal       Breath sounds: No wheezing  Abdominal:      Palpations: Abdomen is soft  Tenderness: There is no abdominal tenderness  Musculoskeletal:         General: Normal range of motion  Cervical back: Neck supple  Skin:     General: Skin is warm  Findings: No rash  Neurological:      Mental Status: He is alert and oriented to person, place, and time     Psychiatric:         Behavior: Behavior normal          LAB RESULTS:  Results for orders placed or performed in visit on 13/42/83   Basic metabolic panel   Result Value Ref Range    Sodium 135 135 - 147 mmol/L    Potassium 4 5 3 5 - 5 3 mmol/L    Chloride 103 96 - 108 mmol/L    CO2 24 21 - 32 mmol/L    ANION GAP 8 4 - 13 mmol/L    BUN 39 (H) 5 - 25 mg/dL    Creatinine 1 76 (H) 0 60 - 1 30 mg/dL    Glucose, Fasting 90 65 - 99 mg/dL    Calcium 9 5 8 3 - 10 1 mg/dL    eGFR 44 ml/min/1 73sq m   CBC and differential   Result Value Ref Range    WBC 7 43 4 31 - 10 16 Thousand/uL    RBC 4 76 3 88 - 5 62 Million/uL    Hemoglobin 14 6 12 0 - 17 0 g/dL    Hematocrit 44 6 36 5 - 49 3 %    MCV 94 82 - 98 fL    MCH 30 7 26 8 - 34 3 pg    MCHC 32 7 31 4 - 37 4 g/dL    RDW 12 0 11 6 - 15 1 %    MPV 10 1 8 9 - 12 7 fL    Platelets 499 668 - 365 Thousands/uL    nRBC 0 /100 WBCs    Neutrophils Relative 52 43 - 75 %    Immat GRANS % 0 0 - 2 %    Lymphocytes Relative 35 14 - 44 %    Monocytes Relative 10 4 - 12 %    Eosinophils Relative 2 0 - 6 %    Basophils Relative 1 0 - 1 %    Neutrophils Absolute 3 81 1 85 - 7 62 Thousands/µL    Immature Grans Absolute 0 01 0 00 - 0 20 Thousand/uL    Lymphocytes Absolute 2 62 0 60 - 4 47 Thousands/µL    Monocytes Absolute 0 76 0 17 - 1 22 Thousand/µL    Eosinophils Absolute 0 16 0 00 - 0 61 Thousand/µL    Basophils Absolute 0 07 0 00 - 0 10 Thousands/µL   Phosphorus   Result Value Ref Range    Phosphorus 2 7 2 7 - 4 5 mg/dL   Protein / creatinine ratio, urine   Result Value Ref Range    Creatinine, Ur 275 0 mg/dL    Protein Urine Random 64 mg/dL    Prot/Creat Ratio, Ur 0 23 (H) 0 00 - 0 10   PTH, intact   Result Value Ref Range    PTH 93 1 (H) 18 4 - 80 1 pg/mL   UA (URINE) with reflex to Scope   Result Value Ref Range    Color, UA Yellow     Clarity, UA Clear     Specific Gravity, UA 1 023 1 003 - 1 030    pH, UA 5 5 4 5, 5 0, 5 5, 6 0, 6 5, 7 0, 7 5, 8 0    Leukocytes, UA Negative Negative    Nitrite, UA Negative Negative    Protein, UA 30 (1+) (A) Negative mg/dl    Glucose, UA Negative Negative mg/dl    Ketones, UA Negative Negative mg/dl    Urobilinogen, UA <2 0 <2 0 mg/dl mg/dl    Bilirubin, UA Negative Negative    Occult Blood, UA Moderate (A) Negative   Vitamin D 25 hydroxy   Result Value Ref Range    Vit D, 25-Hydroxy 36 0 30 0 - 100 0 ng/mL   Tacrolimus level   Result Value Ref Range    TACROLIMUS 5 8 2 0 - 20 0 ng/mL   Urine Microscopic   Result Value Ref Range    RBC, UA 1-2 None Seen, 1-2 /hpf    WBC, UA 2-4 (A) None Seen, 1-2 /hpf    Epithelial Cells Occasional None Seen, Occasional /hpf    Bacteria, UA None Seen None Seen, Occasional /hpf    MUCUS THREADS Occasional (A) None Seen    Hyaline Casts, UA 0-3 (A) None Seen /lpf       ASSESSMENT and PLAN:      Stage 3 chronic kidney disease (HCC)  Lab Results Component Value Date    EGFR 44 12/19/2022    EGFR 58 09/08/2022    EGFR 52 08/24/2022    CREATININE 1 76 (H) 12/19/2022    CREATININE 1 40 (H) 09/08/2022    CREATININE 1 55 (H) 08/24/2022   Renal function is fluctuating  At present GFR is 44 with worsening creatinine  Will advise hydration and repeat blood test in 1 or 2 weeks if you continue to get worse will advise close follow-up with transplant nephrologist    Severe episode of recurrent major depressive disorder, without psychotic features ([address] )  On multiple medication and being monitored by psychiatrist    Chronic kidney disease-mineral and bone disorder  Lab Results   Component Value Date    EGFR 44 12/19/2022    EGFR 58 09/08/2022    EGFR 52 08/24/2022    CREATININE 1 76 (H) 12/19/2022    CREATININE 1 40 (H) 09/08/2022    CREATININE 1 55 (H) 08/24/2022   PTH and phos along with vitamin D are within acceptable range  Will renew prescription for K-Phos phorus    Essential hypertension  Very well controlled    Renal transplant recipient  Tacrolimus is within therapeutic range  We will continue to monitor along with transplant nephrologist      Everything discussed with patient at length  I will see him back in 6 months  He will get blood test in couple of weeks to assess any change in kidney function  He is going to be seen by transplant nephrologist in February        Portions of the record may have been created with voice recognition software  Occasional wrong word or "sound a like" substitutions may have occurred due to the inherent limitations of voice recognition software  Read the chart carefully and recognize, using context, where substitutions have occurred  If you have any questions, please contact the dictating provider

## 2022-12-20 NOTE — ASSESSMENT & PLAN NOTE
Lab Results   Component Value Date    EGFR 44 12/19/2022    EGFR 58 09/08/2022    EGFR 52 08/24/2022    CREATININE 1 76 (H) 12/19/2022    CREATININE 1 40 (H) 09/08/2022    CREATININE 1 55 (H) 08/24/2022   Renal function is fluctuating  At present GFR is 44 with worsening creatinine    Will advise hydration and repeat blood test in 1 or 2 weeks if you continue to get worse will advise close follow-up with transplant nephrologist

## 2022-12-23 DIAGNOSIS — I10 ESSENTIAL HYPERTENSION: ICD-10-CM

## 2022-12-23 RX ORDER — DILTIAZEM HYDROCHLORIDE 120 MG/1
TABLET, FILM COATED ORAL
Qty: 180 TABLET | Refills: 1 | Status: SHIPPED | OUTPATIENT
Start: 2022-12-23

## 2022-12-29 DIAGNOSIS — E83.39 HYPOPHOSPHATEMIA: ICD-10-CM

## 2022-12-29 NOTE — TELEPHONE ENCOUNTER
Called in to [facility] spoke with [facility] Pharmacist  per Dr Carrera refill for K-Phos 500 1 tablet BID #[id number] refills

## 2023-01-08 DIAGNOSIS — Z94.0 RENAL TRANSPLANT RECIPIENT: ICD-10-CM

## 2023-01-08 RX ORDER — ROSUVASTATIN CALCIUM 5 MG/1
5 TABLET, COATED ORAL DAILY
Qty: 90 TABLET | Refills: 3 | Status: SHIPPED | OUTPATIENT
Start: 2023-01-08

## 2023-01-30 DIAGNOSIS — I82.4Z1 DEEP VEIN THROMBOSIS (DVT) OF DISTAL VEIN OF RIGHT LOWER EXTREMITY, UNSPECIFIED CHRONICITY (HCC): ICD-10-CM

## 2023-01-30 RX ORDER — WARFARIN SODIUM 4 MG/1
TABLET ORAL
Qty: 60 TABLET | Refills: 5 | Status: SHIPPED | OUTPATIENT
Start: 2023-01-30

## 2023-02-13 ENCOUNTER — TELEPHONE (OUTPATIENT)
Dept: NEPHROLOGY | Facility: CLINIC | Age: 48
End: 2023-02-13

## 2023-02-13 NOTE — TELEPHONE ENCOUNTER
Patient called for confirmation that his new prescription for Vraylar (cariprazine) is safe to take in regards to his kidneys  Please advise

## 2023-02-14 NOTE — TELEPHONE ENCOUNTER
Hello    Please let the patient know that I spoke to our transplant pharmacist and this is safe to take with his transplant medications  He should check with his prescriber to make sure there are no other interactions  Thank you

## 2023-02-21 ENCOUNTER — ANTICOAG VISIT (OUTPATIENT)
Dept: INTERNAL MEDICINE CLINIC | Facility: CLINIC | Age: 48
End: 2023-02-21

## 2023-02-21 ENCOUNTER — TELEPHONE (OUTPATIENT)
Dept: INTERNAL MEDICINE CLINIC | Facility: CLINIC | Age: 48
End: 2023-02-21

## 2023-02-21 ENCOUNTER — APPOINTMENT (OUTPATIENT)
Dept: LAB | Facility: HOSPITAL | Age: 48
End: 2023-02-21

## 2023-02-21 DIAGNOSIS — E83.39 HYPOPHOSPHATEMIA: ICD-10-CM

## 2023-02-23 ENCOUNTER — OFFICE VISIT (OUTPATIENT)
Dept: NEPHROLOGY | Facility: CLINIC | Age: 48
End: 2023-02-23

## 2023-02-23 VITALS
BODY MASS INDEX: 29.55 KG/M2 | HEART RATE: 89 BPM | HEIGHT: 68 IN | SYSTOLIC BLOOD PRESSURE: 106 MMHG | DIASTOLIC BLOOD PRESSURE: 80 MMHG | WEIGHT: 195 LBS

## 2023-02-23 DIAGNOSIS — Z94.0 RENAL TRANSPLANT RECIPIENT: Primary | ICD-10-CM

## 2023-02-23 RX ORDER — CARIPRAZINE 1.5 MG/1
CAPSULE, GELATIN COATED ORAL
COMMUNITY
Start: 2023-02-15

## 2023-02-23 RX ORDER — SERTRALINE HYDROCHLORIDE 100 MG/1
200 TABLET, FILM COATED ORAL EVERY MORNING
COMMUNITY
Start: 2023-01-28

## 2023-02-23 NOTE — PROGRESS NOTES
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), living related from kidney swap renal transplant in 2009 at [facility] (patient's brother was donor involved in swap), was on peritoneal dialysis for 9 months prior to transplant, DVT X 4 prior, HTN, depression, ADHD, admission in December of 2017 for hematuria and at that time was treated for urinary tract infection/pyelonephritis  Then admission in January 2020 for FRANCES with Cr to 3 4 mg/dL in setting of n/v/d  End of October 2020, patient had missed tacrolimus for one week and creat had increased to 2 1 requiring steroid therapy  Pt presenting for f/u visit       CXR - 1/23/2020 - no acute disease     CT abd/pel - atrophic native kidneys, unremarkable renal transplant     U/s liver - 2 2 cm R hepatic echogenic mass reflecting hemangioma     1) CKD III, renal transplant -      Prior-history obtained from [facility], and [facility] and here at [facility] records:     -Baseline Cr 1 6-1 9 mg/dL mg/dL;  -Tac levels from 2009 - 2014 were 7-10  -Patient followed at [facility] - followed with Dr Dorinda Farfan  -Prior baseline Cr 1 4-1 5 mg/dL and had been rising to 1 8 mg/dL  -pt had early CMV - on EGD  - last time seen was in May 2017 at [facility] - no known rejection ([phone])  -Has never had renal biopsy post transplant     Old Labwork review:     - CT abd/pel without hydro or nephrolithiasis on transplanted kidney  - to note, patient has had microscopic hematuria as far back as 2014 on UA  - BK and CMV negative 10/2020  - urine eos 0%     October 2020:       -  pt had undetectable tac level end of October   Pt states missed one week of tac due to not being able to obtain the medication  - creat had increased to 2 1 end of October   Patient was given three doses of Solu-Medrol starting October 24th  - [place] Hafsanorm completed on November 5th-no class 2 DSA, no class 1 DSA, PRA 15%, patient has watch antibodies      since then, the creatinine has ranged from approximately 1 6-2 mg/dL        Important Medication notes:   - on diltiazem to note (interaction with tacrolimus)     February 2023-patient presents for follow-up visit  Patient's creatinine has been on the upper end of his baseline with most recent 1 8 mg/dL with stable and appropriate electrolytes  We reviewed prior tacrolimus levels and creatinine levels and patient's graft does fluctuate widely with tacrolimus levels  Currently level is slightly higher than goal and therefore I believe the patient's creatinine is slightly higher  When the levels are much lower of tacrolimus, the creatinine is on the lower end of goal   Given that this is the first time in close to 1 year that the patient's level is slightly above goal, I will monitor for now and repeat lab work sooner in 1 month rather than 2 to 3 months  Patient states that he is agreeable to this plan  Overall, patient has a history of IgA nephropathy and we are monitoring proteinuria and hematuria closely  Currently does not have significant hematuria and had a concentrated urine specimen    Protein creatinine ratio was at goal      Plan:     -Continue  tacrolimus to 1 mg in the morning and continue 0 5 mg in the evening  -continue  mg in the morning and 250 mg in the evening   -continue statin   - repeat lab work in 1 month and again in 3 months  -  We will see the patient in 6 months  -  Continue interval care with primary nephrologist Dr Hubert Sewell as doing  - labwork every 2-3 months   - to note, if biopsy is needed  In the future will need bridge with heparin as is on coumadin for mult DVT prior  - to note, pt is on diltiazem     2) Immune Suppression-on 2 drug regimen with tacrolimus 1 mg in the morning and 0 5 mg in the evening and mycophenolate 500 mg in the morning and 250 mg in the evening     - June 2020 - pt did not have tacrolimus for a few days because he was waiting on refill and did not realize that it was backordered  Issue resolved and restarted tacrolimus  - 10/2020 - did not have tac for one week and see above     -  in AM and 250 mg PM  - history CMV  - pt has had labile tacrolimus levels    3) DVT - recurrent DVT  Most recent march 2018     - 4 prior DVTs  - 1 RUE post PICC  - then 3 in LE  - further plans per primary team  -Tiffany Seymour per Primary team --> last INR 11/2021, no recent     4) Vaccines     - stay up to date on vaccine with flu vaccine  - PNA vaccine - pt will review with Primary Physician  - no live vaccines  -Stay up-to-date on appropriate inactivated vaccines     5) Age appropriate Ca screening     - saw Derm  11/2021  Was placed on doxy for 1 week due to folliculitis concerning for MRSA  - has history of psoriaform dermatitis      6) HTN     - pt is on diltiazem     7) HPL     - further plan per primary team   -would continue to monitor lipid panel closely  -Is on statin     9) Depression     -prior suicide attempts - prior 2003  - patient follows with Psychiatry  Patient had new medication started 1 week prior  I have reviewed this medication with transplant pharmacy team and no objection from transplant medication interaction standpoint to start      10) Hb - monitor     11) IgA nephropathy native disease      monitor for recurrence     12) MBD     - defer to Primary Nephrology for f/u     13) liver lesion on u/s in hospital     - see above for GI f/u  - saw GI in April  -  Repeat renal ultrasound    - pt saw GI 4/2021 --> MRI completed in may - no evidence of hemangioma or mass;      14) COVID infection in end of dec 2021     It was a pleasure evaluating your patient   Thank you for allowing our team to participate in the care of Mr Arnav Joshi  Please do not hesitate to contact our team if further issues/questions shall arise in the interim         No problem-specific Assessment & Plan notes found for this encounter  HPI:    Patient denies complaints  No fevers, chills, nausea, vomiting, diarrhea  PATIENT INSTRUCTIONS:    Patient Instructions   1) Avoid NSAIDS - (Example - motrin, advil, ibuprofen, aleve, exederin, etc)  2) Always follow a low salt diet  3) If you have any issues with trouble urinating, blood in the urine, food tasting like metal, confusion, weakness, fatigue that is worsening, please call right away  4) Please continue to follow regularly with your family physician and any other specialist that you are advised to see and continue to follow their recommendations  5) labwork in one month  6) then full labs again every 3 months  7) no changes to your medications today        OBJECTIVE:  Current Weight: Weight - Scale: 88 5 kg (195 lb)  Vitals:    02/23/23 1537   BP: 106/80   BP Location: Right arm   Patient Position: Sitting   Cuff Size: Large   Pulse: 89   Weight: 88 5 kg (195 lb)   Height: 5' 7 5" (1 715 m)    Body mass index is 30 09 kg/m²  REVIEW OF SYSTEMS:    Review of Systems   Constitutional: Negative  Negative for appetite change and fatigue  HENT: Negative  Eyes: Negative  Respiratory: Negative  Negative for shortness of breath  Cardiovascular: Negative  Negative for leg swelling  Gastrointestinal: Negative  Endocrine: Negative  Genitourinary: Negative  Negative for difficulty urinating  Musculoskeletal: Negative  Allergic/Immunologic: Negative  Neurological: Negative  Hematological: Negative  Psychiatric/Behavioral: Negative  All other systems reviewed and are negative  PHYSICAL EXAM:      Physical Exam  Vitals and nursing note reviewed  Constitutional:       General: He is not in acute distress       Appearance: He is well-developed  He is not diaphoretic  HENT:      Head: Normocephalic and atraumatic  Eyes:      General: No scleral icterus  Right eye: No discharge  Left eye: No discharge  Conjunctiva/sclera: Conjunctivae normal    Neck:      Vascular: No JVD  Cardiovascular:      Rate and Rhythm: Normal rate and regular rhythm  Heart sounds: Normal heart sounds  No murmur heard  No friction rub  No gallop  Pulmonary:      Effort: Pulmonary effort is normal  No respiratory distress  Breath sounds: Normal breath sounds  No wheezing or rales  Chest:      Chest wall: No tenderness  Abdominal:      General: Bowel sounds are normal  There is no distension  Palpations: Abdomen is soft  Tenderness: There is no abdominal tenderness  There is no rebound  Musculoskeletal:         General: No tenderness or deformity  Normal range of motion  Cervical back: Normal range of motion and neck supple  Skin:     General: Skin is warm and dry  Coloration: Skin is not pale  Findings: No erythema or rash  Neurological:      Mental Status: He is alert and oriented to person, place, and time  Cranial Nerves: No cranial nerve deficit  Coordination: Coordination normal       Deep Tendon Reflexes: Reflexes are normal and symmetric  Psychiatric:         Behavior: Behavior normal          Thought Content:  Thought content normal          Judgment: Judgment normal          Medications:    Current Outpatient Medications:   •  Cholecalciferol (VITAMIN D-3) 1000 units CAPS, Take 2,000 Units by mouth daily, Disp: , Rfl:   •  diltiazem (CARDIZEM) 120 MG tablet, TAKE 1 TABLET BY MOUTH EVERY 12 HOURS, Disp: 180 tablet, Rfl: 1  •  LATUDA 60 MG TABS, Take 80 mg by mouth daily , Disp: , Rfl: 2  •  methylphenidate (CONCERTA) 36 MG ER tablet, Take 54 mg by mouth daily , Disp: , Rfl:   •  mycophenolate (CELLCEPT) 250 mg capsule, TAKE 2 CAPSULES BY MOUTH IN AM AND 1 CAPSULES IN THE PM, Disp: 270 capsule, Rfl: 4  •  rosuvastatin (CRESTOR) 5 mg tablet, TAKE 1 TABLET (5 MG TOTAL) BY MOUTH DAILY  , Disp: 90 tablet, Rfl: 3  •  sertraline (ZOLOFT) 100 mg tablet, Take 200 mg by mouth every morning, Disp: , Rfl:   •  tacrolimus (PROGRAF) 0 5 mg capsule, 2 tabs of 0 5 in am and 1 of 0 5 mg in PM for total 1 mg in AM and 0 5 mg in PM, Disp: 270 capsule, Rfl: 3  •  Vraylar 1 5 MG capsule, , Disp: , Rfl:   •  warfarin (COUMADIN) 4 mg tablet, TAKE 2 TABLETS BY MOUTH EVERY DAY, Disp: 60 tablet, Rfl: 5  •  potassium phosphate, monobasic, (K-PHOS) 500 MG tablet, Take 2 tablets (1,000 mg total) by mouth 2 (two) times a day, Disp: 360 tablet, Rfl: 3  •  sertraline (ZOLOFT) 50 mg tablet, Take 50 mg by mouth 2 (two) times a day (Patient not taking: Reported on 2/23/2023), Disp: , Rfl: 0    Laboratory Results:  Results from last 7 days   Lab Units 02/21/23  1434   WBC Thousand/uL 6 78   HEMOGLOBIN g/dL 13 6   HEMATOCRIT % 41 7   PLATELETS Thousands/uL 223   POTASSIUM mmol/L 4 8   CHLORIDE mmol/L 111*   CO2 mmol/L 24   BUN mg/dL 26*   CREATININE mg/dL 1 80*   CALCIUM mg/dL 10 2   MAGNESIUM mg/dL 1 9   PHOSPHORUS mg/dL 2 6*       Results for orders placed or performed in visit on 32/01/60   Basic metabolic panel   Result Value Ref Range    Sodium 135 135 - 147 mmol/L    Potassium 4 5 3 5 - 5 3 mmol/L    Chloride 103 96 - 108 mmol/L    CO2 24 21 - 32 mmol/L    ANION GAP 8 4 - 13 mmol/L    BUN 39 (H) 5 - 25 mg/dL    Creatinine 1 76 (H) 0 60 - 1 30 mg/dL    Glucose, Fasting 90 65 - 99 mg/dL    Calcium 9 5 8 3 - 10 1 mg/dL    eGFR 44 ml/min/1 73sq m   CBC and differential   Result Value Ref Range    WBC 7 43 4 31 - 10 16 Thousand/uL    RBC 4 76 3 88 - 5 62 Million/uL    Hemoglobin 14 6 12 0 - 17 0 g/dL    Hematocrit 44 6 36 5 - 49 3 %    MCV 94 82 - 98 fL    MCH 30 7 26 8 - 34 3 pg    MCHC 32 7 31 4 - 37 4 g/dL    RDW 12 0 11 6 - 15 1 %    MPV 10 1 8 9 - 12 7 fL    Platelets 457 032 - 498 Thousands/uL    nRBC 0 /100 WBCs Neutrophils Relative 52 43 - 75 %    Immat GRANS % 0 0 - 2 %    Lymphocytes Relative 35 14 - 44 %    Monocytes Relative 10 4 - 12 %    Eosinophils Relative 2 0 - 6 %    Basophils Relative 1 0 - 1 %    Neutrophils Absolute 3 81 1 85 - 7 62 Thousands/µL    Immature Grans Absolute 0 01 0 00 - 0 20 Thousand/uL    Lymphocytes Absolute 2 62 0 60 - 4 47 Thousands/µL    Monocytes Absolute 0 76 0 17 - 1 22 Thousand/µL    Eosinophils Absolute 0 16 0 00 - 0 61 Thousand/µL    Basophils Absolute 0 07 0 00 - 0 10 Thousands/µL   Phosphorus   Result Value Ref Range    Phosphorus 2 7 2 7 - 4 5 mg/dL   Protein / creatinine ratio, urine   Result Value Ref Range    Creatinine, Ur 275 0 mg/dL    Protein Urine Random 64 mg/dL    Prot/Creat Ratio, Ur 0 23 (H) 0 00 - 0 10   PTH, intact   Result Value Ref Range    PTH 93 1 (H) 18 4 - 80 1 pg/mL   UA (URINE) with reflex to Scope   Result Value Ref Range    Color, UA Yellow     Clarity, UA Clear     Specific Gravity, UA 1 023 1 003 - 1 030    pH, UA 5 5 4 5, 5 0, 5 5, 6 0, 6 5, 7 0, 7 5, 8 0    Leukocytes, UA Negative Negative    Nitrite, UA Negative Negative    Protein, UA 30 (1+) (A) Negative mg/dl    Glucose, UA Negative Negative mg/dl    Ketones, UA Negative Negative mg/dl    Urobilinogen, UA <2 0 <2 0 mg/dl mg/dl    Bilirubin, UA Negative Negative    Occult Blood, UA Moderate (A) Negative   Vitamin D 25 hydroxy   Result Value Ref Range    Vit D, 25-Hydroxy 36 0 30 0 - 100 0 ng/mL   Tacrolimus level   Result Value Ref Range    TACROLIMUS 5 8 2 0 - 20 0 ng/mL   Urine Microscopic   Result Value Ref Range    RBC, UA 1-2 None Seen, 1-2 /hpf    WBC, UA 2-4 (A) None Seen, 1-2 /hpf    Epithelial Cells Occasional None Seen, Occasional /hpf    Bacteria, UA None Seen None Seen, Occasional /hpf    MUCUS THREADS Occasional (A) None Seen    Hyaline Casts, UA 0-3 (A) None Seen /lpf

## 2023-02-23 NOTE — PATIENT INSTRUCTIONS
1) Avoid NSAIDS - (Example - motrin, advil, ibuprofen, aleve, exederin, etc)  2) Always follow a low salt diet  3) If you have any issues with trouble urinating, blood in the urine, food tasting like metal, confusion, weakness, fatigue that is worsening, please call right away    4) Please continue to follow regularly with your family physician and any other specialist that you are advised to see and continue to follow their recommendations  5) labwork in one month  6) then full labs again every 3 months  7) no changes to your medications today

## 2023-02-23 NOTE — LETTER
February 23, 2023     Radha Membreno MD  [address] [place] [address]    Patient: Varsha Knight   YOB: 1975   Date of Visit: 2/23/2023       Dear Dr Kee New:    Thank you for referring Varsha Knight to me for evaluation  Below are my notes for this consultation  If you have questions, please do not hesitate to call me  I look forward to following your patient along with you  Sincerely,        Chuck Butler MD        CC: No Recipients  Chuck Butler MD  2/23/2023  4:31 PM  Sign when Signing Visit  NEPHROLOGY OFFICE VISIT   Varsha Knight 50 y o  male MRN: [id number]  2/23/2023    Reason for Visit: CKD III    ASSESSMENT and PLAN:    I had the pleasure of seeing Mr Didi Miranda today in the renal clinic for the continued management of CKD III  53-year-old male with a past medical history of 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 (diagnosed at 15 yo), living related from kidney swap renal transplant in 2009 at [facility] (patient's brother was donor involved in swap), was on peritoneal dialysis for 9 months prior to transplant, DVT X 4 prior, HTN, depression, ADHD, admission in December of 2017 for hematuria and at that time was treated for urinary tract infection/pyelonephritis  Then admission in January 2020 for FRANCES with Cr to 3 4 mg/dL in setting of n/v/d  End of October 2020, patient had missed tacrolimus for one week and creat had increased to 2 1 requiring steroid therapy   Pt presenting for f/u visit       CXR - 1/23/2020 - no acute disease     CT abd/pel - atrophic native kidneys, unremarkable renal transplant     U/s liver - 2 2 cm R hepatic echogenic mass reflecting hemangioma     1) CKD III, renal transplant -      Prior-history obtained from [facility], and [facility] and here at [facility] records:     -Baseline Cr 1 6-1 9 mg/dL mg/dL;  -Tac levels from 2009 - 2014 were 7-10  -Patient followed at [facility] - followed with Dr Walker Stiles  -Prior baseline Cr 1 4-1 5 mg/dL and had been rising to 1 8 mg/dL  -pt had early CMV - on EGD  - last time seen was in May 2017 at [facility] - no known rejection ([phone])  -Has never had renal biopsy post transplant     Old Labwork review:     - CT abd/pel without hydro or nephrolithiasis on transplanted kidney  - to note, patient has had microscopic hematuria as far back as 2014 on UA  - BK and CMV negative 10/2020  - urine eos 0%     October 2020:       -  pt had undetectable tac level end of October  Pt states missed one week of tac due to not being able to obtain the medication  - creat had increased to 2 1 end of October   Patient was given three doses of Solu-Medrol starting October 24th  - [facility] completed on November 5th-no class 2 DSA, no class 1 DSA, PRA 15%, patient has watch antibodies      since then, the creatinine has ranged from approximately 1 6-2 mg/dL        Important Medication notes:   - on diltiazem to note (interaction with tacrolimus)     February 2023-patient presents for follow-up visit  Patient's creatinine has been on the upper end of his baseline with most recent 1 8 mg/dL with stable and appropriate electrolytes  We reviewed prior tacrolimus levels and creatinine levels and patient's graft does fluctuate widely with tacrolimus levels  Currently level is slightly higher than goal and therefore I believe the patient's creatinine is slightly higher  When the levels are much lower of tacrolimus, the creatinine is on the lower end of goal   Given that this is the first time in close to 1 year that the patient's level is slightly above goal, I will monitor for now and repeat lab work sooner in 1 month rather than 2 to 3 months  Patient states that he is agreeable to this plan  Overall, patient has a history of IgA nephropathy and we are monitoring proteinuria and hematuria closely  Currently does not have significant hematuria and had a concentrated urine specimen    Protein creatinine ratio was at goal      Plan:     -Continue  tacrolimus to 1 mg in the morning and continue 0 5 mg in the evening  -continue  mg in the morning and 250 mg in the evening   -continue statin   - repeat lab work in 1 month and again in 3 months  -  We will see the patient in 6 months  -  Continue interval care with primary nephrologist Dr Bethany Henderson as doing  - labwork every 2-3 months   - to note, if biopsy is needed  In the future will need bridge with heparin as is on coumadin for mult DVT prior  - to note, pt is on diltiazem     2) Immune Suppression-on 2 drug regimen with tacrolimus 1 mg in the morning and 0 5 mg in the evening and mycophenolate 500 mg in the morning and 250 mg in the evening     - June 2020 - pt did not have tacrolimus for a few days because he was waiting on refill and did not realize that it was backordered  Issue resolved and restarted tacrolimus  - 10/2020 - did not have tac for one week and see above     -  in AM and 250 mg PM  - history CMV  - pt has had labile tacrolimus levels    3) DVT - recurrent DVT  Most recent march 2018     - 4 prior DVTs  - 1 RUE post PICC  - then 3 in LE  - further plans per primary team  -Yifan Yost per Primary team --> last INR 11/2021, no recent     4) Vaccines     - stay up to date on vaccine with flu vaccine  - PNA vaccine - pt will review with Primary Physician  - no live vaccines  -Stay up-to-date on appropriate inactivated vaccines     5) Age appropriate Ca screening     - saw Derm  11/2021  Was placed on doxy for 1 week due to folliculitis concerning for MRSA  - has history of psoriaform dermatitis      6) HTN     - pt is on diltiazem     7) HPL     - further plan per primary team   -would continue to monitor lipid panel closely  -Is on statin     9) Depression     -prior suicide attempts - prior 2003  - patient follows with Psychiatry  Patient had new medication started 1 week prior    I have reviewed this medication with transplant pharmacy team and no objection from transplant medication interaction standpoint to start      10) Hb - monitor     11) IgA nephropathy native disease      monitor for recurrence     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     - defer to Primary Nephrology for f/u     13) liver lesion on u/s in hospital     - see above for GI f/u  - saw GI in April  -  Repeat renal ultrasound    - pt saw GI 4/2021 --> MRI completed in may - no evidence of hemangioma or mass;      14) COVID infection in end of dec 2021     It was a pleasure evaluating your patient  Thank you for allowing our team to participate in the care of Mr Arnav Joshi  Please do not hesitate to contact our team if further issues/questions shall arise in the interim         No problem-specific Assessment & Plan notes found for this encounter  HPI:    Patient denies complaints  No fevers, chills, nausea, vomiting, diarrhea  PATIENT INSTRUCTIONS:    Patient Instructions   1) Avoid NSAIDS - (Example - motrin, advil, ibuprofen, aleve, exederin, etc)  2) Always follow a low salt diet  3) If you have any issues with trouble urinating, blood in the urine, food tasting like metal, confusion, weakness, fatigue that is worsening, please call right away  4) Please continue to follow regularly with your family physician and any other specialist that you are advised to see and continue to follow their recommendations  5) labwork in one month  6) then full labs again every 3 months  7) no changes to your medications today        OBJECTIVE:  Current Weight: Weight - Scale: 88 5 kg (195 lb)  Vitals:    02/23/23 1537   BP: 106/80   BP Location: Right arm   Patient Position: Sitting   Cuff Size: Large   Pulse: 89   Weight: 88 5 kg (195 lb)   Height: 5' 7 5" (1 715 m)    Body mass index is 30 09 kg/m²  REVIEW OF SYSTEMS:    Review of Systems   Constitutional: Negative  Negative for appetite change and fatigue  HENT: Negative  Eyes: Negative  Respiratory: Negative  Negative for shortness of breath  Cardiovascular: Negative  Negative for leg swelling  Gastrointestinal: Negative  Endocrine: Negative  Genitourinary: Negative  Negative for difficulty urinating  Musculoskeletal: Negative  Allergic/Immunologic: Negative  Neurological: Negative  Hematological: Negative  Psychiatric/Behavioral: Negative  All other systems reviewed and are negative  PHYSICAL EXAM:      Physical Exam  Vitals and nursing note reviewed  Constitutional:       General: He is not in acute distress  Appearance: He is well-developed  He is not diaphoretic  HENT:      Head: Normocephalic and atraumatic  Eyes:      General: No scleral icterus  Right eye: No discharge  Left eye: No discharge  Conjunctiva/sclera: Conjunctivae normal    Neck:      Vascular: No JVD  Cardiovascular:      Rate and Rhythm: Normal rate and regular rhythm  Heart sounds: Normal heart sounds  No murmur heard  No friction rub  No gallop  Pulmonary:      Effort: Pulmonary effort is normal  No respiratory distress  Breath sounds: Normal breath sounds  No wheezing or rales  Chest:      Chest wall: No tenderness  Abdominal:      General: Bowel sounds are normal  There is no distension  Palpations: Abdomen is soft  Tenderness: There is no abdominal tenderness  There is no rebound  Musculoskeletal:         General: No tenderness or deformity  Normal range of motion  Cervical back: Normal range of motion and neck supple  Skin:     General: Skin is warm and dry  Coloration: Skin is not pale  Findings: No erythema or rash  Neurological:      Mental Status: He is alert and oriented to person, place, and time  Cranial Nerves: No cranial nerve deficit  Coordination: Coordination normal       Deep Tendon Reflexes: Reflexes are normal and symmetric  Psychiatric:         Behavior: Behavior normal          Thought Content:  Thought content normal          Judgment: Judgment normal          Medications:    Current Outpatient Medications:   •  Cholecalciferol (VITAMIN D-3) 1000 units CAPS, Take 2,000 Units by mouth daily, Disp: , Rfl:   •  diltiazem (CARDIZEM) 120 MG tablet, TAKE 1 TABLET BY MOUTH EVERY 12 HOURS, Disp: 180 tablet, Rfl: 1  •  LATUDA 60 MG TABS, Take 80 mg by mouth daily , Disp: , Rfl: 2  •  methylphenidate (CONCERTA) 36 MG ER tablet, Take 54 mg by mouth daily , Disp: , Rfl:   •  mycophenolate (CELLCEPT) 250 mg capsule, TAKE 2 CAPSULES BY MOUTH IN AM AND 1 CAPSULES IN THE PM, Disp: 270 capsule, Rfl: 4  •  rosuvastatin (CRESTOR) 5 mg tablet, TAKE 1 TABLET (5 MG TOTAL) BY MOUTH DAILY  , Disp: 90 tablet, Rfl: 3  •  sertraline (ZOLOFT) 100 mg tablet, Take 200 mg by mouth every morning, Disp: , Rfl:   •  tacrolimus (PROGRAF) 0 5 mg capsule, 2 tabs of 0 5 in am and 1 of 0 5 mg in PM for total 1 mg in AM and 0 5 mg in PM, Disp: 270 capsule, Rfl: 3  •  Vraylar 1 5 MG capsule, , Disp: , Rfl:   •  warfarin (COUMADIN) 4 mg tablet, TAKE 2 TABLETS BY MOUTH EVERY DAY, Disp: 60 tablet, Rfl: 5  •  potassium phosphate, monobasic, (K-PHOS) 500 MG tablet, Take 2 tablets (1,000 mg total) by mouth 2 (two) times a day, Disp: 360 tablet, Rfl: 3  •  sertraline (ZOLOFT) 50 mg tablet, Take 50 mg by mouth 2 (two) times a day (Patient not taking: Reported on 2/23/2023), Disp: , Rfl: 0    Laboratory Results:  Results from last 7 days   Lab Units 02/21/23  1434   WBC Thousand/uL 6 78   HEMOGLOBIN g/dL 13 6   HEMATOCRIT % 41 7   PLATELETS Thousands/uL 223   POTASSIUM mmol/L 4 8   CHLORIDE mmol/L 111*   CO2 mmol/L 24   BUN mg/dL 26*   CREATININE mg/dL 1 80*   CALCIUM mg/dL 10 2   MAGNESIUM mg/dL 1 9   PHOSPHORUS mg/dL 2 6*       Results for orders placed or performed in visit on 31/44/92   Basic metabolic panel   Result Value Ref Range    Sodium 135 135 - 147 mmol/L    Potassium 4 5 3 5 - 5 3 mmol/L    Chloride 103 96 - 108 mmol/L    CO2 24 21 - 32 mmol/L    ANION GAP 8 4 - 13 mmol/L    BUN 39 (H) 5 - 25 mg/dL    Creatinine 1 76 (H) 0 60 - 1 30 mg/dL    Glucose, Fasting 90 65 - 99 mg/dL    Calcium 9 5 8 3 - 10 1 mg/dL    eGFR 44 ml/min/1 73sq m   CBC and differential   Result Value Ref Range    WBC 7 43 4 31 - 10 16 Thousand/uL    RBC 4 76 3 88 - 5 62 Million/uL    Hemoglobin 14 6 12 0 - 17 0 g/dL    Hematocrit 44 6 36 5 - 49 3 %    MCV 94 82 - 98 fL    MCH 30 7 26 8 - 34 3 pg    MCHC 32 7 31 4 - 37 4 g/dL    RDW 12 0 11 6 - 15 1 %    MPV 10 1 8 9 - 12 7 fL    Platelets 282 583 - 158 Thousands/uL    nRBC 0 /100 WBCs    Neutrophils Relative 52 43 - 75 %    Immat GRANS % 0 0 - 2 %    Lymphocytes Relative 35 14 - 44 %    Monocytes Relative 10 4 - 12 %    Eosinophils Relative 2 0 - 6 %    Basophils Relative 1 0 - 1 %    Neutrophils Absolute 3 81 1 85 - 7 62 Thousands/µL    Immature Grans Absolute 0 01 0 00 - 0 20 Thousand/uL    Lymphocytes Absolute 2 62 0 60 - 4 47 Thousands/µL    Monocytes Absolute 0 76 0 17 - 1 22 Thousand/µL    Eosinophils Absolute 0 16 0 00 - 0 61 Thousand/µL    Basophils Absolute 0 07 0 00 - 0 10 Thousands/µL   Phosphorus   Result Value Ref Range    Phosphorus 2 7 2 7 - 4 5 mg/dL   Protein / creatinine ratio, urine   Result Value Ref Range    Creatinine, Ur 275 0 mg/dL    Protein Urine Random 64 mg/dL    Prot/Creat Ratio, Ur 0 23 (H) 0 00 - 0 10   PTH, intact   Result Value Ref Range    PTH 93 1 (H) 18 4 - 80 1 pg/mL   UA (URINE) with reflex to Scope   Result Value Ref Range    Color, UA Yellow     Clarity, UA Clear     Specific Gravity, UA 1 023 1 003 - 1 030    pH, UA 5 5 4 5, 5 0, 5 5, 6 0, 6 5, 7 0, 7 5, 8 0    Leukocytes, UA Negative Negative    Nitrite, UA Negative Negative    Protein, UA 30 (1+) (A) Negative mg/dl    Glucose, UA Negative Negative mg/dl    Ketones, UA Negative Negative mg/dl    Urobilinogen, UA <2 0 <2 0 mg/dl mg/dl    Bilirubin, UA Negative Negative    Occult Blood, UA Moderate (A) Negative   Vitamin D 25 hydroxy   Result Value Ref Range    Vit D, 25-Hydroxy 36 0 30 0 - 100 0 ng/mL   Tacrolimus level   Result Value Ref Range    TACROLIMUS 5 8 2 0 - 20 0 ng/mL   Urine Microscopic   Result Value Ref Range    RBC, UA 1-2 None Seen, 1-2 /hpf    WBC, UA 2-4 (A) None Seen, 1-2 /hpf    Epithelial Cells Occasional None Seen, Occasional /hpf    Bacteria, UA None Seen None Seen, Occasional /hpf    MUCUS THREADS Occasional (A) None Seen    Hyaline Casts, UA 0-3 (A) None Seen /lpf

## 2023-06-06 ENCOUNTER — VBI (OUTPATIENT)
Dept: ADMINISTRATIVE | Facility: OTHER | Age: 48
End: 2023-06-06

## 2023-06-20 ENCOUNTER — TELEPHONE (OUTPATIENT)
Dept: NEPHROLOGY | Facility: CLINIC | Age: 48
End: 2023-06-20

## 2023-06-20 NOTE — TELEPHONE ENCOUNTER
I called Raphael Latonia6 @ [phone] (Automated System) to check eligible for the patient and as of 6/20/2023 the patient has current active coverage as of 6/20/2023   Coni Horne,

## 2023-06-23 DIAGNOSIS — I10 ESSENTIAL HYPERTENSION: ICD-10-CM

## 2023-06-23 RX ORDER — DILTIAZEM HYDROCHLORIDE 120 MG/1
TABLET, FILM COATED ORAL
Qty: 180 TABLET | Refills: 1 | Status: SHIPPED | OUTPATIENT
Start: 2023-06-23

## 2023-06-24 ENCOUNTER — LAB (OUTPATIENT)
Dept: LAB | Facility: HOSPITAL | Age: 48
End: 2023-06-24
Payer: COMMERCIAL

## 2023-06-24 DIAGNOSIS — E83.9 CHRONIC KIDNEY DISEASE-MINERAL AND BONE DISORDER: ICD-10-CM

## 2023-06-24 DIAGNOSIS — M89.9 CHRONIC KIDNEY DISEASE-MINERAL AND BONE DISORDER: ICD-10-CM

## 2023-06-24 DIAGNOSIS — Z94.0 RENAL TRANSPLANT RECIPIENT: ICD-10-CM

## 2023-06-24 DIAGNOSIS — N18.31 STAGE 3A CHRONIC KIDNEY DISEASE (HCC): ICD-10-CM

## 2023-06-24 DIAGNOSIS — N18.9 CHRONIC KIDNEY DISEASE-MINERAL AND BONE DISORDER: ICD-10-CM

## 2023-06-24 LAB
ANION GAP SERPL CALCULATED.3IONS-SCNC: 3 MMOL/L
BACTERIA UR QL AUTO: ABNORMAL /HPF
BASOPHILS # BLD AUTO: 0.05 THOUSANDS/ÂΜL (ref 0–0.1)
BASOPHILS NFR BLD AUTO: 1 % (ref 0–1)
BILIRUB UR QL STRIP: NEGATIVE
BUN SERPL-MCNC: 45 MG/DL (ref 5–25)
CALCIUM SERPL-MCNC: 9.5 MG/DL (ref 8.4–10.2)
CHLORIDE SERPL-SCNC: 110 MMOL/L (ref 96–108)
CLARITY UR: CLEAR
CO2 SERPL-SCNC: 21 MMOL/L (ref 21–32)
COLOR UR: ABNORMAL
CREAT SERPL-MCNC: 1.74 MG/DL (ref 0.6–1.3)
CREAT UR-MCNC: 101 MG/DL
EOSINOPHIL # BLD AUTO: 0.11 THOUSAND/ÂΜL (ref 0–0.61)
EOSINOPHIL NFR BLD AUTO: 2 % (ref 0–6)
ERYTHROCYTE [DISTWIDTH] IN BLOOD BY AUTOMATED COUNT: 12.6 % (ref 11.6–15.1)
GFR SERPL CREATININE-BSD FRML MDRD: 45 ML/MIN/1.73SQ M
GLUCOSE P FAST SERPL-MCNC: 107 MG/DL (ref 65–99)
GLUCOSE UR STRIP-MCNC: NEGATIVE MG/DL
HCT VFR BLD AUTO: 44.2 % (ref 36.5–49.3)
HGB BLD-MCNC: 14.4 G/DL (ref 12–17)
HGB UR QL STRIP.AUTO: ABNORMAL
IMM GRANULOCYTES # BLD AUTO: 0.02 THOUSAND/UL (ref 0–0.2)
IMM GRANULOCYTES NFR BLD AUTO: 0 % (ref 0–2)
KETONES UR STRIP-MCNC: NEGATIVE MG/DL
LEUKOCYTE ESTERASE UR QL STRIP: NEGATIVE
LYMPHOCYTES # BLD AUTO: 1.72 THOUSANDS/ÂΜL (ref 0.6–4.47)
LYMPHOCYTES NFR BLD AUTO: 36 % (ref 14–44)
MCH RBC QN AUTO: 31.4 PG (ref 26.8–34.3)
MCHC RBC AUTO-ENTMCNC: 32.6 G/DL (ref 31.4–37.4)
MCV RBC AUTO: 96 FL (ref 82–98)
MONOCYTES # BLD AUTO: 0.57 THOUSAND/ÂΜL (ref 0.17–1.22)
MONOCYTES NFR BLD AUTO: 12 % (ref 4–12)
NEUTROPHILS # BLD AUTO: 2.34 THOUSANDS/ÂΜL (ref 1.85–7.62)
NEUTS SEG NFR BLD AUTO: 49 % (ref 43–75)
NITRITE UR QL STRIP: NEGATIVE
NON-SQ EPI CELLS URNS QL MICRO: ABNORMAL /HPF
NRBC BLD AUTO-RTO: 0 /100 WBCS
PH UR STRIP.AUTO: 5.5 [PH]
PHOSPHATE SERPL-MCNC: 3.1 MG/DL (ref 2.7–4.5)
PLATELET # BLD AUTO: 209 THOUSANDS/UL (ref 149–390)
PMV BLD AUTO: 10.2 FL (ref 8.9–12.7)
POTASSIUM SERPL-SCNC: 4.8 MMOL/L (ref 3.5–5.3)
PROT UR STRIP-MCNC: ABNORMAL MG/DL
PROT UR-MCNC: 38 MG/DL
PROT/CREAT UR: 0.38 MG/G{CREAT} (ref 0–0.1)
RBC # BLD AUTO: 4.59 MILLION/UL (ref 3.88–5.62)
RBC #/AREA URNS AUTO: ABNORMAL /HPF
SODIUM SERPL-SCNC: 134 MMOL/L (ref 135–147)
SP GR UR STRIP.AUTO: 1.02 (ref 1–1.03)
UROBILINOGEN UR STRIP-ACNC: <2 MG/DL
WBC # BLD AUTO: 4.81 THOUSAND/UL (ref 4.31–10.16)
WBC #/AREA URNS AUTO: ABNORMAL /HPF

## 2023-06-24 PROCEDURE — 82306 VITAMIN D 25 HYDROXY: CPT

## 2023-06-24 PROCEDURE — 83970 ASSAY OF PARATHORMONE: CPT

## 2023-06-24 PROCEDURE — 80197 ASSAY OF TACROLIMUS: CPT

## 2023-06-24 PROCEDURE — 36415 COLL VENOUS BLD VENIPUNCTURE: CPT

## 2023-06-24 PROCEDURE — 85025 COMPLETE CBC W/AUTO DIFF WBC: CPT

## 2023-06-24 PROCEDURE — 80048 BASIC METABOLIC PNL TOTAL CA: CPT

## 2023-06-24 PROCEDURE — 81001 URINALYSIS AUTO W/SCOPE: CPT

## 2023-06-24 PROCEDURE — 84156 ASSAY OF PROTEIN URINE: CPT

## 2023-06-24 PROCEDURE — 82570 ASSAY OF URINE CREATININE: CPT

## 2023-06-24 PROCEDURE — 84100 ASSAY OF PHOSPHORUS: CPT

## 2023-06-25 LAB
25(OH)D3 SERPL-MCNC: 28.4 NG/ML (ref 30–100)
PTH-INTACT SERPL-MCNC: 159.6 PG/ML (ref 12–88)
TACROLIMUS BLD-MCNC: 7.5 NG/ML (ref 3–15)

## 2023-06-26 ENCOUNTER — DOCUMENTATION (OUTPATIENT)
Dept: NEPHROLOGY | Facility: CLINIC | Age: 48
End: 2023-06-26

## 2023-06-26 ENCOUNTER — TELEPHONE (OUTPATIENT)
Dept: NEPHROLOGY | Facility: CLINIC | Age: 48
End: 2023-06-26

## 2023-06-26 NOTE — RESULT ENCOUNTER NOTE
Hello    Patient normally is followed up by Ms Sam Jacinto MA team - Can you please let the patient know that the renal function is stable and at baseline    Tacrolimus level is okay and at goal   Vitamin D level was slightly low and he should increase his vitamin D supplement to 4000 units daily  Dr Omar King above  You are seeing pt soon  PTH higher but vit D low       Thank you    np

## 2023-06-26 NOTE — TELEPHONE ENCOUNTER
Message left on patient's VM that CR stable and at baseline, Tac at goal   Vitamin D slightly low  Per Dr Nani Puente, increase Vitamin D to 4000 Units daily   (asked that patient call back to ensure he received my message)      ----- Message from Uri Hendricks MD sent at 6/26/2023 11:04 AM EDT -----  Hello    Patient normally is followed up by Ms Shira Mariscal MA team - Can you please let the patient know that the renal function is stable and at baseline    Tacrolimus level is okay and at goal   Vitamin D level was slightly low and he should increase his vitamin D supplement to 4000 units daily  Dr Alta Oconnell above  You are seeing pt soon  PTH higher but vit D low       Thank you    np

## 2023-06-30 ENCOUNTER — TELEPHONE (OUTPATIENT)
Dept: UROLOGY | Facility: CLINIC | Age: 48
End: 2023-06-30

## 2023-06-30 NOTE — TELEPHONE ENCOUNTER
I called and left message on patients answering machine confirming appt for Monday 07/03/2023 with Dr Charis Mitchell

## 2023-07-03 ENCOUNTER — OFFICE VISIT (OUTPATIENT)
Dept: NEPHROLOGY | Facility: CLINIC | Age: 48
End: 2023-07-03
Payer: COMMERCIAL

## 2023-07-03 VITALS
TEMPERATURE: 97.9 F | HEART RATE: 78 BPM | HEIGHT: 68 IN | WEIGHT: 192.36 LBS | BODY MASS INDEX: 29.15 KG/M2 | DIASTOLIC BLOOD PRESSURE: 80 MMHG | RESPIRATION RATE: 16 BRPM | OXYGEN SATURATION: 97 % | SYSTOLIC BLOOD PRESSURE: 110 MMHG

## 2023-07-03 DIAGNOSIS — E83.9 CHRONIC KIDNEY DISEASE-MINERAL AND BONE DISORDER: ICD-10-CM

## 2023-07-03 DIAGNOSIS — Z94.0 RENAL TRANSPLANT RECIPIENT: Primary | ICD-10-CM

## 2023-07-03 DIAGNOSIS — T86.19 RECURRENT IGA NEPHROPATHY AFTER KIDNEY TRANSPLANTATION: ICD-10-CM

## 2023-07-03 DIAGNOSIS — N18.31 STAGE 3A CHRONIC KIDNEY DISEASE (HCC): ICD-10-CM

## 2023-07-03 DIAGNOSIS — N02.8 RECURRENT IGA NEPHROPATHY AFTER KIDNEY TRANSPLANTATION: ICD-10-CM

## 2023-07-03 DIAGNOSIS — M89.9 CHRONIC KIDNEY DISEASE-MINERAL AND BONE DISORDER: ICD-10-CM

## 2023-07-03 DIAGNOSIS — N18.9 CHRONIC KIDNEY DISEASE-MINERAL AND BONE DISORDER: ICD-10-CM

## 2023-07-03 DIAGNOSIS — E83.39 HYPOPHOSPHATEMIA: ICD-10-CM

## 2023-07-03 PROCEDURE — 99214 OFFICE O/P EST MOD 30 MIN: CPT | Performed by: INTERNAL MEDICINE

## 2023-07-03 NOTE — ASSESSMENT & PLAN NOTE
Renal function seems to be stable. Patient is on tacrolimus and CellCept and tacrolimus was is within therapeutic range.   Patient also being monitored but transplant nephrologist    Advised hydration and avoiding nephrotoxic medication

## 2023-07-03 NOTE — PROGRESS NOTES
NEPHROLOGY OFFICE FOLLOW UP  Mao Sosa 50 y.o. male MRN: [id number]    Encounter: [id number] 7/3/2023    REASON FOR VISIT: Mao Sosa is a 50 y.o. male who is here on 7/3/2023 for Chronic Kidney Disease and Follow-up  . HPI:    Angelina Tello came in today for follow-up of CKD stage III. 57-year-old gentleman with kidney transplant with recurrence of IgA nephropathy and kidney transplant. Patient is also being monitored by transplant nephrologist Dr. Braydon Akhtar    He is overall doing well. Patient also the problems are recurrent depression which seems to be stable    Denies any acute complaint    No chest pain no palpitation or shortness of breath    No nausea no vomiting    No abdominal discomfort      REVIEW OF SYSTEMS:    Review of Systems   Constitutional: Negative for activity change and fatigue. HENT: Negative for congestion and ear discharge. Eyes: Negative for photophobia and pain. Respiratory: Negative for apnea and choking. Cardiovascular: Negative for chest pain and palpitations. Gastrointestinal: Negative for abdominal distention and blood in stool. Endocrine: Negative for heat intolerance and polyphagia. Genitourinary: Negative for flank pain and urgency. Musculoskeletal: Negative for neck pain and neck stiffness. Skin: Negative for color change and wound. Allergic/Immunologic: Negative for food allergies and immunocompromised state. Neurological: Negative for seizures and facial asymmetry. Hematological: Negative for adenopathy. Does not bruise/bleed easily. Psychiatric/Behavioral: Negative for self-injury and suicidal ideas.          PAST MEDICAL HISTORY:  Past Medical History:   Diagnosis Date   • ADD (attention deficit disorder)    • FRANCES (acute kidney injury) ([address]) 1/24/2020   • Chronic kidney disease    • Depression    • DVT (deep venous thrombosis) ([facility])    • H/O immunosuppressive therapy    • History of kidney disease    • HTN (hypertension) 12/17/2017   • Hypertension    • Hypomagnesemia 2020   • Left lower quadrant pain 2019   • Nephropathy, IgA    • Suicidal ideations 2019       PAST SURGICAL HISTORY:  Past Surgical History:   Procedure Laterality Date   • NEPHRECTOMY TRANSPLANTED ORGAN         SOCIAL HISTORY:  Social History     Substance and Sexual Activity   Alcohol Use Yes    Comment: on occasion     Social History     Substance and Sexual Activity   Drug Use Not Currently   • Types: Marijuana    Comment: smokes marijuana a few times daily     Social History     Tobacco Use   Smoking Status Former   • Packs/day: 0.50   • Years: 4.00   • Total pack years: 2.00   • Types: Cigarettes   • Quit date:    • Years since quittin.5   Smokeless Tobacco Never       FAMILY HISTORY:  Family History   Problem Relation Age of Onset   • Deep vein thrombosis Father    • Deep vein thrombosis Paternal Grandfather    • Transient ischemic attack Mother        MEDICATIONS:    Current Outpatient Medications:   •  Cholecalciferol (VITAMIN D-3) 1000 units CAPS, Take 4,000 Units by mouth daily, Disp: , Rfl:   •  diltiazem (CARDIZEM) 120 MG tablet, TAKE 1 TABLET BY MOUTH EVERY 12 HOURS, Disp: 180 tablet, Rfl: 1  •  mycophenolate (CELLCEPT) 250 mg capsule, TAKE 2 CAPSULES BY MOUTH IN AM AND 1 CAPSULES IN THE PM, Disp: 270 capsule, Rfl: 4  •  potassium phosphate, monobasic, (K-PHOS) 500 MG tablet, Take 2 tablets (1,000 mg total) by mouth 2 (two) times a day, Disp: 360 tablet, Rfl: 3  •  rosuvastatin (CRESTOR) 5 mg tablet, TAKE 1 TABLET (5 MG TOTAL) BY MOUTH DAILY. , Disp: 90 tablet, Rfl: 3  •  sertraline (ZOLOFT) 100 mg tablet, Take 200 mg by mouth every morning, Disp: , Rfl:   •  tacrolimus (PROGRAF) 0.5 mg capsule, 2 tabs of 0.5 in am and 1 of 0.5 mg in PM for total 1 mg in AM and 0.5 mg in PM, Disp: 270 capsule, Rfl: 3  •  warfarin (COUMADIN) 4 mg tablet, TAKE 2 TABLETS BY MOUTH EVERY DAY, Disp: 180 tablet, Rfl: 2    PHYSICAL EXAM:  Vitals:    23 1302   BP: 110/80   BP Location: Right arm   Patient Position: Sitting   Pulse: 78   Resp: 16   Temp: 97.9 °F (36.6 °C)   TempSrc: Temporal   SpO2: 97%   Weight: 87.3 kg (192 lb 5.8 oz)   Height: 5' 7.5" (1.715 m)     Body mass index is 29.68 kg/m². Physical Exam  Constitutional:       General: He is not in acute distress. Appearance: He is well-developed. HENT:      Head: Normocephalic. Eyes:      General: No scleral icterus. Conjunctiva/sclera: Conjunctivae normal.   Neck:      Vascular: No JVD. Cardiovascular:      Rate and Rhythm: Normal rate. Heart sounds: Normal heart sounds. Pulmonary:      Effort: Pulmonary effort is normal.      Breath sounds: No wheezing. Abdominal:      Palpations: Abdomen is soft. Tenderness: There is no abdominal tenderness. Musculoskeletal:         General: Normal range of motion. Cervical back: Neck supple. Skin:     General: Skin is warm. Findings: No rash. Neurological:      Mental Status: He is alert and oriented to person, place, and time.    Psychiatric:         Behavior: Behavior normal.         LAB RESULTS:  Results for orders placed or performed in visit on 06/24/23   CBC and differential   Result Value Ref Range    WBC 4.81 4.31 - 10.16 Thousand/uL    RBC 4.59 3.88 - 5.62 Million/uL    Hemoglobin 14.4 12.0 - 17.0 g/dL    Hematocrit 44.2 36.5 - 49.3 %    MCV 96 82 - 98 fL    MCH 31.4 26.8 - 34.3 pg    MCHC 32.6 31.4 - 37.4 g/dL    RDW 12.6 11.6 - 15.1 %    MPV 10.2 8.9 - 12.7 fL    Platelets 792 578 - 430 Thousands/uL    nRBC 0 /100 WBCs    Neutrophils Relative 49 43 - 75 %    Immat GRANS % 0 0 - 2 %    Lymphocytes Relative 36 14 - 44 %    Monocytes Relative 12 4 - 12 %    Eosinophils Relative 2 0 - 6 %    Basophils Relative 1 0 - 1 %    Neutrophils Absolute 2.34 1.85 - 7.62 Thousands/µL    Immature Grans Absolute 0.02 0.00 - 0.20 Thousand/uL    Lymphocytes Absolute 1.72 0.60 - 4.47 Thousands/µL    Monocytes Absolute 0.57 0.17 - 1.22 Thousand/µL Eosinophils Absolute 0.11 0.00 - 0.61 Thousand/µL    Basophils Absolute 0.05 0.00 - 0.10 Thousands/µL   Phosphorus   Result Value Ref Range    Phosphorus 3.1 2.7 - 4.5 mg/dL   Protein / creatinine ratio, urine   Result Value Ref Range    Creatinine, Ur 101.0 mg/dL    Protein Urine Random 38 mg/dL    Prot/Creat Ratio, Ur 0.38 (H) 0.00 - 0.10   PTH, intact   Result Value Ref Range    .6 (H) 12.0 - 88.0 pg/mL   UA (URINE) with reflex to Scope   Result Value Ref Range    Color, UA Light Yellow     Clarity, UA Clear     Specific Gravity, UA 1.018 1.003 - 1.030    pH, UA 5.5 4.5, 5.0, 5.5, 6.0, 6.5, 7.0, 7.5, 8.0    Leukocytes, UA Negative Negative    Nitrite, UA Negative Negative    Protein, UA Trace (A) Negative mg/dl    Glucose, UA Negative Negative mg/dl    Ketones, UA Negative Negative mg/dl    Urobilinogen, UA <2.0 <2.0 mg/dl mg/dl    Bilirubin, UA Negative Negative    Occult Blood, UA Moderate (A) Negative   Vitamin D 25 hydroxy   Result Value Ref Range    Vit D, 25-Hydroxy 28.4 (L) 30.0 - 100.0 ng/mL   Tacrolimus level   Result Value Ref Range    TACROLIMUS 7.5 3.0 - 15.0 ng/mL   Basic metabolic panel   Result Value Ref Range    Sodium 134 (L) 135 - 147 mmol/L    Potassium 4.8 3.5 - 5.3 mmol/L    Chloride 110 (H) 96 - 108 mmol/L    CO2 21 21 - 32 mmol/L    ANION GAP 3 mmol/L    BUN 45 (H) 5 - 25 mg/dL    Creatinine 1.74 (H) 0.60 - 1.30 mg/dL    Glucose, Fasting 107 (H) 65 - 99 mg/dL    Calcium 9.5 8.4 - 10.2 mg/dL    eGFR 45 ml/min/1.73sq m   Urine Microscopic   Result Value Ref Range    RBC, UA 2-4 (A) None Seen, 1-2 /hpf    WBC, UA 1-2 None Seen, 1-2 /hpf    Epithelial Cells None Seen None Seen, Occasional /hpf    Bacteria, UA None Seen None Seen, Occasional /hpf       ASSESSMENT and PLAN:      Renal transplant recipient  Renal function seems to be stable. Patient is on tacrolimus and CellCept and tacrolimus was is within therapeutic range.   Patient also being monitored but transplant nephrologist    Advised hydration and avoiding nephrotoxic medication    Stage 3 chronic kidney disease [facility])  Lab Results   Component Value Date    EGFR 45 06/24/2023    EGFR 43 02/21/2023    EGFR 44 12/19/2022    CREATININE 1.74 (H) 06/24/2023    CREATININE 1.80 (H) 02/21/2023    CREATININE 1.76 (H) 12/19/2022   Present CKD which is likely secondary to recurrence of IgA nephropathy. Stable at this point. Advise hydration and avoiding nephrotoxic medication    Essential hypertension  Blood pressure is very well controlled    Chronic kidney disease-mineral and bone disorder  Lab Results   Component Value Date    EGFR 45 06/24/2023    EGFR 43 02/21/2023    EGFR 44 12/19/2022    CREATININE 1.74 (H) 06/24/2023    CREATININE 1.80 (H) 02/21/2023    CREATININE 1.76 (H) 12/19/2022   PTH and phosphorus along with vitamin D are within acceptable range and will continue to monitor        Everything discussed with the patient. I will see him back in 6 months. He will be seen by transplant nephrologist in 3 months. We will get blood and urine test before that visit      Portions of the record may have been created with voice recognition software. Occasional wrong word or "sound a like" substitutions may have occurred due to the inherent limitations of voice recognition software. Read the chart carefully and recognize, using context, where substitutions have occurred. If you have any questions, please contact the dictating provider.

## 2023-07-03 NOTE — ASSESSMENT & PLAN NOTE
Lab Results   Component Value Date    EGFR 45 06/24/2023    EGFR 43 02/21/2023    EGFR 44 12/19/2022    CREATININE 1.74 (H) 06/24/2023    CREATININE 1.80 (H) 02/21/2023    CREATININE 1.76 (H) 12/19/2022   PTH and phosphorus along with vitamin D are within acceptable range and will continue to monitor

## 2023-07-10 DIAGNOSIS — I82.4Z1 DEEP VEIN THROMBOSIS (DVT) OF DISTAL VEIN OF RIGHT LOWER EXTREMITY, UNSPECIFIED CHRONICITY (HCC): ICD-10-CM

## 2023-07-10 RX ORDER — WARFARIN SODIUM 4 MG/1
TABLET ORAL
Qty: 180 TABLET | Refills: 3 | Status: SHIPPED | OUTPATIENT
Start: 2023-07-10

## 2023-09-13 DIAGNOSIS — Z94.0 KIDNEY TRANSPLANTED: ICD-10-CM

## 2023-09-13 RX ORDER — TACROLIMUS 0.5 MG/1
CAPSULE ORAL
Qty: 270 CAPSULE | Refills: 3 | Status: SHIPPED | OUTPATIENT
Start: 2023-09-13

## 2023-10-10 ENCOUNTER — TELEPHONE (OUTPATIENT)
Dept: NEPHROLOGY | Facility: CLINIC | Age: 48
End: 2023-10-10

## 2023-10-10 NOTE — TELEPHONE ENCOUNTER
I called and left a message on machine for patient to return our call about scheduling his 6 month nephrology follow up appointment with Dr. Radha Shi from our recall list for on or after 1/3/2024.

## 2023-10-25 ENCOUNTER — APPOINTMENT (OUTPATIENT)
Dept: LAB | Facility: HOSPITAL | Age: 48
End: 2023-10-25
Attending: INTERNAL MEDICINE
Payer: COMMERCIAL

## 2023-10-25 LAB
ALBUMIN SERPL BCP-MCNC: 4.3 G/DL (ref 3.5–5)
ALP SERPL-CCNC: 114 U/L (ref 34–104)
ALT SERPL W P-5'-P-CCNC: 15 U/L (ref 7–52)
ANION GAP SERPL CALCULATED.3IONS-SCNC: 5 MMOL/L
AST SERPL W P-5'-P-CCNC: 18 U/L (ref 13–39)
BACTERIA UR QL AUTO: ABNORMAL /HPF
BILIRUB SERPL-MCNC: 0.55 MG/DL (ref 0.2–1)
BILIRUB UR QL STRIP: NEGATIVE
BUN SERPL-MCNC: 31 MG/DL (ref 5–25)
CALCIUM SERPL-MCNC: 10.3 MG/DL (ref 8.4–10.2)
CHLORIDE SERPL-SCNC: 106 MMOL/L (ref 96–108)
CLARITY UR: CLEAR
CO2 SERPL-SCNC: 27 MMOL/L (ref 21–32)
COLOR UR: ABNORMAL
CREAT SERPL-MCNC: 1.71 MG/DL (ref 0.6–1.3)
CREAT UR-MCNC: 103.8 MG/DL
ERYTHROCYTE [DISTWIDTH] IN BLOOD BY AUTOMATED COUNT: 12.3 % (ref 11.6–15.1)
GFR SERPL CREATININE-BSD FRML MDRD: 45 ML/MIN/1.73SQ M
GLUCOSE P FAST SERPL-MCNC: 96 MG/DL (ref 65–99)
GLUCOSE UR STRIP-MCNC: NEGATIVE MG/DL
HCT VFR BLD AUTO: 50.4 % (ref 36.5–49.3)
HGB BLD-MCNC: 16.4 G/DL (ref 12–17)
HGB UR QL STRIP.AUTO: ABNORMAL
KETONES UR STRIP-MCNC: NEGATIVE MG/DL
LEUKOCYTE ESTERASE UR QL STRIP: NEGATIVE
MAGNESIUM SERPL-MCNC: 1.9 MG/DL (ref 1.9–2.7)
MCH RBC QN AUTO: 31.2 PG (ref 26.8–34.3)
MCHC RBC AUTO-ENTMCNC: 32.5 G/DL (ref 31.4–37.4)
MCV RBC AUTO: 96 FL (ref 82–98)
NITRITE UR QL STRIP: NEGATIVE
NON-SQ EPI CELLS URNS QL MICRO: ABNORMAL /HPF
PH UR STRIP.AUTO: 6 [PH]
PHOSPHATE SERPL-MCNC: 2 MG/DL (ref 2.7–4.5)
PLATELET # BLD AUTO: 236 THOUSANDS/UL (ref 149–390)
PMV BLD AUTO: 9.4 FL (ref 8.9–12.7)
POTASSIUM SERPL-SCNC: 4.9 MMOL/L (ref 3.5–5.3)
PROT SERPL-MCNC: 7.7 G/DL (ref 6.4–8.4)
PROT UR STRIP-MCNC: ABNORMAL MG/DL
PROT UR-MCNC: 68 MG/DL
PROT/CREAT UR: 0.66 MG/G{CREAT} (ref 0–0.1)
RBC # BLD AUTO: 5.25 MILLION/UL (ref 3.88–5.62)
RBC #/AREA URNS AUTO: ABNORMAL /HPF
SODIUM SERPL-SCNC: 138 MMOL/L (ref 135–147)
SP GR UR STRIP.AUTO: 1.02 (ref 1–1.03)
UROBILINOGEN UR STRIP-ACNC: <2 MG/DL
WBC # BLD AUTO: 6.48 THOUSAND/UL (ref 4.31–10.16)
WBC #/AREA URNS AUTO: ABNORMAL /HPF

## 2023-10-26 LAB — TACROLIMUS BLD-MCNC: 9.9 NG/ML (ref 3–15)

## 2023-10-27 ENCOUNTER — TELEPHONE (OUTPATIENT)
Dept: NEPHROLOGY | Facility: CLINIC | Age: 48
End: 2023-10-27

## 2023-10-27 NOTE — TELEPHONE ENCOUNTER
Lvm to let pt know we need to change his appointment to virtual due to Dr Maribell Castorena being [address]

## 2023-10-30 ENCOUNTER — TELEMEDICINE (OUTPATIENT)
Dept: NEPHROLOGY | Facility: CLINIC | Age: 48
End: 2023-10-30
Payer: COMMERCIAL

## 2023-10-30 ENCOUNTER — TELEPHONE (OUTPATIENT)
Dept: NEPHROLOGY | Facility: CLINIC | Age: 48
End: 2023-10-30

## 2023-10-30 DIAGNOSIS — E83.52 HYPERCALCEMIA: Primary | ICD-10-CM

## 2023-10-30 DIAGNOSIS — Z94.0 KIDNEY TRANSPLANTED: ICD-10-CM

## 2023-10-30 DIAGNOSIS — E83.39 HYPOPHOSPHATEMIA: ICD-10-CM

## 2023-10-30 PROCEDURE — 99215 OFFICE O/P EST HI 40 MIN: CPT | Performed by: INTERNAL MEDICINE

## 2023-10-30 RX ORDER — TACROLIMUS 0.5 MG/1
CAPSULE ORAL
Qty: 270 CAPSULE | Refills: 3
Start: 2023-10-30

## 2023-10-30 NOTE — TELEPHONE ENCOUNTER
Called spoke with patient advised patient of scheduled fu appt with  for 02/01/24 @ 11:40am along with labs to be done 1 week prior to scheduled appt. patient verbalized understanding and is okay with it.

## 2023-10-30 NOTE — TELEPHONE ENCOUNTER
----- Message from Ronen Cornejo sent at 10/30/2023  2:26 PM EDT -----  Hello! Patient just had follow up with Dr. Dorota Perales and needs to see Dr. Alayna Doheryt in 3 months! Thank you!

## 2023-10-30 NOTE — PROGRESS NOTES
Virtual Regular Visit    Verification of patient location:    Patient is located at Home in the following state in which I hold an active license [place]      Assessment/Plan:    Problem List Items Addressed This Visit    None           Reason for visit is   Chief Complaint   Patient presents with    Virtual Regular Visit          Encounter provider Tanvi Hung MD    Provider located at [address] [facility]  [address] [place] [address] [place] [address]      Recent Visits  Date Type Provider Dept   10/27/23 Telephone Tanvi Hung MD [facility] recent visits within past 7 days and meeting all other requirements  Today's Visits  Date Type Provider Dept   10/30/23 Telemedicine Tanvi Hung MD [facility]   Showing today's visits and meeting all other requirements  Future Appointments  No visits were found meeting these conditions. Showing future appointments within next 150 days and meeting all other requirements       The patient was identified by name and date of birth. William Carmona was informed that this is a telemedicine visit and that the visit is being conducted through the [employer]. He agrees to proceed. .  My office door was closed. No one else was in the room. He acknowledged consent and understanding of privacy and security of the video platform. The patient has agreed to participate and understands they can discontinue the visit at any time. Patient is aware this is a billable service. Subjective  William Carmona is a 46 y.o. male who Is seen as virtual visit as I have contracted covid19 currently (symptomatic) so for pt safety we have moved this visit to virtual . No fevers, chills, nausea, vomiting, diarrhea. Dysuria. Last BP was 110/80. Did lose weight 170s in 12/2022. Weight at home now 185.        HPI     Past Medical History:   Diagnosis Date    ADD (attention deficit disorder)     FRANCES (acute kidney injury) ([address] ) 1/24/2020    Chronic kidney disease     Depression     DVT (deep venous thrombosis) ([facility])     H/O immunosuppressive therapy     History of kidney disease     HTN (hypertension) 12/17/2017    Hypertension     Hypomagnesemia 1/25/2020    Left lower quadrant pain 1/31/2019    Nephropathy, IgA     Suicidal ideations 6/14/2019       Past Surgical History:   Procedure Laterality Date    NEPHRECTOMY TRANSPLANTED ORGAN         Current Outpatient Medications   Medication Sig Dispense Refill    Cholecalciferol (VITAMIN D-3) 1000 units CAPS Take 4,000 Units by mouth daily      diltiazem (CARDIZEM) 120 MG tablet TAKE 1 TABLET BY MOUTH EVERY 12 HOURS 180 tablet 1    mycophenolate (CELLCEPT) 250 mg capsule TAKE 2 CAPSULES BY MOUTH IN AM AND 1 CAPSULES IN THE  capsule 4    potassium phosphate, monobasic, (K-PHOS) 500 MG tablet Take 2 tablets (1,000 mg total) by mouth 2 (two) times a day 360 tablet 3    rosuvastatin (CRESTOR) 5 mg tablet TAKE 1 TABLET (5 MG TOTAL) BY MOUTH DAILY. 90 tablet 3    sertraline (ZOLOFT) 100 mg tablet Take 200 mg by mouth every morning      tacrolimus (PROGRAF) 0.5 mg capsule TAKE 2 CAPS IN AM AND 1 CAP IN  capsule 3    warfarin (COUMADIN) 4 mg tablet TAKE 2 TABLETS BY MOUTH EVERY  tablet 3     No current facility-administered medications for this visit. Allergies   Allergen Reactions    Penicillins Rash       Review of Systems   All other systems reviewed and are negative. Video Exam    There were no vitals filed for this visit. Physical Exam  Constitutional:       General: He is not in acute distress. Appearance: Normal appearance. He is not ill-appearing. HENT:      Head: Normocephalic and atraumatic. Eyes:      Conjunctiva/sclera: Conjunctivae normal.   Pulmonary:      Effort: Pulmonary effort is normal.      Breath sounds: Normal breath sounds. Neurological:      Mental Status: He is alert and oriented to person, place, and time.    Psychiatric: Mood and Affect: Mood normal.         Behavior: Behavior normal.          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 Then admission in January 2020 for FRANCES with Cr to 3.4 mg/dL in setting of n/v/d. End of October 2020, patient had missed tacrolimus for one week and creat had increased to 2.1 requiring steroid therapy. Pt presenting for f/u visit. CXR - 1/23/2020 - no acute disease     CT abd/pel - atrophic native kidneys, unremarkable renal transplant     U/s liver - 2.2 cm R hepatic echogenic mass reflecting hemangioma     1) CKD III, renal transplant -      Prior-history obtained from [address] [place] [address], and [facility] and here at [facility] records:     -Baseline Cr 1.6-1.9 mg/dL mg/dL;  -Tac levels from 2009 - 2014 were 7-10  -Patient followed at [facility] - followed with Dr Teo Palma   -Prior baseline Cr 1.4-1.5 mg/dL and had been rising to 1.8 mg/dL  -pt had early CMV - on EGD  - last time seen was in May 2017 at [facility] - no known rejection ([phone])  -Has never had renal biopsy post transplant     Old Labwork review:     - CT abd/pel without hydro or nephrolithiasis on transplanted kidney  - to note, patient has had microscopic hematuria as far back as 2014 on UA  - BK and CMV negative 10/2020  - urine eos 0%     October 2020:       -  pt had undetectable tac level end of October. Pt states missed one week of tac due to not being able to obtain the medication  - creat had increased to 2.1 end of October.   Patient was given three doses of Solu-Medrol starting October 24th.  -  HLA completed on November 5th-no class 2 DSA, no class 1 DSA, PRA 15%, patient has watch antibodies      since then, the creatinine has ranged from approximately 1.6-2 mg/dL. Important Medication notes:   - on diltiazem to note (interaction with tacrolimus)     Appointment October 2023-lab work reviewed. Creatinine stable 1.7. Phosphorus slightly low at 2 but the patient has not been able to take phosphorus supplement since July. He states that his pharmacy has not dispensed it for unclear reason. He does have proteinuria which is slightly higher at 0.6. Urinalysis without significant hematuria. Tacrolimus level is above goal at 9.9. We will adjust tacrolimus and lower tacrolimus dose 2.5 mg twice daily from 1 in the morning and point 5 in the evening. Continue CellCept at 500 in the morning and 250 in the evening. With regards to proteinuria, he has gained about 10 to 15 pounds since last visit and I reviewed my concerns regarding this along with the risks to the graft. We reviewed portion control and exercising. Patient will attempt. Eventually we may need to lower her diltiazem and start ACE or ARB. Patient states understanding.   We will repeat lab work in 2 weeks first.     Plan:     -Change tacrolimus to 0.5 mg every 12 hours  -continue  mg in the morning and 250 mg in the evening   -continue statin   - repeat lab work in 2 weeks and then every 2 months  -  We will see the patient in 6 months  -  Continue interval care with primary nephrologist Dr Che Marquez as doing in 3 months  - to note, if biopsy is needed  In the future will need bridge with heparin as is on coumadin for mult DVT prior  - to note, pt is on diltiazem and if we are to switch to ACE or ARB or lower diltiazem, will need to adjust tacrolimus and check frequent levels initially     2) Immune Suppression-on 2 drug regimen with tacrolimus 0.5 mg in the morning and 0.5 mg in the evening and mycophenolate 500 mg in the morning and 250 mg in the evening     - June 2020 - pt did not have tacrolimus for a few days because he was waiting on refill and did not realize that it was backordered. Issue resolved and restarted tacrolimus  - 10/2020 - did not have tac for one week and see above. -  in AM and 250 mg PM  - history CMV  - pt has had labile tacrolimus levels    3) DVT - recurrent DVT. Most recent march 2018     - 4 prior DVTs  - 1 RUE post PICC  - then 3 in LE  - further plans per primary team  - INR per Primary team --> last INR 11/2021, no recent     4) Vaccines     - stay up to date on vaccine with flu vaccine  - PNA vaccine - pt will review with Primary Physician  - no live vaccines  -Stay up-to-date on appropriate inactivated vaccines  - October 2023-patient reminded to stay up-to-date with COVID and flu vaccines. Patient is agreeable. 5) Age appropriate Ca screening     - saw Derm  11/2021. Was placed on doxy for 1 week due to folliculitis concerning for MRSA  - has history of psoriaform dermatitis      6) HTN     - pt is on diltiazem     7) HPL     - further plan per primary team.  -would continue to monitor lipid panel closely  -Is on statin     9) Depression     -prior suicide attempts - prior 2003  - patient follows with Psychiatry  - off zoloft for one month. Per psych per pt. currently without any suicidal or homicidal ideations. 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 Check PTH again. 13) liver lesion on u/s in hospital     - see above for GI f/u  - saw GI in April  - pt saw GI 4/2021 --> MRI completed in may - no evidence of hemangioma or mass;      14) COVID infection in end of dec 2021     It was a pleasure evaluating your patient.  Thank you for allowing our team to participate in the care of Mr Jocelyn Fong. Please do not hesitate to contact our team if further issues/questions shall arise in the interim.         Visit Time  Total Visit Duration: 18

## 2023-10-30 NOTE — PATIENT INSTRUCTIONS
1) Avoid NSAIDS - (Example - motrin, advil, ibuprofen, aleve, exederin, etc)  2) Always follow a low salt diet  3) If you have any issues with trouble with nausea, vomiting, urinating, blood in the urine, food tasting like metal, confusion, weakness, fatigue that is worsening, or any other questions, please call right away. 4) Please continue to follow regularly with your family physician and any other specialist that you are advised to see and continue to follow their recommendations  5) labwork in 2 weeks and then every 2 months  6) lower tacrolimus to 0.5 mg one cap twice daily   7) restart phos supplement but at 1 tab twice daily once available from pharmacy  8) appointment with Dr Radha Canada in 3 mo and with me in 6 mo    9) you have gained weight which could be contributing to rising protein in urine. First we will adjust your tac dose since your level was high and repeat labs including protein in urine check in 2 weeks along with calcium check since your calcium level is very borderline above normal.  For now the main changes lowering the tacrolimus to 0.5 mg capsule take 1 capsule twice daily. Rest of medications will stay the same. If the protein in the urine is still elevated above your prior baseline, we will discuss about making other medication adjustments.

## 2023-10-30 NOTE — LETTER
October 30, 2023     Pedro Guerrero MD  [address] [place] [address] [address] [facility]    Patient: Sergio Augustine   YOB: 1975   Date of Visit: 10/30/2023       Dear Dr. Nga Randolph:    Thank you for referring Sergio Augustine to me for evaluation. Below are my notes for this consultation. If you have questions, please do not hesitate to call me. I look forward to following your patient along with you. Sincerely,        Jeremiah Thompson MD        CC: MD Jeremiah Rand MD  10/30/2023  2:31 PM  Sign when Signing Visit  Virtual Regular Visit    Verification of patient location:    Patient is located at Home in the following state in which I hold an active license [place]      Assessment/Plan:    Problem List Items Addressed This Visit    None           Reason for visit is   Chief Complaint   Patient presents with   • Virtual Regular Visit          Encounter provider Jeremiah Thompson MD    Provider located at [id number] [place] [address]  [address] [place] [id number]      Recent Visits  Date Type Provider Dept   10/27/23 Telephone Jeremiah Thompson MD [facility] Maura Dundas recent visits within past 7 days and meeting all other requirements  Today's Visits  Date Type Provider Dept   10/30/23 Telemedicine Jeremiah Thompson MD [facility] Pavithra Laughter   Showing today's visits and meeting all other requirements  Future Appointments  No visits were found meeting these conditions. Showing future appointments within next 150 days and meeting all other requirements       The patient was identified by name and date of birth. Sergio Augustine was informed that this is a telemedicine visit and that the visit is being conducted through the [employer]. He agrees to proceed. .  My office door was closed. No one else was in the room. He acknowledged consent and understanding of privacy and security of the video platform.  The patient has agreed to participate and understands they can discontinue the visit at any time. Patient is aware this is a billable service. Subjective  Christine Dash is a 46 y.o. male who Is seen as virtual visit as I have contracted covid19 currently (symptomatic) so for pt safety we have moved this visit to virtual . No fevers, chills, nausea, vomiting, diarrhea. Dysuria. Last BP was 110/80. Did lose weight 170s in 12/2022. Weight at home now 185. HPI     Past Medical History:   Diagnosis Date   • ADD (attention deficit disorder)    • FRANCES (acute kidney injury) ([address]) 1/24/2020   • Chronic kidney disease    • Depression    • DVT (deep venous thrombosis) ([facility])    • H/O immunosuppressive therapy    • History of kidney disease    • HTN (hypertension) 12/17/2017   • Hypertension    • Hypomagnesemia 1/25/2020   • Left lower quadrant pain 1/31/2019   • Nephropathy, IgA    • Suicidal ideations 6/14/2019       Past Surgical History:   Procedure Laterality Date   • NEPHRECTOMY TRANSPLANTED ORGAN         Current Outpatient Medications   Medication Sig Dispense Refill   • Cholecalciferol (VITAMIN D-3) 1000 units CAPS Take 4,000 Units by mouth daily     • diltiazem (CARDIZEM) 120 MG tablet TAKE 1 TABLET BY MOUTH EVERY 12 HOURS 180 tablet 1   • mycophenolate (CELLCEPT) 250 mg capsule TAKE 2 CAPSULES BY MOUTH IN AM AND 1 CAPSULES IN THE  capsule 4   • potassium phosphate, monobasic, (K-PHOS) 500 MG tablet Take 2 tablets (1,000 mg total) by mouth 2 (two) times a day 360 tablet 3   • rosuvastatin (CRESTOR) 5 mg tablet TAKE 1 TABLET (5 MG TOTAL) BY MOUTH DAILY. 90 tablet 3   • sertraline (ZOLOFT) 100 mg tablet Take 200 mg by mouth every morning     • tacrolimus (PROGRAF) 0.5 mg capsule TAKE 2 CAPS IN AM AND 1 CAP IN  capsule 3   • warfarin (COUMADIN) 4 mg tablet TAKE 2 TABLETS BY MOUTH EVERY  tablet 3     No current facility-administered medications for this visit.         Allergies   Allergen Reactions   • Penicillins Rash       Review of Systems   All other systems reviewed and are negative. Video Exam    There were no vitals filed for this visit. Physical Exam  Constitutional:       General: He is not in acute distress. Appearance: Normal appearance. He is not ill-appearing. HENT:      Head: Normocephalic and atraumatic. Eyes:      Conjunctiva/sclera: Conjunctivae normal.   Pulmonary:      Effort: Pulmonary effort is normal.      Breath sounds: Normal breath sounds. Neurological:      Mental Status: He is alert and oriented to person, place, and time. Psychiatric:         Mood and Affect: Mood normal.         Behavior: Behavior normal.          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 Then admission in January 2020 for FRANCES with Cr to 3.4 mg/dL in setting of n/v/d. End of October 2020, patient had missed tacrolimus for one week and creat had increased to 2.1 requiring steroid therapy. Pt presenting for f/u visit.       CXR - 1/23/2020 - no acute disease     CT abd/pel - atrophic native kidneys, unremarkable renal transplant     U/s liver - 2.2 cm R hepatic echogenic mass reflecting hemangioma     1) CKD III, renal transplant -      Prior-history obtained from [address] [place] [address], and [facility] and here at [facility] records:     -Baseline Cr 1.6-1.9 mg/dL mg/dL;  -Tac levels from 2009 - 2014 were 7-10  -Patient followed at [facility] - followed with Dr Treasure Kat   -Prior baseline Cr 1.4-1.5 mg/dL and had been rising to 1.8 mg/dL  -pt had early CMV - on EGD  - last time seen was in May 2017 at [facility] [address] - no known rejection ([phone])  -Has never had renal biopsy post transplant     Old Labwork review:     - CT abd/pel without hydro or nephrolithiasis on transplanted kidney  - to note, patient has had microscopic hematuria as far back as 2014 on UA  - BK and CMV negative 10/2020  - urine eos 0%     October 2020:       -  pt had undetectable tac level end of October. Pt states missed one week of tac due to not being able to obtain the medication  - creat had increased to 2.1 end of October. Patient was given three doses of Solu-Medrol starting October 24th.  -  HLA completed on November 5th-no class 2 DSA, no class 1 DSA, PRA 15%, patient has watch antibodies      since then, the creatinine has ranged from approximately 1.6-2 mg/dL. Important Medication notes:   - on diltiazem to note (interaction with tacrolimus)     Appointment October 2023-lab work reviewed. Creatinine stable 1.7. Phosphorus slightly low at 2 but the patient has not been able to take phosphorus supplement since July. He states that his pharmacy has not dispensed it for unclear reason. He does have proteinuria which is slightly higher at 0.6. Urinalysis without significant hematuria. Tacrolimus level is above goal at 9.9. We will adjust tacrolimus and lower tacrolimus dose 2.5 mg twice daily from 1 in the morning and point 5 in the evening. Continue CellCept at 500 in the morning and 250 in the evening. With regards to proteinuria, he has gained about 10 to 15 pounds since last visit and I reviewed my concerns regarding this along with the risks to the graft. We reviewed portion control and exercising. Patient will attempt. Eventually we may need to lower her diltiazem and start ACE or ARB. Patient states understanding.   We will repeat lab work in 2 weeks first.     Plan:     -Change tacrolimus to 0.5 mg every 12 hours  -continue  mg in the morning and 250 mg in the evening   -continue statin   - repeat lab work in 2 weeks and then every 2 months  -  We will see the patient in 6 months  -  Continue interval care with primary nephrologist Dr Alayna Doherty as doing in 3 months  - to note, if biopsy is needed  In the future will need bridge with heparin as is on coumadin for mult DVT prior  - to note, pt is on diltiazem and if we are to switch to ACE or ARB or lower diltiazem, will need to adjust tacrolimus and check frequent levels initially     2) Immune Suppression-on 2 drug regimen with tacrolimus 0.5 mg in the morning and 0.5 mg in the evening and mycophenolate 500 mg in the morning and 250 mg in the evening     - June 2020 - pt did not have tacrolimus for a few days because he was waiting on refill and did not realize that it was backordered. Issue resolved and restarted tacrolimus  - 10/2020 - did not have tac for one week and see above. -  in AM and 250 mg PM  - history CMV  - pt has had labile tacrolimus levels    3) DVT - recurrent DVT. Most recent march 2018     - 4 prior DVTs  - 1 RUE post PICC  - then 3 in LE  - further plans per primary team  - INR per Primary team --> last INR 11/2021, no recent     4) Vaccines     - stay up to date on vaccine with flu vaccine  - PNA vaccine - pt will review with Primary Physician  - no live vaccines  -Stay up-to-date on appropriate inactivated vaccines  - October 2023-patient reminded to stay up-to-date with COVID and flu vaccines. Patient is agreeable. 5) Age appropriate Ca screening     - saw Derm  11/2021. Was placed on doxy for 1 week due to folliculitis concerning for MRSA  - has history of psoriaform dermatitis      6) HTN     - pt is on diltiazem     7) HPL     - further plan per primary team.  -would continue to monitor lipid panel closely  -Is on statin     9) Depression     -prior suicide attempts - prior 2003  - patient follows with Psychiatry  - off zoloft for one month.  Per psych per pt. currently without any suicidal or homicidal ideations. 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 Check PTH again. 13) liver lesion on u/s in hospital     - see above for GI f/u  - saw GI in April  - pt saw GI 4/2021 --> MRI completed in may - no evidence of hemangioma or mass;      14) COVID infection in end of dec 2021     It was a pleasure evaluating your patient. Thank you for allowing our team to participate in the care of Mr Keyshawn Queen. Please do not hesitate to contact our team if further issues/questions shall arise in the interim.         Visit Time  Total Visit Duration: 18

## 2023-10-30 NOTE — TELEPHONE ENCOUNTER
----- Message from Cici Deal MD sent at 10/30/2023  2:15 PM EDT -----  Hello    Can we call pt pharmacy to see [facility]. He has not gotten it since July.  It was costing 3 dollars out of pocket before    I sent new script to pharmacy    Please call pt with update    Thank you    np

## 2023-11-15 ENCOUNTER — TELEPHONE (OUTPATIENT)
Dept: NEPHROLOGY | Facility: CLINIC | Age: 48
End: 2023-11-15

## 2023-12-28 ENCOUNTER — APPOINTMENT (EMERGENCY)
Dept: RADIOLOGY | Facility: HOSPITAL | Age: 48
End: 2023-12-28
Payer: COMMERCIAL

## 2023-12-28 ENCOUNTER — HOSPITAL ENCOUNTER (EMERGENCY)
Facility: HOSPITAL | Age: 48
Discharge: HOME | End: 2023-12-28
Attending: EMERGENCY MEDICINE
Payer: COMMERCIAL

## 2023-12-28 VITALS
DIASTOLIC BLOOD PRESSURE: 80 MMHG | HEART RATE: 109 BPM | TEMPERATURE: 99.3 F | OXYGEN SATURATION: 96 % | RESPIRATION RATE: 20 BRPM | SYSTOLIC BLOOD PRESSURE: 131 MMHG

## 2023-12-28 DIAGNOSIS — N17.9 AKI (ACUTE KIDNEY INJURY) (HCC): ICD-10-CM

## 2023-12-28 DIAGNOSIS — J40 BRONCHITIS: Primary | ICD-10-CM

## 2023-12-28 LAB
ANION GAP SERPL CALCULATED.3IONS-SCNC: 9 MMOL/L
BASOPHILS # BLD MANUAL: 0 THOUSAND/UL (ref 0–0.1)
BASOPHILS NFR MAR MANUAL: 0 % (ref 0–1)
BUN SERPL-MCNC: 34 MG/DL (ref 5–25)
CALCIUM SERPL-MCNC: 10.4 MG/DL (ref 8.4–10.2)
CHLORIDE SERPL-SCNC: 104 MMOL/L (ref 96–108)
CO2 SERPL-SCNC: 21 MMOL/L (ref 21–32)
CREAT SERPL-MCNC: 1.91 MG/DL (ref 0.6–1.3)
EOSINOPHIL # BLD MANUAL: 0 THOUSAND/UL (ref 0–0.4)
EOSINOPHIL NFR BLD MANUAL: 0 % (ref 0–6)
ERYTHROCYTE [DISTWIDTH] IN BLOOD BY AUTOMATED COUNT: 12.4 % (ref 11.6–15.1)
FLUAV RNA RESP QL NAA+PROBE: NEGATIVE
FLUBV RNA RESP QL NAA+PROBE: NEGATIVE
GFR SERPL CREATININE-BSD FRML MDRD: 39 ML/MIN/1.73SQ M
GLUCOSE SERPL-MCNC: 100 MG/DL (ref 65–140)
HCT VFR BLD AUTO: 49.1 % (ref 36.5–49.3)
HGB BLD-MCNC: 16.3 G/DL (ref 12–17)
LYMPHOCYTES # BLD AUTO: 1.63 THOUSAND/UL (ref 0.6–4.47)
LYMPHOCYTES # BLD AUTO: 21 % (ref 14–44)
MCH RBC QN AUTO: 30.9 PG (ref 26.8–34.3)
MCHC RBC AUTO-ENTMCNC: 33.2 G/DL (ref 31.4–37.4)
MCV RBC AUTO: 93 FL (ref 82–98)
MONOCYTES # BLD AUTO: 0.82 THOUSAND/UL (ref 0–1.22)
MONOCYTES NFR BLD: 11 % (ref 4–12)
NEUTROPHILS # BLD MANUAL: 4.97 THOUSAND/UL (ref 1.85–7.62)
NEUTS SEG NFR BLD AUTO: 67 % (ref 43–75)
PLATELET # BLD AUTO: 222 THOUSANDS/UL (ref 149–390)
PLATELET BLD QL SMEAR: ADEQUATE
PMV BLD AUTO: 9.6 FL (ref 8.9–12.7)
POTASSIUM SERPL-SCNC: 4.8 MMOL/L (ref 3.5–5.3)
RBC # BLD AUTO: 5.28 MILLION/UL (ref 3.88–5.62)
RBC MORPH BLD: NORMAL
RSV RNA RESP QL NAA+PROBE: NEGATIVE
SARS-COV-2 RNA RESP QL NAA+PROBE: NEGATIVE
SODIUM SERPL-SCNC: 134 MMOL/L (ref 135–147)
VARIANT LYMPHS # BLD AUTO: 1 %
WBC # BLD AUTO: 7.42 THOUSAND/UL (ref 4.31–10.16)

## 2023-12-28 PROCEDURE — 71045 X-RAY EXAM CHEST 1 VIEW: CPT

## 2023-12-28 PROCEDURE — 94640 AIRWAY INHALATION TREATMENT: CPT

## 2023-12-28 PROCEDURE — 85027 COMPLETE CBC AUTOMATED: CPT | Performed by: EMERGENCY MEDICINE

## 2023-12-28 PROCEDURE — 96361 HYDRATE IV INFUSION ADD-ON: CPT

## 2023-12-28 PROCEDURE — 99284 EMERGENCY DEPT VISIT MOD MDM: CPT

## 2023-12-28 PROCEDURE — 99285 EMERGENCY DEPT VISIT HI MDM: CPT | Performed by: EMERGENCY MEDICINE

## 2023-12-28 PROCEDURE — 96360 HYDRATION IV INFUSION INIT: CPT

## 2023-12-28 PROCEDURE — 80048 BASIC METABOLIC PNL TOTAL CA: CPT | Performed by: EMERGENCY MEDICINE

## 2023-12-28 PROCEDURE — 0241U HB NFCT DS VIR RESP RNA 4 TRGT: CPT | Performed by: EMERGENCY MEDICINE

## 2023-12-28 PROCEDURE — 85007 BL SMEAR W/DIFF WBC COUNT: CPT | Performed by: EMERGENCY MEDICINE

## 2023-12-28 PROCEDURE — 36415 COLL VENOUS BLD VENIPUNCTURE: CPT | Performed by: EMERGENCY MEDICINE

## 2023-12-28 RX ORDER — PREDNISONE 50 MG/1
50 TABLET ORAL DAILY
Qty: 5 TABLET | Refills: 0 | Status: SHIPPED | OUTPATIENT
Start: 2023-12-28

## 2023-12-28 RX ORDER — BENZONATATE 100 MG/1
200 CAPSULE ORAL EVERY 8 HOURS
Qty: 21 CAPSULE | Refills: 0 | Status: SHIPPED | OUTPATIENT
Start: 2023-12-28

## 2023-12-28 RX ORDER — IPRATROPIUM BROMIDE AND ALBUTEROL SULFATE 2.5; .5 MG/3ML; MG/3ML
3 SOLUTION RESPIRATORY (INHALATION) ONCE
Status: COMPLETED | OUTPATIENT
Start: 2023-12-28 | End: 2023-12-28

## 2023-12-28 RX ORDER — ONDANSETRON 4 MG/1
4 TABLET, ORALLY DISINTEGRATING ORAL ONCE
Status: COMPLETED | OUTPATIENT
Start: 2023-12-28 | End: 2023-12-28

## 2023-12-28 RX ORDER — ONDANSETRON 4 MG/1
4 TABLET, FILM COATED ORAL EVERY 6 HOURS
Qty: 20 TABLET | Refills: 0 | Status: SHIPPED | OUTPATIENT
Start: 2023-12-28

## 2023-12-28 RX ORDER — AZITHROMYCIN 250 MG/1
250 TABLET, FILM COATED ORAL EVERY 24 HOURS
Qty: 4 TABLET | Refills: 0 | Status: SHIPPED | OUTPATIENT
Start: 2023-12-29 | End: 2024-01-02

## 2023-12-28 RX ORDER — AZITHROMYCIN 500 MG/1
500 TABLET, FILM COATED ORAL ONCE
Status: COMPLETED | OUTPATIENT
Start: 2023-12-28 | End: 2023-12-28

## 2023-12-28 RX ADMIN — PREDNISONE 50 MG: 20 TABLET ORAL at 12:06

## 2023-12-28 RX ADMIN — AZITHROMYCIN 500 MG: 500 TABLET, FILM COATED ORAL at 13:05

## 2023-12-28 RX ADMIN — ONDANSETRON 4 MG: 4 TABLET, ORALLY DISINTEGRATING ORAL at 12:06

## 2023-12-28 RX ADMIN — SODIUM CHLORIDE 1000 ML: 0.9 INJECTION, SOLUTION INTRAVENOUS at 13:06

## 2023-12-28 RX ADMIN — IPRATROPIUM BROMIDE AND ALBUTEROL SULFATE 3 ML: 2.5; .5 SOLUTION RESPIRATORY (INHALATION) at 12:06

## 2023-12-28 NOTE — Clinical Note
Arnav Joshi was seen and treated in our emergency department on 12/28/2023.                Diagnosis: bronchitis    Arnav  .    He may return on this date:     Please excuse 12/27 through 12/28     If you have any questions or concerns, please don't hesitate to call.      Junior Aragon MD    ______________________________           _______________          _______________  Hospital Representative                              Date                                Time

## 2023-12-28 NOTE — ED PROVIDER NOTES
History  Chief Complaint   Patient presents with    Flu Symptoms     Pt reports flu symptoms since Tuesday, chills and body aches as well as headache, pt had a kidney transplant in      Arnav Joshi is a 48 y.o.  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, IgA  2019: Suicidal ideations  Social History    Tobacco Use      Smoking status: Former        Packs/day: 0.00        Years: 0.5 packs/day for 4.0 years (2.0 ttl pk-yrs)        Types: Cigarettes        Start date:         Quit date:         Years since quittin.0      Smokeless tobacco: Never    Vaping Use      Vaping status: Never Used    Alcohol use: Yes      Comment: on occasion    Drug use: Not Currently      Types: Marijuana      Comment: smokes marijuana a few times daily    Patient presents with:  Flu Symptoms: Pt reports flu symptoms since Tuesday, chills and body aches as well as headache, pt had a kidney transplant in   Pt with aches, fever (subjective) / chills / sweats +  Cough - slight phlegm  Son sick with a 'cold'  Decreased PO intake and patient has hx of renal disease     History obtained directly from the PATIENT              Flu Symptoms      Prior to Admission Medications   Prescriptions Last Dose Informant Patient Reported? Taking?   Cholecalciferol (VITAMIN D-3) 1000 units CAPS  Self Yes No   Sig: Take 4,000 Units by mouth daily   diltiazem (CARDIZEM) 120 MG tablet  Self No No   Sig: TAKE 1 TABLET BY MOUTH EVERY 12 HOURS   mycophenolate (CELLCEPT) 250 mg capsule  Self No No   Sig: TAKE 2 CAPSULES BY MOUTH IN AM AND 1 CAPSULES IN  THE PM   potassium phosphate, monobasic, (K-PHOS) 500 MG tablet   No No   Sig: Take 1 tablet (500 mg total) by mouth 2 (two) times a day   rosuvastatin (CRESTOR) 5 mg tablet  Self No No   Sig: TAKE 1 TABLET (5 MG TOTAL) BY MOUTH DAILY.   tacrolimus (PROGRAF) 0.5 mg capsule   No No   Sig: TAKE 1 CAPS IN AM AND 1 CAP IN PM 10/30/2023   warfarin (COUMADIN) 4 mg tablet   No No   Sig: TAKE 2 TABLETS BY MOUTH EVERY DAY      Facility-Administered Medications: None       Past Medical History:   Diagnosis Date    ADD (attention deficit disorder)     FRANCES (acute kidney injury) ([facility]) 2020    Chronic kidney disease     Depression     DVT (deep venous thrombosis) ([facility])     H/O immunosuppressive therapy     History of kidney disease     HTN (hypertension) 2017    Hypertension     Hypomagnesemia 2020    Left lower quadrant pain 2019    Nephropathy, IgA     Suicidal ideations 2019       Past Surgical History:   Procedure Laterality Date    NEPHRECTOMY TRANSPLANTED ORGAN         Family History   Problem Relation Age of Onset    Deep vein thrombosis Father     Deep vein thrombosis Paternal Grandfather     Transient ischemic attack Mother      I have reviewed and agree with the history as documented.    E-Cigarette/Vaping    E-Cigarette Use Never User      E-Cigarette/Vaping Substances    Nicotine No     THC No     CBD No     Flavoring No     Other No     Unknown No      Social History     Tobacco Use    Smoking status: Former     Current packs/day: 0.00     Average packs/day: 0.5 packs/day for 4.0 years (2.0 ttl pk-yrs)     Types: Cigarettes     Start date:      Quit date: 2000     Years since quittin.0    Smokeless tobacco: Never   Vaping Use    Vaping status: Never Used   Substance Use Topics    Alcohol use: Yes     Comment: on occasion    Drug use: Not Currently     Types: Marijuana     Comment: smokes marijuana a few times daily       Review of Systems    Physical Exam  Physical Exam    Vital  Signs  ED Triage Vitals [12/28/23 1034]   Temperature Pulse Respirations Blood Pressure SpO2   99.3 °F (37.4 °C) (!) 109 20 131/80 96 %      Temp src Heart Rate Source Patient Position - Orthostatic VS BP Location FiO2 (%)   -- -- -- -- --      Pain Score       --           Vitals:    12/28/23 1034   BP: 131/80   Pulse: (!) 109         Visual Acuity      ED Medications  Medications   predniSONE tablet 50 mg (50 mg Oral Given 12/28/23 1206)   ipratropium-albuterol (DUO-NEB) 0.5-2.5 mg/3 mL inhalation solution 3 mL (3 mL Nebulization Given 12/28/23 1206)   ondansetron (ZOFRAN-ODT) dispersible tablet 4 mg (4 mg Oral Given 12/28/23 1206)   azithromycin (ZITHROMAX) tablet 500 mg (500 mg Oral Given 12/28/23 1305)   sodium chloride 0.9 % bolus 1,000 mL (0 mL Intravenous Stopped 12/28/23 1440)       Diagnostic Studies  Results Reviewed       Procedure Component Value Units Date/Time    Manual Differential(PHLEBS Do Not Order) [[id number]]  (Abnormal) Collected: 12/28/23 1206    Lab Status: Final result Specimen: Blood from Arm, Left Updated: 12/28/23 1236     Segmented % 67 %      Lymphocytes % 21 %      Monocytes % 11 %      Eosinophils, % 0 %      Basophils % 0 %      Atypical Lymphocytes % 1 %      Absolute Neutrophils 4.97 Thousand/uL      Lymphocytes Absolute 1.63 Thousand/uL      Monocytes Absolute 0.82 Thousand/uL      Eosinophils Absolute 0.00 Thousand/uL      Basophils Absolute 0.00 Thousand/uL      Total Counted --     RBC Morphology Normal     Platelet Estimate Adequate    Basic metabolic panel [[id number]]  (Abnormal) Collected: 12/28/23 1206    Lab Status: Final result Specimen: Blood from Arm, Left Updated: 12/28/23 1231     Sodium 134 mmol/L      Potassium 4.8 mmol/L      Chloride 104 mmol/L      CO2 21 mmol/L      ANION GAP 9 mmol/L      BUN 34 mg/dL      Creatinine 1.91 mg/dL      Glucose 100 mg/dL      Calcium 10.4 mg/dL      eGFR 39 ml/min/1.73sq m     Narrative:      National Kid[employer] Foundation  guidelines for Chronic Kidney Disease (CKD):     Stage 1 with normal or high GFR (GFR > 90 mL/min/1.73 square meters)    Stage 2 Mild CKD (GFR = 60-89 mL/min/1.73 square meters)    Stage 3A Moderate CKD (GFR = 45-59 mL/min/1.73 square meters)    Stage 3B Moderate CKD (GFR = 30-44 mL/min/1.73 square meters)    Stage 4 Severe CKD (GFR = 15-29 mL/min/1.73 square meters)    Stage 5 End Stage CKD (GFR <15 mL/min/1.73 square meters)  Note: GFR calculation is accurate only with a steady state creatinine    CBC and differential [[id number]]  (Normal) Collected: 12/28/23 1206    Lab Status: Final result Specimen: Blood from Arm, Left Updated: 12/28/23 1216     WBC 7.42 Thousand/uL      RBC 5.28 Million/uL      Hemoglobin 16.3 g/dL      Hematocrit 49.1 %      MCV 93 fL      MCH 30.9 pg      MCHC 33.2 g/dL      RDW 12.4 %      MPV 9.6 fL      Platelets 222 Thousands/uL     COVID/FLU/RSV [[id number]]  (Normal) Collected: 12/28/23 1036    Lab Status: Final result Specimen: Nares from Nose Updated: 12/28/23 1122     SARS-CoV-2 Negative     INFLUENZA A PCR Negative     INFLUENZA B PCR Negative     RSV PCR Negative    Narrative:      FOR PEDIATRIC PATIENTS - copy/paste COVID Guidelines URL to browser: [email or web address]    SARS-CoV-2 assay is a Nucleic Acid Amplification assay intended for the  qualitative detection of nucleic acid from SARS-CoV-2 in nasopharyngeal  swabs. Results are for the presumptive identification of SARS-CoV-2 RNA.    Positive results are indicative of infection with SARS-CoV-2, the virus  causing COVID-19, but do not rule out bacterial infection or co-infection  with other viruses. Laboratories within the [place] and its  territories are required to report all positive results to the appropriate  public health authorities. Negative results do not preclude SARS-CoV-2  infection and should not be used as the sole basis for treatment or other  patient  management decisions. Negative results must be combined with  clinical observations, patient history, and epidemiological information.  This test has not been FDA cleared or approved.    This test has been authorized by FDA under an Emergency Use Authorization  (EUA). This test is only authorized for the duration of time the  declaration that circumstances exist justifying the authorization of the  emergency use of an in vitro diagnostic tests for detection of SARS-CoV-2  virus and/or diagnosis of COVID-19 infection under section 564(b)(1) of  the Act, 21 U.S.C. 360bbb-3(b)(1), unless the authorization is terminated  or revoked sooner. The test has been validated but independent review by FDA  and CLIA is pending.    Test performed using [employer] GeneXpert: This RT-PCR assay targets N2,  a region unique to SARS-CoV-2. A conserved region in the E-gene was chosen  for pan-Sarbecovirus detection which includes SARS-CoV-2.    According to CMS-2020-01-R, this platform meets the definition of high-throughput technology.                   XR chest 1 view portable   ED Interpretation by Junior Aragon MD (12/28 0801)   ? Focal infiltrate RLL  No effusion  Normal heart size        Final Result by Caro Marmolejo MD (12/28 5004)      No acute cardiopulmonary disease.                  Workstation performed: [id number]                    Procedures  Procedures         ED Course  ED Course as of 12/28/23 1956   Thu Dec 28, 2023   1145 SARS-COV-2: Negative   1145 INFLU A PCR: Negative   1145 INFLU B PCR: Negative   1145 RSV PCR: Negative                               SBIRT 22yo+      Flowsheet Row Most Recent Value   Initial Alcohol Screen: US AUDIT-C     1. How often do you have a drink containing alcohol? 0 Filed at: 12/28/2023 1121   2. How many drinks containing alcohol do you have on a typical day you are drinking?  0 Filed at: 12/28/2023 1121   3a. Male UNDER 65: How often do you have five or more drinks on one occasion? 0  Filed at: 12/28/2023 1121   Audit-C Score 0 Filed at: 12/28/2023 1121   TONYA: How many times in the past year have you...    Used an illegal drug or used a prescription medication for non-medical reasons? Never Filed at: 12/28/2023 1121                      Medical Decision Making  Problems Addressed:  FRANCES (acute kidney injury) (HCC): chronic illness or injury with exacerbation, progression, or side effects of treatment  Bronchitis: acute illness or injury    Amount and/or Complexity of Data Reviewed  Labs: ordered. Decision-making details documented in ED Course.  Radiology: ordered and independent interpretation performed.  Discussion of management or test interpretation with external provider(s): Patient clinical presentation is benign.    Meaning patient's vital signs are normal and stable ED Triage Vitals [12/28/23 1034]  Temperature: 99.3 °F (37.4 °C)  Pulse: (!) 109  Respirations: 20  Blood Pressure: 131/80  SpO2: 96 %  Temp src: n/a  Heart Rate Source: n/a  Patient Position - Orthostatic VS: n/a  BP Location: n/a  FiO2 (%): n/a  Pain Score: n/a.    Patient in no distress.    Chief complaint, vital signs, physical examination does not suggest an acute medical emergency at this time.      Risk  Prescription drug management.             Disposition  Final diagnoses:   Bronchitis   FRANCES (acute kidney injury) (HCC)     Time reflects when diagnosis was documented in both MDM as applicable and the Disposition within this note       Time User Action Codes Description Comment    12/28/2023 12:51 PM Junior Aragon [J40] Bronchitis     12/28/2023 12:51 PM Junior Aragon Add [N17.9] FRANCES (acute kidney injury) (HCC)           ED Disposition       ED Disposition   Discharge    Condition   Stable    Date/Time   Thu Dec 28, 2023 1251    Comment   Arnav Joshi discharge to home/self care.                   Follow-up Information       Follow up With Specialties Details Why Contact Info    Shaina Husain MD Internal Medicine In 5  days [facility] kidney function [address]  [address]  [facility] [address]  [phone]              Discharge Medication List as of 12/28/2023 12:52 PM        START taking these medications    Details   azithromycin (Zithromax Z-Kenton) 250 mg tablet Take 1 tablet (250 mg total) by mouth every 24 hours for 4 days Take 2 tablets today then 1 tablet daily x 4 days Do not start before December 29, 2023., Starting Fri 12/29/2023, Until Tue 1/2/2024, Normal      benzonatate (TESSALON PERLES) 100 mg capsule Take 2 capsules (200 mg total) by mouth every 8 (eight) hours, Starting u 12/28/2023, Normal      ondansetron (ZOFRAN) 4 mg tablet Take 1 tablet (4 mg total) by mouth every 6 (six) hours, Starting u 12/28/2023, Normal      predniSONE 50 mg tablet Take 1 tablet (50 mg total) by mouth daily, Starting Thu 12/28/2023, Normal           CONTINUE these medications which have NOT CHANGED    Details   Cholecalciferol (VITAMIN D-3) 1000 units CAPS Take 4,000 Units by mouth daily, Historical Med      diltiazem (CARDIZEM) 120 MG tablet TAKE 1 TABLET BY MOUTH EVERY 12 HOURS, Normal      mycophenolate (CELLCEPT) 250 mg capsule TAKE 2 CAPSULES BY MOUTH IN AM AND 1 CAPSULES IN THE PM, Normal      potassium phosphate, monobasic, (K-PHOS) 500 MG tablet Take 1 tablet (500 mg total) by mouth 2 (two) times a day, Starting Mon 10/30/2023, Normal      rosuvastatin (CRESTOR) 5 mg tablet TAKE 1 TABLET (5 MG TOTAL) BY MOUTH DAILY., Starting Sun 1/8/2023, Normal      tacrolimus (PROGRAF) 0.5 mg capsule TAKE 1 CAPS IN AM AND 1 CAP IN PM 10/30/2023, No Print      warfarin (COUMADIN) 4 mg tablet TAKE 2 TABLETS BY MOUTH EVERY DAY, Normal             No discharge procedures on file.    PDMP Review       None            ED Provider  Electronically Signed by             Junior Aragon MD  12/28/23 1956

## 2023-12-28 NOTE — Clinical Note
Arnav Joshi was seen and treated in our emergency department on 12/28/2023.                Diagnosis: bronchitis    Arnav  .    He may return on this date:     Please excuse 12/27 through 12/29     If you have any questions or concerns, please don't hesitate to call.      Penny Li RN    ______________________________           _______________          _______________  Hospital Representative                              Date                                Time

## 2023-12-28 NOTE — DISCHARGE INSTRUCTIONS
A  personal message from Dr. Junior Aragon,  Thank you so much for allowing me to care for you today.    I pride myself in the care and attention I give all my patients.  I hope you were a witness to this tonight.   If for any reason your condition does not improve or worsens, or you have a question that was not answered during your visit you can feel free to text me on my personal phone #  # [phone].   I will answer to your message and continue your care past your emergency room visit.     Please understand that although you are being discharged because your condition has been deemed stable and able to be managed on an outpatient setting. However your condition may worsen as part of the natural progression of the illness/condition, if this occurs please come back to the emergency department for a repeat evaluation.

## 2024-01-02 DIAGNOSIS — I82.4Z1 DEEP VEIN THROMBOSIS (DVT) OF DISTAL VEIN OF RIGHT LOWER EXTREMITY, UNSPECIFIED CHRONICITY (HCC): ICD-10-CM

## 2024-01-02 RX ORDER — WARFARIN SODIUM 4 MG/1
TABLET ORAL
Qty: 180 TABLET | Refills: 4 | Status: SHIPPED | OUTPATIENT
Start: 2024-01-02

## 2024-01-07 DIAGNOSIS — Z94.0 KIDNEY TRANSPLANTED: ICD-10-CM

## 2024-01-08 RX ORDER — MYCOPHENOLATE MOFETIL 250 MG/1
CAPSULE ORAL
Qty: 90 CAPSULE | Refills: 14 | Status: SHIPPED | OUTPATIENT
Start: 2024-01-08

## 2024-01-10 DIAGNOSIS — Z94.0 RENAL TRANSPLANT RECIPIENT: ICD-10-CM

## 2024-01-10 RX ORDER — ROSUVASTATIN CALCIUM 5 MG/1
5 TABLET, COATED ORAL DAILY
Qty: 90 TABLET | Refills: 3 | Status: SHIPPED | OUTPATIENT
Start: 2024-01-10

## 2024-01-11 NOTE — TELEPHONE ENCOUNTER
Per pharmacy- they don't see that they had sent a request to us for med refill. They will never change med without checking with doctor so not sure why would they had sent a different med.   Left message for pt asking to call office when home so he can look at bottle and tell us exactly what medication was he given by pharmacy last time.

## 2024-01-19 NOTE — TELEPHONE ENCOUNTER
Spoke to pharmacists, this is the generic and the mono basic is the brand. They will make sure that next refill will the the brand name.

## 2024-01-22 DIAGNOSIS — I10 ESSENTIAL HYPERTENSION: ICD-10-CM

## 2024-01-22 RX ORDER — DILTIAZEM HYDROCHLORIDE 120 MG/1
120 TABLET, FILM COATED ORAL EVERY 12 HOURS
Qty: 180 TABLET | Refills: 2 | Status: SHIPPED | OUTPATIENT
Start: 2024-01-22

## 2024-01-23 DIAGNOSIS — Z86.718 HISTORY OF DVT (DEEP VEIN THROMBOSIS): Primary | ICD-10-CM

## 2024-01-24 ENCOUNTER — TELEPHONE (OUTPATIENT)
Dept: NEPHROLOGY | Facility: CLINIC | Age: 49
End: 2024-01-24

## 2024-01-26 ENCOUNTER — APPOINTMENT (OUTPATIENT)
Dept: LAB | Facility: HOSPITAL | Age: 49
End: 2024-01-26
Payer: COMMERCIAL

## 2024-01-26 DIAGNOSIS — N18.9 CHRONIC KIDNEY DISEASE-MINERAL AND BONE DISORDER: ICD-10-CM

## 2024-01-26 DIAGNOSIS — Z94.0 RENAL TRANSPLANT RECIPIENT: ICD-10-CM

## 2024-01-26 DIAGNOSIS — E83.9 CHRONIC KIDNEY DISEASE-MINERAL AND BONE DISORDER: ICD-10-CM

## 2024-01-26 DIAGNOSIS — M89.9 CHRONIC KIDNEY DISEASE-MINERAL AND BONE DISORDER: ICD-10-CM

## 2024-01-26 DIAGNOSIS — N18.31 STAGE 3A CHRONIC KIDNEY DISEASE (HCC): ICD-10-CM

## 2024-01-26 DIAGNOSIS — E83.52 HYPERCALCEMIA: ICD-10-CM

## 2024-01-26 DIAGNOSIS — E83.39 HYPOPHOSPHATEMIA: ICD-10-CM

## 2024-01-26 DIAGNOSIS — Z94.0 KIDNEY TRANSPLANTED: ICD-10-CM

## 2024-01-26 LAB
25(OH)D3 SERPL-MCNC: 41.5 NG/ML (ref 30–100)
ANION GAP SERPL CALCULATED.3IONS-SCNC: 7 MMOL/L
BACTERIA UR QL AUTO: NORMAL /HPF
BASOPHILS # BLD AUTO: 0.04 THOUSANDS/ÂΜL (ref 0–0.1)
BASOPHILS NFR BLD AUTO: 1 % (ref 0–1)
BILIRUB UR QL STRIP: NEGATIVE
BUN SERPL-MCNC: 36 MG/DL (ref 5–25)
CALCIUM SERPL-MCNC: 9.7 MG/DL (ref 8.4–10.2)
CHLORIDE SERPL-SCNC: 107 MMOL/L (ref 96–108)
CLARITY UR: CLEAR
CO2 SERPL-SCNC: 22 MMOL/L (ref 21–32)
COLOR UR: COLORLESS
CREAT SERPL-MCNC: 1.47 MG/DL (ref 0.6–1.3)
CREAT UR-MCNC: 65.9 MG/DL
EOSINOPHIL # BLD AUTO: 0.24 THOUSAND/ÂΜL (ref 0–0.61)
EOSINOPHIL NFR BLD AUTO: 5 % (ref 0–6)
ERYTHROCYTE [DISTWIDTH] IN BLOOD BY AUTOMATED COUNT: 12.2 % (ref 11.6–15.1)
GFR SERPL CREATININE-BSD FRML MDRD: 54 ML/MIN/1.73SQ M
GLUCOSE P FAST SERPL-MCNC: 94 MG/DL (ref 65–99)
GLUCOSE UR STRIP-MCNC: NEGATIVE MG/DL
HCT VFR BLD AUTO: 44 % (ref 36.5–49.3)
HGB BLD-MCNC: 14.6 G/DL (ref 12–17)
HGB UR QL STRIP.AUTO: ABNORMAL
IMM GRANULOCYTES # BLD AUTO: 0.01 THOUSAND/UL (ref 0–0.2)
IMM GRANULOCYTES NFR BLD AUTO: 0 % (ref 0–2)
INR PPP: 2.62 (ref 0.84–1.19)
KETONES UR STRIP-MCNC: NEGATIVE MG/DL
LEUKOCYTE ESTERASE UR QL STRIP: NEGATIVE
LYMPHOCYTES # BLD AUTO: 1.68 THOUSANDS/ÂΜL (ref 0.6–4.47)
LYMPHOCYTES NFR BLD AUTO: 35 % (ref 14–44)
MCH RBC QN AUTO: 31.7 PG (ref 26.8–34.3)
MCHC RBC AUTO-ENTMCNC: 33.2 G/DL (ref 31.4–37.4)
MCV RBC AUTO: 96 FL (ref 82–98)
MONOCYTES # BLD AUTO: 0.61 THOUSAND/ÂΜL (ref 0.17–1.22)
MONOCYTES NFR BLD AUTO: 13 % (ref 4–12)
NEUTROPHILS # BLD AUTO: 2.25 THOUSANDS/ÂΜL (ref 1.85–7.62)
NEUTS SEG NFR BLD AUTO: 46 % (ref 43–75)
NITRITE UR QL STRIP: NEGATIVE
NON-SQ EPI CELLS URNS QL MICRO: NORMAL /HPF
NRBC BLD AUTO-RTO: 0 /100 WBCS
PH UR STRIP.AUTO: 5 [PH]
PHOSPHATE SERPL-MCNC: 2.3 MG/DL (ref 2.7–4.5)
PLATELET # BLD AUTO: 248 THOUSANDS/UL (ref 149–390)
PMV BLD AUTO: 10.2 FL (ref 8.9–12.7)
POTASSIUM SERPL-SCNC: 4.3 MMOL/L (ref 3.5–5.3)
PROT UR STRIP-MCNC: ABNORMAL MG/DL
PROT UR-MCNC: 29 MG/DL
PROT/CREAT UR: 0.44 MG/G{CREAT} (ref 0–0.1)
PROTHROMBIN TIME: 28.9 SECONDS (ref 11.6–14.5)
PTH-INTACT SERPL-MCNC: 131.2 PG/ML (ref 12–88)
RBC # BLD AUTO: 4.6 MILLION/UL (ref 3.88–5.62)
RBC #/AREA URNS AUTO: NORMAL /HPF
SODIUM SERPL-SCNC: 136 MMOL/L (ref 135–147)
SP GR UR STRIP.AUTO: 1.01 (ref 1–1.03)
TACROLIMUS BLD-MCNC: 4.4 NG/ML (ref 3–15)
UROBILINOGEN UR STRIP-ACNC: <2 MG/DL
WBC # BLD AUTO: 4.83 THOUSAND/UL (ref 4.31–10.16)
WBC #/AREA URNS AUTO: NORMAL /HPF

## 2024-01-26 PROCEDURE — 85610 PROTHROMBIN TIME: CPT

## 2024-01-26 PROCEDURE — 80197 ASSAY OF TACROLIMUS: CPT

## 2024-01-26 PROCEDURE — 84100 ASSAY OF PHOSPHORUS: CPT

## 2024-01-26 PROCEDURE — 83970 ASSAY OF PARATHORMONE: CPT

## 2024-01-26 PROCEDURE — 84156 ASSAY OF PROTEIN URINE: CPT

## 2024-01-26 PROCEDURE — 85025 COMPLETE CBC W/AUTO DIFF WBC: CPT

## 2024-01-26 PROCEDURE — 81001 URINALYSIS AUTO W/SCOPE: CPT

## 2024-01-26 PROCEDURE — 36415 COLL VENOUS BLD VENIPUNCTURE: CPT

## 2024-01-26 PROCEDURE — 82570 ASSAY OF URINE CREATININE: CPT

## 2024-01-26 PROCEDURE — 80048 BASIC METABOLIC PNL TOTAL CA: CPT

## 2024-01-26 PROCEDURE — 82306 VITAMIN D 25 HYDROXY: CPT

## 2024-01-28 DIAGNOSIS — Z94.0 RENAL TRANSPLANT RECIPIENT: ICD-10-CM

## 2024-01-29 RX ORDER — ROSUVASTATIN CALCIUM 5 MG/1
5 TABLET, COATED ORAL DAILY
Qty: 90 TABLET | Refills: 3 | Status: SHIPPED | OUTPATIENT
Start: 2024-01-29

## 2024-01-30 ENCOUNTER — ANTICOAG VISIT (OUTPATIENT)
Age: 49
End: 2024-01-30

## 2024-02-01 ENCOUNTER — OFFICE VISIT (OUTPATIENT)
Dept: NEPHROLOGY | Facility: CLINIC | Age: 49
End: 2024-02-01
Payer: COMMERCIAL

## 2024-02-01 VITALS
OXYGEN SATURATION: 98 % | RESPIRATION RATE: 18 BRPM | TEMPERATURE: 98.5 F | HEART RATE: 95 BPM | BODY MASS INDEX: 29.07 KG/M2 | SYSTOLIC BLOOD PRESSURE: 110 MMHG | HEIGHT: 68 IN | DIASTOLIC BLOOD PRESSURE: 80 MMHG | WEIGHT: 191.8 LBS

## 2024-02-01 DIAGNOSIS — Z94.0 RENAL TRANSPLANT RECIPIENT: ICD-10-CM

## 2024-02-01 DIAGNOSIS — N02.B9 RECURRENT IGA NEPHROPATHY AFTER KIDNEY TRANSPLANTATION: ICD-10-CM

## 2024-02-01 DIAGNOSIS — I10 ESSENTIAL HYPERTENSION: ICD-10-CM

## 2024-02-01 DIAGNOSIS — T86.19 RECURRENT IGA NEPHROPATHY AFTER KIDNEY TRANSPLANTATION: ICD-10-CM

## 2024-02-01 DIAGNOSIS — N18.31 STAGE 3A CHRONIC KIDNEY DISEASE (HCC): Primary | ICD-10-CM

## 2024-02-01 PROCEDURE — 99214 OFFICE O/P EST MOD 30 MIN: CPT | Performed by: INTERNAL MEDICINE

## 2024-02-01 NOTE — PROGRESS NOTES
NEPHROLOGY OFFICE FOLLOW UP  Arnav Joshi 48 y.o. male MRN: [id number]    Encounter: [id number] 2/1/2024    REASON FOR VISIT: Arnav Joshi is a 48 y.o. male who is here on 2/1/2024 for Chronic Kidney Disease and Follow-up  .    HPI:    Arnav came in today for follow-up of CKD and kidney transplant.  48-year-old gentleman with history of hypertension ESRD s/p kidney transplant with recurrence of IgA nephropathy and kidney transplant and at present has stage III CKD    Patient is also being monitored by transplant nephrologist Dr. Franco Vega    He is overall doing quite well.  Recently had a bronchitis which was treated appropriately about a month ago now is feeling much better    No acute complaint    Patient also history of depression though not on any medication right now and feeling well        REVIEW OF SYSTEMS:    Review of Systems   Constitutional:  Negative for fatigue.   HENT:  Negative for congestion.    Eyes:  Negative for photophobia and pain.   Respiratory:  Negative for chest tightness and shortness of breath.    Cardiovascular:  Negative for chest pain and palpitations.   Gastrointestinal:  Negative for abdominal distention, abdominal pain and blood in stool.   Endocrine: Negative for polydipsia.   Genitourinary:  Negative for difficulty urinating, dysuria, flank pain, hematuria and urgency.   Musculoskeletal:  Negative for arthralgias and back pain.   Skin:  Negative for rash.   Neurological:  Negative for dizziness, light-headedness and headaches.   Hematological:  Does not bruise/bleed easily.   Psychiatric/Behavioral:  Negative for behavioral problems. The patient is not nervous/anxious.          PAST MEDICAL HISTORY:  Past Medical History:   Diagnosis Date    ADD (attention deficit disorder)     FRANCES (acute kidney injury) ([facility]) 1/24/2020    Chronic kidney disease     Depression     DVT (deep venous thrombosis) ([facility])     H/O immunosuppressive therapy     History of kidney disease     HTN (hypertension)  2017    Hypertension     Hypomagnesemia 2020    Left lower quadrant pain 2019    Nephropathy, IgA     Suicidal ideations 2019       PAST SURGICAL HISTORY:  Past Surgical History:   Procedure Laterality Date    NEPHRECTOMY TRANSPLANTED ORGAN         SOCIAL HISTORY:  Social History     Substance and Sexual Activity   Alcohol Use Yes    Comment: on occasion     Social History     Substance and Sexual Activity   Drug Use Not Currently    Types: Marijuana    Comment: smokes marijuana a few times daily     Social History     Tobacco Use   Smoking Status Former    Current packs/day: 0.00    Average packs/day: 0.5 packs/day for 4.0 years (2.0 ttl pk-yrs)    Types: Cigarettes    Start date:     Quit date:     Years since quittin.1    Passive exposure: Past   Smokeless Tobacco Never       FAMILY HISTORY:  Family History   Problem Relation Age of Onset    Deep vein thrombosis Father     Deep vein thrombosis Paternal Grandfather     Transient ischemic attack Mother        MEDICATIONS:    Current Outpatient Medications:     Cholecalciferol (VITAMIN D-3) 1000 units CAPS, Take 4,000 Units by mouth daily, Disp: , Rfl:     diltiazem (CARDIZEM) 120 MG tablet, Take 1 tablet (120 mg total) by mouth every 12 (twelve) hours, Disp: 180 tablet, Rfl: 2    mycophenolate (CELLCEPT) 250 mg capsule, TAKE 2 CAPSULES BY MOUTH IN AM AND 1 CAPSULES IN THE PM, Disp: 90 capsule, Rfl: 14    potassium phosphate, monobasic, (K-PHOS) 500 MG tablet, Take 1 tablet (500 mg total) by mouth 2 (two) times a day, Disp: 180 tablet, Rfl: 3    rosuvastatin (CRESTOR) 5 mg tablet, Take 1 tablet (5 mg total) by mouth daily, Disp: 90 tablet, Rfl: 3    tacrolimus (PROGRAF) 0.5 mg capsule, TAKE 1 CAPS IN AM AND 1 CAP IN PM 10/30/2023, Disp: 270 capsule, Rfl: 3    warfarin (COUMADIN) 4 mg tablet, TAKE 2 TABLETS BY MOUTH EVERY DAY, Disp: 180 tablet, Rfl: 4    PHYSICAL EXAM:  Vitals:    24 1133   BP: 110/80   BP Location: Right arm  "  Patient Position: Sitting   Pulse: 95   Resp: 18   Temp: 98.5 °F (36.9 °C)   TempSrc: Temporal   SpO2: 98%   Weight: 87 kg (191 lb 12.8 oz)   Height: 5' 7.5\" (1.715 m)     Body mass index is 29.6 kg/m².    Physical Exam  Constitutional:       General: He is not in acute distress.     Appearance: He is well-developed.   HENT:      Head: Normocephalic.      Mouth/Throat:      Mouth: Mucous membranes are moist.   Eyes:      General: No scleral icterus.     Conjunctiva/sclera: Conjunctivae normal.   Neck:      Vascular: No JVD.   Cardiovascular:      Rate and Rhythm: Normal rate.      Heart sounds: Normal heart sounds.   Pulmonary:      Effort: Pulmonary effort is normal.      Breath sounds: No wheezing.   Abdominal:      Palpations: Abdomen is soft.      Tenderness: There is no abdominal tenderness.   Musculoskeletal:         General: Normal range of motion.      Cervical back: Neck supple.   Skin:     General: Skin is warm.      Findings: No rash.   Neurological:      Mental Status: He is alert and oriented to person, place, and time.   Psychiatric:         Behavior: Behavior normal.         LAB RESULTS:  Results for orders placed or performed in visit on 01/26/24   Basic metabolic panel   Result Value Ref Range    Sodium 136 135 - 147 mmol/L    Potassium 4.3 3.5 - 5.3 mmol/L    Chloride 107 96 - 108 mmol/L    CO2 22 21 - 32 mmol/L    ANION GAP 7 mmol/L    BUN 36 (H) 5 - 25 mg/dL    Creatinine 1.47 (H) 0.60 - 1.30 mg/dL    Glucose, Fasting 94 65 - 99 mg/dL    Calcium 9.7 8.4 - 10.2 mg/dL    eGFR 54 ml/min/1.73sq m   CBC and differential   Result Value Ref Range    WBC 4.83 4.31 - 10.16 Thousand/uL    RBC 4.60 3.88 - 5.62 Million/uL    Hemoglobin 14.6 12.0 - 17.0 g/dL    Hematocrit 44.0 36.5 - 49.3 %    MCV 96 82 - 98 fL    MCH 31.7 26.8 - 34.3 pg    MCHC 33.2 31.4 - 37.4 g/dL    RDW 12.2 11.6 - 15.1 %    MPV 10.2 8.9 - 12.7 fL    Platelets 248 149 - 390 Thousands/uL    nRBC 0 /100 WBCs    Neutrophils Relative 46 43 " - 75 %    Immat GRANS % 0 0 - 2 %    Lymphocytes Relative 35 14 - 44 %    Monocytes Relative 13 (H) 4 - 12 %    Eosinophils Relative 5 0 - 6 %    Basophils Relative 1 0 - 1 %    Neutrophils Absolute 2.25 1.85 - 7.62 Thousands/µL    Immature Grans Absolute 0.01 0.00 - 0.20 Thousand/uL    Lymphocytes Absolute 1.68 0.60 - 4.47 Thousands/µL    Monocytes Absolute 0.61 0.17 - 1.22 Thousand/µL    Eosinophils Absolute 0.24 0.00 - 0.61 Thousand/µL    Basophils Absolute 0.04 0.00 - 0.10 Thousands/µL   Phosphorus   Result Value Ref Range    Phosphorus 2.3 (L) 2.7 - 4.5 mg/dL   Protein / creatinine ratio, urine   Result Value Ref Range    Creatinine, Ur 65.9 Reference range not established. mg/dL    Protein Urine Random 29 Reference range not established. mg/dL    Prot/Creat Ratio, Ur 0.44 (H) 0.00 - 0.10   PTH, intact   Result Value Ref Range    .2 (H) 12.0 - 88.0 pg/mL   UA (URINE) with reflex to Scope   Result Value Ref Range    Color, UA Colorless     Clarity, UA Clear     Specific Gravity, UA 1.013 1.003 - 1.030    pH, UA 5.0 4.5, 5.0, 5.5, 6.0, 6.5, 7.0, 7.5, 8.0    Leukocytes, UA Negative Negative    Nitrite, UA Negative Negative    Protein, UA Trace (A) Negative mg/dl    Glucose, UA Negative Negative mg/dl    Ketones, UA Negative Negative mg/dl    Urobilinogen, UA <2.0 <2.0 mg/dl mg/dl    Bilirubin, UA Negative Negative    Occult Blood, UA Small (A) Negative   Vitamin D 25 hydroxy   Result Value Ref Range    Vit D, 25-Hydroxy 41.5 30.0 - 100.0 ng/mL   Tacrolimus level   Result Value Ref Range    TACROLIMUS 4.4 3.0 - 15.0 ng/mL   Urine Microscopic   Result Value Ref Range    RBC, UA 1-2 None Seen, 1-2 /hpf    WBC, UA 1-2 None Seen, 1-2 /hpf    Epithelial Cells None Seen None Seen, Occasional /hpf    Bacteria, UA Occasional None Seen, Occasional /hpf       ASSESSMENT and PLAN:      Renal transplant recipient  On immunosuppressive medication and being monitored by transplant nephrologist.  Tacrolimus level is within  "acceptable range and kidney function is stable also    Recurrent IgA nephropathy after kidney transplantation  This possible stage III CKD.  Again stable for now    Stage 3 chronic kidney disease (HCC)  Lab Results   Component Value Date    EGFR 54 01/26/2024    EGFR 39 12/28/2023    EGFR 45 10/25/2023    CREATININE 1.47 (H) 01/26/2024    CREATININE 1.91 (H) 12/28/2023    CREATININE 1.71 (H) 10/25/2023   Quite stable.  Advise hydration and avoiding nephrotoxic medication    Essential hypertension  Blood pressure is very well-controlled        Everything discussed with the patient at length.  I will see him back in 6 months.  In between he will be seen by Dr. Franco Vega.  He is getting regular blood test ordered by him also      Portions of the record may have been created with voice recognition software. Occasional wrong word or \"sound a like\" substitutions may have occurred due to the inherent limitations of voice recognition software. Read the chart carefully and recognize, using context, where substitutions have occurred.If you have any questions, please contact the dictating provider.   "

## 2024-02-01 NOTE — ASSESSMENT & PLAN NOTE
Lab Results   Component Value Date    EGFR 54 01/26/2024    EGFR 39 12/28/2023    EGFR 45 10/25/2023    CREATININE 1.47 (H) 01/26/2024    CREATININE 1.91 (H) 12/28/2023    CREATININE 1.71 (H) 10/25/2023   Quite stable.  Advise hydration and avoiding nephrotoxic medication

## 2024-02-01 NOTE — ASSESSMENT & PLAN NOTE
On immunosuppressive medication and being monitored by transplant nephrologist.  Tacrolimus level is within acceptable range and kidney function is stable also

## 2024-02-03 DIAGNOSIS — Z94.0 KIDNEY TRANSPLANTED: ICD-10-CM

## 2024-02-04 RX ORDER — MYCOPHENOLATE MOFETIL 250 MG/1
CAPSULE ORAL
Qty: 270 CAPSULE | Refills: 5 | Status: SHIPPED | OUTPATIENT
Start: 2024-02-04

## 2024-03-04 ENCOUNTER — TELEPHONE (OUTPATIENT)
Dept: NEPHROLOGY | Facility: CLINIC | Age: 49
End: 2024-03-04

## 2024-03-05 ENCOUNTER — OFFICE VISIT (OUTPATIENT)
Age: 49
End: 2024-03-05
Payer: COMMERCIAL

## 2024-03-05 VITALS — BODY MASS INDEX: 28.79 KG/M2 | TEMPERATURE: 98.2 F | HEIGHT: 68 IN | WEIGHT: 190 LBS

## 2024-03-05 DIAGNOSIS — L82.1 SEBORRHEIC KERATOSIS: ICD-10-CM

## 2024-03-05 DIAGNOSIS — D22.9 MULTIPLE NEVI: ICD-10-CM

## 2024-03-05 DIAGNOSIS — Z92.25 HISTORY OF IMMUNOSUPPRESSIVE THERAPY: ICD-10-CM

## 2024-03-05 DIAGNOSIS — D18.01 CHERRY ANGIOMA: ICD-10-CM

## 2024-03-05 DIAGNOSIS — R21 RASH: ICD-10-CM

## 2024-03-05 DIAGNOSIS — Z13.89 SCREENING FOR SKIN CONDITION: Primary | ICD-10-CM

## 2024-03-05 PROCEDURE — 99214 OFFICE O/P EST MOD 30 MIN: CPT | Performed by: DERMATOLOGY

## 2024-03-05 RX ORDER — BETAMETHASONE DIPROPIONATE 0.5 MG/G
OINTMENT, AUGMENTED TOPICAL 2 TIMES DAILY
Qty: 15 G | Refills: 0 | Status: SHIPPED | OUTPATIENT
Start: 2024-03-05

## 2024-03-05 NOTE — PATIENT INSTRUCTIONS
When outside we recommend using a wide brim hat, sunglasses, long sleeve and pants, sunscreen with SPF 30+ with reapplication every 2 hours, or SPF specific clothing     ACTINIC KERATOSIS    Actinic keratoses are very common on sites repeatedly exposed to the sun, especially the backs of the hands and the face, most often affecting the ears, nose, cheeks, upper lip, vermilion of the lower lip, temples, forehead and balding scalp. In severely chronically sun-damaged individuals, they may also be found on the upper trunk, upper and lower limbs, and dorsum of feet.    We discussed the theoretical premalignant (“pre-cancerous”) nature and etiology of these growths.  We discussed the prevailing notion that actinic keratoses are a reflection of abnormal skin cell development due to DNA damage by short wavelength UVB.  They are more likely to appear if the immune function is poor, due to aging, recent sun exposure, predisposing disease or certain drugs.    We discussed that the main concern is that actinic keratoses may predispose to squamous cell carcinoma. It is rare for a solitary actinic keratosis to evolve to squamous cell carcinoma (SCC), but the risk of SCC occurring at some stage in a patient with more than 10 actinic keratoses is thought to be about 10 to 15%. A tender, thickened, ulcerated or enlarging actinic keratosis is suspicious of SCC.    Actinic keratoses may be prevented by strict sun protection. If already present, keratoses may improve with a very high sun protection factor (50+) broad-spectrum sunscreen applied at least daily to affected areas, year-round.  We recommend that UPF-rated clothing and hats and sunglasses be worn whenever possible and that a sunscreen-moisturizer combination product such as Neutrogena Daily Defense be applied at least three times a day.    We performed a thorough discussion of treatment options and specific risk/benefits/alternatives including but not limited to medical  “field” treatment with medications such as the following:    Topical “field area” medications such as 5-fluorouracil or Aldara (specifically, the trouble with long-term compliance, blistering and local skin reaction versus the convenience of at-home therapy and that field therapy “gets what is not yet seen”).    Cryotherapy (specifically, local pain, scarring, dyspigmentation, blistering, possible superinfection, and treats “only what we see” versus directed treatment today).    Photodynamic therapy (specifically, local pain, scarring, dyspigmentation, blistering, possible superinfection, need to schedule for a later date, and time spent in the office versus field therapy that “gets what is not yet seen”).      BASAL CELL CARCINOMA    What is basal cell carcinoma?  Basal cell carcinoma (BCC) is a common, locally invasive, keratinocytic, or non-melanoma, skin cancer. It is also known as rodent ulcer and basalioma. Patients with BCC often develop multiple primary tumours over time.    Who gets basal cell carcinoma?  Risk factors for BCC include:  Age and sex: BCCs are particularly prevalent in elderly males. However, they also affect females and younger adults   Previous BCC or other form of skin cancer (squamous cell carcinoma, melanoma)   Sun damage (photoaging, actinic keratoses)   Repeated prior episodes of sunburn   Fair skin, blue eyes and blond or red hair--note; BCC can also affect darker skin types   Previous cutaneous injury, thermal burn, disease (eg cutaneous lupus, sebaceous naevus)   Inherited syndromes: BCC is a particular problem for families with basal cell naevus syndrome (Gorlin syndrome), Qdhlw-Xjrlé-Zynovksb syndrome, Rombo syndrome, Oley syndrome and xeroderma pigmentosum   Other risk factors include ionising radiation, exposure to arsenic, and immune suppression due to disease or medicines    What causes basal cell carcinoma?  The cause of BCC is multifactorial.  Most often, there are DNA  mutations in the patched (PTCH) tumour suppressor gene, part of hedgehog signaling pathway   These may be triggered by exposure to ultraviolet radiation   Various spontaneous and inherited gene defects predispose to BCC    What are the clinical features of basal cell carcinoma?  BCC is a locally invasive skin tumour. The main characteristics are:  Slowly growing plaque or nodule   Skin coloured, pink or pigmented   Varies in size from a few millimetres to several centimetres in diameter   Spontaneous bleeding or ulceration  BCC is very rarely a threat to life. A tiny proportion of BCCs grow rapidly, invade deeply, and/or metastasise to local lymph nodes.    Types of basal cell carcinoma  There are several distinct clinical types of BCC, and over 20 histological growth patterns of BCC.  Nodular BCC  Most common type of facial BCC   Shiny or pearly nodule with a smooth surface   May have central depression or ulceration, so its edges appear rolled   Blood vessels cross its surface   Cystic variant is soft, with jelly-like contents   Micronodular, microcystic and infiltrative types are potentially aggressive subtypes   Also known as nodulocystic carcinoma  Superficial BCC  Most common type in younger adults   Most common type on upper trunk and shoulders   Slightly scaly, irregular plaque   Thin, translucent rolled border   Multiple microerosions  Morphoeaform BCC  Usually found in mid-facial sites   Waxy, scar-like plaque with indistinct borders   Wide and deep subclinical extension   May infiltrate cutaneous nerves (perineural spread)   Also known as morpheic, morphoeiform or sclerosing BCC  Basosquamous carcinoma  Mixed basal cell carcinoma (BCC) and squamous cell carcinoma (SCC)   Infiltrative growth pattern   Potentially more aggressive than other forms of BCC   Also known as basisquamous carcinoma and mixed basal-squamous cell carcinoma       Complications of basal cell carcinoma    Recurrent BCC  Recurrence of  BCC after initial treatment is not uncommon. Characteristics of recurrent BCC often include:  Incomplete excision or narrow margins at primary excision   Morphoeic, micronodular, and infiltrative subtypes   Location on head and neck    Advanced BCC  Advanced BCCs are large, often neglected tumours.  They may be several centimetres in diameter   They may be deeply infiltrating into tissues below the skin   They are difficult or impossible to treat surgically    Metastatic BCC  Very rare   Primary tumour is often large, neglected or recurrent, located on head and neck, with aggressive subtype   May have had multiple prior treatments   May arise in site exposed to ionising radiation   Can be fatal    How is basal cell carcinoma diagnosed?  BCC is diagnosed clinically by the presence of a slowly enlarging skin lesion with typical appearance. The diagnosis and  by a diagnostic biopsy or following excision.  Some typical superficial BCCs on trunk and limbs are clinically diagnosed and have non-surgical treatment without histology.    What is the treatment for primary basal cell carcinoma?  The treatment for a BCC depends on its type, size and location, the number to be treated, patient factors, and the preference or expertise of the doctor. Most BCCs are treated surgically. Long-term follow-up is recommended to check for new lesions and recurrence; the latter may be unnecessary if histology has reported wide clear margins.    Excision biopsy  Excision means the lesion is cut out and the skin stitched up.  Most appropriate treatment for nodular, infiltrative and morphoeic BCCs   Should include 3 to 5 mm margin of normal skin around the tumour   Very large lesions may require flap or skin graft to repair the defect   Pathologist will report deep and lateral margins   Further surgery is recommended for lesions that are incompletely excised    Mohs micrographically controlled excision  Mohs micrographically controlled surgery  involves examining carefully marked excised tissue under the microscope, layer by layer, to ensure complete excision.  Very high cure rates achieved by trained Mohs surgeons   Used in high-risk areas of the face around eyes, lips and nose   Suitable for ill-defined, morphoeic, infiltrative and recurrent subtypes   Large defects are repaired by flap or skin graft    Superficial skin surgery  Superficial skin surgery comprises shave, curettage, and electrocautery. It is a rapid technique using local anaesthesia and does not require sutures.  Suitable for small, well-defined nodular or superficial BCCs   Lesions are usually located on trunk or limbs   Wound is left open to heal by secondary intention   Moist wound dressings lead to healing within a few weeks   Eventual scar quality variable    Cryotherapy  Cryotherapy is the treatment of a superficial skin lesion by freezing it, usually with liquid nitrogen.  Suitable for small superficial BCCs on covered areas of trunk and limbs   Best avoided for BCCs on head and neck, and distal to knees   Double freeze-thaw technique   Results in a blister that crusts over and heals within several weeks.   Leaves permanent white faith    Photodynamic therapy  Photodynamic therapy (PDT) refers to a technique in which BCC is treated with a photosensitising chemical, and exposed to light several hours later.  Topical photosensitisers include aminolevulinic acid lotion and methyl aminolevulinate cream   Suitable for low-risk small, superficial BCCs   Best avoided if tumour in site at high risk of recurrence   Results in inflammatory reaction, maximal 3-4 days after procedure   Treatment repeated 7 days after initial treatment   Excellent cosmetic results    Imiquimod cream  Imiquimod is an immune response modifier.  Best used for superficial BCCs less than 2 cm diameter   Applied three to five times each week, for 6-16 weeks   Results in a variable inflammatory reaction, maximal at  three weeks   Minimal scarring is usual    Fluorouracil cream  5-Fluorouracil cream is a topical cytotoxic agent.  Used to treat small superficial basal cell carcinomas   Requires prolonged course, eg twice daily for 6-12 weeks   Causes inflammatory reaction   Has high recurrence rates    Radiotherapy  Radiotherapy or X-ray treatment can be used to treat primary BCCs or as adjunctive treatment if margins are incomplete.  Mainly used if surgery is not suitable   Best avoided in young patients and in genetic conditions predisposing to skin cancer   Best cosmetic results achieved using multiple fractions   Typically, patient attends once-weekly for several weeks   Causes inflammatory reaction followed by scar   Risk of radiodermatitis, late recurrence, and new tumours    What is the treatment for advanced or metastatic basal cell carcinoma?  Locally advanced primary, recurrent or metastatic BCC requires multidisciplinary consultation. Often a combination of treatments is used.  Surgery   Radiotherapy   Targeted therapy  Targeted therapy refers to the hedgehog signalling pathway inhibitors, vismodegib and sonidegib. These drugs have some important risks and side effects.    How can basal cell carcinoma be prevented?  The most important way to prevent BCC is to avoid sunburn. This is especially important in childhood and early life. Fair skinned individuals and those with a personal or family history of BCC should protect their skin from sun exposure daily, year-round and lifelong.  Stay indoors or under the shade in the middle of the day   Wear covering clothing   Apply high protection factor SPF50+ broad-spectrum sunscreens generously to exposed skin if outdoors   Avoid indoor tanning (sun beds, solaria)  Oral nicotinamide (vitamin B3) in a dose of 500 mg twice daily may reduce the number and severity of BCCs.    What is the outlook for basal cell carcinoma?  Most BCCs are cured by treatment. Cure is most likely if  treatment is undertaken when the lesion is small.  About 50% of people with BCC develop a second one within 3 years of the first. They are also at increased risk of other skin cancers, especially melanoma. Regular self-skin examinations and long-term annual skin checks by an experienced health professional are recommended.        SQUAMOUS CELL CARCINOMA    What is cutaneous squamous cell carcinoma?  Cutaneous squamous cell carcinoma (SCC) is a common type of keratinocyte or non-melanoma skin cancer. It is derived from cells within the epidermis that make keratin -- the horny protein that makes up skin, hair and nails.  Cutaneous SCC is an invasive disease, referring to cancer cells that have grown beyond the epidermis. SCC can sometimes metastasise and may prove fatal.  Intraepidermal carcinoma (cutaneous SCC in situ) and mucosal SCC are considered elsewhere.    Who gets cutaneous squamous cell carcinoma?  Risk factors for cutaneous SCC include:  Age and sex: SCCs are particularly prevalent in elderly males. However, they also affect females and younger adults.   Previous SCC or another form of skin cancer (basal cell carcinoma, melanoma) are a strong predictor for further skin cancers.   Actinic keratoses   Outdoor occupation or recreation   Smoking   Fair skin, blue eyes and blond or red hair   Previous cutaneous injury, thermal burn, disease (eg cutaneous lupus, epidermolysis bullosa, leg ulcer)   Inherited syndromes: SCC is a particular problem for families with xeroderma pigmentosum and albinism   Other risk factors include ionising radiation, exposure to arsenic, and immune suppression due to disease (eg chronic lymphocytic leukaemia) or medicines. Organ transplant recipients have a massively increased risk of developing SCC.    What causes cutaneous squamous cell carcinoma?  More than 90% of cases of SCC are associated with numerous DNA mutations in multiple somatic genes. Mutations in the p53 tumour  suppressor gene are caused by exposure to ultraviolet radiation (UV), especially UVB (known as signature 7). Other signature mutations relate to cigarette smoking, ageing and immune suppression (eg, to drugs such as azathioprine). Mutations in signalling pathways affect the epidermal growth factor receptor, TAYLER, Fyn, and [id number] signalling.   Beta-genus human papillomaviruses (wart virus) are thought to play a role in SCC arising in immune-suppressed populations. ?-HPV and HPV subtypes 5, 8, 17, 20, 24, and 38 have also been associated with an increased risk of cutaneous SCC in immunocompetent individuals.     What are the clinical features of cutaneous squamous cell carcinoma?  Cutaneous SCCs present as enlarging scaly or crusted lumps. They usually arise within pre-existing actinic keratosis or intraepidermal carcinoma.  They grow over weeks to months   They may ulcerate   They are often tender or painful   Located on sun-exposed sites, particularly the face, lips, ears, hands, forearms and lower legs   Size varies from a few millimetres to several centimetres in diameter.    Types of cutaneous squamous cell carcinoma  Distinct clinical types of invasive cutaneous SCC include:  Cutaneous horn -- the horn is due to excessive production of keratin   Keratoacanthoma (KA) -- a rapidly growing keratinising nodule that may resolve without treatment   Carcinoma cuniculatum (‘verrucous carcinoma’), a slow-growing, warty tumour on the sole of the foot.   Multiple eruptive SCC/KA-like lesions arising in syndromes, such as multiple self-healing squamous epitheliomas of Hale-Smith and Grzybowski syndrome  The pathologist may classify a tumour as well differentiated, moderately well differentiated, poorly differentiated or anaplastic cutaneous SCC. There are other variants.    Classification of squamous cell carcinoma by risk  Cutaneous SCC is classified as low-risk or high-risk, depending on the chance of tumour  recurrence and metastasis. Characteristics of high-risk SCC include:  High-risk cutaneous squamous cell carcinoma has the following characteristics:  Diameter greater than or equal to 2 cm   Location on the ear, vermilion of the lip, central face, hands, feet, genitalia   Arising in elderly or immune suppressed patient   Histological thickness greater than 2 mm, poorly differentiated histology, or with the invasion of the subcutaneous tissue, nerves and blood vessels  Metastatic SCC is found in regional lymph nodes (80%), lungs, liver, brain, bones and skin.    Staging cutaneous squamous cell carcinoma  In 2011, the American Joint Committee on Cancer (AJCC) published a new staging systemic for cutaneous SCC for the 7th Edition of the AJCC manual. This evaluates the dimensions of the original primary tumour (T) and its metastases to lymph nodes (N).    Tumour staging for cutaneous SCC  TX: Th Primary tumour cannot be assessed  T0: No evidence of a primary tumour  Tis: Carcinoma in situ  T1: Tumour ? 2cm without high-risk features  T2: Tumour ? 2cm; or; Tumour ? 2 cm with high-risk features  T3: Tumour with the invasion of maxilla, mandible, orbit or temporal bone  T4: Tumour with the invasion of axial or appendicular skeleton or perineural invasion of skull base    Mrali staging for cutaneous SCC  NX: Regional lymph nodes cannot be assessed  N0: No regional lymph node metastasis  N1: Metastasis in one local lymph node ? 3cm  N2: Metastasis in one local lymph node ? 3cm; or; Metastasis in >1 local lymph node ? 6cm  N3: Metastasis in lymph node ? 6cm    How is squamous cell carcinoma diagnosed?  Diagnosis of cutaneous SCC is based on clinical features. The diagnosis and histological subtype are confirmed pathologically by diagnostic biopsy or following excision. See squamous cell carcinoma - pathology.  Patients with high-risk SCC may also undergo staging investigations to determine whether it has spread to lymph nodes  or elsewhere. These may include:  Imaging using ultrasound scan, X-rays, CT scans, MRI scans   Lymph node or other tissue biopsies    What is the treatment for cutaneous squamous cell carcinoma?  Cutaneous SCC is nearly always treated surgically. Most cases are excised with a 3-10 mm margin of normal tissue around a visible tumour. A flap or skin graft may be needed to repair the defect.  Other methods of removal include:  Shave, curettage, and electrocautery for low-risk tumours on trunk and limbs   Aggressive cryotherapy for very small, thin, low-risk tumours   Mohs micrographic surgery for large facial lesions with indistinct margins or recurrent tumours   Radiotherapy for an inoperable tumour, patients unsuitable for surgery, or as adjuvant    What is the treatment for advanced or metastatic squamous cell carcinoma?  Locally advanced primary, recurrent or metastatic SCC requires multidisciplinary consultation. Often a combination of treatments is used.  Surgery   Radiotherapy   Cemiplimab   Experimental targeted therapy using epidermal growth factor receptor inhibitors    How can cutaneous squamous cell carcinoma be prevented?  There is a great deal of evidence to show that very careful sun protection at any time of life reduces the number of SCCs. This is particularly important in ageing, sun-damaged, fair skin; in patients that are immune suppressed; and in those who already have actinic keratoses or previous SCC.  Stay indoors or under the shade in the middle of the day   Wear covering clothing   Apply high protection factor SPF50+ broad-spectrum sunscreens generously to exposed skin if outdoors   Avoid indoor tanning (sun beds, solaria)    Oral nicotinamide (vitamin B3) in a dose of 500 mg twice daily may reduce the number and severity of SCCs in people at high risk.  Patients with multiple squamous cell carcinomas may be prescribed an oral retinoid (acitretin or isotretinoin). These reduce the number of  tumours but have some nuisance side effects.    What is the outlook for cutaneous squamous cell carcinoma?  Most SCCs are cured by treatment. A cure is most likely if treatment is undertaken when the lesion is small. The risk of recurrence or disease-associated death is greater for tumours that are > 20 mm in diameter and/or > 2 mm in thickness at the time of surgical excision.  About 50% of people at high risk of SCC develop a second one within 5 years of the first. They are also at increased risk of other skin cancers, especially melanoma. Regular self-skin examinations and long-term annual skin checks by an experienced health professional are recommended.

## 2024-03-05 NOTE — PROGRESS NOTES
"[facility]atology [facility] Note     Patient Name: Arnav Joshi  Encounter Date: March 5, 2024     Have you been cared for by a [facility] Dermatologist in the last 3 years and, if so, which description applies to you?    Yes.  I have been here within the last 3 years, and my medical history has NOT changed since that time.  I am MALE/not capable of bearing children.    REVIEW OF SYSTEMS:  Have you recently had or currently have any of the following? No changes in my recent health.   PAST MEDICAL HISTORY:  Have you personally ever had or currently have any of the following?  If \"YES,\" then please provide more detail. No changes in my medical history.   HISTORY OF IMMUNOSUPPRESSION: Do you have a history of any of the following:  Systemic Immunosuppression such as Diabetes, Biologic or Immunotherapy, Chemotherapy, Organ Transplantation, Bone Marrow Transplantation?  YES, KIDNEY TRANSPLANT     Answering \"YES\" requires the addition of the dotphrase \"IMMUNOSUPPRESSED\" as the first diagnosis of the patient's visit.   FAMILY HISTORY:  Any \"first degree relatives\" (parent, brother, sister, or child) with the following?    No changes in my family's known health.   PATIENT EXPERIENCE:    Do you want the Dermatologist to perform a COMPLETE skin exam today including a clinical examination under the \"bra and underwear\" areas?  Yes  If necessary, do we have your permission to call and leave a detailed message on your Preferred Phone number that includes your specific medical information?  Yes      Allergies   Allergen Reactions    Penicillins Rash      Current Outpatient Medications:     Cholecalciferol (VITAMIN D-3) 1000 units CAPS, Take 4,000 Units by mouth daily, Disp: , Rfl:     diltiazem (CARDIZEM) 120 MG tablet, Take 1 tablet (120 mg total) by mouth every 12 (twelve) hours, Disp: 180 tablet, Rfl: 2    mycophenolate (CELLCEPT) 250 mg capsule, TAKE 2 CAPSULES BY MOUTH IN AM AND 1 CAPSULES IN THE PM, Disp: 270 capsule, Rfl: " "5    potassium phosphate, monobasic, (K-PHOS) 500 MG tablet, Take 1 tablet (500 mg total) by mouth 2 (two) times a day, Disp: 180 tablet, Rfl: 3    rosuvastatin (CRESTOR) 5 mg tablet, Take 1 tablet (5 mg total) by mouth daily, Disp: 90 tablet, Rfl: 3    tacrolimus (PROGRAF) 0.5 mg capsule, TAKE 1 CAPS IN AM AND 1 CAP IN PM 10/30/2023, Disp: 270 capsule, Rfl: 3    warfarin (COUMADIN) 4 mg tablet, TAKE 2 TABLETS BY MOUTH EVERY DAY, Disp: 180 tablet, Rfl: 4          Whom besides the patient is providing clinical information about today's encounter?   NO ADDITIONAL HISTORIAN (patient alone provided history)    Physical Exam and Assessment/Plan by Diagnosis:    Chief complaint: Patient is a 49 y/o male present for a routine skin exam without history of skin cancer and has a spot of concern on the left lower leg which he noticed approx 1 month ago and looks different from his usual dermatitis. Pt is also immunosuppressed d/t Kidney Transplant    MELANOCYTIC NEVI (\"Moles\")    Physical Exam:  Anatomic Location Affected: Mostly on sun-exposed areas of the trunk and extremities  Morphological Description:  Scattered, 1-4mm round to ovoid, symmetrical-appearing, even bordered, skin colored to dark brown macules/papules, mostly in sun-exposed areas  Pertinent Positives:  Pertinent Negatives:    Additional History of Present Condition:  present on exam    Assessment and Plan:  Based on a thorough discussion of this condition and the management approach to it (including a comprehensive discussion of the known risks, side effects and potential benefits of treatment), the patient (family) agrees to implement the following specific plan:  Provided handout with information regarding the ABCDE's of moles   Recommend routine skin exams every year   Sun avoidance, protective clothing (known as UPF clothing), and the use of at least SPF 30 sunscreens is advised. Sunscreen should be reapplied every two hours when outside.     SEBORRHEIC " "KERATOSIS; NON-INFLAMED    Physical Exam:  Anatomic Location Affected:  scattered across trunk, extremities, face  Morphological Description:  Flat and raised, waxy, smooth to warty textured, yellow to brownish-grey to dark brown to blackish, discrete, \"stuck-on\" appearing papules.  Pertinent Positives:  Pertinent Negatives:    Additional History of Present Condition:  Patient reports new bumps on the skin.  Denies itch, burn, pain, bleeding or ulceration.  Present constantly; nothing seems to make it worse or better.  No prior treatment.      Assessment and Plan:  Based on a thorough discussion of this condition and the management approach to it (including a comprehensive discussion of the known risks, side effects and potential benefits of treatment), the patient (family) agrees to implement the following specific plan:  Reassured benign    ANGIOMA (\"CHERRY ANGIOMA\")    Physical Exam:  Anatomic Location: scattered across sun exposed areas of the trunk and extremities   Morphologic Description: Firm red to reddish-blue discrete papules  Pertinent Positives:  Pertinent Negatives:    Additional History of Present Condition:  Present on exam.     Assessment and Plan:  Reassured benign  RASH    Physical Exam:  (Anatomic Location); (Size and Morphological Description); (Differential Diagnosis):  Left Lower Leg; 4cm isolated scaly erythematous patch; Psoriasiform Dermatitis  Pertinent Positives:  Pertinent Negatives: KOH Prep - Negative    Additional History of Present Condition:  present for a month, pt with previous history of rashes on the legs    Assessment and Plan:  Based on a thorough discussion of this condition and the management approach to it (including a comprehensive discussion of the known risks, side effects and potential benefits of treatment), the patient (family) agrees to implement the following specific plan:  Betamethasone Ointment twice a day for 1 month and if not resolved to call for follow " up    Scribe Attestation      I,:  Lyn Fung am acting as a scribe while in the presence of the attending physician.:       I,:  Gael Barba MD personally performed the services described in this documentation    as scribed in my presence.:

## 2024-03-13 ENCOUNTER — TELEPHONE (OUTPATIENT)
Dept: NEPHROLOGY | Facility: CLINIC | Age: 49
End: 2024-03-13

## 2024-03-13 ENCOUNTER — APPOINTMENT (EMERGENCY)
Dept: RADIOLOGY | Facility: HOSPITAL | Age: 49
End: 2024-03-13
Payer: COMMERCIAL

## 2024-03-13 ENCOUNTER — APPOINTMENT (OUTPATIENT)
Dept: LAB | Facility: HOSPITAL | Age: 49
End: 2024-03-13
Payer: COMMERCIAL

## 2024-03-13 ENCOUNTER — HOSPITAL ENCOUNTER (EMERGENCY)
Facility: HOSPITAL | Age: 49
Discharge: HOME | End: 2024-03-13
Attending: EMERGENCY MEDICINE
Payer: COMMERCIAL

## 2024-03-13 ENCOUNTER — TELEPHONE (OUTPATIENT)
Dept: OTHER | Facility: HOSPITAL | Age: 49
End: 2024-03-13

## 2024-03-13 VITALS
OXYGEN SATURATION: 96 % | RESPIRATION RATE: 18 BRPM | SYSTOLIC BLOOD PRESSURE: 110 MMHG | DIASTOLIC BLOOD PRESSURE: 85 MMHG | HEART RATE: 67 BPM | TEMPERATURE: 98.2 F

## 2024-03-13 DIAGNOSIS — R19.7 DIARRHEA, UNSPECIFIED TYPE: Primary | ICD-10-CM

## 2024-03-13 LAB
ALBUMIN SERPL BCP-MCNC: 4.2 G/DL (ref 3.5–5)
ALP SERPL-CCNC: 114 U/L (ref 34–104)
ALT SERPL W P-5'-P-CCNC: 16 U/L (ref 7–52)
ANION GAP SERPL CALCULATED.3IONS-SCNC: 9 MMOL/L (ref 4–13)
AST SERPL W P-5'-P-CCNC: 20 U/L (ref 13–39)
BASOPHILS # BLD AUTO: 0.03 THOUSANDS/ÂΜL (ref 0–0.1)
BASOPHILS NFR BLD AUTO: 1 % (ref 0–1)
BILIRUB SERPL-MCNC: 0.57 MG/DL (ref 0.2–1)
BUN SERPL-MCNC: 36 MG/DL (ref 5–25)
CALCIUM SERPL-MCNC: 10.2 MG/DL (ref 8.4–10.2)
CHLORIDE SERPL-SCNC: 105 MMOL/L (ref 96–108)
CO2 SERPL-SCNC: 18 MMOL/L (ref 21–32)
CREAT SERPL-MCNC: 2.06 MG/DL (ref 0.6–1.3)
EOSINOPHIL # BLD AUTO: 0.11 THOUSAND/ÂΜL (ref 0–0.61)
EOSINOPHIL NFR BLD AUTO: 2 % (ref 0–6)
ERYTHROCYTE [DISTWIDTH] IN BLOOD BY AUTOMATED COUNT: 12.6 % (ref 11.6–15.1)
GFR SERPL CREATININE-BSD FRML MDRD: 36 ML/MIN/1.73SQ M
GLUCOSE SERPL-MCNC: 106 MG/DL (ref 65–140)
HCT VFR BLD AUTO: 52.3 % (ref 36.5–49.3)
HGB BLD-MCNC: 17.7 G/DL (ref 12–17)
IMM GRANULOCYTES # BLD AUTO: 0.02 THOUSAND/UL (ref 0–0.2)
IMM GRANULOCYTES NFR BLD AUTO: 0 % (ref 0–2)
LYMPHOCYTES # BLD AUTO: 2.35 THOUSANDS/ÂΜL (ref 0.6–4.47)
LYMPHOCYTES NFR BLD AUTO: 42 % (ref 14–44)
MCH RBC QN AUTO: 30.8 PG (ref 26.8–34.3)
MCHC RBC AUTO-ENTMCNC: 33.8 G/DL (ref 31.4–37.4)
MCV RBC AUTO: 91 FL (ref 82–98)
MONOCYTES # BLD AUTO: 0.66 THOUSAND/ÂΜL (ref 0.17–1.22)
MONOCYTES NFR BLD AUTO: 12 % (ref 4–12)
NEUTROPHILS # BLD AUTO: 2.47 THOUSANDS/ÂΜL (ref 1.85–7.62)
NEUTS SEG NFR BLD AUTO: 43 % (ref 43–75)
NRBC BLD AUTO-RTO: 0 /100 WBCS
PLATELET # BLD AUTO: 191 THOUSANDS/UL (ref 149–390)
PMV BLD AUTO: 9.9 FL (ref 8.9–12.7)
POTASSIUM SERPL-SCNC: 4.5 MMOL/L (ref 3.5–5.3)
PROT SERPL-MCNC: 7.8 G/DL (ref 6.4–8.4)
RBC # BLD AUTO: 5.74 MILLION/UL (ref 3.88–5.62)
SODIUM SERPL-SCNC: 132 MMOL/L (ref 135–147)
WBC # BLD AUTO: 5.64 THOUSAND/UL (ref 4.31–10.16)

## 2024-03-13 PROCEDURE — 36415 COLL VENOUS BLD VENIPUNCTURE: CPT | Performed by: EMERGENCY MEDICINE

## 2024-03-13 PROCEDURE — 99285 EMERGENCY DEPT VISIT HI MDM: CPT | Performed by: EMERGENCY MEDICINE

## 2024-03-13 PROCEDURE — 96360 HYDRATION IV INFUSION INIT: CPT

## 2024-03-13 PROCEDURE — 80053 COMPREHEN METABOLIC PANEL: CPT | Performed by: EMERGENCY MEDICINE

## 2024-03-13 PROCEDURE — 99284 EMERGENCY DEPT VISIT MOD MDM: CPT

## 2024-03-13 PROCEDURE — 71046 X-RAY EXAM CHEST 2 VIEWS: CPT

## 2024-03-13 PROCEDURE — 85025 COMPLETE CBC W/AUTO DIFF WBC: CPT | Performed by: EMERGENCY MEDICINE

## 2024-03-13 PROCEDURE — 96361 HYDRATE IV INFUSION ADD-ON: CPT

## 2024-03-13 RX ADMIN — SODIUM CHLORIDE 1000 ML: 0.9 INJECTION, SOLUTION INTRAVENOUS at 12:57

## 2024-03-13 NOTE — TELEPHONE ENCOUNTER
Dull abd pain throughout abd. Diarrhea.temp 99.6 on Monday and Tuesday. Diarrhea since Sunday. Total 10 times loose stool. No vomiting. Some nausea. No pinpoint pain. Son had flu last week. Received one liter fluids.     Today, diarrhea is improving.    Lower MMF to 250 mg BID from 500 mg in AM and 250 mg in PM for one week and then back to 500 mg in AM and 250 mg in PM    Reviewed with the patient.  Otherwise feeling okay.  Son was sick last week with the flu but patient symptoms are different.  Unclear if patient has viral gastroenteritis.  Has a history of CMV.  Patient lab work appears hemoconcentrated but also could have supratherapeutic tacrolimus level.  Is mildly acidotic with nonelevated anion gap.  Likely in setting of diarrhea.    Given the diarrhea is improving, the ER is already given the patient intravenous fluids.  We will recheck lab work tomorrow.  May need to consider sodium bicarb tablets.  Patient uses well water and has not had it tested recently but his family is not sick.  We will check stool studies also.  Patient knows to go right back to the ER if his symptoms return or worsen.  I also advised him to call his PCP.  Patient will call us tomorrow to update how he is feeling and doing.  Patient is agreeable to the plans as outlined.

## 2024-03-13 NOTE — TELEPHONE ENCOUNTER
Pt just got home from ED, hasn't been feeling well , diarrhea, upset stomach. Was given fluids and sent home. Cr 2.06. he just doesnt feel right and wanted to run by you if anything else that needs to be done.

## 2024-03-13 NOTE — Clinical Note
Arnav Joshi was seen and treated in our emergency department on 3/13/2024.                Diagnosis:     Arnav  may return to work on return date.    He may return on this date: 03/15/2024         If you have any questions or concerns, please don't hesitate to call.      Sergey Ramos MD    ______________________________           _______________          _______________  Hospital Representative                              Date                                Time

## 2024-03-13 NOTE — ED PROVIDER NOTES
History  Chief Complaint   Patient presents with    Abdominal Pain     Patient c/o lower mid abdominal pain that started on Monday.  Patient c/o diarrhea.Patient c/o cough for approx 1 week. Patient reports he is a kidney transplant patient. Patient also reports thoughts of suicide without any attempts and without a plan.     48-year-old male, presents with 3 days of diarrhea.  Patient reports about 10 loose bowel movements per day, no blood.  Has associated discomfort in stomach and nausea.  Reports low-grade fevers up to 99.6.  Also reporting cough.  On triage screening, patient admitted to thoughts about suicide, patient states this has been going on for years, has no specific plan and would never attempt to harm himself, no recent change.  History of renal transplant, has been taking his medications regularly.      History provided by:  Patient   used: No    Abdominal Pain  Associated symptoms: cough, diarrhea and nausea        Prior to Admission Medications   Prescriptions Last Dose Informant Patient Reported? Taking?   Cholecalciferol (VITAMIN D-3) 1000 units CAPS  Self Yes No   Sig: Take 4,000 Units by mouth daily   betamethasone, augmented, (DIPROLENE) 0.05 % ointment   No No   Sig: Apply topically 2 (two) times a day   diltiazem (CARDIZEM) 120 MG tablet  Self No No   Sig: Take 1 tablet (120 mg total) by mouth every 12 (twelve) hours   mycophenolate (CELLCEPT) 250 mg capsule   No No   Sig: TAKE 2 CAPSULES BY MOUTH IN AM AND 1 CAPSULES IN THE PM   potassium phosphate, monobasic, (K-PHOS) 500 MG tablet  Self No No   Sig: Take 1 tablet (500 mg total) by mouth 2 (two) times a day   rosuvastatin (CRESTOR) 5 mg tablet  Self No No   Sig: Take 1 tablet (5 mg total) by mouth daily   tacrolimus (PROGRAF) 0.5 mg capsule  Self No No   Sig: TAKE 1 CAPS IN AM AND 1 CAP IN PM 10/30/2023   warfarin (COUMADIN) 4 mg tablet  Self No No   Sig: TAKE 2 TABLETS BY MOUTH EVERY DAY      Facility-Administered  Medications: None       Past Medical History:   Diagnosis Date    ADD (attention deficit disorder)     FRANCES (acute kidney injury) (HCC) 2020    Chronic kidney disease     Depression     DVT (deep venous thrombosis) ([facility])     H/O immunosuppressive therapy     History of kidney disease     HTN (hypertension) 2017    Hypertension     Hypomagnesemia 2020    Left lower quadrant pain 2019    Nephropathy, IgA     Suicidal ideations 2019       Past Surgical History:   Procedure Laterality Date    NEPHRECTOMY TRANSPLANTED ORGAN         Family History   Problem Relation Age of Onset    Deep vein thrombosis Father     Deep vein thrombosis Paternal Grandfather     Transient ischemic attack Mother      I have reviewed and agree with the history as documented.    E-Cigarette/Vaping    E-Cigarette Use Never User      E-Cigarette/Vaping Substances    Nicotine No     THC No     CBD No     Flavoring No     Other No     Unknown No      Social History     Tobacco Use    Smoking status: Former     Current packs/day: 0.00     Average packs/day: 0.5 packs/day for 4.0 years (2.0 ttl pk-yrs)     Types: Cigarettes     Start date:      Quit date:      Years since quittin.2     Passive exposure: Past    Smokeless tobacco: Never   Vaping Use    Vaping status: Never Used   Substance Use Topics    Alcohol use: Yes     Comment: on occasion    Drug use: Not Currently     Types: Marijuana     Comment: smokes marijuana a few times daily       Review of Systems   Respiratory:  Positive for cough.    Gastrointestinal:  Positive for abdominal pain, diarrhea and nausea.   Neurological: Negative.        Physical Exam  Physical Exam  Vitals and nursing note reviewed.   Constitutional:       General: He is not in acute distress.  HENT:      Head: Normocephalic and atraumatic.      Mouth/Throat:      Mouth: Mucous membranes are moist.      Pharynx: Oropharynx is clear.   Cardiovascular:      Rate and Rhythm: Normal rate  and regular rhythm.   Pulmonary:      Effort: Pulmonary effort is normal.      Breath sounds: Normal breath sounds.   Abdominal:      General: There is no distension.      Palpations: Abdomen is soft.      Tenderness: There is no abdominal tenderness.   Musculoskeletal:         General: Normal range of motion.   Skin:     General: Skin is warm and dry.   Neurological:      General: No focal deficit present.      Mental Status: He is alert and oriented to person, place, and time.      Motor: No weakness.      Gait: Gait normal.   Psychiatric:         Mood and Affect: Mood normal.         Behavior: Behavior normal.         Vital Signs  ED Triage Vitals [03/13/24 1237]   Temperature Pulse Respirations Blood Pressure SpO2   98.2 °F (36.8 °C) 80 18 118/78 98 %      Temp Source Heart Rate Source Patient Position - Orthostatic VS BP Location FiO2 (%)   Temporal Monitor Sitting Left arm --      Pain Score       --           Vitals:    03/13/24 1237 03/13/24 1340   BP: 118/78 110/85   Pulse: 80 67   Patient Position - Orthostatic VS: Sitting Lying         Visual Acuity      ED Medications  Medications   sodium chloride 0.9 % bolus 1,000 mL (1,000 mL Intravenous New Bag 3/13/24 1257)       Diagnostic Studies  Results Reviewed       Procedure Component Value Units Date/Time    Comprehensive metabolic panel [[id number]]  (Abnormal) Collected: 03/13/24 1256    Lab Status: Final result Specimen: Blood from Arm, Right Updated: 03/13/24 1320     Sodium 132 mmol/L      Potassium 4.5 mmol/L      Chloride 105 mmol/L      CO2 18 mmol/L      ANION GAP 9 mmol/L      BUN 36 mg/dL      Creatinine 2.06 mg/dL      Glucose 106 mg/dL      Calcium 10.2 mg/dL      AST 20 U/L      ALT 16 U/L      Alkaline Phosphatase 114 U/L      Total Protein 7.8 g/dL      Albumin 4.2 g/dL      Total Bilirubin 0.57 mg/dL      eGFR 36 ml/min/1.73sq m     Narrative:      National Kidney Disease Foundation guidelines for Chronic Kidney Disease (CKD):     Stage 1  with normal or high GFR (GFR > 90 mL/min/1.73 square meters)    Stage 2 Mild CKD (GFR = 60-89 mL/min/1.73 square meters)    Stage 3A Moderate CKD (GFR = 45-59 mL/min/1.73 square meters)    Stage 3B Moderate CKD (GFR = 30-44 mL/min/1.73 square meters)    Stage 4 Severe CKD (GFR = 15-29 mL/min/1.73 square meters)    Stage 5 End Stage CKD (GFR <15 mL/min/1.73 square meters)  Note: GFR calculation is accurate only with a steady state creatinine    CBC and differential [[id number]]  (Abnormal) Collected: 03/13/24 1256    Lab Status: Final result Specimen: Blood from Arm, Right Updated: 03/13/24 1302     WBC 5.64 Thousand/uL      RBC 5.74 Million/uL      Hemoglobin 17.7 g/dL      Hematocrit 52.3 %      MCV 91 fL      MCH 30.8 pg      MCHC 33.8 g/dL      RDW 12.6 %      MPV 9.9 fL      Platelets 191 Thousands/uL      nRBC 0 /100 WBCs      Neutrophils Relative 43 %      Immature Grans % 0 %      Lymphocytes Relative 42 %      Monocytes Relative 12 %      Eosinophils Relative 2 %      Basophils Relative 1 %      Neutrophils Absolute 2.47 Thousands/µL      Absolute Immature Grans 0.02 Thousand/uL      Absolute Lymphocytes 2.35 Thousands/µL      Absolute Monocytes 0.66 Thousand/µL      Eosinophils Absolute 0.11 Thousand/µL      Basophils Absolute 0.03 Thousands/µL                    XR chest 2 views    (Results Pending)              Procedures  Procedures         ED Course  ED Course as of 03/13/24 1410   Wed Mar 13, 2024   1319 Pending reviewed by myself and compared to previous, no acute changes, no infiltrate or effusion.   1356 Creatinine(!): 2.06  Creatinine slightly elevated, 1.47, 1.91 on 2 previous lab values.   1409 Patient comfortable, abdomen soft and nontender on repeat examination.  Patient feels well to be discharged.  Instructed to keep yourself well hydrated at home.  Can try foods such as bananas, rice, applesauce to help with diarrhea.  Discussed with patient that if his diarrhea persists he will need further  testing, stool studies.  Instructed to follow-up with primary doctor.  Instructed to return to emergency department for any worsening or new concerning symptoms.                               SBIRT 22yo+      Flowsheet Row Most Recent Value   Initial Alcohol Screen: US AUDIT-C     1. How often do you have a drink containing alcohol? 0 Filed at: 03/13/2024 1239   2. How many drinks containing alcohol do you have on a typical day you are drinking?  0 Filed at: 03/13/2024 1239   3a. Male UNDER 65: How often do you have five or more drinks on one occasion? 0 Filed at: 03/13/2024 1239   3b. FEMALE Any Age, or MALE 65+: How often do you have 4 or more drinks on one occassion? 0 Filed at: 03/13/2024 1239   Audit-C Score 0 Filed at: 03/13/2024 1239   TONYA: How many times in the past year have you...    Used an illegal drug or used a prescription medication for non-medical reasons? Never Filed at: 03/13/2024 1239                      Medical Decision Making  48-year-old male, history of renal transplant, presenting with several days of diarrhea and nausea.  Differential diagnosis includes viral illness, pneumonia, dehydration among other diagnoses.  Patient looks well in no distress, abdomen soft and nontender on exam.  Chest x-ray and labs ordered, will give IV fluids, continue to monitor in ED and reevaluate.    Amount and/or Complexity of Data Reviewed  Labs: ordered. Decision-making details documented in ED Course.  Radiology: ordered and independent interpretation performed. Decision-making details documented in ED Course.           I have reviewed test results and diagnosis with patient.  Follow-up plan reviewed.  Precautions for acute return for re-evaluation are reviewed.  Opportunity to ask questions was provided.  Patient verbalizes understanding.    Disposition  Final diagnoses:   Diarrhea, unspecified type     Time reflects when diagnosis was documented in both MDM as applicable and the Disposition within this  note       Time User Action Codes Description Comment    3/13/2024  2:05 PM Sergey Ramos Add [R19.7] Diarrhea, unspecified type           ED Disposition       ED Disposition   Discharge    Condition   Stable    Date/Time   Wed Mar 13, 2024 1405    Comment   Arnav Joshi discharge to home/self care.                   Follow-up Information       Follow up With Specialties Details Why Contact Info    Shaina Husain MD Internal Medicine Schedule an appointment as soon as possible for a visit   [address] [place] [address]  [address]  [place] [address]  [phone]              Patient's Medications   Discharge Prescriptions    No medications on file       No discharge procedures on file.    PDMP Review       None            ED Provider  Electronically Signed by             Sergey Ramos MD  03/13/24 1413

## 2024-03-14 ENCOUNTER — TELEPHONE (OUTPATIENT)
Dept: OTHER | Facility: HOSPITAL | Age: 49
End: 2024-03-14

## 2024-03-14 ENCOUNTER — LAB (OUTPATIENT)
Dept: LAB | Facility: HOSPITAL | Age: 49
End: 2024-03-14
Attending: INTERNAL MEDICINE
Payer: COMMERCIAL

## 2024-03-14 DIAGNOSIS — K85.90 ACUTE PANCREATITIS, UNSPECIFIED COMPLICATION STATUS, UNSPECIFIED PANCREATITIS TYPE: Primary | ICD-10-CM

## 2024-03-14 DIAGNOSIS — R19.7 DIARRHEA, UNSPECIFIED TYPE: ICD-10-CM

## 2024-03-14 LAB
ALBUMIN SERPL BCP-MCNC: 4 G/DL (ref 3.5–5)
ALP SERPL-CCNC: 105 U/L (ref 34–104)
ALT SERPL W P-5'-P-CCNC: 16 U/L (ref 7–52)
AMYLASE SERPL-CCNC: 191 IU/L (ref 29–103)
ANION GAP SERPL CALCULATED.3IONS-SCNC: 6 MMOL/L (ref 4–13)
AST SERPL W P-5'-P-CCNC: 19 U/L (ref 13–39)
BACTERIA UR QL AUTO: NORMAL /HPF
BASOPHILS # BLD AUTO: 0.02 THOUSANDS/ÂΜL (ref 0–0.1)
BASOPHILS NFR BLD AUTO: 0 % (ref 0–1)
BILIRUB SERPL-MCNC: 0.41 MG/DL (ref 0.2–1)
BILIRUB UR QL STRIP: NEGATIVE
BUN SERPL-MCNC: 38 MG/DL (ref 5–25)
CALCIUM SERPL-MCNC: 9.5 MG/DL (ref 8.4–10.2)
CHLORIDE SERPL-SCNC: 108 MMOL/L (ref 96–108)
CLARITY UR: CLEAR
CO2 SERPL-SCNC: 22 MMOL/L (ref 21–32)
COLOR UR: ABNORMAL
CREAT SERPL-MCNC: 1.96 MG/DL (ref 0.6–1.3)
EOSINOPHIL # BLD AUTO: 0.09 THOUSAND/ÂΜL (ref 0–0.61)
EOSINOPHIL NFR BLD AUTO: 2 % (ref 0–6)
ERYTHROCYTE [DISTWIDTH] IN BLOOD BY AUTOMATED COUNT: 12.5 % (ref 11.6–15.1)
GFR SERPL CREATININE-BSD FRML MDRD: 38 ML/MIN/1.73SQ M
GLUCOSE P FAST SERPL-MCNC: 95 MG/DL (ref 65–99)
GLUCOSE UR STRIP-MCNC: NEGATIVE MG/DL
HCT VFR BLD AUTO: 48.3 % (ref 36.5–49.3)
HGB BLD-MCNC: 15.9 G/DL (ref 12–17)
HGB UR QL STRIP.AUTO: ABNORMAL
IMM GRANULOCYTES # BLD AUTO: 0 THOUSAND/UL (ref 0–0.2)
IMM GRANULOCYTES NFR BLD AUTO: 0 % (ref 0–2)
KETONES UR STRIP-MCNC: NEGATIVE MG/DL
LEUKOCYTE ESTERASE UR QL STRIP: NEGATIVE
LIPASE SERPL-CCNC: 377 U/L (ref 11–82)
LYMPHOCYTES # BLD AUTO: 1.97 THOUSANDS/ÂΜL (ref 0.6–4.47)
LYMPHOCYTES NFR BLD AUTO: 36 % (ref 14–44)
MCH RBC QN AUTO: 30.9 PG (ref 26.8–34.3)
MCHC RBC AUTO-ENTMCNC: 32.9 G/DL (ref 31.4–37.4)
MCV RBC AUTO: 94 FL (ref 82–98)
MONOCYTES # BLD AUTO: 0.51 THOUSAND/ÂΜL (ref 0.17–1.22)
MONOCYTES NFR BLD AUTO: 9 % (ref 4–12)
NEUTROPHILS # BLD AUTO: 2.91 THOUSANDS/ÂΜL (ref 1.85–7.62)
NEUTS SEG NFR BLD AUTO: 53 % (ref 43–75)
NITRITE UR QL STRIP: NEGATIVE
NON-SQ EPI CELLS URNS QL MICRO: NORMAL /HPF
NRBC BLD AUTO-RTO: 0 /100 WBCS
PH UR STRIP.AUTO: 5.5 [PH]
PLATELET # BLD AUTO: 170 THOUSANDS/UL (ref 149–390)
PMV BLD AUTO: 10.4 FL (ref 8.9–12.7)
POTASSIUM SERPL-SCNC: 4.6 MMOL/L (ref 3.5–5.3)
PROT SERPL-MCNC: 7.4 G/DL (ref 6.4–8.4)
PROT UR STRIP-MCNC: ABNORMAL MG/DL
RBC # BLD AUTO: 5.15 MILLION/UL (ref 3.88–5.62)
RBC #/AREA URNS AUTO: NORMAL /HPF
SODIUM SERPL-SCNC: 136 MMOL/L (ref 135–147)
SP GR UR STRIP.AUTO: 1.02 (ref 1–1.03)
TACROLIMUS BLD-MCNC: 8.6 NG/ML (ref 3–15)
UROBILINOGEN UR STRIP-ACNC: <2 MG/DL
WBC # BLD AUTO: 5.5 THOUSAND/UL (ref 4.31–10.16)
WBC #/AREA URNS AUTO: NORMAL /HPF

## 2024-03-14 PROCEDURE — 80053 COMPREHEN METABOLIC PANEL: CPT

## 2024-03-14 PROCEDURE — 87046 STOOL CULTR AEROBIC BACT EA: CPT

## 2024-03-14 PROCEDURE — 81001 URINALYSIS AUTO W/SCOPE: CPT

## 2024-03-14 PROCEDURE — 87799 DETECT AGENT NOS DNA QUANT: CPT

## 2024-03-14 PROCEDURE — 82150 ASSAY OF AMYLASE: CPT

## 2024-03-14 PROCEDURE — 80197 ASSAY OF TACROLIMUS: CPT

## 2024-03-14 PROCEDURE — 87505 NFCT AGENT DETECTION GI: CPT

## 2024-03-14 PROCEDURE — 87329 GIARDIA AG IA: CPT

## 2024-03-14 PROCEDURE — 83690 ASSAY OF LIPASE: CPT

## 2024-03-14 PROCEDURE — 87636 SARSCOV2 & INF A&B AMP PRB: CPT | Performed by: INTERNAL MEDICINE

## 2024-03-14 PROCEDURE — 85025 COMPLETE CBC W/AUTO DIFF WBC: CPT

## 2024-03-14 NOTE — TELEPHONE ENCOUNTER
I spoke to the patient today to follow-up.  He states he is feeling better.  Tolerating oral intake.  No diarrhea today.  Abdominal pain has improved and resolved.    Noted patient's amylase and lipase are elevated.  Patient did have pancreatitis years ago.  He did not follow-up with gastroenterology at that time as was advised to follow-up though.  He is agreeable to see gastroenterology now.  I placed a referral.  Will check a right upper quadrant ultrasound.  Unclear etiology of pancreatitis.  Patient knows to go right to the emergency room if his pain returns or if he starts to feel unwell again given the pancreatitis issue.  But given that the patient is rapidly improving after receiving intravenous fluids yesterday, can cautiously complete conservative monitoring at home.  Patient knows to repeat amylase and lipase on Monday again.

## 2024-03-15 ENCOUNTER — ANTICOAG VISIT (OUTPATIENT)
Age: 49
End: 2024-03-15

## 2024-03-15 ENCOUNTER — TELEPHONE (OUTPATIENT)
Dept: NEPHROLOGY | Facility: CLINIC | Age: 49
End: 2024-03-15

## 2024-03-15 LAB
C COLI+JEJUNI TUF STL QL NAA+PROBE: NEGATIVE
CMV DNA SERPL NAA+PROBE-ACNC: NOT DETECTED [IU]/ML
EC STX1+STX2 GENES STL QL NAA+PROBE: NEGATIVE
FLUAV RNA RESP QL NAA+PROBE: NEGATIVE
FLUBV RNA RESP QL NAA+PROBE: NEGATIVE
G LAMBLIA AG STL QL IA: NEGATIVE
SALMONELLA SP SPAO STL QL NAA+PROBE: NEGATIVE
SARS-COV-2 RNA RESP QL NAA+PROBE: NEGATIVE
SHIGELLA SP+EIEC IPAH STL QL NAA+PROBE: NEGATIVE

## 2024-03-15 NOTE — TELEPHONE ENCOUNTER
Left detailed message for pt with recommendations. US scheduled, message felt for pt with date, time and locations. GI will be calling pt directly to schedule.

## 2024-03-15 NOTE — TELEPHONE ENCOUNTER
----- Message from Lev Vega MD sent at 3/15/2024  3:46 PM EDT -----  Hello    Can we please see if the lab can add a C. difficile to the stool sample.  If not, patient does not need to go urgently for now as he was improving yesterday.  Thank you.  ----- Message -----  From: Lab, Background User  Sent: 3/14/2024  10:54 AM EDT  To: Lev Vega MD

## 2024-03-15 NOTE — TELEPHONE ENCOUNTER
----- Message from Lev Vega MD sent at 3/14/2024  3:08 PM EDT -----  Hello    Can you please help the patient set up a stat right upper quadrant ultrasound this week or early next week    Can you please help the patient set up gastroenterology referral for sooner than later appointment due to pancreatitis.  This is recurrent pancreatitis evaluation    Thank you and orders are in and patient is aware

## 2024-03-16 LAB
VIBRIO STL CULT: NORMAL
YERSINIA STL CULT: NORMAL

## 2024-03-18 ENCOUNTER — TELEPHONE (OUTPATIENT)
Dept: NEPHROLOGY | Facility: CLINIC | Age: 49
End: 2024-03-18

## 2024-03-18 DIAGNOSIS — Z94.0 KIDNEY TRANSPLANTED: Primary | ICD-10-CM

## 2024-03-18 NOTE — TELEPHONE ENCOUNTER
Spoke to pt, he feels much better. Lab order for repeat Tac level in the system, pt aware to get it done in the next few weeks.

## 2024-03-18 NOTE — RESULT ENCOUNTER NOTE
Hello    Patient normally is followed up by Ms Eli Villanueva.     Tien for the double message.  When we call the patient, can we please let him know that the tacrolimus level is borderline elevated.  This could be in the setting of his current GI issues last week.  Please ask him to repeat tacrolimus level in 1 to 2 weeks.  And please send the patient a new tacrolimus slip    Thank you    np

## 2024-03-18 NOTE — TELEPHONE ENCOUNTER
----- Message from Lev Vega MD sent at 3/17/2024  8:24 PM EDT -----  Hello    Patient normally is followed up by Ms Eli Villanueva.     Can we check in with pt early this week to ensure he is improving from his GI issues last week     When we call the patient, can we please let him know that the tacrolimus level is borderline elevated.  This could be in the setting of his current GI issues last week.  Please ask him to repeat tacrolimus level in 1 to 2 weeks.  And please send the patient a new tacrolimus slip    Thank you    np

## 2024-03-19 LAB
Lab: NORMAL
MISCELLANEOUS LAB TEST RESULT: NORMAL

## 2024-03-20 ENCOUNTER — TELEPHONE (OUTPATIENT)
Dept: NEPHROLOGY | Facility: CLINIC | Age: 49
End: 2024-03-20

## 2024-03-20 DIAGNOSIS — Z94.0 KIDNEY TRANSPLANTED: Primary | ICD-10-CM

## 2024-03-20 NOTE — TELEPHONE ENCOUNTER
----- Message from Lev Vega MD sent at 3/20/2024  1:02 PM EDT -----  Hello    Can you please let the patient know that the Jessica-Barr virus PCR is slightly positive.  It is unclear if this was in the setting of acute illness.  For now please asked patient to repeat EBV PCR in 1 to 2 weeks.  He was supposed to repeat lab work earlier this week but it does not appear to have been completed.  Please asked the patient to complete the following this week or next week  -CMP, CBC, tacrolimus, amylase, lipase and please add a EBV PCR slip also.  - Please also send patient lab slip for EBV PCR to complete again in 1 month    Thank you

## 2024-04-02 ENCOUNTER — ANTICOAG VISIT (OUTPATIENT)
Age: 49
End: 2024-04-02

## 2024-04-02 ENCOUNTER — TELEPHONE (OUTPATIENT)
Age: 49
End: 2024-04-02

## 2024-04-02 ENCOUNTER — LAB (OUTPATIENT)
Dept: LAB | Facility: HOSPITAL | Age: 49
End: 2024-04-02
Payer: COMMERCIAL

## 2024-04-02 DIAGNOSIS — Z94.0 KIDNEY TRANSPLANTED: ICD-10-CM

## 2024-04-02 LAB
ALBUMIN SERPL BCP-MCNC: 4.2 G/DL (ref 3.5–5)
ALP SERPL-CCNC: 111 U/L (ref 34–104)
ALT SERPL W P-5'-P-CCNC: 30 U/L (ref 7–52)
AMYLASE SERPL-CCNC: 149 IU/L (ref 29–103)
ANION GAP SERPL CALCULATED.3IONS-SCNC: 7 MMOL/L (ref 4–13)
AST SERPL W P-5'-P-CCNC: 22 U/L (ref 13–39)
BACTERIA UR QL AUTO: ABNORMAL /HPF
BASOPHILS # BLD AUTO: 0.05 THOUSANDS/ÂΜL (ref 0–0.1)
BASOPHILS NFR BLD AUTO: 1 % (ref 0–1)
BILIRUB SERPL-MCNC: 0.52 MG/DL (ref 0.2–1)
BILIRUB UR QL STRIP: NEGATIVE
BUN SERPL-MCNC: 25 MG/DL (ref 5–25)
CALCIUM SERPL-MCNC: 10 MG/DL (ref 8.4–10.2)
CHLORIDE SERPL-SCNC: 103 MMOL/L (ref 96–108)
CLARITY UR: CLEAR
CO2 SERPL-SCNC: 27 MMOL/L (ref 21–32)
COLOR UR: COLORLESS
CREAT SERPL-MCNC: 1.65 MG/DL (ref 0.6–1.3)
CREAT UR-MCNC: 38 MG/DL
EOSINOPHIL # BLD AUTO: 0.21 THOUSAND/ÂΜL (ref 0–0.61)
EOSINOPHIL NFR BLD AUTO: 3 % (ref 0–6)
ERYTHROCYTE [DISTWIDTH] IN BLOOD BY AUTOMATED COUNT: 11.9 % (ref 11.6–15.1)
GFR SERPL CREATININE-BSD FRML MDRD: 47 ML/MIN/1.73SQ M
GLUCOSE P FAST SERPL-MCNC: 92 MG/DL (ref 65–99)
GLUCOSE UR STRIP-MCNC: NEGATIVE MG/DL
HCT VFR BLD AUTO: 49.2 % (ref 36.5–49.3)
HGB BLD-MCNC: 16.7 G/DL (ref 12–17)
HGB UR QL STRIP.AUTO: ABNORMAL
IMM GRANULOCYTES # BLD AUTO: 0.02 THOUSAND/UL (ref 0–0.2)
IMM GRANULOCYTES NFR BLD AUTO: 0 % (ref 0–2)
KETONES UR STRIP-MCNC: NEGATIVE MG/DL
LEUKOCYTE ESTERASE UR QL STRIP: NEGATIVE
LIPASE SERPL-CCNC: 89 U/L (ref 11–82)
LYMPHOCYTES # BLD AUTO: 2.24 THOUSANDS/ÂΜL (ref 0.6–4.47)
LYMPHOCYTES NFR BLD AUTO: 32 % (ref 14–44)
MAGNESIUM SERPL-MCNC: 1.9 MG/DL (ref 1.9–2.7)
MCH RBC QN AUTO: 32 PG (ref 26.8–34.3)
MCHC RBC AUTO-ENTMCNC: 33.9 G/DL (ref 31.4–37.4)
MCV RBC AUTO: 94 FL (ref 82–98)
MONOCYTES # BLD AUTO: 0.69 THOUSAND/ÂΜL (ref 0.17–1.22)
MONOCYTES NFR BLD AUTO: 10 % (ref 4–12)
NEUTROPHILS # BLD AUTO: 3.88 THOUSANDS/ÂΜL (ref 1.85–7.62)
NEUTS SEG NFR BLD AUTO: 54 % (ref 43–75)
NITRITE UR QL STRIP: NEGATIVE
NON-SQ EPI CELLS URNS QL MICRO: ABNORMAL /HPF
NRBC BLD AUTO-RTO: 0 /100 WBCS
PH UR STRIP.AUTO: 6 [PH]
PHOSPHATE SERPL-MCNC: 1.7 MG/DL (ref 2.7–4.5)
PLATELET # BLD AUTO: 231 THOUSANDS/UL (ref 149–390)
PMV BLD AUTO: 10.5 FL (ref 8.9–12.7)
POTASSIUM SERPL-SCNC: 4.9 MMOL/L (ref 3.5–5.3)
PROT SERPL-MCNC: 7.7 G/DL (ref 6.4–8.4)
PROT UR STRIP-MCNC: NEGATIVE MG/DL
PROT UR-MCNC: 20 MG/DL
PROT/CREAT UR: 0.53 MG/G{CREAT} (ref 0–0.1)
PTH-INTACT SERPL-MCNC: 116.1 PG/ML (ref 12–88)
RBC # BLD AUTO: 5.22 MILLION/UL (ref 3.88–5.62)
RBC #/AREA URNS AUTO: ABNORMAL /HPF
SODIUM SERPL-SCNC: 137 MMOL/L (ref 135–147)
SP GR UR STRIP.AUTO: 1.01 (ref 1–1.03)
TACROLIMUS BLD-MCNC: 9.3 NG/ML (ref 3–15)
UROBILINOGEN UR STRIP-ACNC: <2 MG/DL
WBC # BLD AUTO: 7.09 THOUSAND/UL (ref 4.31–10.16)
WBC #/AREA URNS AUTO: ABNORMAL /HPF

## 2024-04-02 PROCEDURE — 83970 ASSAY OF PARATHORMONE: CPT

## 2024-04-02 PROCEDURE — 82150 ASSAY OF AMYLASE: CPT

## 2024-04-02 PROCEDURE — 85025 COMPLETE CBC W/AUTO DIFF WBC: CPT

## 2024-04-02 PROCEDURE — 87799 DETECT AGENT NOS DNA QUANT: CPT

## 2024-04-02 PROCEDURE — 83690 ASSAY OF LIPASE: CPT

## 2024-04-02 NOTE — TELEPHONE ENCOUNTER
----- Message from Shaina Husain MD sent at 4/2/2024 12:04 PM EDT -----  Please take warfarin 8 mg daily and repeat labs in 1 week

## 2024-04-02 NOTE — RESULT ENCOUNTER NOTE
Hello    Patient normally is followed up by Ms Eli Villanueva.     Can you please let the patient know that the lab work shows stable creatinine 1.6 mg/dL.  - Hypophosphatemic-please asked the patient to increase phosphorus supplement.  He is currently on 1 tablet phosphate twice daily.  If he can increase to 2 tablets twice daily.  - His INR is subtherapeutic-please ask him to talk to his primary care physician to make adjustments to Coumadin  -Urinalysis is okay  - His amylase and lipase are improving but still slow improvement and still elevated from baseline.  Has he reestablished contact attempt with GI team.  Can you please call the GI office to see if they can see him.  I believe they tried to call him but he did not return the call yet.  -Can the patient have follow-up with me with my next available    Thank you    np

## 2024-04-03 ENCOUNTER — TELEPHONE (OUTPATIENT)
Dept: NEPHROLOGY | Facility: CLINIC | Age: 49
End: 2024-04-03

## 2024-04-03 DIAGNOSIS — E83.39 HYPOPHOSPHATEMIA: ICD-10-CM

## 2024-04-03 DIAGNOSIS — Z94.0 KIDNEY TRANSPLANTED: Primary | ICD-10-CM

## 2024-04-03 DIAGNOSIS — N18.31 STAGE 3A CHRONIC KIDNEY DISEASE (HCC): ICD-10-CM

## 2024-04-03 LAB
BKV DNA, QUANT PCR, PLASMA: NEGATIVE IU/ML
CMV DNA SERPL NAA+PROBE-ACNC: NOT DETECTED [IU]/ML

## 2024-04-03 NOTE — TELEPHONE ENCOUNTER
Pt informed with results and recommendations. He states that he was in the hospital for 2 weeks and was discharged  last Thursday, while inpatient he was not given the K- Phos but he started taking when he got home.   Appt with Dr. Vega scheduled for June. Pt will be calling GI to schedule an appointment. Lab orders mailed to pt.   Coumadin has already been adjusted by PCP.   Per Dr. Vega - since pt wasn't taking the K-Phos, to continue to take 1 tab bid with no changes for now.

## 2024-04-03 NOTE — TELEPHONE ENCOUNTER
----- Message from Lev Vega MD sent at 4/2/2024  4:49 PM EDT -----  Hello    Patient normally is followed up by Ms Eli Villanueva.     Can you please let the patient know that the lab work shows stable creatinine 1.6 mg/dL.  - Hypophosphatemic-please asked the patient to increase phosphorus supplement.  He is currently on 1 tablet phosphate twice daily.  If he can increase to 2 tablets twice daily.  - His INR is subtherapeutic-please ask him to talk to his primary care physician to make adjustments to Coumadin  -Urinalysis is okay  - His amylase and lipase are improving but still slow improvement and still elevated from baseline.  Has he reestablished contact attempt with GI team.  Can you please call the GI office to see if they can see him.  I believe they tried to call him but he did not return the call yet.  -Can the patient have follow-up with me with my next available  Hello, also can the patient repeat phosphorus check, amylase and lipase in 1 to 2 weeks.  Thank you   Thank you    np

## 2024-04-05 ENCOUNTER — OFFICE VISIT (OUTPATIENT)
Age: 49
End: 2024-04-05
Payer: COMMERCIAL

## 2024-04-05 ENCOUNTER — HOSPITAL ENCOUNTER (OUTPATIENT)
Dept: ULTRASOUND IMAGING | Facility: CLINIC | Age: 49
Discharge: HOME | End: 2024-04-05
Payer: COMMERCIAL

## 2024-04-05 VITALS
BODY MASS INDEX: 30.1 KG/M2 | DIASTOLIC BLOOD PRESSURE: 76 MMHG | HEIGHT: 68 IN | SYSTOLIC BLOOD PRESSURE: 110 MMHG | HEART RATE: 93 BPM | WEIGHT: 198.6 LBS | TEMPERATURE: 96.5 F | OXYGEN SATURATION: 96 %

## 2024-04-05 DIAGNOSIS — E78.5 HYPERLIPIDEMIA, UNSPECIFIED HYPERLIPIDEMIA TYPE: ICD-10-CM

## 2024-04-05 DIAGNOSIS — Z79.01 WARFARIN ANTICOAGULATION: Primary | ICD-10-CM

## 2024-04-05 DIAGNOSIS — F33.2 SEVERE EPISODE OF RECURRENT MAJOR DEPRESSIVE DISORDER, WITHOUT PSYCHOTIC FEATURES (HCC): ICD-10-CM

## 2024-04-05 DIAGNOSIS — K85.90 ACUTE PANCREATITIS, UNSPECIFIED COMPLICATION STATUS, UNSPECIFIED PANCREATITIS TYPE: ICD-10-CM

## 2024-04-05 DIAGNOSIS — Z12.11 COLON CANCER SCREENING: ICD-10-CM

## 2024-04-05 DIAGNOSIS — N18.31 STAGE 3A CHRONIC KIDNEY DISEASE (HCC): ICD-10-CM

## 2024-04-05 PROCEDURE — 99214 OFFICE O/P EST MOD 30 MIN: CPT

## 2024-04-05 PROCEDURE — 76705 ECHO EXAM OF ABDOMEN: CPT

## 2024-04-05 RX ORDER — METHYLPHENIDATE HYDROCHLORIDE 27 MG/1
27 TABLET ORAL DAILY
COMMUNITY
Start: 2024-04-02

## 2024-04-05 NOTE — PROGRESS NOTES
Assessment & Plan     1. Warfarin anticoagulation  Comments:  Will recheck INR in 3-4 days. Most recent was 1.8... down from 4.2 in hospital on 3/25/24. No diet changes, added desvenlafaxine to med list  Orders:  -     Protime-INR; Future    2. Severe episode of recurrent major depressive disorder, without psychotic features (HCC)  Comments:  Controlled, on desvenlafaxine. Feels back to normal now. Suicidal thoughts resolved. Follows w Dr Menezes in [place], [place]    3. Stage 3a chronic kidney disease (HCC)  Comments:  Hx of renal transplant. Stable, Continue following with Nephrology    4. Hyperlipidemia, unspecified hyperlipidemia type  Comments:  PT to follow up in three months for lab review and annual physical.  Orders:  -     Lipid panel; Future    5. Colon cancer screening  -     Ambulatory Referral to Gastroenterology; Future        Depression Screening and Follow-up Plan: Patient's depression screening was positive with a PHQ-9 score of 9. Continue regular follow-up with their mental health provider who is managing their mental health condition(s). Patient with underlying depression and was advised to continue current medications as prescribed.       Subjective     Transitional Care Management Review:   Arnav Joshi is a 48 y.o. male here for TCM follow up.     During the TCM phone call patient stated:  TCM Call       Date and time call was made  1/30/2020  2:54 PM    Hospital care reviewed  Records reviewed    Patient was hospitialized at  [facility]    Date of Admission  01/23/20    Date of discharge  01/28/20    Diagnosis   Acute kidney injury with associated nausea, vomiting and diarrhea    Disposition  Home    Were the patients medications reviewed and updated  Yes  MAG-OX    Current Symptoms  None  stomach-mild          TCM Call       Post hospital issues  None    Should patient be enrolled in anticoag monitoring?  Yes  warfarin    Scheduled for follow up?  Yes  2/10 2:45 w/H. MD Rodrigue    Patients  specialists  Other (comment); Nephrologist    Nephrologist name  Lev Vega MD  /  Noah Carrera MD    Nephrologist contact #  [phone]                  /  [phone]    Other specialists names  BHARTI-Esdras Pitts III, MD    Other specialists contcat #  [phone]    Referrals needed  n/a    Did you obtain your prescribed medications  Yes  MAG-OX    Do you need help managing your prescriptions or medications  No  wife- helps organize them    Is transportation to your appointment needed  No    I have advised the patient to call PCP with any new or worsening symptoms  Echo William-RMA    Living Arrangements  Spouse or Significiant other; Children    Support System  Spouse; Children    The type of support provided  Emotional; Financial; Physical    Do you have social support  Yes, as much as I need    Are you recieving any outpatient services  No    Are you recieving home care services  No    Are you using any community resources  No    Current waiver services  No    Have you fallen in the last 12 months  Yes    How many times  once-w/o injury    Interperter language line needed  No    Counseling  Patient    Counseling topics  Importance of RX compliance    Comments  upcoming appt. 2/10 2:45 w/H. MD Rodrigue          Pt is here for TCM post hospitalization in psychiatric hospital in [place], [place]. Discharged 3/28/24. Was there for suicidal thoughts post discontinuation of SSRI months prior. Is now following with previous psychiatrist Dr Menezes in [place], [place]. Feels back under control now, no recent suicidal or homicidal thoughts. Is comfortable with where he is at now, is concerned for INR levels which need continuous monitoring. On warfarin chronically, hx of DVT.       Review of Systems   Constitutional:  Negative for activity change, appetite change, fatigue, fever and unexpected weight change.   HENT: Negative.     Eyes: Negative.    Respiratory:  Negative for cough, chest tightness and shortness  "of breath.    Cardiovascular:  Negative for chest pain, palpitations and leg swelling.   Gastrointestinal: Negative.    Endocrine: Negative.    Genitourinary: Negative.    Musculoskeletal: Negative.    Skin: Negative.    Neurological: Negative.    Hematological: Negative.    Psychiatric/Behavioral:  Positive for dysphoric mood and suicidal ideas (resolved). Negative for agitation, behavioral problems, confusion, decreased concentration, hallucinations, self-injury and sleep disturbance. The patient is not nervous/anxious and is not hyperactive.        Objective     /76   Pulse 93   Temp (!) 96.5 °F (35.8 °C)   Ht 5' 7.5\" (1.715 m)   Wt 90.1 kg (198 lb 9.6 oz)   SpO2 96%   BMI 30.65 kg/m²      Physical Exam  Vitals and nursing note reviewed.   Constitutional:       General: He is not in acute distress.     Appearance: He is normal weight. He is not ill-appearing, toxic-appearing or diaphoretic.   HENT:      Head: Normocephalic and atraumatic.      Nose: Nose normal.   Eyes:      General: No scleral icterus.        Right eye: No discharge.         Left eye: No discharge.      Extraocular Movements: Extraocular movements intact.      Conjunctiva/sclera: Conjunctivae normal.   Cardiovascular:      Rate and Rhythm: Normal rate and regular rhythm.      Heart sounds: No murmur heard.  Pulmonary:      Effort: Pulmonary effort is normal.      Breath sounds: Normal breath sounds.   Musculoskeletal:      Cervical back: Normal range of motion and neck supple.   Skin:     Findings: No rash.   Neurological:      Mental Status: He is alert. Mental status is at baseline.   Psychiatric:         Mood and Affect: Mood normal.         Behavior: Behavior normal.         Thought Content: Thought content normal.         Judgment: Judgment normal.       Medications have been reviewed by provider in current encounter    Remi Garcia PA-C         "

## 2024-04-05 NOTE — RESULT ENCOUNTER NOTE
Hello    Patient normally is followed up by Ms Eli Villanueva.     Please let the patient know that the right upper quadrant ultrasound was limited evaluation of the pancreas.  Visualized portion is grossly unremarkable.  Hepatic steatosis.  Has not been able to schedule a GI appointment?    Thank you    np

## 2024-04-08 ENCOUNTER — TELEPHONE (OUTPATIENT)
Dept: NEPHROLOGY | Facility: CLINIC | Age: 49
End: 2024-04-08

## 2024-04-08 NOTE — TELEPHONE ENCOUNTER
----- Message from Lev Vega MD sent at 4/5/2024  4:58 PM EDT -----  Hello    Patient normally is followed up by Ms Eli Villanueva.     Please let the patient know that the right upper quadrant ultrasound was limited evaluation of the pancreas.  Visualized portion is grossly unremarkable.  Hepatic steatosis.  Has not been able to schedule a GI appointment?    Thank you    np

## 2024-04-09 ENCOUNTER — LAB (OUTPATIENT)
Dept: LAB | Facility: HOSPITAL | Age: 49
End: 2024-04-09
Payer: COMMERCIAL

## 2024-04-09 ENCOUNTER — TELEPHONE (OUTPATIENT)
Dept: NEPHROLOGY | Facility: CLINIC | Age: 49
End: 2024-04-09

## 2024-04-09 ENCOUNTER — ANTICOAG VISIT (OUTPATIENT)
Age: 49
End: 2024-04-09

## 2024-04-09 ENCOUNTER — TELEPHONE (OUTPATIENT)
Age: 49
End: 2024-04-09

## 2024-04-09 DIAGNOSIS — E78.5 HYPERLIPIDEMIA, UNSPECIFIED HYPERLIPIDEMIA TYPE: ICD-10-CM

## 2024-04-09 DIAGNOSIS — Z79.01 WARFARIN ANTICOAGULATION: ICD-10-CM

## 2024-04-09 DIAGNOSIS — E83.39 HYPOPHOSPHATEMIA: ICD-10-CM

## 2024-04-09 DIAGNOSIS — Z12.11 SCREEN FOR COLON CANCER: Primary | ICD-10-CM

## 2024-04-09 DIAGNOSIS — Z94.0 KIDNEY TRANSPLANTED: ICD-10-CM

## 2024-04-09 DIAGNOSIS — N18.31 STAGE 3A CHRONIC KIDNEY DISEASE (HCC): ICD-10-CM

## 2024-04-09 DIAGNOSIS — Z79.01 ENCOUNTER FOR CURRENT LONG-TERM USE OF ANTICOAGULANTS: Primary | ICD-10-CM

## 2024-04-09 LAB
AMYLASE SERPL-CCNC: 166 IU/L (ref 29–103)
CHOLEST SERPL-MCNC: 165 MG/DL
HDLC SERPL-MCNC: 30 MG/DL
LDLC SERPL CALC-MCNC: 72 MG/DL (ref 0–100)
LIPASE SERPL-CCNC: 127 U/L (ref 11–82)
NONHDLC SERPL-MCNC: 135 MG/DL
PHOSPHATE SERPL-MCNC: 2.3 MG/DL (ref 2.7–4.5)
TRIGL SERPL-MCNC: 315 MG/DL

## 2024-04-09 PROCEDURE — 82150 ASSAY OF AMYLASE: CPT

## 2024-04-09 PROCEDURE — 80061 LIPID PANEL: CPT

## 2024-04-09 PROCEDURE — 84100 ASSAY OF PHOSPHORUS: CPT

## 2024-04-09 PROCEDURE — 83690 ASSAY OF LIPASE: CPT

## 2024-04-09 NOTE — RESULT ENCOUNTER NOTE
Hello    Patient normally is followed up by Ms Eli Villanueva.     Can you please check in with the patient to see if he is having any abdominal pain or discomfort.  His amylase and lipase are rising again.  If he has any abdominal pain, he should go to the emergency room for further evaluation.  He has no abdominal pain and feeling well, can we please call the GI office because it does not look like he has a follow-up appointment yet.  He really needs to see them ASAP.    Thank you    np

## 2024-04-09 NOTE — TELEPHONE ENCOUNTER
----- Message from Shaina Husain MD sent at 4/9/2024  1:10 PM EDT -----  Please continue the same dose of warfarin and repeat labs in 1 month

## 2024-04-09 NOTE — TELEPHONE ENCOUNTER
----- Message from Lev Vega MD sent at 4/9/2024  1:14 PM EDT -----  Hello    Patient normally is followed up by Ms Eli Villanueva.     Can you please check in with the patient to see if he is having any abdominal pain or discomfort.  His amylase and lipase are rising again.  If he has any abdominal pain, he should go to the emergency room for further evaluation.  He has no abdominal pain and feeling well, can we please call the GI office because it does not look like he has a follow-up appointment yet.  He really needs to see them ASAP.    Thank you    np

## 2024-04-09 NOTE — TELEPHONE ENCOUNTER
Patient returning call. Made aware:    ----- Message from Lev Vega MD sent at 4/9/2024  1:14 PM EDT -----  Hello     Patient normally is followed up by Ms Eli Villanueva.      Can you please check in with the patient to see if he is having any abdominal pain or discomfort.  His amylase and lipase are rising again.  If he has any abdominal pain, he should go to the emergency room for further evaluation.  He has no abdominal pain and feeling well, can we please call the GI office because it does not look like he has a follow-up appointment yet.  He really needs to see them ASAP.     Thank you     Np    Patient verbalized understanding.   Patient denies abdominal pain or discomfort and feels fine. Stressed importance of making an appointment with GI asap.

## 2024-04-09 NOTE — TELEPHONE ENCOUNTER
Left a detailed message for pt with results and recommendations. Asked that he calls office to confirm that message received and to let us know if he feels ok.   Urged pt to call GI and schedule an appointment ASAP.

## 2024-04-09 NOTE — TELEPHONE ENCOUNTER
DOESN'T   WANT  TO  HAVE   COLONSCOPY  DONE     WANTS  TO  KNOW  IF  WE  CAN  ORDER  COLORGUARD test   for   him    he   left  message  saying  he  was to  check  with  his  pcp

## 2024-04-09 NOTE — TELEPHONE ENCOUNTER
Detailed message left for pt with results and recommendations. Asked that he calls GI to schedule an appointment ASAP.

## 2024-04-10 RX ORDER — DESVENLAFAXINE SUCCINATE 50 MG/1
TABLET, EXTENDED RELEASE ORAL
COMMUNITY
Start: 2024-04-09

## 2024-04-11 ENCOUNTER — CONSULT (OUTPATIENT)
Dept: GASTROENTEROLOGY | Facility: CLINIC | Age: 49
End: 2024-04-11

## 2024-04-11 VITALS
WEIGHT: 192 LBS | DIASTOLIC BLOOD PRESSURE: 82 MMHG | SYSTOLIC BLOOD PRESSURE: 124 MMHG | HEART RATE: 76 BPM | OXYGEN SATURATION: 97 % | BODY MASS INDEX: 30.86 KG/M2 | HEIGHT: 66 IN | TEMPERATURE: 98 F | RESPIRATION RATE: 18 BRPM

## 2024-04-11 DIAGNOSIS — K76.0 FATTY LIVER: Primary | ICD-10-CM

## 2024-04-11 DIAGNOSIS — Z12.11 SCREENING FOR COLON CANCER: ICD-10-CM

## 2024-04-11 DIAGNOSIS — R74.8 ELEVATED AMYLASE AND LIPASE: ICD-10-CM

## 2024-04-11 NOTE — PROGRESS NOTES
[facility] - Outpatient Note  Arnav Joshi 48 y.o. male MRN: [id number]  Encounter: [id number]      ASSESSMENT AND PLAN:    Arnav Joshi is a 48 y.o. old pleasant male with PMH of hypertension, CKD who presents for consultation for elevated lipase/amylase    Elevated lipase-this is a nonspecific test and could be due to a multitude of etiologies but I suspect in his case this is due to gastroenteritis which can be seen even frequently especially in the setting of kidney disease.  No evidence of actual pancreatitis or pancreatic cancer.  He had similar elevation in the past and had extensive workup including MRI which was unremarkable.  Hold off further workup especially given asymptomatic nature.  No need to trend lipase or amylase.  Low suspicion for pancreatic etiology of elevated lipase.    Colon cancer screening-no previous colonoscopy.  Patient would prefer Cologuard after extensive counseling.  Cologuard pending    Fatty liver-likely in the setting of obesity.  No significant alcohol intake.  No signs or symptoms of cirrhosis.  Counseled on 5 to 10% weight loss  Check ultrasound elastography      There are no diagnoses linked to this encounter.  ______________________________________________________________________    SUBJECTIVE:      Had abdominal pain, diarrhea among other symptoms recently and had lipase checked which was elevated.  He states all symptoms have currently resolved.  Denies abdominal pain nausea vomiting constipation diarrhea.  No weight loss dysphagia odynophagia.  No family history of GI cancers.    I reviewed prior external notes    I reviewed previous lab results and images      REVIEW OF SYSTEMS:     REVIEW OF ALL OTHER SYSTEMS IS OTHERWISE NEGATIVE.      Historical Information   Past Medical History:   Diagnosis Date    ADD (attention deficit disorder)     FRANCES (acute kidney injury) (HCC) 1/24/2020    Chronic kidney disease     Depression     DVT (deep venous  "thrombosis) (HCC)     H/O immunosuppressive therapy     History of kidney disease     HTN (hypertension) 2017    Hypertension     Hypomagnesemia 2020    Left lower quadrant pain 2019    Nephropathy, IgA     Suicidal ideations 2019     Past Surgical History:   Procedure Laterality Date    NEPHRECTOMY TRANSPLANTED ORGAN       Social History   Social History     Substance and Sexual Activity   Alcohol Use Yes    Comment: on occasion     Social History     Substance and Sexual Activity   Drug Use Not Currently    Types: Marijuana    Comment: smokes marijuana a few times daily     Social History     Tobacco Use   Smoking Status Former    Current packs/day: 0.00    Average packs/day: 0.5 packs/day for 4.0 years (2.0 ttl pk-yrs)    Types: Cigarettes    Start date:     Quit date:     Years since quittin.2    Passive exposure: Past   Smokeless Tobacco Never     Family History   Problem Relation Age of Onset    Deep vein thrombosis Father     Deep vein thrombosis Paternal Grandfather     Transient ischemic attack Mother        Meds/Allergies       Current Outpatient Medications:     betamethasone, augmented, (DIPROLENE) 0.05 % ointment    Cholecalciferol (VITAMIN D-3) 1000 units CAPS    DESVENLAFAXINE ER PO    desvenlafaxine succinate (PRISTIQ) 50 mg 24 hr tablet    diltiazem (CARDIZEM) 120 MG tablet    methylphenidate (CONCERTA) 27 MG ER tablet    mycophenolate (CELLCEPT) 250 mg capsule    potassium phosphate, monobasic, (K-PHOS) 500 MG tablet    rosuvastatin (CRESTOR) 5 mg tablet    tacrolimus (PROGRAF) 0.5 mg capsule    warfarin (COUMADIN) 4 mg tablet    Allergies   Allergen Reactions    Penicillins Rash           Objective     Blood pressure 124/82, pulse 76, temperature 98 °F (36.7 °C), temperature source Tympanic, resp. rate 18, height 5' 6\" (1.676 m), weight 87.1 kg (192 lb), SpO2 97%. Body mass index is 30.99 kg/m².      PHYSICAL EXAM:      General Appearance:   Alert, cooperative, no " distress   HEENT:   Normocephalic, atraumatic, anicteric.     Neck:  Supple, symmetrical, trachea midline   Lungs:   Clear to auscultation bilaterally; no rales, rhonchi or wheezing; respirations unlabored    Heart::   Regular rate and rhythm; no murmur, rub, or gallop.   Abdomen:   Soft, non-tender, non-distended; normal bowel sounds; no masses, no organomegaly    Genitalia:   Deferred    Rectal:   Deferred    Extremities:  No cyanosis, clubbing or edema    Pulses:  2+ and symmetric    Skin:  No jaundice, rashes, or lesions    Lymph nodes:  No palpable cervical lymphadenopathy        Lab Results:   No visits with results within 1 Day(s) from this visit.   Latest known visit with results is:   Lab on 04/09/2024   Component Date Value    Phosphorus 04/09/2024 2.3 (L)     Amylase 04/09/2024 166 (H)     Lipase 04/09/2024 127 (H)     Cholesterol 04/09/2024 165     Triglycerides 04/09/2024 315 (H)     HDL, Direct 04/09/2024 30 (L)     LDL Calculated 04/09/2024 72     Non-HDL-Chol (CHOL-HDL) 04/09/2024 135          Radiology Results:   US right upper quadrant    Result Date: 4/5/2024  Narrative: RIGHT UPPER QUADRANT ULTRASOUND INDICATION: K85.90: Acute pancreatitis without necrosis or infection, unspecified. COMPARISON: None TECHNIQUE: Real-time ultrasound of the right upper quadrant was performed with a curvilinear transducer with both volumetric sweeps and still imaging techniques. FINDINGS: PANCREAS: Evaluation of the pancreas is limited, visualized portions appear grossly unremarkable. AORTA AND IVC: Visualized portions are normal for patient age. LIVER: Size: The liver measures 17.2 cm in the midclavicular line. Contour: Surface contour is smooth. Parenchyma: There is mild diffuse increased echogenicity with smooth echotexture, without significant beam attenuation or loss of periportal echogenicity. Most consistent with mild hepatic steatosis. No liver mass identified. Limited imaging of the main portal vein shows it  to be patent and hepatopetal. BILIARY: Gallbladder is contracted. No gallbladder findings. No intrahepatic biliary dilatation. CBD measures 4.0 mm. No choledocholithiasis. KIDNEY: Right kidney appears atrophic and poorly visualized compared to adjacent right upper quadrant fat. Submitted measurements are likely overestimated.. ASCITES: None.     Impression: Limited evaluation of the pancreas, visualized portions appear grossly unremarkable. Hepatic steatosis. Atrophic right kidney. Additional and /or follow-up imaging should be obtained as clinically indicated. Workstation performed: [id number]     XR chest 2 views    Result Date: 3/13/2024  Narrative: XR CHEST PA & LATERAL INDICATION: cough. COMPARISON: Compared with 12/28/2023 FINDINGS: Clear lungs. No pneumothorax or pleural effusion. Tortuous thoracic aorta Normal cardiomediastinal silhouette. Bones are unremarkable for age. Normal upper abdomen.     Impression: No acute cardiopulmonary disease. Workstation performed: [id number]     Answers submitted by the patient for this visit:  Abdominal Pain Questionnaire (Submitted on 4/10/2024)  Chief Complaint: Abdominal pain  anorexia: No  arthralgias: No  belching: No  constipation: No  diarrhea: Yes  dysuria: No  fever: No  flatus: No  frequency: No  headaches: Yes  hematochezia: No  hematuria: No  melena: No  myalgias: Yes  nausea: No  weight loss: No  vomiting: No  Diagnostic workup: ultrasound

## 2024-04-16 LAB
Lab: NORMAL
MISCELLANEOUS LAB TEST RESULT: NORMAL

## 2024-04-20 ENCOUNTER — TELEPHONE (OUTPATIENT)
Dept: OTHER | Facility: OTHER | Age: 49
End: 2024-04-20

## 2024-04-20 NOTE — TELEPHONE ENCOUNTER
"Pt stated, \" I would like a list of what labs I need to get done.\"    Please follow up when office reopens, thank you !  "

## 2024-04-23 ENCOUNTER — HOSPITAL ENCOUNTER (OUTPATIENT)
Dept: ULTRASOUND IMAGING | Facility: HOSPITAL | Age: 49
Discharge: HOME | End: 2024-04-23
Attending: INTERNAL MEDICINE
Payer: COMMERCIAL

## 2024-04-23 DIAGNOSIS — K76.0 FATTY LIVER: ICD-10-CM

## 2024-04-23 PROCEDURE — 76981 USE PARENCHYMA: CPT

## 2024-05-02 ENCOUNTER — TELEPHONE (OUTPATIENT)
Dept: NEPHROLOGY | Facility: CLINIC | Age: 49
End: 2024-05-02

## 2024-05-02 DIAGNOSIS — Z94.0 KIDNEY TRANSPLANTED: Primary | ICD-10-CM

## 2024-05-03 ENCOUNTER — TELEPHONE (OUTPATIENT)
Age: 49
End: 2024-05-03

## 2024-05-03 LAB — COLOGUARD RESULT REPORTABLE: NEGATIVE

## 2024-05-03 NOTE — TELEPHONE ENCOUNTER
Voice mail message left offering patient 1 year follow up appointment with advanced practitioner in [place] office. Dr. Barba will be retiring but [facility] will be available to see them.

## 2024-05-10 ENCOUNTER — LAB (OUTPATIENT)
Dept: LAB | Facility: HOSPITAL | Age: 49
End: 2024-05-10
Payer: COMMERCIAL

## 2024-05-10 DIAGNOSIS — Z94.0 KIDNEY TRANSPLANTED: Primary | ICD-10-CM

## 2024-05-10 LAB
ALBUMIN SERPL BCP-MCNC: 4.2 G/DL (ref 3.5–5)
ALP SERPL-CCNC: 95 U/L (ref 34–104)
ALT SERPL W P-5'-P-CCNC: 16 U/L (ref 7–52)
ANION GAP SERPL CALCULATED.3IONS-SCNC: 4 MMOL/L (ref 4–13)
AST SERPL W P-5'-P-CCNC: 22 U/L (ref 13–39)
BACTERIA UR QL AUTO: ABNORMAL /HPF
BILIRUB SERPL-MCNC: 0.46 MG/DL (ref 0.2–1)
BILIRUB UR QL STRIP: NEGATIVE
BUN SERPL-MCNC: 31 MG/DL (ref 5–25)
CALCIUM SERPL-MCNC: 9.5 MG/DL (ref 8.4–10.2)
CHLORIDE SERPL-SCNC: 108 MMOL/L (ref 96–108)
CLARITY UR: CLEAR
CO2 SERPL-SCNC: 26 MMOL/L (ref 21–32)
COLOR UR: COLORLESS
CREAT SERPL-MCNC: 1.52 MG/DL (ref 0.6–1.3)
CREAT UR-MCNC: 56.4 MG/DL
ERYTHROCYTE [DISTWIDTH] IN BLOOD BY AUTOMATED COUNT: 12.6 % (ref 11.6–15.1)
GFR SERPL CREATININE-BSD FRML MDRD: 52 ML/MIN/1.73SQ M
GLUCOSE P FAST SERPL-MCNC: 91 MG/DL (ref 65–99)
GLUCOSE UR STRIP-MCNC: NEGATIVE MG/DL
HCT VFR BLD AUTO: 45.1 % (ref 36.5–49.3)
HGB BLD-MCNC: 14.9 G/DL (ref 12–17)
HGB UR QL STRIP.AUTO: ABNORMAL
KETONES UR STRIP-MCNC: NEGATIVE MG/DL
LEUKOCYTE ESTERASE UR QL STRIP: NEGATIVE
MAGNESIUM SERPL-MCNC: 2.1 MG/DL (ref 1.9–2.7)
MCH RBC QN AUTO: 31.8 PG (ref 26.8–34.3)
MCHC RBC AUTO-ENTMCNC: 33 G/DL (ref 31.4–37.4)
MCV RBC AUTO: 96 FL (ref 82–98)
NITRITE UR QL STRIP: NEGATIVE
NON-SQ EPI CELLS URNS QL MICRO: ABNORMAL /HPF
PH UR STRIP.AUTO: 6 [PH]
PHOSPHATE SERPL-MCNC: 2.1 MG/DL (ref 2.7–4.5)
PLATELET # BLD AUTO: 222 THOUSANDS/UL (ref 149–390)
PMV BLD AUTO: 10.1 FL (ref 8.9–12.7)
POTASSIUM SERPL-SCNC: 4.7 MMOL/L (ref 3.5–5.3)
PROT SERPL-MCNC: 7.2 G/DL (ref 6.4–8.4)
PROT UR STRIP-MCNC: ABNORMAL MG/DL
PROT UR-MCNC: 28 MG/DL
PROT/CREAT UR: 0.5 MG/G{CREAT} (ref 0–0.1)
RBC # BLD AUTO: 4.69 MILLION/UL (ref 3.88–5.62)
RBC #/AREA URNS AUTO: ABNORMAL /HPF
SODIUM SERPL-SCNC: 138 MMOL/L (ref 135–147)
SP GR UR STRIP.AUTO: 1.01 (ref 1–1.03)
TACROLIMUS BLD-MCNC: 6.6 NG/ML (ref 3–15)
UROBILINOGEN UR STRIP-ACNC: <2 MG/DL
WBC # BLD AUTO: 4.87 THOUSAND/UL (ref 4.31–10.16)
WBC #/AREA URNS AUTO: ABNORMAL /HPF

## 2024-05-10 PROCEDURE — 80197 ASSAY OF TACROLIMUS: CPT

## 2024-05-10 PROCEDURE — 80053 COMPREHEN METABOLIC PANEL: CPT

## 2024-05-10 PROCEDURE — 36415 COLL VENOUS BLD VENIPUNCTURE: CPT

## 2024-05-10 PROCEDURE — 82570 ASSAY OF URINE CREATININE: CPT

## 2024-05-10 PROCEDURE — 84100 ASSAY OF PHOSPHORUS: CPT

## 2024-05-10 PROCEDURE — 83735 ASSAY OF MAGNESIUM: CPT

## 2024-05-10 PROCEDURE — 81001 URINALYSIS AUTO W/SCOPE: CPT

## 2024-05-10 PROCEDURE — 87799 DETECT AGENT NOS DNA QUANT: CPT

## 2024-05-10 PROCEDURE — 84156 ASSAY OF PROTEIN URINE: CPT

## 2024-05-10 PROCEDURE — 85027 COMPLETE CBC AUTOMATED: CPT

## 2024-05-13 ENCOUNTER — TELEPHONE (OUTPATIENT)
Dept: NEPHROLOGY | Facility: CLINIC | Age: 49
End: 2024-05-13

## 2024-05-13 ENCOUNTER — DOCUMENTATION (OUTPATIENT)
Dept: NEPHROLOGY | Facility: CLINIC | Age: 49
End: 2024-05-13

## 2024-05-13 DIAGNOSIS — E83.39 HYPOPHOSPHATEMIA: ICD-10-CM

## 2024-05-13 DIAGNOSIS — Z94.0 KIDNEY TRANSPLANTED: Primary | ICD-10-CM

## 2024-05-13 LAB — EBV DNA # SPEC NAA+PROBE: NOT DETECTED {COPIES}/ML

## 2024-05-13 NOTE — TELEPHONE ENCOUNTER
----- Message from Lev Vega MD sent at 5/13/2024 12:54 PM EDT -----  Hello    Patient normally is followed up by Ms Eli Villanueva.     Please let the patient know that his creatinine is stable 1.5.  Electrolytes are stable with the exception of phosphorus borderline low at 2.1.  Hemoglobin at goal.  Urine is bland with UPCR stable 0.5.  Tacrolimus is okay at 6.6  -Please confirm and advise the patient to continue taking his current medications but if his phosphorus supplement can be increased from 1 tablet twice daily to 2 tablets twice daily and if he can repeat just a BMP in 3 to 4 weeks  - Lab work again in 2 months for full transplant lab work  - Please adjust medication list accordingly    Thank you    np

## 2024-05-13 NOTE — TELEPHONE ENCOUNTER
----- Message from Lev Vega MD sent at 5/12/2024  9:51 PM EDT -----  Phosphorus borderline low.  EBV PCR pending.  Rest of lab work appears stable.

## 2024-05-13 NOTE — TELEPHONE ENCOUNTER
Left a detailed message for pt with results and recommendations. Asked that he calls to confirm that message was received. BMP in the system.

## 2024-06-18 ENCOUNTER — TELEPHONE (OUTPATIENT)
Dept: NEPHROLOGY | Facility: CLINIC | Age: 49
End: 2024-06-18

## 2024-06-18 ENCOUNTER — OFFICE VISIT (OUTPATIENT)
Dept: NEPHROLOGY | Facility: CLINIC | Age: 49
End: 2024-06-18
Payer: COMMERCIAL

## 2024-06-18 VITALS
HEART RATE: 79 BPM | SYSTOLIC BLOOD PRESSURE: 118 MMHG | BODY MASS INDEX: 28.45 KG/M2 | HEIGHT: 66 IN | WEIGHT: 177 LBS | DIASTOLIC BLOOD PRESSURE: 88 MMHG

## 2024-06-18 DIAGNOSIS — Z94.0 RENAL TRANSPLANT RECIPIENT: Primary | ICD-10-CM

## 2024-06-18 PROCEDURE — 99214 OFFICE O/P EST MOD 30 MIN: CPT | Performed by: INTERNAL MEDICINE

## 2024-06-18 RX ORDER — CLONAZEPAM 0.5 MG/1
0.5 TABLET ORAL DAILY
COMMUNITY

## 2024-06-18 NOTE — LETTER
June 18, 2024     Shaina Husain MD  [address]  2nd Floor  [facility] [address]    Patient: Arnav Joshi   YOB: 1975   Date of Visit: 6/18/2024       Dear Dr. Husain:    Thank you for referring Arnav Joshi to me for evaluation. Below are my notes for this consultation.    If you have questions, please do not hesitate to call me. I look forward to following your patient along with you.         Sincerely,        Lev Vega MD        CC: MD Lev Jenkins MD  6/18/2024  3:45 PM  Sign when Signing Visit  NEPHROLOGY OFFICE VISIT   Arnav Joshi 48 y.o. male MRN: [id number]  6/18/2024    Reason for Visit: renal transplant f/u    ASSESSMENT and PLAN:    I had the pleasure of seeing Mr Joshi today in the renal clinic for the continued management of renal transplant f/u.     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 Then admission in January 2020 for FRANCES with Cr to 3.4 mg/dL in setting of n/v/d. End of October 2020, patient had missed tacrolimus for one week and creat had increased to 2.1 requiring steroid therapy. Pt presenting for f/u visit.      CXR - 1/23/2020 - no acute disease     CT abd/pel - atrophic native kidneys, unremarkable renal transplant     U/s liver - 2.2 cm R hepatic echogenic mass reflecting hemangioma     1) CKD III, renal transplant -      Prior-history obtained from [place], and [facility] and here at [facility]'s records:     -Baseline Cr 1.6-1.9 mg/dL mg/dL;  -Tac levels from 2009 - 2014 were 7-10  -Patient followed at [facility] - followed  with Dr Smith   -Prior baseline Cr 1.4-1.5 mg/dL and had been rising to 1.8 mg/dL  -pt had early CMV - on EGD  - last time seen was in May 2017 at [facility] - no known rejection ([phone])  -Has never had renal biopsy post transplant     Old Labwork review:     - CT abd/pel without hydro or nephrolithiasis on transplanted kidney  - to note, patient has had microscopic hematuria as far back as 2014 on UA  - BK and CMV negative 10/2020  - urine eos 0%     October 2020:       -  pt had undetectable tac level end of October. Pt states missed one week of tac due to not being able to obtain the medication  - creat had increased to 2.1 end of October.  Patient was given three doses of Solu-Medrol starting October 24th.  -  HLA completed on November 5th-no class 2 DSA, no class 1 DSA, PRA 15%, patient has watch antibodies      since then, the creatinine has ranged from approximately 1.6-2 mg/dL.       Important Medication notes:   - on diltiazem to note (interaction with tacrolimus)     Appointment appointment June 2024, patient is clinically feeling well and no complaints.  No abdominal pain.  From mental health standpoint, patient is smiling and no longer with flat affect.  He states that he was admitted from a mental health perspective and his medication adjustments have helped him significantly and this is the best he is felt in the past 10 years.  His renal function is stable 1.5 mg/dL.  Phosphorus was low at 2.1 on May 10 and phosphorus supplements or increase to 2 tablets twice daily.  Hemoglobin was stable at 14.9.  Urinalysis was bland.  UPCR was stable 0.5.  Tacrolimus was at goal at 6.6.  EBV was negative.    - Patient is appropriately losing weight and has lost 20 pounds since his diagnosis of fatty liver on imaging     Plan:     -Change tacrolimus to 0.5 mg every 12 hours  -continue  mg in the morning and 250 mg in the evening   -continue statin   - repeat lab work in 2 weeks and  then every 2 months  -  We will see the patient in 6 months  -  Continue interval care with primary nephrologist Dr Carrera as doing in 3 months  - to note, if biopsy is needed  In the future will need bridge with heparin as is on coumadin for mult DVT prior  - to note, pt is on diltiazem and if we are to switch to ACE or ARB or lower diltiazem, will need to adjust tacrolimus and check frequent levels initially  - I have asked our office to obtain EKG records from his most recent hospitalization as the patient is on desvenlafaxine and also on tacrolimus.  Does not have a history of QTc prolongation in our records currently.  And if we cannot find records, we will asked the patient to complete an EKG locally.  Message sent to the office.  - If proteinuria rises, may consider changing to ARB but will need to watch tacrolimus levels closely if reducing diltiazem.  No urgency to change as of yet.     2) Immune Suppression-on 2 drug regimen with tacrolimus 0.5 mg in the morning and 0.5 mg in the evening and mycophenolate 500 mg in the morning and 250 mg in the evening     - June 2020 - pt did not have tacrolimus for a few days because he was waiting on refill and did not realize that it was backordered. Issue resolved and restarted tacrolimus  - 10/2020 - did not have tac for one week and see above.    -  in AM and 250 mg PM  - history CMV  - pt has had labile tacrolimus levels    3) DVT - recurrent DVT. Most recent march 2018     - 4 prior DVTs  - 1 RUE post PICC  - then 3 in LE  - further plans per primary team  - INR per Primary team --> patient was reminded to update INR test and he is agreeable.  This was June 18, 2024.       4) Vaccines     - stay up to date on vaccine with flu vaccine  - PNA vaccine - pt will review with Primary Physician  - no live vaccines  -Stay up-to-date on appropriate inactivated vaccines  - October 2023-patient reminded to stay up-to-date with COVID and flu vaccines.  Patient is  agreeable.     5) Age appropriate Ca screening     - saw Derm  11/2021. Was placed on doxy for 1 week due to folliculitis concerning for MRSA  - pt had yearly appt in 2024  - has history of psoriaform dermatitis      6) HTN     - pt is on diltiazem - BP well controlled     7) HPL     - further plan per primary team.  -would continue to monitor lipid panel closely  -Is on statin     9) Depression     -prior suicide attempts - prior 2003  - patient follows with Psychiatry  -Patient was recently admitted early 2024 for mental health decompensation.  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  Check PTH again.     13) liver lesion on u/s in hospital     - see above for GI f/u  - saw GI in April  - pt saw GI 4/2021 --> MRI completed in may - no evidence of hemangioma or mass;      14) abdominal pain-currently resolved and no pain    Patient had ER visit in March 2024 for abdominal pain.  Was felt to be viral gastroenteritis and improved with conservative management.  But during further workup, was noted to have elevated amylase and lipase.  Given his history of pancreatitis, patient was referred to gastroenterology..  - EBV PCR was noted to be positive at 650 in March 2024 but level improved and remains negative  - Amylase and lipase were borderline elevated-patient saw gastroenterology team and felt no further trending was necessary and workup was not necessary.    15-hepatic steatosis-patient was advised to lose weight by his primary team and he has lost approximately 20 pounds as of June 2024       It was a pleasure evaluating your patient in the office today. Thank you for allowing our team to participate in the care of Mr Arnav Joshi. Please do  "not hesitate to contact our team if further issues/questions shall arise in the interim.     No problem-specific Assessment & Plan notes found for this encounter.        HPI:    Patient denies complaints.  No fevers, chills, nausea, vomiting, diarrhea.    PATIENT INSTRUCTIONS:    There are no Patient Instructions on file for this visit.      OBJECTIVE:  Current Weight: Weight - Scale: 80.3 kg (177 lb)  Vitals:    06/18/24 1057   BP: 118/88   BP Location: Left arm   Patient Position: Sitting   Cuff Size: Standard   Pulse: 79   Weight: 80.3 kg (177 lb)   Height: 5' 6\" (1.676 m)    Body mass index is 28.57 kg/m².      REVIEW OF SYSTEMS:    Review of Systems   Constitutional: Negative.  Negative for appetite change and fatigue.   HENT: Negative.     Eyes: Negative.    Respiratory: Negative.  Negative for shortness of breath.    Cardiovascular: Negative.  Negative for leg swelling.   Gastrointestinal: Negative.    Endocrine: Negative.    Genitourinary: Negative.  Negative for difficulty urinating.   Musculoskeletal: Negative.    Allergic/Immunologic: Negative.    Neurological: Negative.    Hematological: Negative.    Psychiatric/Behavioral: Negative.     All other systems reviewed and are negative.      PHYSICAL EXAM:      Physical Exam  Vitals and nursing note reviewed.   Constitutional:       General: He is not in acute distress.     Appearance: He is well-developed. He is not diaphoretic.   HENT:      Head: Normocephalic and atraumatic.   Eyes:      General: No scleral icterus.        Right eye: No discharge.         Left eye: No discharge.      Conjunctiva/sclera: Conjunctivae normal.   Cardiovascular:      Rate and Rhythm: Normal rate and regular rhythm.      Heart sounds: Normal heart sounds. No murmur heard.     No friction rub. No gallop.   Pulmonary:      Effort: Pulmonary effort is normal. No respiratory distress.      Breath sounds: Normal breath sounds. No wheezing or rales.   Chest:      Chest wall: No " tenderness.   Abdominal:      General: Bowel sounds are normal. There is no distension.      Palpations: Abdomen is soft.      Tenderness: There is no abdominal tenderness. There is no rebound.   Musculoskeletal:         General: No tenderness or deformity. Normal range of motion.      Cervical back: Normal range of motion and neck supple.      Right lower leg: No edema.      Left lower leg: No edema.   Skin:     General: Skin is warm and dry.      Coloration: Skin is not pale.      Findings: No erythema or rash.   Neurological:      Mental Status: He is alert and oriented to person, place, and time.      Cranial Nerves: No cranial nerve deficit.      Coordination: Coordination normal.   Psychiatric:         Behavior: Behavior normal.         Thought Content: Thought content normal.         Judgment: Judgment normal.         Medications:    Current Outpatient Medications:   •  Cholecalciferol (VITAMIN D-3) 1000 units CAPS, Take 4,000 Units by mouth daily, Disp: , Rfl:   •  clonazePAM (KlonoPIN) 0.5 mg tablet, Take 0.5 mg by mouth daily PRN, Disp: , Rfl:   •  desvenlafaxine succinate (PRISTIQ) 50 mg 24 hr tablet, Take 100 mg by mouth daily, Disp: , Rfl:   •  diltiazem (CARDIZEM) 120 MG tablet, Take 1 tablet (120 mg total) by mouth every 12 (twelve) hours, Disp: 180 tablet, Rfl: 2  •  methylphenidate (CONCERTA) 27 MG ER tablet, Take 27 mg by mouth daily, Disp: , Rfl:   •  mycophenolate (CELLCEPT) 250 mg capsule, TAKE 2 CAPSULES BY MOUTH IN AM AND 1 CAPSULES IN THE PM, Disp: 270 capsule, Rfl: 5  •  potassium phosphate, monobasic, (K-PHOS) 500 MG tablet, Take 2 tablets (1,000 mg total) by mouth 2 (two) times a day, Disp: 360 tablet, Rfl: 3  •  rosuvastatin (CRESTOR) 5 mg tablet, Take 1 tablet (5 mg total) by mouth daily, Disp: 90 tablet, Rfl: 3  •  tacrolimus (PROGRAF) 0.5 mg capsule, TAKE 1 CAPS IN AM AND 1 CAP IN PM 10/30/2023, Disp: 270 capsule, Rfl: 3  •  warfarin (COUMADIN) 4 mg tablet, TAKE 2 TABLETS BY MOUTH  "EVERY DAY, Disp: 180 tablet, Rfl: 4  •  betamethasone, augmented, (DIPROLENE) 0.05 % ointment, Apply topically 2 (two) times a day, Disp: 15 g, Rfl: 0  •  DESVENLAFAXINE ER PO, Take 50 mg by mouth daily, Disp: , Rfl:     Laboratory Results:        Invalid input(s): \"ALBUMIN\"    Results for orders placed or performed in visit on 05/10/24   Jessica-Barr Virus (EBV), Quantitative PCR, [facility]    Specimen: Arm, Right; Blood   Result Value Ref Range    EBV TARGET Not Detected Not Detected   CBC   Result Value Ref Range    WBC 4.87 4.31 - 10.16 Thousand/uL    RBC 4.69 3.88 - 5.62 Million/uL    Hemoglobin 14.9 12.0 - 17.0 g/dL    Hematocrit 45.1 36.5 - 49.3 %    MCV 96 82 - 98 fL    MCH 31.8 26.8 - 34.3 pg    MCHC 33.0 31.4 - 37.4 g/dL    RDW 12.6 11.6 - 15.1 %    Platelets 222 149 - 390 Thousands/uL    MPV 10.1 8.9 - 12.7 fL   Comprehensive metabolic panel   Result Value Ref Range    Sodium 138 135 - 147 mmol/L    Potassium 4.7 3.5 - 5.3 mmol/L    Chloride 108 96 - 108 mmol/L    CO2 26 21 - 32 mmol/L    ANION GAP 4 4 - 13 mmol/L    BUN 31 (H) 5 - 25 mg/dL    Creatinine 1.52 (H) 0.60 - 1.30 mg/dL    Glucose, Fasting 91 65 - 99 mg/dL    Calcium 9.5 8.4 - 10.2 mg/dL    AST 22 13 - 39 U/L    ALT 16 7 - 52 U/L    Alkaline Phosphatase 95 34 - 104 U/L    Total Protein 7.2 6.4 - 8.4 g/dL    Albumin 4.2 3.5 - 5.0 g/dL    Total Bilirubin 0.46 0.20 - 1.00 mg/dL    eGFR 52 ml/min/1.73sq m   Magnesium   Result Value Ref Range    Magnesium 2.1 1.9 - 2.7 mg/dL   Phosphorus   Result Value Ref Range    Phosphorus 2.1 (L) 2.7 - 4.5 mg/dL   Protein / creatinine ratio, urine   Result Value Ref Range    Creatinine, Ur 56.4 Reference range not established. mg/dL    Protein Urine Random 28 Reference range not established. mg/dL    Prot/Creat Ratio, Ur 0.50 (H) 0.00 - 0.10   Tacrolimus level   Result Value Ref Range    TACROLIMUS 6.6 3.0 - 15.0 ng/mL   Urinalysis with microscopic   Result Value Ref Range    Color, UA Colorless     Clarity, UA " Clear     Specific [place], UA 1.012 1.003 - 1.030    pH, UA 6.0 4.5, 5.0, 5.5, 6.0, 6.5, 7.0, 7.5, 8.0    Leukocytes, UA Negative Negative    Nitrite, UA Negative Negative    Protein, UA Trace (A) Negative mg/dl    Glucose, UA Negative Negative mg/dl    Ketones, UA Negative Negative mg/dl    Urobilinogen, UA <2.0 <2.0 mg/dl mg/dl    Bilirubin, UA Negative Negative    Occult Blood, UA Trace (A) Negative    RBC, UA 1-2 None Seen, 1-2 /hpf    WBC, UA None Seen None Seen, 1-2 /hpf    Epithelial Cells None Seen None Seen, Occasional /hpf    Bacteria, UA None Seen None Seen, Occasional /hpf

## 2024-06-18 NOTE — TELEPHONE ENCOUNTER
----- Message from Lev Vega MD sent at 6/18/2024  3:44 PM EDT -----  Hello    Can we please find out where the patient was admitted recently for his mental health decompensation.  Can we please asked the facility or a psychiatrist for recent EKG if they have.  Since he is on desvenlafaxine and tacrolimus, want to make sure the QTc is appropriate.  If there is no EKG recently, can we please have the patient complete an EKG nonurgently locally at [facility]    Thank you

## 2024-06-18 NOTE — PROGRESS NOTES
NEPHROLOGY OFFICE VISIT   Arnav Joshi 48 y.o. male MRN: [id number]  6/18/2024    Reason for Visit: renal transplant f/u    ASSESSMENT and PLAN:    I had the pleasure of seeing Mr Joshi today in the renal clinic for the continued management of renal transplant f/u.     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 Then admission in January 2020 for FRANCES with Cr to 3.4 mg/dL in setting of n/v/d. End of October 2020, patient had missed tacrolimus for one week and creat had increased to 2.1 requiring steroid therapy. Pt presenting for f/u visit.      CXR - 1/23/2020 - no acute disease     CT abd/pel - atrophic native kidneys, unremarkable renal transplant     U/s liver - 2.2 cm R hepatic echogenic mass reflecting hemangioma     1) CKD III, renal transplant -      Prior-history obtained from [facility], and [facility] and here at [facility]'s records:     -Baseline Cr 1.6-1.9 mg/dL mg/dL;  -Tac levels from 2009 - 2014 were 7-10  -Patient followed at [facility] - followed with Dr Smith   -Prior baseline Cr 1.4-1.5 mg/dL and had been rising to 1.8 mg/dL  -pt had early CMV - on EGD  - last time seen was in May 2017 at [facility] - no known rejection ([phone])  -Has never had renal biopsy post transplant     Old Labwork review:     - CT abd/pel without hydro or nephrolithiasis on transplanted kidney  - to note, patient has had microscopic hematuria as far back as 2014 on UA  - BK and CMV negative 10/2020  - urine eos 0%     October 2020:       -  pt had undetectable tac level end of October. Pt states missed  one week of tac due to not being able to obtain the medication  - creat had increased to 2.1 end of October.  Patient was given three doses of Solu-Medrol starting October 24th.  -  HLA completed on November 5th-no class 2 DSA, no class 1 DSA, PRA 15%, patient has watch antibodies      since then, the creatinine has ranged from approximately 1.6-2 mg/dL.       Important Medication notes:   - on diltiazem to note (interaction with tacrolimus)     Appointment appointment June 2024, patient is clinically feeling well and no complaints.  No abdominal pain.  From mental health standpoint, patient is smiling and no longer with flat affect.  He states that he was admitted from a mental health perspective and his medication adjustments have helped him significantly and this is the best he is felt in the past 10 years.  His renal function is stable 1.5 mg/dL.  Phosphorus was low at 2.1 on May 10 and phosphorus supplements or increase to 2 tablets twice daily.  Hemoglobin was stable at 14.9.  Urinalysis was bland.  UPCR was stable 0.5.  Tacrolimus was at goal at 6.6.  EBV was negative.    - Patient is appropriately losing weight and has lost 20 pounds since his diagnosis of fatty liver on imaging     Plan:     -Change tacrolimus to 0.5 mg every 12 hours  -continue  mg in the morning and 250 mg in the evening   -continue statin   - repeat lab work in 2 weeks and then every 2 months  -  We will see the patient in 6 months  -  Continue interval care with primary nephrologist Dr Carrera as doing in 3 months  - to note, if biopsy is needed  In the future will need bridge with heparin as is on coumadin for mult DVT prior  - to note, pt is on diltiazem and if we are to switch to ACE or ARB or lower diltiazem, will need to adjust tacrolimus and check frequent levels initially  - I have asked our office to obtain EKG records from his most recent hospitalization as the patient is on desvenlafaxine and also on tacrolimus.  Does not  have a history of QTc prolongation in our records currently.  And if we cannot find records, we will asked the patient to complete an EKG locally.  Message sent to the office.  - If proteinuria rises, may consider changing to ARB but will need to watch tacrolimus levels closely if reducing diltiazem.  No urgency to change as of yet.  - With regards to hypophosphatemia, we have increased phosphorus supplement recently to 2 tablets twice daily and will monitor for now     2) Immune Suppression-on 2 drug regimen with tacrolimus 0.5 mg in the morning and 0.5 mg in the evening and mycophenolate 500 mg in the morning and 250 mg in the evening     - June 2020 - pt did not have tacrolimus for a few days because he was waiting on refill and did not realize that it was backordered. Issue resolved and restarted tacrolimus  - 10/2020 - did not have tac for one week and see above.    -  in AM and 250 mg PM  - history CMV  - pt has had labile tacrolimus levels    3) DVT - recurrent DVT. Most recent march 2018     - 4 prior DVTs  - 1 RUE post PICC  - then 3 in LE  - further plans per primary team  - INR per Primary team --> patient was reminded to update INR test and he is agreeable.  This was June 18, 2024.       4) Vaccines     - stay up to date on vaccine with flu vaccine  - PNA vaccine - pt will review with Primary Physician  - no live vaccines  -Stay up-to-date on appropriate inactivated vaccines  - October 2023-patient reminded to stay up-to-date with COVID and flu vaccines.  Patient is agreeable.     5) Age appropriate Ca screening     - saw Derm  11/2021. Was placed on doxy for 1 week due to folliculitis concerning for MRSA  - pt had yearly appt in 2024  - has history of psoriaform dermatitis      6) HTN     - pt is on diltiazem - BP well controlled     7) HPL     - further plan per primary team.  -would continue to monitor lipid panel closely  -Is on statin     9) Depression     -prior suicide attempts - prior  2003  - patient follows with Psychiatry  -Patient was recently admitted early 2024 for mental health decompensation.  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  Check PTH again.     13) liver lesion on u/s in hospital     - see above for GI f/u  - saw GI in April  - pt saw GI 4/2021 --> MRI completed in may - no evidence of hemangioma or mass;      14) abdominal pain-currently resolved and no pain    Patient had ER visit in March 2024 for abdominal pain.  Was felt to be viral gastroenteritis and improved with conservative management.  But during further workup, was noted to have elevated amylase and lipase.  Given his history of pancreatitis, patient was referred to gastroenterology..  - EBV PCR was noted to be positive at 650 in March 2024 but level improved and remains negative  - Amylase and lipase were borderline elevated-patient saw gastroenterology team and felt no further trending was necessary and workup was not necessary.    15-hepatic steatosis-patient was advised to lose weight by his primary team and he has lost approximately 20 pounds as of June 2024       It was a pleasure evaluating your patient in the office today. Thank you for allowing our team to participate in the care of Mr Arnav Joshi. Please do not hesitate to contact our team if further issues/questions shall arise in the interim.     No problem-specific Assessment & Plan notes found for this encounter.        HPI:    Patient denies complaints.  No fevers, chills, nausea, vomiting, diarrhea.    PATIENT INSTRUCTIONS:    There are no Patient Instructions on file for this visit.      OBJECTIVE:  Current Weight: Weight - Scale: 80.3 kg (177 lb)  Vitals:    06/18/24 1057   BP:  "118/88   BP Location: Left arm   Patient Position: Sitting   Cuff Size: Standard   Pulse: 79   Weight: 80.3 kg (177 lb)   Height: 5' 6\" (1.676 m)    Body mass index is 28.57 kg/m².      REVIEW OF SYSTEMS:    Review of Systems   Constitutional: Negative.  Negative for appetite change and fatigue.   HENT: Negative.     Eyes: Negative.    Respiratory: Negative.  Negative for shortness of breath.    Cardiovascular: Negative.  Negative for leg swelling.   Gastrointestinal: Negative.    Endocrine: Negative.    Genitourinary: Negative.  Negative for difficulty urinating.   Musculoskeletal: Negative.    Allergic/Immunologic: Negative.    Neurological: Negative.    Hematological: Negative.    Psychiatric/Behavioral: Negative.     All other systems reviewed and are negative.      PHYSICAL EXAM:      Physical Exam  Vitals and nursing note reviewed.   Constitutional:       General: He is not in acute distress.     Appearance: He is well-developed. He is not diaphoretic.   HENT:      Head: Normocephalic and atraumatic.   Eyes:      General: No scleral icterus.        Right eye: No discharge.         Left eye: No discharge.      Conjunctiva/sclera: Conjunctivae normal.   Cardiovascular:      Rate and Rhythm: Normal rate and regular rhythm.      Heart sounds: Normal heart sounds. No murmur heard.     No friction rub. No gallop.   Pulmonary:      Effort: Pulmonary effort is normal. No respiratory distress.      Breath sounds: Normal breath sounds. No wheezing or rales.   Chest:      Chest wall: No tenderness.   Abdominal:      General: Bowel sounds are normal. There is no distension.      Palpations: Abdomen is soft.      Tenderness: There is no abdominal tenderness. There is no rebound.   Musculoskeletal:         General: No tenderness or deformity. Normal range of motion.      Cervical back: Normal range of motion and neck supple.      Right lower leg: No edema.      Left lower leg: No edema.   Skin:     General: Skin is warm and " "dry.      Coloration: Skin is not pale.      Findings: No erythema or rash.   Neurological:      Mental Status: He is alert and oriented to person, place, and time.      Cranial Nerves: No cranial nerve deficit.      Coordination: Coordination normal.   Psychiatric:         Behavior: Behavior normal.         Thought Content: Thought content normal.         Judgment: Judgment normal.         Medications:    Current Outpatient Medications:     Cholecalciferol (VITAMIN D-3) 1000 units CAPS, Take 4,000 Units by mouth daily, Disp: , Rfl:     clonazePAM (KlonoPIN) 0.5 mg tablet, Take 0.5 mg by mouth daily PRN, Disp: , Rfl:     desvenlafaxine succinate (PRISTIQ) 50 mg 24 hr tablet, Take 100 mg by mouth daily, Disp: , Rfl:     diltiazem (CARDIZEM) 120 MG tablet, Take 1 tablet (120 mg total) by mouth every 12 (twelve) hours, Disp: 180 tablet, Rfl: 2    methylphenidate (CONCERTA) 27 MG ER tablet, Take 27 mg by mouth daily, Disp: , Rfl:     mycophenolate (CELLCEPT) 250 mg capsule, TAKE 2 CAPSULES BY MOUTH IN AM AND 1 CAPSULES IN THE PM, Disp: 270 capsule, Rfl: 5    potassium phosphate, monobasic, (K-PHOS) 500 MG tablet, Take 2 tablets (1,000 mg total) by mouth 2 (two) times a day, Disp: 360 tablet, Rfl: 3    rosuvastatin (CRESTOR) 5 mg tablet, Take 1 tablet (5 mg total) by mouth daily, Disp: 90 tablet, Rfl: 3    tacrolimus (PROGRAF) 0.5 mg capsule, TAKE 1 CAPS IN AM AND 1 CAP IN PM 10/30/2023, Disp: 270 capsule, Rfl: 3    warfarin (COUMADIN) 4 mg tablet, TAKE 2 TABLETS BY MOUTH EVERY DAY, Disp: 180 tablet, Rfl: 4    betamethasone, augmented, (DIPROLENE) 0.05 % ointment, Apply topically 2 (two) times a day, Disp: 15 g, Rfl: 0    DESVENLAFAXINE ER PO, Take 50 mg by mouth daily, Disp: , Rfl:     Laboratory Results:        Invalid input(s): \"ALBUMIN\"    Results for orders placed or performed in visit on 05/10/24   Jessica-Barr Virus (EBV), Quantitative PCR, [facility]    Specimen: Arm, Right; Blood   Result Value Ref Range    EBV " TARGET Not Detected Not Detected   CBC   Result Value Ref Range    WBC 4.87 4.31 - 10.16 Thousand/uL    RBC 4.69 3.88 - 5.62 Million/uL    Hemoglobin 14.9 12.0 - 17.0 g/dL    Hematocrit 45.1 36.5 - 49.3 %    MCV 96 82 - 98 fL    MCH 31.8 26.8 - 34.3 pg    MCHC 33.0 31.4 - 37.4 g/dL    RDW 12.6 11.6 - 15.1 %    Platelets 222 149 - 390 Thousands/uL    MPV 10.1 8.9 - 12.7 fL   Comprehensive metabolic panel   Result Value Ref Range    Sodium 138 135 - 147 mmol/L    Potassium 4.7 3.5 - 5.3 mmol/L    Chloride 108 96 - 108 mmol/L    CO2 26 21 - 32 mmol/L    ANION GAP 4 4 - 13 mmol/L    BUN 31 (H) 5 - 25 mg/dL    Creatinine 1.52 (H) 0.60 - 1.30 mg/dL    Glucose, Fasting 91 65 - 99 mg/dL    Calcium 9.5 8.4 - 10.2 mg/dL    AST 22 13 - 39 U/L    ALT 16 7 - 52 U/L    Alkaline Phosphatase 95 34 - 104 U/L    Total Protein 7.2 6.4 - 8.4 g/dL    Albumin 4.2 3.5 - 5.0 g/dL    Total Bilirubin 0.46 0.20 - 1.00 mg/dL    eGFR 52 ml/min/1.73sq m   Magnesium   Result Value Ref Range    Magnesium 2.1 1.9 - 2.7 mg/dL   Phosphorus   Result Value Ref Range    Phosphorus 2.1 (L) 2.7 - 4.5 mg/dL   Protein / creatinine ratio, urine   Result Value Ref Range    Creatinine, Ur 56.4 Reference range not established. mg/dL    Protein Urine Random 28 Reference range not established. mg/dL    Prot/Creat Ratio, Ur 0.50 (H) 0.00 - 0.10   Tacrolimus level   Result Value Ref Range    TACROLIMUS 6.6 3.0 - 15.0 ng/mL   Urinalysis with microscopic   Result Value Ref Range    Color, UA Colorless     Clarity, UA Clear     Specific Gravity, UA 1.012 1.003 - 1.030    pH, UA 6.0 4.5, 5.0, 5.5, 6.0, 6.5, 7.0, 7.5, 8.0    Leukocytes, UA Negative Negative    Nitrite, UA Negative Negative    Protein, UA Trace (A) Negative mg/dl    Glucose, UA Negative Negative mg/dl    Ketones, UA Negative Negative mg/dl    Urobilinogen, UA <2.0 <2.0 mg/dl mg/dl    Bilirubin, UA Negative Negative    Occult Blood, UA Trace (A) Negative    RBC, UA 1-2 None Seen, 1-2 /hpf    WBC, UA None  Seen None Seen, 1-2 /hpf    Epithelial Cells None Seen None Seen, Occasional /hpf    Bacteria, UA None Seen None Seen, Occasional /hpf

## 2024-06-18 NOTE — PATIENT INSTRUCTIONS
1) Avoid NSAIDS - (Example - motrin, advil, ibuprofen, aleve, exederin, etc)  2) Always follow a low salt diet  3) If you have any issues with trouble with nausea, vomiting, urinating, blood in the urine, food tasting like metal, confusion, weakness, fatigue that is worsening, or any other questions, please call right away.  4) Please continue to follow regularly with your family physician and any other specialist that you are advised to see and continue to follow their recommendations  5) If you have any emergencies, please go to the nearest urgent care or emergency room and please inform your family physician and our office once you are able to.  6) please ask your pharmacist regarding why the kphos supplement was higher cost this time you received it  7) no changes to your medications  8) labwork every other month to every 3 months  9) appointment with Dr Carrera in August  10) appointment with our office in november

## 2024-06-26 NOTE — TELEPHONE ENCOUNTER
Pt was at [facility] in March, there was no EKG done. Pt's PCP does do them in their office. Left message for pt asking if he would like to call and schedule or would like us to schedule for him.   When pt calls please transfer him to the [place] office.

## 2024-07-09 ENCOUNTER — OFFICE VISIT (OUTPATIENT)
Age: 49
End: 2024-07-09
Payer: COMMERCIAL

## 2024-07-09 VITALS
HEART RATE: 73 BPM | DIASTOLIC BLOOD PRESSURE: 86 MMHG | WEIGHT: 171.2 LBS | HEIGHT: 66 IN | TEMPERATURE: 97.2 F | SYSTOLIC BLOOD PRESSURE: 112 MMHG | BODY MASS INDEX: 27.51 KG/M2 | OXYGEN SATURATION: 98 %

## 2024-07-09 DIAGNOSIS — I10 ESSENTIAL HYPERTENSION: Primary | ICD-10-CM

## 2024-07-09 DIAGNOSIS — Z79.01 WARFARIN ANTICOAGULATION: ICD-10-CM

## 2024-07-09 DIAGNOSIS — Z86.718 HISTORY OF DVT (DEEP VEIN THROMBOSIS): ICD-10-CM

## 2024-07-09 DIAGNOSIS — N18.31 STAGE 3A CHRONIC KIDNEY DISEASE (HCC): ICD-10-CM

## 2024-07-09 DIAGNOSIS — F33.2 SEVERE EPISODE OF RECURRENT MAJOR DEPRESSIVE DISORDER, WITHOUT PSYCHOTIC FEATURES (HCC): ICD-10-CM

## 2024-07-09 PROCEDURE — 99213 OFFICE O/P EST LOW 20 MIN: CPT

## 2024-07-09 PROCEDURE — 93000 ELECTROCARDIOGRAM COMPLETE: CPT

## 2024-07-09 PROCEDURE — 99396 PREV VISIT EST AGE 40-64: CPT

## 2024-07-09 RX ORDER — CLINDAMYCIN HYDROCHLORIDE 300 MG/1
CAPSULE ORAL
COMMUNITY
Start: 2024-04-11

## 2024-07-09 NOTE — ASSESSMENT & PLAN NOTE
Has had 3 DVTs in the past, Recommended to be on anticoagulation lifelong'    Declines closer monitoring. Wants to f/u yearly, suggested more frequent, pt declines. Sees nephrology several times yearly and always going for labs   Pt aware of signs and sx of DVTs and PE  Patient aware to take medication as prescribed and aware of dietary changes and possibility of new medications affecting INR

## 2024-07-09 NOTE — ASSESSMENT & PLAN NOTE
Lab Results   Component Value Date    EGFR 52 05/10/2024    EGFR 47 04/02/2024    EGFR 54 (L) 03/27/2024    CREATININE 1.52 (H) 05/10/2024    CREATININE 1.65 (H) 04/02/2024    CREATININE 1.54 (H) 03/27/2024   Stable, Continue following with nephrology. Avoid nephrotoxic agents  Blood pressure controlled. Continue current medications and dash diet    Has been on tacrolimus and Dr Vega recommended an EKG being done  Pt willing to complete today while in office  Result- NSR, rate 74 BPM.    No ST or T wave abnormalities

## 2024-07-09 NOTE — PROGRESS NOTES
Adult Annual Physical  Name: Arnav Joshi      : 1975      MRN: [id number]  Encounter Provider: Remi Garcia PA-C  Encounter Date: 2024   Encounter department: [facility] [place]    Assessment & Plan   1. Essential hypertension  Comments:  Controlled today at 112/86  Continue current medications and dash diet  2. Stage 3a chronic kidney disease (HCC)  Assessment & Plan:  Lab Results   Component Value Date    EGFR 52 05/10/2024    EGFR 47 2024    EGFR 54 (L) 2024    CREATININE 1.52 (H) 05/10/2024    CREATININE 1.65 (H) 2024    CREATININE 1.54 (H) 2024   Stable, Continue following with nephrology. Avoid nephrotoxic agents  Blood pressure controlled. Continue current medications and dash diet    Has been on tacrolimus and Dr Vega recommended an EKG being done  Pt willing to complete today while in office  Result- NSR, rate 74 BPM.    No ST or T wave abnormalities  3. Warfarin anticoagulation  Comments:  Has been on warfarin 4mg BID.   Was recommended to get a repeat in a month and didnt complete  Will reorder INR and q3mo if at goal 2-3  Orders:  -     POCT ECG  -     Protime-INR; Standing  -     Protime-INR; Future  4. History of DVT (deep vein thrombosis)  Assessment & Plan:  Has had 3 DVTs in the past, Recommended to be on anticoagulation lifelong'    Declines closer monitoring. Wants to f/u yearly, suggested more frequent, pt declines. Sees nephrology several times yearly and always going for labs   Pt aware of signs and sx of DVTs and PE  Patient aware to take medication as prescribed and aware of dietary changes and possibility of new medications affecting INR  Orders:  -     POCT ECG  -     Protime-INR; Standing  -     Protime-INR; Future  5. Severe episode of recurrent major depressive disorder, without psychotic features (HCC)  Comments:  Uncontrolled, Continue  pristiq 100mg daily.  Feels the best he's ever been but PHQ positive  Follow up  with Dr Menezes office as scheduled    Immunizations and preventive care screenings were discussed with patient today. Appropriate education was printed on patient's after visit summary.    Discussed risks and benefits of prostate cancer screening. We discussed the controversial history of PSA screening for prostate cancer in the [place] as well as the risk of over detection and over treatment of prostate cancer by way of PSA screening.  The patient understands that PSA blood testing is an imperfect way to screen for prostate cancer and that elevated PSA levels in the blood may also be caused by infection, inflammation, prostatic trauma or manipulation, urological procedures, or by benign prostatic enlargement.    The role of the digital rectal examination in prostate cancer screening was also discussed and I discussed with him that there is large interobserver variability in the findings of digital rectal examination.    Counseling:  Alcohol/drug use: discussed moderation in alcohol intake, the recommendations for healthy alcohol use, and avoidance of illicit drug use.  Dental Health: discussed importance of regular tooth brushing, flossing, and dental visits.  Injury prevention: discussed safety/seat belts, safety helmets, smoke detectors, carbon dioxide detectors, and smoking near bedding or upholstery.  Sexual health: discussed sexually transmitted diseases, partner selection, use of condoms, avoidance of unintended pregnancy, and contraceptive alternatives.  Exercise: the importance of regular exercise/physical activity was discussed. Recommend exercise 3-5 times per week for at least 30 minutes.       Depression Screening and Follow-up Plan: Patient's depression screening was positive with a PHQ-9 score of 8. Patient assessed for underlying major depression. Brief counseling provided and recommend additional follow-up/re-evaluation next office visit. Continue regular follow-up with their mental health  provider who is managing their mental health condition(s).         History of Present Illness     Adult Annual Physical:  Patient presents for annual physical. Arnav is here for follow up for lab review.  Chronic conditions are stable  Has been on tacrolimus and Dr Vega recommended an EKG being done  Pt willing to complete today while in office  Otherwise no concerns or complaints  Says he feels the best he's ever been from a mental health standpoint.  Lost 25 lbs over the past year, increasing activity.     Diet and Physical Activity:  - Diet/Nutrition: well balanced diet and frequent junk food.  - Exercise: walking and moderate cardiovascular exercise.    Depression Screening:    - PHQ-9 Score: 8    General Health:  - Sleep: sleeps well.  - Hearing: normal hearing right ear and normal hearing left ear.  - Vision: no vision problems.  - Dental: regular dental visits.     Health:    - Urinary symptoms: none.     Advanced Care Planning:  - Has an advanced directive?: no    - Has a durable medical POA?: no    - ACP document given to patient?: no      Review of Systems   Constitutional:  Negative for activity change, diaphoresis, fatigue and fever.   HENT: Negative.  Negative for congestion and ear pain.    Eyes: Negative.    Respiratory:  Negative for cough, chest tightness and shortness of breath.    Cardiovascular:  Negative for chest pain, palpitations and leg swelling.   Gastrointestinal: Negative.    Endocrine: Negative for polydipsia, polyphagia and polyuria.   Genitourinary:  Negative for dysuria, flank pain, frequency, hematuria and urgency.   Musculoskeletal:  Negative for back pain, joint swelling, neck pain and neck stiffness.   Skin: Negative.    Neurological:  Negative for dizziness, weakness, light-headedness and headaches.   Hematological:  Negative for adenopathy.   Psychiatric/Behavioral: Negative.  Negative for confusion and sleep disturbance. The patient is not nervous/anxious.      Pertinent  Medical History         Medical History Reviewed by provider this encounter:  Tobacco  Allergies  Meds  Problems  Med Hx  Surg Hx  Fam Hx       Past Medical History   Past Medical History:   Diagnosis Date    ADD (attention deficit disorder)     FRANCES (acute kidney injury) ([facility]) 1/24/2020    Chronic kidney disease     Depression     DVT (deep venous thrombosis) ([facility])     H/O immunosuppressive therapy     History of kidney disease     HTN (hypertension) 12/17/2017    Hypertension     Hypomagnesemia 1/25/2020    Left lower quadrant pain 1/31/2019    Nephropathy, IgA     Suicidal ideations 6/14/2019     Past Surgical History:   Procedure Laterality Date    NEPHRECTOMY TRANSPLANTED ORGAN       Family History   Problem Relation Age of Onset    Deep vein thrombosis Father     Deep vein thrombosis Paternal Grandfather     Transient ischemic attack Mother      Current Outpatient Medications on File Prior to Visit   Medication Sig Dispense Refill    betamethasone, augmented, (DIPROLENE) 0.05 % ointment Apply topically 2 (two) times a day 15 g 0    Cholecalciferol (VITAMIN D-3) 1000 units CAPS Take 4,000 Units by mouth daily      clindamycin (CLEOCIN) 300 MG capsule For dental procedures      clonazePAM (KlonoPIN) 0.5 mg tablet Take 0.5 mg by mouth daily PRN      desvenlafaxine succinate (PRISTIQ) 50 mg 24 hr tablet Take 100 mg by mouth daily      diltiazem (CARDIZEM) 120 MG tablet Take 1 tablet (120 mg total) by mouth every 12 (twelve) hours 180 tablet 2    methylphenidate (CONCERTA) 27 MG ER tablet Take 27 mg by mouth daily      mycophenolate (CELLCEPT) 250 mg capsule TAKE 2 CAPSULES BY MOUTH IN AM AND 1 CAPSULES IN THE  capsule 5    potassium phosphate, monobasic, (K-PHOS) 500 MG tablet Take 2 tablets (1,000 mg total) by mouth 2 (two) times a day 360 tablet 3    rosuvastatin (CRESTOR) 5 mg tablet Take 1 tablet (5 mg total) by mouth daily 90 tablet 3    tacrolimus (PROGRAF) 0.5 mg capsule TAKE 1 CAPS IN AM AND 1  CAP IN PM 10/30/2023 270 capsule 3    warfarin (COUMADIN) 4 mg tablet TAKE 2 TABLETS BY MOUTH EVERY  tablet 4    DESVENLAFAXINE ER PO Take 50 mg by mouth daily       No current facility-administered medications on file prior to visit.     Allergies   Allergen Reactions    Penicillins Rash      Current Outpatient Medications on File Prior to Visit   Medication Sig Dispense Refill    betamethasone, augmented, (DIPROLENE) 0.05 % ointment Apply topically 2 (two) times a day 15 g 0    Cholecalciferol (VITAMIN D-3) 1000 units CAPS Take 4,000 Units by mouth daily      clindamycin (CLEOCIN) 300 MG capsule For dental procedures      clonazePAM (KlonoPIN) 0.5 mg tablet Take 0.5 mg by mouth daily PRN      desvenlafaxine succinate (PRISTIQ) 50 mg 24 hr tablet Take 100 mg by mouth daily      diltiazem (CARDIZEM) 120 MG tablet Take 1 tablet (120 mg total) by mouth every 12 (twelve) hours 180 tablet 2    methylphenidate (CONCERTA) 27 MG ER tablet Take 27 mg by mouth daily      mycophenolate (CELLCEPT) 250 mg capsule TAKE 2 CAPSULES BY MOUTH IN AM AND 1 CAPSULES IN THE  capsule 5    potassium phosphate, monobasic, (K-PHOS) 500 MG tablet Take 2 tablets (1,000 mg total) by mouth 2 (two) times a day 360 tablet 3    rosuvastatin (CRESTOR) 5 mg tablet Take 1 tablet (5 mg total) by mouth daily 90 tablet 3    tacrolimus (PROGRAF) 0.5 mg capsule TAKE 1 CAPS IN AM AND 1 CAP IN PM 10/30/2023 270 capsule 3    warfarin (COUMADIN) 4 mg tablet TAKE 2 TABLETS BY MOUTH EVERY  tablet 4    DESVENLAFAXINE ER PO Take 50 mg by mouth daily       No current facility-administered medications on file prior to visit.      Social History     Tobacco Use    Smoking status: Former     Current packs/day: 0.00     Average packs/day: 0.5 packs/day for 4.0 years (2.0 ttl pk-yrs)     Types: Cigarettes     Start date:      Quit date:      Years since quittin.5     Passive exposure: Past    Smokeless tobacco: Never   Vaping Use     "Vaping status: Never Used   Substance and Sexual Activity    Alcohol use: Yes     Comment: on occasion    Drug use: Not Currently     Types: Marijuana     Comment: smokes marijuana a few times daily    Sexual activity: Yes     Partners: Female     Birth control/protection: Male Sterilization, None       Objective     /86   Pulse 73   Temp (!) 97.2 °F (36.2 °C) (Tympanic)   Ht 5' 6\" (1.676 m)   Wt 77.7 kg (171 lb 3.2 oz)   SpO2 98%   BMI 27.63 kg/m²     Physical Exam  Vitals and nursing note reviewed.   Constitutional:       General: He is not in acute distress.     Appearance: Normal appearance. He is normal weight. He is not ill-appearing, toxic-appearing or diaphoretic.   HENT:      Head: Normocephalic and atraumatic.      Right Ear: External ear normal.      Left Ear: External ear normal.      Nose: Nose normal.   Eyes:      General: No scleral icterus.        Right eye: No discharge.         Left eye: No discharge.      Extraocular Movements: Extraocular movements intact.      Conjunctiva/sclera: Conjunctivae normal.   Neck:      Vascular: No carotid bruit.   Cardiovascular:      Rate and Rhythm: Normal rate and regular rhythm.      Heart sounds: No murmur heard.  Pulmonary:      Effort: Pulmonary effort is normal. No respiratory distress.      Breath sounds: Normal breath sounds.   Musculoskeletal:      Cervical back: Normal range of motion and neck supple.      Right lower leg: No edema.      Left lower leg: No edema.   Skin:     Findings: No rash.   Neurological:      Mental Status: He is alert. Mental status is at baseline.   Psychiatric:         Mood and Affect: Mood normal.         Behavior: Behavior normal.         Thought Content: Thought content normal.         Judgment: Judgment normal.             POCT ECG     Date/Time  7/9/2024 8:00 AM     Performed by  Remi Garcia PA-C   Authorized by  Remi Garcia PA-C     Universal Protocol   Consent: Verbal consent " obtained.  Consent given by: patient  Patient understanding: patient states understanding of the procedure being performed  Patient consent: the patient's understanding of the procedure matches consent given  Procedure consent: procedure consent matches procedure scheduled  Patient identity confirmed: verbally with patient      Local anesthesia used: no     Anesthesia   Local anesthesia used: no     Sedation   Patient sedated: no       Procedure Details   Procedure Notes: Rate 74  NSR no ST or T wave abnormalities  QT 376ms  QRS 96ms  Patient tolerance: patient tolerated the procedure well with no immediate complications

## 2024-07-23 ENCOUNTER — TELEPHONE (OUTPATIENT)
Dept: NEPHROLOGY | Facility: CLINIC | Age: 49
End: 2024-07-23

## 2024-07-23 NOTE — TELEPHONE ENCOUNTER
Called unable to get in touch with patient, phone rings with no answer. unable to leave voice message to remind patient to get fasting labs done 1 week prior to 08/09/24 scheduled follow-up appointment with Dr.Umesh Deandre MD. reminder sent via epic chart.

## 2024-08-02 NOTE — TELEPHONE ENCOUNTER
Spoke w/ pt, he said he is currently out on short term disabilty for this which was approved and just needs a note to return to work    I asked him when he brought forms for the short term and he said it was done via phone but he may be recieiving forms that need to be filled out Patient's belongings returned

## 2024-08-07 ENCOUNTER — LAB (OUTPATIENT)
Dept: LAB | Facility: HOSPITAL | Age: 49
End: 2024-08-07
Payer: COMMERCIAL

## 2024-08-07 DIAGNOSIS — Z94.0 KIDNEY TRANSPLANTED: ICD-10-CM

## 2024-08-07 DIAGNOSIS — Z86.718 HISTORY OF DVT (DEEP VEIN THROMBOSIS): ICD-10-CM

## 2024-08-07 DIAGNOSIS — Z79.01 WARFARIN ANTICOAGULATION: ICD-10-CM

## 2024-08-07 LAB
ALBUMIN SERPL BCG-MCNC: 3.9 G/DL (ref 3.5–5)
ALP SERPL-CCNC: 79 U/L (ref 34–104)
ALT SERPL W P-5'-P-CCNC: 16 U/L (ref 7–52)
ANION GAP SERPL CALCULATED.3IONS-SCNC: 7 MMOL/L (ref 4–13)
AST SERPL W P-5'-P-CCNC: 18 U/L (ref 13–39)
BACTERIA UR QL AUTO: ABNORMAL /HPF
BILIRUB SERPL-MCNC: 0.4 MG/DL (ref 0.2–1)
BILIRUB UR QL STRIP: NEGATIVE
BUN SERPL-MCNC: 38 MG/DL (ref 5–25)
CALCIUM SERPL-MCNC: 9.3 MG/DL (ref 8.4–10.2)
CHLORIDE SERPL-SCNC: 107 MMOL/L (ref 96–108)
CLARITY UR: CLEAR
CO2 SERPL-SCNC: 23 MMOL/L (ref 21–32)
COLOR UR: COLORLESS
CREAT SERPL-MCNC: 1.64 MG/DL (ref 0.6–1.3)
CREAT UR-MCNC: 35.8 MG/DL
ERYTHROCYTE [DISTWIDTH] IN BLOOD BY AUTOMATED COUNT: 12.5 % (ref 11.6–15.1)
GFR SERPL CREATININE-BSD FRML MDRD: 48 ML/MIN/1.73SQ M
GLUCOSE P FAST SERPL-MCNC: 90 MG/DL (ref 65–99)
GLUCOSE UR STRIP-MCNC: NEGATIVE MG/DL
HCT VFR BLD AUTO: 44.8 % (ref 36.5–49.3)
HGB BLD-MCNC: 14.9 G/DL (ref 12–17)
HGB UR QL STRIP.AUTO: ABNORMAL
INR PPP: 1.7 (ref 0.85–1.19)
KETONES UR STRIP-MCNC: NEGATIVE MG/DL
LEUKOCYTE ESTERASE UR QL STRIP: NEGATIVE
MAGNESIUM SERPL-MCNC: 1.9 MG/DL (ref 1.9–2.7)
MCH RBC QN AUTO: 32 PG (ref 26.8–34.3)
MCHC RBC AUTO-ENTMCNC: 33.3 G/DL (ref 31.4–37.4)
MCV RBC AUTO: 96 FL (ref 82–98)
NITRITE UR QL STRIP: NEGATIVE
NON-SQ EPI CELLS URNS QL MICRO: ABNORMAL /HPF
PH UR STRIP.AUTO: 5 [PH]
PHOSPHATE SERPL-MCNC: 1.9 MG/DL (ref 2.7–4.5)
PLATELET # BLD AUTO: 214 THOUSANDS/UL (ref 149–390)
PMV BLD AUTO: 10.5 FL (ref 8.9–12.7)
POTASSIUM SERPL-SCNC: 4.1 MMOL/L (ref 3.5–5.3)
PROT SERPL-MCNC: 6.8 G/DL (ref 6.4–8.4)
PROT UR STRIP-MCNC: NEGATIVE MG/DL
PROT UR-MCNC: 23 MG/DL
PROT/CREAT UR: 0.64 MG/G{CREAT} (ref 0–0.1)
PROTHROMBIN TIME: 20.7 SECONDS (ref 12.3–15)
RBC # BLD AUTO: 4.65 MILLION/UL (ref 3.88–5.62)
RBC #/AREA URNS AUTO: ABNORMAL /HPF
SODIUM SERPL-SCNC: 137 MMOL/L (ref 135–147)
SP GR UR STRIP.AUTO: 1.01 (ref 1–1.03)
TACROLIMUS BLD-MCNC: 4.5 NG/ML (ref 3–15)
UROBILINOGEN UR STRIP-ACNC: <2 MG/DL
WBC # BLD AUTO: 6.69 THOUSAND/UL (ref 4.31–10.16)
WBC #/AREA URNS AUTO: ABNORMAL /HPF

## 2024-08-07 PROCEDURE — 85610 PROTHROMBIN TIME: CPT

## 2024-08-07 PROCEDURE — 81001 URINALYSIS AUTO W/SCOPE: CPT

## 2024-08-07 PROCEDURE — 85027 COMPLETE CBC AUTOMATED: CPT

## 2024-08-07 PROCEDURE — 36415 COLL VENOUS BLD VENIPUNCTURE: CPT

## 2024-08-07 PROCEDURE — 80197 ASSAY OF TACROLIMUS: CPT

## 2024-08-07 PROCEDURE — 83735 ASSAY OF MAGNESIUM: CPT

## 2024-08-07 PROCEDURE — 84100 ASSAY OF PHOSPHORUS: CPT

## 2024-08-07 PROCEDURE — 84156 ASSAY OF PROTEIN URINE: CPT

## 2024-08-07 PROCEDURE — 82570 ASSAY OF URINE CREATININE: CPT

## 2024-08-07 PROCEDURE — 80053 COMPREHEN METABOLIC PANEL: CPT

## 2024-08-08 DIAGNOSIS — Z86.718 HISTORY OF DVT (DEEP VEIN THROMBOSIS): Primary | ICD-10-CM

## 2024-08-08 RX ORDER — WARFARIN SODIUM 1 MG/1
1 TABLET ORAL
Qty: 15 TABLET | Refills: 1 | Status: SHIPPED | OUTPATIENT
Start: 2024-08-08 | End: 2024-10-07

## 2024-08-08 NOTE — RESULT ENCOUNTER NOTE
Hello    Patient normally is followed up by Ms Eli Villanueva.     Please let patient know that renal function is stable 1.6.  Electrolytes are appropriate with the exception of phosphorus 1.9.  Hemoglobin is at goal.  INR is below goal at 1.7.  His urine is stable with protein 0.6 g/g and tacrolimus is at goal.  - Hypophosphatemia-Can you please make sure the patient is taking phosphorus supplement 2 tablets twice a day.  If he is, please ask him to increase to 3 times a day  -With regards to low INR-please make sure he is talking to his PCP or who is managing his Coumadin to make adjustments if needed  -Please ask him to repeat his transplant lab work in 2 months but repeat a phosphorus level in the coming 1 to 2 weeks    Thank you    np

## 2024-08-09 ENCOUNTER — TELEPHONE (OUTPATIENT)
Dept: NEPHROLOGY | Facility: CLINIC | Age: 49
End: 2024-08-09

## 2024-08-09 ENCOUNTER — OFFICE VISIT (OUTPATIENT)
Dept: NEPHROLOGY | Facility: CLINIC | Age: 49
End: 2024-08-09
Payer: COMMERCIAL

## 2024-08-09 ENCOUNTER — DOCUMENTATION (OUTPATIENT)
Dept: NEPHROLOGY | Facility: CLINIC | Age: 49
End: 2024-08-09

## 2024-08-09 VITALS
DIASTOLIC BLOOD PRESSURE: 80 MMHG | WEIGHT: 163.6 LBS | OXYGEN SATURATION: 100 % | HEIGHT: 66 IN | SYSTOLIC BLOOD PRESSURE: 120 MMHG | HEART RATE: 74 BPM | TEMPERATURE: 97.6 F | RESPIRATION RATE: 18 BRPM | BODY MASS INDEX: 26.29 KG/M2

## 2024-08-09 DIAGNOSIS — E83.39 HYPOPHOSPHATEMIA: ICD-10-CM

## 2024-08-09 DIAGNOSIS — N18.9 CHRONIC KIDNEY DISEASE-MINERAL AND BONE DISORDER: ICD-10-CM

## 2024-08-09 DIAGNOSIS — N18.31 STAGE 3A CHRONIC KIDNEY DISEASE (HCC): ICD-10-CM

## 2024-08-09 DIAGNOSIS — E83.9 CHRONIC KIDNEY DISEASE-MINERAL AND BONE DISORDER: ICD-10-CM

## 2024-08-09 DIAGNOSIS — Z94.0 RENAL TRANSPLANT RECIPIENT: Primary | ICD-10-CM

## 2024-08-09 DIAGNOSIS — I10 ESSENTIAL HYPERTENSION: ICD-10-CM

## 2024-08-09 DIAGNOSIS — M89.9 CHRONIC KIDNEY DISEASE-MINERAL AND BONE DISORDER: ICD-10-CM

## 2024-08-09 DIAGNOSIS — N02.B9 RECURRENT IGA NEPHROPATHY AFTER KIDNEY TRANSPLANTATION: ICD-10-CM

## 2024-08-09 DIAGNOSIS — F33.2 SEVERE EPISODE OF RECURRENT MAJOR DEPRESSIVE DISORDER, WITHOUT PSYCHOTIC FEATURES (HCC): ICD-10-CM

## 2024-08-09 DIAGNOSIS — T86.19 RECURRENT IGA NEPHROPATHY AFTER KIDNEY TRANSPLANTATION: ICD-10-CM

## 2024-08-09 PROCEDURE — 99214 OFFICE O/P EST MOD 30 MIN: CPT | Performed by: INTERNAL MEDICINE

## 2024-08-09 NOTE — PROGRESS NOTES
NEPHROLOGY OFFICE FOLLOW UP  Arnav Joshi 48 y.o. male MRN: [id number]    Encounter: [id number] 8/9/2024    REASON FOR VISIT: Arnav Joshi is a 48 y.o. male who is here on 8/9/2024 for Chronic Kidney Disease and Kidney Transplant  .    HPI:    Arnav came in today for follow-up of CKD and renal transplant.  48 gentleman with history of IgA nephropathy leading to ESRD.  Patient got kidney transplant.  He has some rejection and IgA nephropathy of transplanted kidney.  He has been closely monitored by transplant nephrologist Dr. Vega    He denies any acute complaint    Actually is feeling better as he claims he was started on a new antidepressant medication which is helping a lot    No chest pain no palpitation no shortness of breath    No nausea no vomiting        REVIEW OF SYSTEMS:    Review of Systems   Constitutional:  Negative for fatigue.   HENT:  Negative for congestion.    Eyes:  Negative for photophobia and pain.   Respiratory:  Negative for chest tightness and shortness of breath.    Cardiovascular:  Negative for chest pain and palpitations.   Gastrointestinal:  Negative for abdominal distention, abdominal pain and blood in stool.   Endocrine: Negative for polydipsia.   Genitourinary:  Negative for difficulty urinating, dysuria, flank pain, hematuria and urgency.   Musculoskeletal:  Negative for arthralgias and back pain.   Skin:  Negative for rash.   Neurological:  Negative for dizziness, light-headedness and headaches.   Hematological:  Does not bruise/bleed easily.   Psychiatric/Behavioral:  Negative for behavioral problems. The patient is not nervous/anxious.          PAST MEDICAL HISTORY:  Past Medical History:   Diagnosis Date    ADD (attention deficit disorder)     FRANCES (acute kidney injury) ([facility]) 01/24/2020    Chronic kidney disease     Clotting disorder ([facility]) October 2009    First of many DVTs    Depression     DVT (deep venous thrombosis) ([facility])     Gout Don't remember.    It happened when my kidneys were  not working great.    H/O immunosuppressive therapy     History of kidney disease     HTN (hypertension) 2017    Hypertension     Hypomagnesemia 2020    Left lower quadrant pain 2019    Nephropathy, IgA     Suicidal ideations 2019       PAST SURGICAL HISTORY:  Past Surgical History:   Procedure Laterality Date    NEPHRECTOMY TRANSPLANTED ORGAN      RENAL BIOPSY      First at age 15       SOCIAL HISTORY:  Social History     Substance and Sexual Activity   Alcohol Use Yes    Comment: No regular pattern, almost never more than 3, usually only 1     Social History     Substance and Sexual Activity   Drug Use Not Currently    Types: Marijuana    Comment: smokes marijuana a few times daily     Social History     Tobacco Use   Smoking Status Former    Current packs/day: 0.00    Average packs/day: 0.5 packs/day for 4.0 years (2.0 ttl pk-yrs)    Types: Cigarettes    Start date:     Quit date: 10/22/2000    Years since quittin.8    Passive exposure: Past   Smokeless Tobacco Never       FAMILY HISTORY:  Family History   Problem Relation Age of Onset    Deep vein thrombosis Father     Deep vein thrombosis Paternal Grandfather     Transient ischemic attack Mother     Cancer Maternal Grandfather        MEDICATIONS:    Current Outpatient Medications:     betamethasone, augmented, (DIPROLENE) 0.05 % ointment, Apply topically 2 (two) times a day, Disp: 15 g, Rfl: 0    Cholecalciferol (VITAMIN D-3) 1000 units CAPS, Take 4,000 Units by mouth daily, Disp: , Rfl:     clindamycin (CLEOCIN) 300 MG capsule, if needed For dental procedures, Disp: , Rfl:     clonazePAM (KlonoPIN) 0.5 mg tablet, Take 0.5 mg by mouth if needed PRN, Disp: , Rfl:     desvenlafaxine succinate (PRISTIQ) 50 mg 24 hr tablet, Take 100 mg by mouth daily, Disp: , Rfl:     diltiazem (CARDIZEM) 120 MG tablet, Take 1 tablet (120 mg total) by mouth every 12 (twelve) hours, Disp: 180 tablet, Rfl: 2    methylphenidate (CONCERTA) 27 MG ER  "tablet, Take 27 mg by mouth daily, Disp: , Rfl:     mycophenolate (CELLCEPT) 250 mg capsule, TAKE 2 CAPSULES BY MOUTH IN AM AND 1 CAPSULES IN THE PM, Disp: 270 capsule, Rfl: 5    potassium phosphate, monobasic, (K-PHOS) 500 MG tablet, Take 2 tablets (1,000 mg total) by mouth 2 (two) times a day (Patient taking differently: Take 1,000 mg by mouth 3 (three) times a day), Disp: 360 tablet, Rfl: 3    rosuvastatin (CRESTOR) 5 mg tablet, Take 1 tablet (5 mg total) by mouth daily, Disp: 90 tablet, Rfl: 3    tacrolimus (PROGRAF) 0.5 mg capsule, TAKE 1 CAPS IN AM AND 1 CAP IN PM 10/30/2023, Disp: 270 capsule, Rfl: 3    warfarin (Coumadin) 1 mg tablet, Take 1 tablet (1 mg total) by mouth every other day, Disp: 15 tablet, Rfl: 1    warfarin (COUMADIN) 4 mg tablet, TAKE 2 TABLETS BY MOUTH EVERY DAY, Disp: 180 tablet, Rfl: 4    PHYSICAL EXAM:  Vitals:    08/09/24 1118   BP: 120/80   BP Location: Right arm   Patient Position: Sitting   Pulse: 74   Resp: 18   Temp: 97.6 °F (36.4 °C)   TempSrc: Temporal   SpO2: 100%   Weight: 74.2 kg (163 lb 9.6 oz)   Height: 5' 6\" (1.676 m)     Body mass index is 26.41 kg/m².    Physical Exam  Constitutional:       General: He is not in acute distress.     Appearance: He is well-developed.   HENT:      Head: Normocephalic.      Mouth/Throat:      Mouth: Mucous membranes are moist.   Eyes:      General: No scleral icterus.     Conjunctiva/sclera: Conjunctivae normal.   Neck:      Vascular: No JVD.   Cardiovascular:      Rate and Rhythm: Normal rate.      Heart sounds: Normal heart sounds.   Pulmonary:      Effort: Pulmonary effort is normal.      Breath sounds: No wheezing.   Abdominal:      Palpations: Abdomen is soft.      Tenderness: There is no abdominal tenderness.   Musculoskeletal:         General: Normal range of motion.      Cervical back: Neck supple.   Skin:     General: Skin is warm.      Findings: No rash.   Neurological:      Mental Status: He is alert and oriented to person, place, " and time.   Psychiatric:         Behavior: Behavior normal.         LAB RESULTS:  Results for orders placed or performed in visit on 08/07/24   CBC   Result Value Ref Range    WBC 6.69 4.31 - 10.16 Thousand/uL    RBC 4.65 3.88 - 5.62 Million/uL    Hemoglobin 14.9 12.0 - 17.0 g/dL    Hematocrit 44.8 36.5 - 49.3 %    MCV 96 82 - 98 fL    MCH 32.0 26.8 - 34.3 pg    MCHC 33.3 31.4 - 37.4 g/dL    RDW 12.5 11.6 - 15.1 %    Platelets 214 149 - 390 Thousands/uL    MPV 10.5 8.9 - 12.7 fL   Comprehensive metabolic panel   Result Value Ref Range    Sodium 137 135 - 147 mmol/L    Potassium 4.1 3.5 - 5.3 mmol/L    Chloride 107 96 - 108 mmol/L    CO2 23 21 - 32 mmol/L    ANION GAP 7 4 - 13 mmol/L    BUN 38 (H) 5 - 25 mg/dL    Creatinine 1.64 (H) 0.60 - 1.30 mg/dL    Glucose, Fasting 90 65 - 99 mg/dL    Calcium 9.3 8.4 - 10.2 mg/dL    AST 18 13 - 39 U/L    ALT 16 7 - 52 U/L    Alkaline Phosphatase 79 34 - 104 U/L    Total Protein 6.8 6.4 - 8.4 g/dL    Albumin 3.9 3.5 - 5.0 g/dL    Total Bilirubin 0.40 0.20 - 1.00 mg/dL    eGFR 48 ml/min/1.73sq m   Magnesium   Result Value Ref Range    Magnesium 1.9 1.9 - 2.7 mg/dL   Phosphorus   Result Value Ref Range    Phosphorus 1.9 (L) 2.7 - 4.5 mg/dL   Protein / creatinine ratio, urine   Result Value Ref Range    Creatinine, Ur 35.8 Reference range not established. mg/dL    Protein Urine Random 23 Reference range not established. mg/dL    Prot/Creat Ratio, Ur 0.64 (H) 0.00 - 0.10   Tacrolimus level   Result Value Ref Range    TACROLIMUS 4.5 3.0 - 15.0 ng/mL   Urinalysis with microscopic   Result Value Ref Range    Color, UA Colorless     Clarity, UA Clear     Specific Gravity, UA 1.009 1.003 - 1.030    pH, UA 5.0 4.5, 5.0, 5.5, 6.0, 6.5, 7.0, 7.5, 8.0    Leukocytes, UA Negative Negative    Nitrite, UA Negative Negative    Protein, UA Negative Negative mg/dl    Glucose, UA Negative Negative mg/dl    Ketones, UA Negative Negative mg/dl    Urobilinogen, UA <2.0 <2.0 mg/dl mg/dl    Bilirubin, UA  "Negative Negative    Occult Blood, UA Trace (A) Negative    RBC, UA None Seen None Seen, 1-2 /hpf    WBC, UA None Seen None Seen, 1-2 /hpf    Epithelial Cells None Seen None Seen, Occasional /hpf    Bacteria, UA None Seen None Seen, Occasional /hpf   Protime-INR   Result Value Ref Range    Protime 20.7 (H) 12.3 - 15.0 seconds    INR 1.70 (H) 0.85 - 1.19       ASSESSMENT and PLAN:      Renal transplant recipient  Patient does have a recurrence of IgA nephropathy.  At present kidney function is stable at stage III.  On immunosuppression medication and being monitored by transplant nephrologist    Stage 3 chronic kidney disease (HCC)  Lab Results   Component Value Date    EGFR 48 08/07/2024    EGFR 52 05/10/2024    EGFR 47 04/02/2024    CREATININE 1.64 (H) 08/07/2024    CREATININE 1.52 (H) 05/10/2024    CREATININE 1.65 (H) 04/02/2024   Renal function stable for now.  Advise hydration and avoiding nephrotoxic medication    Chronic kidney disease-mineral and bone disorder  Lab Results   Component Value Date    EGFR 48 08/07/2024    EGFR 52 05/10/2024    EGFR 47 04/02/2024    CREATININE 1.64 (H) 08/07/2024    CREATININE 1.52 (H) 05/10/2024    CREATININE 1.65 (H) 04/02/2024   PTH and phosphorus along with vitamin D are within acceptable range and will continue to monitor    Essential hypertension  Very well-controlled    Recurrent IgA nephropathy after kidney transplantation  Because of the CKD in transplanted kidney      Everything discussed at length with the patient.  I will see him back in 6 months.  In between he was seen by Dr. Franco Vega also.  He does get regular blood test by Dr. Vega        Portions of the record may have been created with voice recognition software. Occasional wrong word or \"sound a like\" substitutions may have occurred due to the inherent limitations of voice recognition software. Read the chart carefully and recognize, using context, where substitutions have occurred.If you have any " questions, please contact the dictating provider.

## 2024-08-09 NOTE — TELEPHONE ENCOUNTER
Pt informed with results and recommendations. He confirmed that he does take the phos supplements 2 tabs bid, will increase to 2 tabs tid. Med list up to date. Lab order in the system. PCP already contacted him about INR.

## 2024-08-09 NOTE — ASSESSMENT & PLAN NOTE
Patient does have a recurrence of IgA nephropathy.  At present kidney function is stable at stage III.  On immunosuppression medication and being monitored by transplant nephrologist

## 2024-08-09 NOTE — TELEPHONE ENCOUNTER
----- Message from Lev Vega MD sent at 8/8/2024  6:14 PM EDT -----  Hello    Patient normally is followed up by Ms Eli Villanueva.     Please let patient know that renal function is stable 1.6.  Electrolytes are appropriate with the exception of phosphorus 1.9.  Hemoglobin is at goal.  INR is below goal at 1.7.  His urine is stable with protein 0.6 g/g and tacrolimus is at goal.  - Hypophosphatemia-Can you please make sure the patient is taking phosphorus supplement 2 tablets twice a day.  If he is, please ask him to increase to 3 times a day  -With regards to low INR-please make sure he is talking to his PCP or who is managing his Coumadin to make adjustments if needed  -Please ask him to repeat his transplant lab work in 2 months but repeat a phosphorus level in the coming 1 to 2 weeks    Thank you    np

## 2024-08-09 NOTE — ASSESSMENT & PLAN NOTE
Lab Results   Component Value Date    EGFR 48 08/07/2024    EGFR 52 05/10/2024    EGFR 47 04/02/2024    CREATININE 1.64 (H) 08/07/2024    CREATININE 1.52 (H) 05/10/2024    CREATININE 1.65 (H) 04/02/2024   Renal function stable for now.  Advise hydration and avoiding nephrotoxic medication

## 2024-08-09 NOTE — ASSESSMENT & PLAN NOTE
Lab Results   Component Value Date    EGFR 48 08/07/2024    EGFR 52 05/10/2024    EGFR 47 04/02/2024    CREATININE 1.64 (H) 08/07/2024    CREATININE 1.52 (H) 05/10/2024    CREATININE 1.65 (H) 04/02/2024   PTH and phosphorus along with vitamin D are within acceptable range and will continue to monitor

## 2024-08-12 ENCOUNTER — PATIENT MESSAGE (OUTPATIENT)
Dept: NEPHROLOGY | Facility: CLINIC | Age: 49
End: 2024-08-12

## 2024-08-12 DIAGNOSIS — E83.39 HYPOPHOSPHATEMIA: ICD-10-CM

## 2024-08-30 DIAGNOSIS — Z86.718 HISTORY OF DVT (DEEP VEIN THROMBOSIS): ICD-10-CM

## 2024-08-30 RX ORDER — WARFARIN SODIUM 1 MG/1
1 TABLET ORAL
Qty: 45 TABLET | Refills: 1 | Status: SHIPPED | OUTPATIENT
Start: 2024-08-30 | End: 2024-10-29

## 2024-09-14 DIAGNOSIS — Z94.0 KIDNEY TRANSPLANTED: ICD-10-CM

## 2024-09-16 RX ORDER — TACROLIMUS 0.5 MG/1
CAPSULE ORAL
Qty: 90 CAPSULE | Refills: 11 | Status: SHIPPED | OUTPATIENT
Start: 2024-09-16

## 2024-10-13 DIAGNOSIS — I10 ESSENTIAL HYPERTENSION: ICD-10-CM

## 2024-10-14 RX ORDER — DILTIAZEM HYDROCHLORIDE 120 MG/1
120 TABLET, FILM COATED ORAL EVERY 12 HOURS
Qty: 180 TABLET | Refills: 1 | Status: SHIPPED | OUTPATIENT
Start: 2024-10-14

## 2024-11-08 ENCOUNTER — TELEPHONE (OUTPATIENT)
Age: 49
End: 2024-11-08

## 2024-11-08 ENCOUNTER — LAB (OUTPATIENT)
Dept: LAB | Facility: HOSPITAL | Age: 49
End: 2024-11-08
Payer: COMMERCIAL

## 2024-11-08 DIAGNOSIS — Z79.01 ANTICOAGULATED ON WARFARIN: ICD-10-CM

## 2024-11-08 DIAGNOSIS — Z86.718 HISTORY OF DVT (DEEP VEIN THROMBOSIS): Primary | ICD-10-CM

## 2024-11-08 DIAGNOSIS — Z94.0 KIDNEY TRANSPLANTED: ICD-10-CM

## 2024-11-08 DIAGNOSIS — Z79.01 WARFARIN ANTICOAGULATION: ICD-10-CM

## 2024-11-08 DIAGNOSIS — Z86.718 HISTORY OF DVT (DEEP VEIN THROMBOSIS): ICD-10-CM

## 2024-11-08 LAB
ALBUMIN SERPL BCG-MCNC: 4.1 G/DL (ref 3.5–5)
ALP SERPL-CCNC: 86 U/L (ref 34–104)
ALT SERPL W P-5'-P-CCNC: 14 U/L (ref 7–52)
ANION GAP SERPL CALCULATED.3IONS-SCNC: 5 MMOL/L (ref 4–13)
AST SERPL W P-5'-P-CCNC: 19 U/L (ref 13–39)
BACTERIA UR QL AUTO: ABNORMAL /HPF
BILIRUB SERPL-MCNC: 0.57 MG/DL (ref 0.2–1)
BILIRUB UR QL STRIP: NEGATIVE
BUN SERPL-MCNC: 35 MG/DL (ref 5–25)
CALCIUM SERPL-MCNC: 9.4 MG/DL (ref 8.4–10.2)
CHLORIDE SERPL-SCNC: 104 MMOL/L (ref 96–108)
CLARITY UR: CLEAR
CO2 SERPL-SCNC: 27 MMOL/L (ref 21–32)
COLOR UR: ABNORMAL
CREAT SERPL-MCNC: 1.77 MG/DL (ref 0.6–1.3)
CREAT UR-MCNC: 68.8 MG/DL
ERYTHROCYTE [DISTWIDTH] IN BLOOD BY AUTOMATED COUNT: 12.2 % (ref 11.6–15.1)
GFR SERPL CREATININE-BSD FRML MDRD: 43 ML/MIN/1.73SQ M
GLUCOSE P FAST SERPL-MCNC: 94 MG/DL (ref 65–99)
GLUCOSE UR STRIP-MCNC: NEGATIVE MG/DL
HCT VFR BLD AUTO: 46.7 % (ref 36.5–49.3)
HGB BLD-MCNC: 15 G/DL (ref 12–17)
HGB UR QL STRIP.AUTO: ABNORMAL
INR PPP: 1.99 (ref 0.85–1.19)
KETONES UR STRIP-MCNC: NEGATIVE MG/DL
LEUKOCYTE ESTERASE UR QL STRIP: NEGATIVE
MAGNESIUM SERPL-MCNC: 2 MG/DL (ref 1.9–2.7)
MCH RBC QN AUTO: 30.9 PG (ref 26.8–34.3)
MCHC RBC AUTO-ENTMCNC: 32.1 G/DL (ref 31.4–37.4)
MCV RBC AUTO: 96 FL (ref 82–98)
NITRITE UR QL STRIP: NEGATIVE
NON-SQ EPI CELLS URNS QL MICRO: ABNORMAL /HPF
PH UR STRIP.AUTO: 6 [PH]
PHOSPHATE SERPL-MCNC: 2.2 MG/DL (ref 2.7–4.5)
PLATELET # BLD AUTO: 211 THOUSANDS/UL (ref 149–390)
PMV BLD AUTO: 9.9 FL (ref 8.9–12.7)
POTASSIUM SERPL-SCNC: 4.8 MMOL/L (ref 3.5–5.3)
PROT SERPL-MCNC: 6.9 G/DL (ref 6.4–8.4)
PROT UR STRIP-MCNC: ABNORMAL MG/DL
PROT UR-MCNC: 47.8 MG/DL
PROT/CREAT UR: 0.7 MG/G{CREAT} (ref 0–0.1)
PROTHROMBIN TIME: 23.4 SECONDS (ref 12.3–15)
RBC # BLD AUTO: 4.86 MILLION/UL (ref 3.88–5.62)
RBC #/AREA URNS AUTO: ABNORMAL /HPF
SODIUM SERPL-SCNC: 136 MMOL/L (ref 135–147)
SP GR UR STRIP.AUTO: 1.01 (ref 1–1.03)
TACROLIMUS BLD-MCNC: 3.8 NG/ML (ref 3–15)
UROBILINOGEN UR STRIP-ACNC: <2 MG/DL
WBC # BLD AUTO: 6.67 THOUSAND/UL (ref 4.31–10.16)
WBC #/AREA URNS AUTO: ABNORMAL /HPF

## 2024-11-08 PROCEDURE — 85610 PROTHROMBIN TIME: CPT

## 2024-11-08 PROCEDURE — 82570 ASSAY OF URINE CREATININE: CPT

## 2024-11-08 PROCEDURE — 84100 ASSAY OF PHOSPHORUS: CPT

## 2024-11-08 PROCEDURE — 81001 URINALYSIS AUTO W/SCOPE: CPT

## 2024-11-08 PROCEDURE — 36415 COLL VENOUS BLD VENIPUNCTURE: CPT

## 2024-11-08 PROCEDURE — 83735 ASSAY OF MAGNESIUM: CPT

## 2024-11-08 PROCEDURE — 85027 COMPLETE CBC AUTOMATED: CPT

## 2024-11-08 PROCEDURE — 80053 COMPREHEN METABOLIC PANEL: CPT

## 2024-11-08 PROCEDURE — 80197 ASSAY OF TACROLIMUS: CPT

## 2024-11-08 PROCEDURE — 84156 ASSAY OF PROTEIN URINE: CPT

## 2024-11-08 NOTE — TELEPHONE ENCOUNTER
----- Message from Remi Garcia PA-C sent at 11/8/2024 12:50 PM EST -----  yes  ----- Message -----  From: Doretha Stokes MA  Sent: 11/8/2024  12:41 PM EST  To: Remi Garcia PA-C    Should he continue to take the same dose?  ----- Message -----  From: Remi Garcia PA-C  Sent: 11/8/2024  12:37 PM EST  To: [facility]    INR going up, at 1.99  Should recheck in 1 month lab ordered

## 2024-11-11 ENCOUNTER — TELEPHONE (OUTPATIENT)
Dept: NEPHROLOGY | Facility: CLINIC | Age: 49
End: 2024-11-11

## 2024-11-11 NOTE — RESULT ENCOUNTER NOTE
Hello    Patient normally is followed up by Ms Eli Villanueva.     I am seeing the patient in a couple days but his tacrolimus level is low.  He is currently taking 1 mg total in the morning and 0.5 mg total in the evening.  Please asked him to increase to 1 mg twice daily.  Please adjust medication list accordingly.  I will review further with him at the upcoming appointment.    Thank you    np

## 2024-11-11 NOTE — TELEPHONE ENCOUNTER
Message left on patient's VM that Tac level is low.  Per Dr. Vega, increase Tacrolimus to  1 mg. BID.  Pt has followup visit tomorrow,  11/12/24.  (Asked that pt call back to ensure he received my message)    ----- Message from Lev Vega MD sent at 11/10/2024  8:46 PM EST -----  Hello    Patient normally is followed up by Ms Eli Villanueva.     I am seeing the patient in a couple days but his tacrolimus level is low.  He is currently taking 1 mg total in the morning and 0.5 mg total in the evening.  Please asked him to increase to 1 mg twice daily.  Please adjust medication list accordingly.  I will review further with him at the upcoming appointment.    Thank you    np

## 2024-11-12 ENCOUNTER — TELEPHONE (OUTPATIENT)
Dept: NEPHROLOGY | Facility: CLINIC | Age: 49
End: 2024-11-12

## 2024-11-12 ENCOUNTER — OFFICE VISIT (OUTPATIENT)
Dept: NEPHROLOGY | Facility: CLINIC | Age: 49
End: 2024-11-12
Payer: COMMERCIAL

## 2024-11-12 VITALS
WEIGHT: 178 LBS | OXYGEN SATURATION: 94 % | DIASTOLIC BLOOD PRESSURE: 86 MMHG | HEIGHT: 66 IN | BODY MASS INDEX: 28.61 KG/M2 | HEART RATE: 78 BPM | SYSTOLIC BLOOD PRESSURE: 122 MMHG

## 2024-11-12 DIAGNOSIS — R01.1 MURMUR: Primary | ICD-10-CM

## 2024-11-12 DIAGNOSIS — D84.9 IMMUNOSUPPRESSION (HCC): ICD-10-CM

## 2024-11-12 DIAGNOSIS — Z94.0 KIDNEY TRANSPLANTED: ICD-10-CM

## 2024-11-12 PROCEDURE — 99214 OFFICE O/P EST MOD 30 MIN: CPT | Performed by: INTERNAL MEDICINE

## 2024-11-12 RX ORDER — TACROLIMUS 0.5 MG/1
CAPSULE ORAL
Start: 2024-11-12

## 2024-11-12 RX ORDER — DESVENLAFAXINE 100 MG/1
1 TABLET, EXTENDED RELEASE ORAL DAILY
COMMUNITY
Start: 2024-10-23

## 2024-11-12 NOTE — TELEPHONE ENCOUNTER
Pt was seen today 11/12/24 and is supposed to come back in Aug/Sept for a post trans follow up with Dr Vega. Dr Vega requested pt to be schedule for earliest appt available in the day, however Dr Vega's schedule only has afternoons in Sept. Please advise for scheduling. Thanks.

## 2024-11-12 NOTE — PATIENT INSTRUCTIONS
1) Avoid NSAIDS - (Example - motrin, advil, ibuprofen, aleve, exederin, etc)  2) Always follow a low salt diet  3) If you have any issues with trouble with nausea, vomiting, urinating, blood in the urine, food tasting like metal, confusion, weakness, fatigue that is worsening, or any other questions, please call right away.  4) Please continue to follow regularly with your family physician and any other specialist that you are advised to see and continue to follow their recommendations  5) If you have any emergencies, please go to the nearest urgent care or emergency room and please inform your family physician and our office once you are able to.  6) continue tacrolimus 1 mg in AM and 0.5 mg in PM  7) labwork in one week and full labs every 2-3 months  8) appointment with Dr Carrera in February  9) appointment with our office in July or August 2025  10) please complete echo - this is ultrasound of heart to follow up heart murmur heard in the office. If you have any shortness of breath, or leg swelling please call right away or go to the nearest ER  11) no changes to your medications  12) please update covid and flu vaccine

## 2024-11-12 NOTE — PROGRESS NOTES
NEPHROLOGY OFFICE VISIT   Arnav Joshi 49 y.o. male MRN: [id number]  11/12/2024    Reason for Visit: Renal transplant follow-up    ASSESSMENT and PLAN:    I had the pleasure of seeing Mr Joshi today in the renal clinic for the continued management of renal transplant follow-up.     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 Then admission in January 2020 for FRANCES with Cr to 3.4 mg/dL in setting of n/v/d. End of October 2020, patient had missed tacrolimus for one week and creat had increased to 2.1 requiring steroid therapy. Pt presenting for f/u visit.      CXR - 1/23/2020 - no acute disease     CT abd/pel - atrophic native kidneys, unremarkable renal transplant     U/s liver - 2.2 cm R hepatic echogenic mass reflecting hemangioma     1) CKD III, renal transplant -2009 for living unrelated donor     Prior-history obtained from [place], and [facility] and here at [facility]'s records:     -Baseline Cr 1.6-1.9 mg/dL;  -Tac levels from 2009 - 2014 were 7-10  -Patient followed at [facility] - followed with Dr Smith   -Prior baseline Cr 1.4-1.5 mg/dL and had been rising to 1.8 mg/dL  -pt had early CMV - on EGD  - last time seen was in May 2017 at [facility] - no known rejection ([phone])  -Has never had renal biopsy post transplant     Old Labwork review:     - CT abd/pel without hydro or nephrolithiasis on transplanted kidney  - to note, patient has had microscopic hematuria as far back as 2014 on UA  - BK and CMV negative 10/2020  - urine eos 0%     October 2020:       -  pt had undetectable tac  level end of October. Pt states missed one week of tac due to not being able to obtain the medication  - creat had increased to 2.1 end of October.  Patient was given three doses of Solu-Medrol starting October 24th.  -  HLA completed on November 5th-no class 2 DSA, no class 1 DSA, PRA 15%, patient has watch antibodies      since then, the creatinine has ranged from approximately 1.6-2 mg/dL.       Important Medication notes:   - on diltiazem to note (interaction with tacrolimus)     Appointment November 12, 2024-lab work reviewed from November 8 with the patient.  Creatinine at baseline 1.7 mg/dL.  Sodium, potassium, bicarbonate are appropriate.  Phosphorus is improving to 2.2.  Magnesium is appropriate at 2.  White count, hemoglobin, platelets are appropriate.  INR 1.99.  Urinalysis with trace protein but no RBC.  Protein in urine 0.7 g/g.  Tacrolimus below goal to 3.8.  After the lab work, we have called the patient to ask him to increase tacrolimus to 1 mg twice daily.  Patient overall feels well.  On exam today there is a new murmur auscultated.  I do not recall patient having a cardiac murmur on my prior exams.  I reviewed this with the patient and he is agreeable with echocardiogram.  He has no symptoms of volume overload or heart failure.  We reviewed the symptoms to monitor for and to call right away or go to the emergency room if needed.     Plan:     -Change tacrolimus to 1 mg in AM and 0.5 mg in PM  -continue  mg in the morning and 250 mg in the evening   -continue statin  - Check echocardiogram  -Repeat lab work in 1 week after tacrolimus change  - Full lab work every 2 to 3 months  - Appointment can alternate with Dr. Carrera every 3 months  - to note, if biopsy is needed  In the future will need bridge with heparin as is on coumadin for mult DVT prior  - If proteinuria rises, may consider changing to ARB but will need to watch tacrolimus levels closely if reducing diltiazem.  Check SPEP, UPEP,  free light chain for completeness  -Continue phosphorus binders  - I reviewed with the patient the proteinuria progression.     2) Immune Suppression-on 2 drug regimen with tacrolimus 1 mg in the morning and 0.5 mg in the evening and mycophenolate 500 mg in the morning and 250 mg in the evening     - June 2020 - pt did not have tacrolimus for a few days because he was waiting on refill and did not realize that it was backordered. Issue resolved and restarted tacrolimus  - 10/2020 - did not have tac for one week and see above.    -  in AM and 250 mg PM  - history CMV  - pt has had labile tacrolimus levels-current level subtherapeutic November 2024.  Patient was inadvertently taking tacrolimus 0.5 mg twice daily still.  We will increase back to 1 mg in the morning and 0.5 mg in the evening    3) DVT - recurrent DVT. Most recent march 2018     - 4 prior DVTs  - 1 RUE post PICC  - then 3 in LE  - further plans per primary team  - INR per Primary physician team      4) Vaccines     - stay up to date on vaccine with flu vaccine  - PNA vaccine - pt will review with Primary Physician  - no live vaccines  -Stay up-to-date on appropriate inactivated vaccines  -November 2024-patient was reminded to stay up-to-date on vaccines with flu and COVID-vaccine boosters     5) Age appropriate Ca screening     - saw Derm  11/2021. Was placed on doxy for 1 week due to folliculitis concerning for MRSA  - pt had yearly appt in 2024  - Colonoscopy-patient completed Cologuard with PCP.  We reviewed the colonoscopy will also be important.  Difficulty here, per the patient, is the Coumadin that cannot be held.  We will continue to readdress  - PSA-ordered for November 2024 lab work  - has history of psoriaform dermatitis      6) HTN     - pt is on diltiazem - BP well controlled     7) HPL     - further plan per primary team.  -would continue to monitor lipid panel closely  -Is on statin     9) Depression     -prior suicide attempts -  prior 2003  - patient follows with Psychiatry  -Patient was recently admitted early 2024 for mental health decompensation.  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  Check PTH again.     13) liver lesion on u/s in hospital     - see above for GI f/u  - saw GI in April  - pt saw GI 4/2021 --> MRI completed in may - no evidence of hemangioma or mass;      14) abdominal pain-currently resolved and no pain     Patient had ER visit in March 2024 for abdominal pain.  Was felt to be viral gastroenteritis and improved with conservative management.  But during further workup, was noted to have elevated amylase and lipase.  Given his history of pancreatitis, patient was referred to gastroenterology..  - EBV PCR was noted to be positive at 650 in March 2024 but level improved and remains negative  - Amylase and lipase were borderline elevated-patient saw gastroenterology team and felt no further trending was necessary and workup was not necessary.     15-hepatic steatosis-patient was advised to lose weight by his primary team and he has lost approximately 20 pounds as of June 2024    16-murmur-check echocardiogram.  November 2024    It was a pleasure evaluating your patient in the office today. Thank you for allowing our team to participate in the care of Mr Arnav Joshi. Please do not hesitate to contact our team if further issues/questions shall arise in the interim.     No problem-specific Assessment & Plan notes found for this encounter.        HPI:    Patient denies complaints.  No fevers, chills, nausea, vomiting, shortness of breath.    PATIENT INSTRUCTIONS:    There are no Patient Instructions on file for this visit.      OBJECTIVE:  Current  Weight:    There were no vitals filed for this visit. There is no height or weight on file to calculate BMI.      REVIEW OF SYSTEMS:    Review of Systems   Constitutional: Negative.  Negative for appetite change and fatigue.   HENT: Negative.     Eyes: Negative.    Respiratory: Negative.  Negative for shortness of breath.    Cardiovascular: Negative.  Negative for leg swelling.   Gastrointestinal: Negative.    Endocrine: Negative.    Genitourinary: Negative.  Negative for difficulty urinating.   Musculoskeletal: Negative.    Allergic/Immunologic: Negative.    Neurological: Negative.    Hematological: Negative.    Psychiatric/Behavioral: Negative.     All other systems reviewed and are negative.      PHYSICAL EXAM:      Physical Exam  Vitals and nursing note reviewed.   Constitutional:       General: He is not in acute distress.     Appearance: He is well-developed. He is not diaphoretic.   HENT:      Head: Normocephalic and atraumatic.   Eyes:      General: No scleral icterus.        Right eye: No discharge.         Left eye: No discharge.      Conjunctiva/sclera: Conjunctivae normal.   Cardiovascular:      Rate and Rhythm: Normal rate and regular rhythm.      Heart sounds: Murmur heard.      No friction rub. No gallop.   Pulmonary:      Effort: Pulmonary effort is normal. No respiratory distress.      Breath sounds: Normal breath sounds. No wheezing or rales.   Chest:      Chest wall: No tenderness.   Abdominal:      General: Bowel sounds are normal. There is no distension.      Palpations: Abdomen is soft.      Tenderness: There is no abdominal tenderness. There is no rebound.      Comments: Left lower quadrant allograft site without tenderness   Musculoskeletal:         General: No tenderness or deformity. Normal range of motion.      Cervical back: Normal range of motion and neck supple.      Right lower leg: No edema.      Left lower leg: No edema.   Skin:     General: Skin is warm and dry.      Coloration:  Skin is not pale.      Findings: No erythema or rash.   Neurological:      Mental Status: He is alert and oriented to person, place, and time.      Cranial Nerves: No cranial nerve deficit.      Coordination: Coordination normal.   Psychiatric:         Behavior: Behavior normal.         Thought Content: Thought content normal.         Judgment: Judgment normal.         Medications:    Current Outpatient Medications:     desvenlafaxine (PRISTIQ) 100 mg 24 hr tablet, Take 1 tablet by mouth in the morning, Disp: , Rfl:     betamethasone, augmented, (DIPROLENE) 0.05 % ointment, Apply topically 2 (two) times a day, Disp: 15 g, Rfl: 0    Cholecalciferol (VITAMIN D-3) 1000 units CAPS, Take 4,000 Units by mouth daily, Disp: , Rfl:     clindamycin (CLEOCIN) 300 MG capsule, if needed For dental procedures, Disp: , Rfl:     clonazePAM (KlonoPIN) 0.5 mg tablet, Take 0.5 mg by mouth if needed PRN, Disp: , Rfl:     desvenlafaxine succinate (PRISTIQ) 50 mg 24 hr tablet, Take 100 mg by mouth daily, Disp: , Rfl:     diltiazem (CARDIZEM) 120 MG tablet, TAKE 1 TABLET BY MOUTH EVERY 12 HOURS., Disp: 180 tablet, Rfl: 1    methylphenidate (CONCERTA) 27 MG ER tablet, Take 27 mg by mouth daily, Disp: , Rfl:     mycophenolate (CELLCEPT) 250 mg capsule, TAKE 2 CAPSULES BY MOUTH IN AM AND 1 CAPSULES IN THE PM, Disp: 270 capsule, Rfl: 5    potassium phosphate, monobasic, (K-PHOS) 500 MG tablet, Take 2 tablets (1,000 mg total) by mouth 3 (three) times a day, Disp: 540 tablet, Rfl: 3    rosuvastatin (CRESTOR) 5 mg tablet, Take 1 tablet (5 mg total) by mouth daily, Disp: 90 tablet, Rfl: 3    tacrolimus (PROGRAF) 0.5 mg capsule, TAKE 2 CAPS IN AM AND 1 CAP IN PM (Patient taking differently: 1 mg 2 (two) times a day), Disp: 90 capsule, Rfl: 11    warfarin (COUMADIN) 1 mg tablet, TAKE 1 TABLET (1 MG TOTAL) BY MOUTH EVERY OTHER DAY, Disp: 45 tablet, Rfl: 1    warfarin (COUMADIN) 4 mg tablet, TAKE 2 TABLETS BY MOUTH EVERY DAY, Disp: 180 tablet, Rfl:  4    Laboratory Results:  Results from last 7 days   Lab Units 11/08/24  0836   WBC Thousand/uL 6.67   HEMOGLOBIN g/dL 15.0   HEMATOCRIT % 46.7   PLATELETS Thousands/uL 211   POTASSIUM mmol/L 4.8   CHLORIDE mmol/L 104   CO2 mmol/L 27   BUN mg/dL 35*   CREATININE mg/dL 1.77*   CALCIUM mg/dL 9.4   MAGNESIUM mg/dL 2.0   PHOSPHORUS mg/dL 2.2*       Results for orders placed or performed in visit on 11/08/24   CBC    Collection Time: 11/08/24  8:36 AM   Result Value Ref Range    WBC 6.67 4.31 - 10.16 Thousand/uL    RBC 4.86 3.88 - 5.62 Million/uL    Hemoglobin 15.0 12.0 - 17.0 g/dL    Hematocrit 46.7 36.5 - 49.3 %    MCV 96 82 - 98 fL    MCH 30.9 26.8 - 34.3 pg    MCHC 32.1 31.4 - 37.4 g/dL    RDW 12.2 11.6 - 15.1 %    Platelets 211 149 - 390 Thousands/uL    MPV 9.9 8.9 - 12.7 fL   Comprehensive metabolic panel    Collection Time: 11/08/24  8:36 AM   Result Value Ref Range    Sodium 136 135 - 147 mmol/L    Potassium 4.8 3.5 - 5.3 mmol/L    Chloride 104 96 - 108 mmol/L    CO2 27 21 - 32 mmol/L    ANION GAP 5 4 - 13 mmol/L    BUN 35 (H) 5 - 25 mg/dL    Creatinine 1.77 (H) 0.60 - 1.30 mg/dL    Glucose, Fasting 94 65 - 99 mg/dL    Calcium 9.4 8.4 - 10.2 mg/dL    AST 19 13 - 39 U/L    ALT 14 7 - 52 U/L    Alkaline Phosphatase 86 34 - 104 U/L    Total Protein 6.9 6.4 - 8.4 g/dL    Albumin 4.1 3.5 - 5.0 g/dL    Total Bilirubin 0.57 0.20 - 1.00 mg/dL    eGFR 43 ml/min/1.73sq m   Magnesium    Collection Time: 11/08/24  8:36 AM   Result Value Ref Range    Magnesium 2.0 1.9 - 2.7 mg/dL   Phosphorus    Collection Time: 11/08/24  8:36 AM   Result Value Ref Range    Phosphorus 2.2 (L) 2.7 - 4.5 mg/dL   Protein / creatinine ratio, urine    Collection Time: 11/08/24  8:36 AM   Result Value Ref Range    Creatinine, Ur 68.8 Reference range not established. mg/dL    Protein Urine Random 47.8 Reference range not established. mg/dL    Prot/Creat Ratio, Ur 0.7 (H) 0.0 - 0.1   Tacrolimus level    Collection Time: 11/08/24  8:36 AM   Result  Value Ref Range    TACROLIMUS 3.8 3.0 - 15.0 ng/mL   Urinalysis with microscopic    Collection Time: 11/08/24  8:36 AM   Result Value Ref Range    Color, UA Light Yellow     Clarity, UA Clear     Specific Gravity, UA 1.012 1.003 - 1.030    pH, UA 6.0 4.5, 5.0, 5.5, 6.0, 6.5, 7.0, 7.5, 8.0    Leukocytes, UA Negative Negative    Nitrite, UA Negative Negative    Protein, UA Trace (A) Negative mg/dl    Glucose, UA Negative Negative mg/dl    Ketones, UA Negative Negative mg/dl    Urobilinogen, UA <2.0 <2.0 mg/dl mg/dl    Bilirubin, UA Negative Negative    Occult Blood, UA Trace (A) Negative    RBC, UA None Seen None Seen, 1-2 /hpf    WBC, UA 1-2 None Seen, 1-2 /hpf    Epithelial Cells None Seen None Seen, Occasional /hpf    Bacteria, UA None Seen None Seen, Occasional /hpf   Protime-INR    Collection Time: 11/08/24  8:36 AM   Result Value Ref Range    Protime 23.4 (H) 12.3 - 15.0 seconds    INR 1.99 (H) 0.85 - 1.19

## 2024-11-12 NOTE — LETTER
November 12, 2024     Remi Garcia PA-C  [address] [facility] [address]    Patient: Arnav Joshi   YOB: 1975   Date of Visit: 11/12/2024       Dear Dr. Garcia:    Thank you for referring Arnav Joshi to me for evaluation. Below are my notes for this consultation.    If you have questions, please do not hesitate to call me. I look forward to following your patient along with you.         Sincerely,        Lev Vega MD        CC: MD Lev Jenkins MD  11/12/2024 11:57 AM  Sign when Signing Visit  NEPHROLOGY OFFICE VISIT   Arnav Joshi 49 y.o. male MRN: [id number]  11/12/2024    Reason for Visit: Renal transplant follow-up    ASSESSMENT and PLAN:    I had the pleasure of seeing Mr Joshi today in the renal clinic for the continued management of renal transplant follow-up.     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 Then admission in January 2020 for FRANCES with Cr to 3.4 mg/dL in setting of n/v/d. End of October 2020, patient had missed tacrolimus for one week and creat had increased to 2.1 requiring steroid therapy. Pt presenting for f/u visit.      CXR - 1/23/2020 - no acute disease     CT abd/pel - atrophic native kidneys, unremarkable renal transplant     U/s liver - 2.2 cm R hepatic echogenic mass reflecting hemangioma     1) CKD III, renal transplant -2009 for living unrelated donor     Prior-history obtained from [place], and [facility] and here at [facility]'s records:     -Baseline Cr 1.6-1.9 mg/dL;  -Tac levels from 2009 - 2014 were  7-10  -Patient followed at [facility] - followed with Dr Smith   -Prior baseline Cr 1.4-1.5 mg/dL and had been rising to 1.8 mg/dL  -pt had early CMV - on EGD  - last time seen was in May 2017 at [facility] - no known rejection ([phone])  -Has never had renal biopsy post transplant     Old Labwork review:     - CT abd/pel without hydro or nephrolithiasis on transplanted kidney  - to note, patient has had microscopic hematuria as far back as 2014 on UA  - BK and CMV negative 10/2020  - urine eos 0%     October 2020:       -  pt had undetectable tac level end of October. Pt states missed one week of tac due to not being able to obtain the medication  - creat had increased to 2.1 end of October.  Patient was given three doses of Solu-Medrol starting October 24th.  -  HLA completed on November 5th-no class 2 DSA, no class 1 DSA, PRA 15%, patient has watch antibodies      since then, the creatinine has ranged from approximately 1.6-2 mg/dL.       Important Medication notes:   - on diltiazem to note (interaction with tacrolimus)     Appointment November 12, 2024-lab work reviewed from November 8 with the patient.  Creatinine at baseline 1.7 mg/dL.  Sodium, potassium, bicarbonate are appropriate.  Phosphorus is improving to 2.2.  Magnesium is appropriate at 2.  White count, hemoglobin, platelets are appropriate.  INR 1.99.  Urinalysis with trace protein but no RBC.  Protein in urine 0.7 g/g.  Tacrolimus below goal to 3.8.  After the lab work, we have called the patient to ask him to increase tacrolimus to 1 mg twice daily.  Patient overall feels well.  On exam today there is a new murmur auscultated.  I do not recall patient having a cardiac murmur on my prior exams.  I reviewed this with the patient and he is agreeable with echocardiogram.  He has no symptoms of volume overload or heart failure.  We reviewed the symptoms to monitor for and to call right away or go to the emergency room if  needed.     Plan:     -Change tacrolimus to 1 mg in AM and 0.5 mg in PM  -continue  mg in the morning and 250 mg in the evening   -continue statin  - Check echocardiogram  -Repeat lab work in 1 week after tacrolimus change  - Full lab work every 2 to 3 months  - Appointment can alternate with Dr. Carrera every 3 months  - to note, if biopsy is needed  In the future will need bridge with heparin as is on coumadin for mult DVT prior  - If proteinuria rises, may consider changing to ARB but will need to watch tacrolimus levels closely if reducing diltiazem.  Check SPEP, UPEP, free light chain for completeness  -Continue phosphorus binders  - I reviewed with the patient the proteinuria progression.     2) Immune Suppression-on 2 drug regimen with tacrolimus 1 mg in the morning and 0.5 mg in the evening and mycophenolate 500 mg in the morning and 250 mg in the evening     - June 2020 - pt did not have tacrolimus for a few days because he was waiting on refill and did not realize that it was backordered. Issue resolved and restarted tacrolimus  - 10/2020 - did not have tac for one week and see above.    -  in AM and 250 mg PM  - history CMV  - pt has had labile tacrolimus levels-current level subtherapeutic November 2024.  Patient was inadvertently taking tacrolimus 0.5 mg twice daily still.  We will increase back to 1 mg in the morning and 0.5 mg in the evening    3) DVT - recurrent DVT. Most recent march 2018     - 4 prior DVTs  - 1 RUE post PICC  - then 3 in LE  - further plans per primary team  - INR per Primary physician team      4) Vaccines     - stay up to date on vaccine with flu vaccine  - PNA vaccine - pt will review with Primary Physician  - no live vaccines  -Stay up-to-date on appropriate inactivated vaccines  -November 2024-patient was reminded to stay up-to-date on vaccines with flu and COVID-vaccine boosters     5) Age appropriate Ca screening     - saw Derm  11/2021. Was placed on doxy for 1  week due to folliculitis concerning for MRSA  - pt had yearly appt in 2024  - has history of psoriaform dermatitis      6) HTN     - pt is on diltiazem - BP well controlled     7) HPL     - further plan per primary team.  -would continue to monitor lipid panel closely  -Is on statin     9) Depression     -prior suicide attempts - prior 2003  - patient follows with Psychiatry  -Patient was recently admitted early 2024 for mental health decompensation.  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  Check PTH again.     13) liver lesion on u/s in hospital     - see above for GI f/u  - saw GI in April  - pt saw GI 4/2021 --> MRI completed in may - no evidence of hemangioma or mass;      14) abdominal pain-currently resolved and no pain     Patient had ER visit in March 2024 for abdominal pain.  Was felt to be viral gastroenteritis and improved with conservative management.  But during further workup, was noted to have elevated amylase and lipase.  Given his history of pancreatitis, patient was referred to gastroenterology..  - EBV PCR was noted to be positive at 650 in March 2024 but level improved and remains negative  - Amylase and lipase were borderline elevated-patient saw gastroenterology team and felt no further trending was necessary and workup was not necessary.     15-hepatic steatosis-patient was advised to lose weight by his primary team and he has lost approximately 20 pounds as of June 2024    16-murmur-check echocardiogram.  November 2024    It was a pleasure evaluating your patient in the office today. Thank you for allowing our team to participate in the care of Mr Arnav Joshi. Please do not hesitate to contact our team if further  issues/questions shall arise in the interim.     No problem-specific Assessment & Plan notes found for this encounter.        HPI:    Patient denies complaints.  No fevers, chills, nausea, vomiting, shortness of breath.    PATIENT INSTRUCTIONS:    There are no Patient Instructions on file for this visit.      OBJECTIVE:  Current Weight:    There were no vitals filed for this visit. There is no height or weight on file to calculate BMI.      REVIEW OF SYSTEMS:    Review of Systems   Constitutional: Negative.  Negative for appetite change and fatigue.   HENT: Negative.     Eyes: Negative.    Respiratory: Negative.  Negative for shortness of breath.    Cardiovascular: Negative.  Negative for leg swelling.   Gastrointestinal: Negative.    Endocrine: Negative.    Genitourinary: Negative.  Negative for difficulty urinating.   Musculoskeletal: Negative.    Allergic/Immunologic: Negative.    Neurological: Negative.    Hematological: Negative.    Psychiatric/Behavioral: Negative.     All other systems reviewed and are negative.      PHYSICAL EXAM:      Physical Exam  Vitals and nursing note reviewed.   Constitutional:       General: He is not in acute distress.     Appearance: He is well-developed. He is not diaphoretic.   HENT:      Head: Normocephalic and atraumatic.   Eyes:      General: No scleral icterus.        Right eye: No discharge.         Left eye: No discharge.      Conjunctiva/sclera: Conjunctivae normal.   Cardiovascular:      Rate and Rhythm: Normal rate and regular rhythm.      Heart sounds: Murmur heard.      No friction rub. No gallop.   Pulmonary:      Effort: Pulmonary effort is normal. No respiratory distress.      Breath sounds: Normal breath sounds. No wheezing or rales.   Chest:      Chest wall: No tenderness.   Abdominal:      General: Bowel sounds are normal. There is no distension.      Palpations: Abdomen is soft.      Tenderness: There is no abdominal tenderness. There is no rebound.       Comments: Left lower quadrant allograft site without tenderness   Musculoskeletal:         General: No tenderness or deformity. Normal range of motion.      Cervical back: Normal range of motion and neck supple.      Right lower leg: No edema.      Left lower leg: No edema.   Skin:     General: Skin is warm and dry.      Coloration: Skin is not pale.      Findings: No erythema or rash.   Neurological:      Mental Status: He is alert and oriented to person, place, and time.      Cranial Nerves: No cranial nerve deficit.      Coordination: Coordination normal.   Psychiatric:         Behavior: Behavior normal.         Thought Content: Thought content normal.         Judgment: Judgment normal.         Medications:    Current Outpatient Medications:   •  desvenlafaxine (PRISTIQ) 100 mg 24 hr tablet, Take 1 tablet by mouth in the morning, Disp: , Rfl:   •  betamethasone, augmented, (DIPROLENE) 0.05 % ointment, Apply topically 2 (two) times a day, Disp: 15 g, Rfl: 0  •  Cholecalciferol (VITAMIN D-3) 1000 units CAPS, Take 4,000 Units by mouth daily, Disp: , Rfl:   •  clindamycin (CLEOCIN) 300 MG capsule, if needed For dental procedures, Disp: , Rfl:   •  clonazePAM (KlonoPIN) 0.5 mg tablet, Take 0.5 mg by mouth if needed PRN, Disp: , Rfl:   •  desvenlafaxine succinate (PRISTIQ) 50 mg 24 hr tablet, Take 100 mg by mouth daily, Disp: , Rfl:   •  diltiazem (CARDIZEM) 120 MG tablet, TAKE 1 TABLET BY MOUTH EVERY 12 HOURS., Disp: 180 tablet, Rfl: 1  •  methylphenidate (CONCERTA) 27 MG ER tablet, Take 27 mg by mouth daily, Disp: , Rfl:   •  mycophenolate (CELLCEPT) 250 mg capsule, TAKE 2 CAPSULES BY MOUTH IN AM AND 1 CAPSULES IN THE PM, Disp: 270 capsule, Rfl: 5  •  potassium phosphate, monobasic, (K-PHOS) 500 MG tablet, Take 2 tablets (1,000 mg total) by mouth 3 (three) times a day, Disp: 540 tablet, Rfl: 3  •  rosuvastatin (CRESTOR) 5 mg tablet, Take 1 tablet (5 mg total) by mouth daily, Disp: 90 tablet, Rfl: 3  •  tacrolimus  (PROGRAF) 0.5 mg capsule, TAKE 2 CAPS IN AM AND 1 CAP IN PM (Patient taking differently: 1 mg 2 (two) times a day), Disp: 90 capsule, Rfl: 11  •  warfarin (COUMADIN) 1 mg tablet, TAKE 1 TABLET (1 MG TOTAL) BY MOUTH EVERY OTHER DAY, Disp: 45 tablet, Rfl: 1  •  warfarin (COUMADIN) 4 mg tablet, TAKE 2 TABLETS BY MOUTH EVERY DAY, Disp: 180 tablet, Rfl: 4    Laboratory Results:  Results from last 7 days   Lab Units 11/08/24  0836   WBC Thousand/uL 6.67   HEMOGLOBIN g/dL 15.0   HEMATOCRIT % 46.7   PLATELETS Thousands/uL 211   POTASSIUM mmol/L 4.8   CHLORIDE mmol/L 104   CO2 mmol/L 27   BUN mg/dL 35*   CREATININE mg/dL 1.77*   CALCIUM mg/dL 9.4   MAGNESIUM mg/dL 2.0   PHOSPHORUS mg/dL 2.2*       Results for orders placed or performed in visit on 11/08/24   CBC    Collection Time: 11/08/24  8:36 AM   Result Value Ref Range    WBC 6.67 4.31 - 10.16 Thousand/uL    RBC 4.86 3.88 - 5.62 Million/uL    Hemoglobin 15.0 12.0 - 17.0 g/dL    Hematocrit 46.7 36.5 - 49.3 %    MCV 96 82 - 98 fL    MCH 30.9 26.8 - 34.3 pg    MCHC 32.1 31.4 - 37.4 g/dL    RDW 12.2 11.6 - 15.1 %    Platelets 211 149 - 390 Thousands/uL    MPV 9.9 8.9 - 12.7 fL   Comprehensive metabolic panel    Collection Time: 11/08/24  8:36 AM   Result Value Ref Range    Sodium 136 135 - 147 mmol/L    Potassium 4.8 3.5 - 5.3 mmol/L    Chloride 104 96 - 108 mmol/L    CO2 27 21 - 32 mmol/L    ANION GAP 5 4 - 13 mmol/L    BUN 35 (H) 5 - 25 mg/dL    Creatinine 1.77 (H) 0.60 - 1.30 mg/dL    Glucose, Fasting 94 65 - 99 mg/dL    Calcium 9.4 8.4 - 10.2 mg/dL    AST 19 13 - 39 U/L    ALT 14 7 - 52 U/L    Alkaline Phosphatase 86 34 - 104 U/L    Total Protein 6.9 6.4 - 8.4 g/dL    Albumin 4.1 3.5 - 5.0 g/dL    Total Bilirubin 0.57 0.20 - 1.00 mg/dL    eGFR 43 ml/min/1.73sq m   Magnesium    Collection Time: 11/08/24  8:36 AM   Result Value Ref Range    Magnesium 2.0 1.9 - 2.7 mg/dL   Phosphorus    Collection Time: 11/08/24  8:36 AM   Result Value Ref Range    Phosphorus 2.2 (L) 2.7 -  4.5 mg/dL   Protein / creatinine ratio, urine    Collection Time: 11/08/24  8:36 AM   Result Value Ref Range    Creatinine, Ur 68.8 Reference range not established. mg/dL    Protein Urine Random 47.8 Reference range not established. mg/dL    Prot/Creat Ratio, Ur 0.7 (H) 0.0 - 0.1   Tacrolimus level    Collection Time: 11/08/24  8:36 AM   Result Value Ref Range    TACROLIMUS 3.8 3.0 - 15.0 ng/mL   Urinalysis with microscopic    Collection Time: 11/08/24  8:36 AM   Result Value Ref Range    Color, UA Light Yellow     Clarity, UA Clear     Specific Gravity, UA 1.012 1.003 - 1.030    pH, UA 6.0 4.5, 5.0, 5.5, 6.0, 6.5, 7.0, 7.5, 8.0    Leukocytes, UA Negative Negative    Nitrite, UA Negative Negative    Protein, UA Trace (A) Negative mg/dl    Glucose, UA Negative Negative mg/dl    Ketones, UA Negative Negative mg/dl    Urobilinogen, UA <2.0 <2.0 mg/dl mg/dl    Bilirubin, UA Negative Negative    Occult Blood, UA Trace (A) Negative    RBC, UA None Seen None Seen, 1-2 /hpf    WBC, UA 1-2 None Seen, 1-2 /hpf    Epithelial Cells None Seen None Seen, Occasional /hpf    Bacteria, UA None Seen None Seen, Occasional /hpf   Protime-INR    Collection Time: 11/08/24  8:36 AM   Result Value Ref Range    Protime 23.4 (H) 12.3 - 15.0 seconds    INR 1.99 (H) 0.85 - 1.19

## 2024-11-19 ENCOUNTER — HOSPITAL ENCOUNTER (OUTPATIENT)
Dept: NON INVASIVE DIAGNOSTICS | Facility: CLINIC | Age: 49
Discharge: HOME | End: 2024-11-19
Payer: COMMERCIAL

## 2024-11-19 VITALS
WEIGHT: 178 LBS | SYSTOLIC BLOOD PRESSURE: 122 MMHG | DIASTOLIC BLOOD PRESSURE: 86 MMHG | HEART RATE: 73 BPM | BODY MASS INDEX: 28.61 KG/M2 | HEIGHT: 66 IN

## 2024-11-19 DIAGNOSIS — Z94.0 KIDNEY TRANSPLANTED: ICD-10-CM

## 2024-11-19 DIAGNOSIS — D84.9 IMMUNOSUPPRESSION (HCC): ICD-10-CM

## 2024-11-19 DIAGNOSIS — R01.1 MURMUR: ICD-10-CM

## 2024-11-19 LAB
AORTIC ROOT: 3.9 CM
AORTIC VALVE MEAN VELOCITY: 14.1 M/S
APICAL FOUR CHAMBER EJECTION FRACTION: 63 %
ASCENDING AORTA: 4.7 CM
AV AREA BY CONTINUOUS VTI: 3 CM2
AV AREA PEAK VELOCITY: 2.8 CM2
AV LVOT MEAN GRADIENT: 2 MMHG
AV LVOT PEAK GRADIENT: 4 MMHG
AV MEAN GRADIENT: 9 MMHG
AV PEAK GRADIENT: 16 MMHG
AV VALVE AREA: 2.99 CM2
AV VELOCITY RATIO: 0.52
BSA FOR ECHO PROCEDURE: 1.9 M2
DOP CALC AO PEAK VEL: 2.02 M/S
DOP CALC AO VTI: 38.66 CM
DOP CALC LVOT AREA: 5.31 CM2
DOP CALC LVOT CARDIAC INDEX: 4.21 L/MIN/M2
DOP CALC LVOT CARDIAC OUTPUT: 8.01 L/MIN
DOP CALC LVOT DIAMETER: 2.6 CM
DOP CALC LVOT PEAK VEL VTI: 21.75 CM
DOP CALC LVOT PEAK VEL: 1.06 M/S
DOP CALC LVOT STROKE INDEX: 59.5 ML/M2
DOP CALC LVOT STROKE VOLUME: 115.42
E WAVE DECELERATION TIME: 239 MS
E/A RATIO: 1.14
FRACTIONAL SHORTENING: 36 (ref 28–44)
INTERVENTRICULAR SEPTUM IN DIASTOLE (PARASTERNAL SHORT AXIS VIEW): 1.3 CM
INTERVENTRICULAR SEPTUM: 1.3 CM (ref 0.6–1.1)
LAAS-AP2: 22.1 CM2
LAAS-AP4: 15.6 CM2
LEFT ATRIUM SIZE: 3 CM
LEFT ATRIUM VOLUME (MOD BIPLANE): 61 ML
LEFT ATRIUM VOLUME INDEX (MOD BIPLANE): 32.1 ML/M2
LEFT INTERNAL DIMENSION IN SYSTOLE: 2.9 CM (ref 2.1–4)
LEFT VENTRICULAR INTERNAL DIMENSION IN DIASTOLE: 4.5 CM (ref 3.5–6)
LEFT VENTRICULAR POSTERIOR WALL IN END DIASTOLE: 1.2 CM
LEFT VENTRICULAR STROKE VOLUME: 60 ML
LVSV (TEICH): 60 ML
MV E'TISSUE VEL-SEP: 8 CM/S
MV PEAK A VEL: 0.63 M/S
MV PEAK E VEL: 72 CM/S
MV STENOSIS PRESSURE HALF TIME: 69 MS
MV VALVE AREA P 1/2 METHOD: 3.19
RIGHT ATRIUM AREA SYSTOLE A4C: 13.9 CM2
RIGHT VENTRICLE ID DIMENSION: 2.9 CM
SL CV LEFT ATRIUM LENGTH A2C: 5.9 CM
SL CV LV EF: 60
SL CV PED ECHO LEFT VENTRICLE DIASTOLIC VOLUME (MOD BIPLANE) 2D: 92 ML
SL CV PED ECHO LEFT VENTRICLE SYSTOLIC VOLUME (MOD BIPLANE) 2D: 32 ML
TRICUSPID ANNULAR PLANE SYSTOLIC EXCURSION: 2 CM

## 2024-11-19 PROCEDURE — 93306 TTE W/DOPPLER COMPLETE: CPT

## 2024-11-19 PROCEDURE — 93306 TTE W/DOPPLER COMPLETE: CPT | Performed by: INTERNAL MEDICINE

## 2024-11-21 ENCOUNTER — RESULTS FOLLOW-UP (OUTPATIENT)
Dept: OTHER | Facility: HOSPITAL | Age: 49
End: 2024-11-21

## 2024-11-22 NOTE — TELEPHONE ENCOUNTER
Called pt again (2:46 PM).  No answer.    Called pt x2; no answer.  Will keep trying     ----- Message from Lev Vega MD sent at 11/21/2024  9:12 PM EST -----  Hello    Patient normally is followed up by Ms Eli Villanueva.     Please let the patient know that the echocardiogram does not show any stenotic valves.  With the aortic valve is congenitally bicuspid.  And the aortic root is slightly dilated at 4.7 cm.  - Please let the patient know that there is nothing acute to do for this for now but he needs close follow-up with a cardiologist.  Bicuspid aortic valve can have progression and develop narrowing of the valves.  Dilated aortic root's need to be monitored closely also.  -Please ask him to see a cardiologist nonurgently for follow-up for bicuspid aortic valve and dilated aortic root  - Please refer him to cardiologist  -This will likely involve a repeat echocardiogram in 6 to 12 months  - Please let me know if the patient wants to review these findings beyond what is stated above and I will certainly call him at the time of his convenience    Thank you    np

## 2024-11-22 NOTE — RESULT ENCOUNTER NOTE
Hello    Patient normally is followed up by Ms Eli Villanueva.     Please let the patient know that the echocardiogram does not show any stenotic valves.  With the aortic valve is congenitally bicuspid.  And the aortic root is slightly dilated at 4.7 cm.  - Please let the patient know that there is nothing acute to do for this for now but he needs close follow-up with a cardiologist.  Bicuspid aortic valve can have progression and develop narrowing of the valves.  Dilated aortic root's need to be monitored closely also.  -Please ask him to see a cardiologist nonurgently for follow-up for bicuspid aortic valve and dilated aortic root  - Please refer him to cardiologist  -This will likely involve a repeat echocardiogram in 6 to 12 months  - Please let me know if the patient wants to review these findings beyond what is stated above and I will certainly call him at the time of his convenience    Thank you    np

## 2024-12-03 DIAGNOSIS — Z94.0 KIDNEY TRANSPLANTED: ICD-10-CM

## 2024-12-04 ENCOUNTER — OFFICE VISIT (OUTPATIENT)
Age: 49
End: 2024-12-04
Payer: COMMERCIAL

## 2024-12-04 VITALS
HEIGHT: 66 IN | TEMPERATURE: 97.1 F | DIASTOLIC BLOOD PRESSURE: 80 MMHG | BODY MASS INDEX: 28.73 KG/M2 | OXYGEN SATURATION: 98 % | SYSTOLIC BLOOD PRESSURE: 118 MMHG | HEART RATE: 80 BPM

## 2024-12-04 DIAGNOSIS — E83.9 CHRONIC KIDNEY DISEASE-MINERAL AND BONE DISORDER: ICD-10-CM

## 2024-12-04 DIAGNOSIS — I10 ESSENTIAL HYPERTENSION: ICD-10-CM

## 2024-12-04 DIAGNOSIS — N52.9 ERECTILE DYSFUNCTION, UNSPECIFIED ERECTILE DYSFUNCTION TYPE: Primary | ICD-10-CM

## 2024-12-04 DIAGNOSIS — M89.9 CHRONIC KIDNEY DISEASE-MINERAL AND BONE DISORDER: ICD-10-CM

## 2024-12-04 DIAGNOSIS — D17.9 LIPOMA, UNSPECIFIED SITE: ICD-10-CM

## 2024-12-04 DIAGNOSIS — N18.9 CHRONIC KIDNEY DISEASE-MINERAL AND BONE DISORDER: ICD-10-CM

## 2024-12-04 DIAGNOSIS — F33.2 SEVERE EPISODE OF RECURRENT MAJOR DEPRESSIVE DISORDER, WITHOUT PSYCHOTIC FEATURES (HCC): ICD-10-CM

## 2024-12-04 PROCEDURE — 99214 OFFICE O/P EST MOD 30 MIN: CPT

## 2024-12-04 RX ORDER — SILDENAFIL 50 MG/1
50 TABLET, FILM COATED ORAL DAILY PRN
Qty: 10 TABLET | Refills: 0 | Status: SHIPPED | OUTPATIENT
Start: 2024-12-04

## 2024-12-04 NOTE — PROGRESS NOTES
Name: Arnav Joshi      : 1975      MRN: [id number]  Encounter Provider: Remi Garcia PA-C  Encounter Date: 2024   Encounter department: [facility] [place]  :  Assessment & Plan  Erectile dysfunction, unspecified erectile dysfunction type  Discussed with patient limitations of dispenses based on insurance coverage.  Will Rx 10 days and adjust if needed.  May try another medication if not covered.  Reviewed several PDE5i medications, formulary unknown for them  -Patient recently increased to his venlafaxine from 50 to 100 mg which may be contributing to worsening ED. is doing well with depression and anxiety and does not want to change medication at this time  -Reviewed side effects interactions including nitroglycerin however patient is not on that, blood pressure monitoring and what to do if erection does not go away.  Advised to take on an empty stomach and may take 1/2-hour to 2 hours to work.  May increase the dose after couple dispenses if needed  Orders:    sildenafil (VIAGRA) 50 MG tablet; Take 1 tablet (50 mg total) by mouth daily as needed for erectile dysfunction    Lipoma, unspecified site  Has been there for few months.  Is slightly painful with palpation however is freely movable and overlying skin without abnormalities.  No pulsations felt underneath. reassured patient, offered plastic surgery referral but patient would prefer to monitor for now.       Severe episode of recurrent major depressive disorder, without psychotic features (HCC)  -Controlled, continue current medications   -Patient recently increased to his venlafaxine from 50 to 100 mg which may be contributing to worsening ED. is doing well with depression and anxiety and does not want to change medication at this time         Essential hypertension  Controlled at 118/80 today.  Continue current medications and lifestyle modifications       Chronic kidney disease-mineral and bone disorder  Lab  "Results   Component Value Date    EGFR 43 11/08/2024    EGFR 48 08/07/2024    EGFR 52 05/10/2024    CREATININE 1.77 (H) 11/08/2024    CREATININE 1.64 (H) 08/07/2024    CREATININE 1.52 (H) 05/10/2024     Stable.  History of renal transplant.  Continue avoiding nephrotoxic agents and following with nephrology              History of Present Illness     Patient presents today to discuss ED medications.  Has been going on for several years but has been worsening over the past 3 to 4 months.  Has difficulty getting and maintaining an erection.  Has intercourse a couple times a week.  Recently increased his desvenlafaxine to 100 mg in May/[place] and is doing very well with mental health  -Has a small lump on the right forehead that has been there for a few weeks and is slightly painful/discomfortable with pressure.  He is not interested in excision at this point.  Not associated with headaches or jaw claudication  -No other symptoms or concerns today      Review of Systems   Constitutional:  Negative for activity change, diaphoresis, fatigue and fever.   HENT: Negative.     Eyes: Negative.    Respiratory: Negative.  Negative for cough, chest tightness and shortness of breath.    Cardiovascular:  Negative for chest pain, palpitations and leg swelling.   Gastrointestinal: Negative.    Endocrine: Negative.  Negative for polydipsia, polyphagia and polyuria.   Genitourinary: Negative.  Negative for dysuria, flank pain, frequency, hematuria and urgency.   Musculoskeletal: Negative.  Negative for back pain, joint swelling, neck pain and neck stiffness.   Skin: Negative.    Neurological: Negative.  Negative for dizziness, weakness, light-headedness and headaches.   Hematological:  Negative for adenopathy.   Psychiatric/Behavioral: Negative.  Negative for confusion and sleep disturbance. The patient is not nervous/anxious.           Objective   /80   Pulse 80   Temp (!) 97.1 °F (36.2 °C) (Tympanic)   Ht 5' 6\" (1.676 m)   " "SpO2 98%   BMI 28.73 kg/m²      Physical Exam  Vitals and nursing note reviewed.   Constitutional:       General: He is not in acute distress.     Appearance: He is normal weight. He is not ill-appearing, toxic-appearing or diaphoretic.   HENT:      Head: Normocephalic and atraumatic.        Comments: 5 mm raised nodule of the right temporal area.  Is freely movable under the skin.  Overlying skin flesh-colored.  No warmth discharge or central punctum.  No pulsations felt     Right Ear: External ear normal.      Left Ear: External ear normal.      Nose: Nose normal.   Eyes:      General: No scleral icterus.        Right eye: No discharge.         Left eye: No discharge.      Extraocular Movements: Extraocular movements intact.      Conjunctiva/sclera: Conjunctivae normal.   Cardiovascular:      Rate and Rhythm: Normal rate.   Pulmonary:      Effort: Pulmonary effort is normal. No respiratory distress.   Musculoskeletal:      Cervical back: Normal range of motion and neck supple.      Right lower leg: No edema.      Left lower leg: No edema.   Skin:     Findings: No rash.   Neurological:      Mental Status: He is alert. Mental status is at baseline.   Psychiatric:         Mood and Affect: Mood normal.         Behavior: Behavior normal.         Thought Content: Thought content normal.         Judgment: Judgment normal.       Portions of the record may have been created with voice recognition software. Occasional wrong word or \"sound a like\" substitutions may have occurred due to the inherent limitations of voice recognition software. Read the chart carefully and recognize, using context, where substitutions have occurred.    "

## 2024-12-04 NOTE — ASSESSMENT & PLAN NOTE
Lab Results   Component Value Date    EGFR 43 11/08/2024    EGFR 48 08/07/2024    EGFR 52 05/10/2024    CREATININE 1.77 (H) 11/08/2024    CREATININE 1.64 (H) 08/07/2024    CREATININE 1.52 (H) 05/10/2024     Stable.  History of renal transplant.  Continue avoiding nephrotoxic agents and following with nephrology

## 2024-12-04 NOTE — ASSESSMENT & PLAN NOTE
-Controlled, continue current medications   -Patient recently increased to his venlafaxine from 50 to 100 mg which may be contributing to worsening ED. is doing well with depression and anxiety and does not want to change medication at this time

## 2024-12-05 RX ORDER — MYCOPHENOLATE MOFETIL 250 MG/1
CAPSULE ORAL
Qty: 270 CAPSULE | Refills: 0 | Status: SHIPPED | OUTPATIENT
Start: 2024-12-05

## 2024-12-09 ENCOUNTER — TELEPHONE (OUTPATIENT)
Dept: NEPHROLOGY | Facility: CLINIC | Age: 49
End: 2024-12-09

## 2024-12-09 NOTE — TELEPHONE ENCOUNTER
Called patient and left a voicemail regarding a follow up with Dr. Vega from [place].   I scheduled in [place] , Tuesday 9/13/2025 at 11:00 Am.   mailing an appointment card.

## 2025-01-21 ENCOUNTER — OFFICE VISIT (OUTPATIENT)
Age: 50
End: 2025-01-21
Payer: COMMERCIAL

## 2025-01-21 VITALS
WEIGHT: 175.4 LBS | RESPIRATION RATE: 18 BRPM | OXYGEN SATURATION: 95 % | HEIGHT: 66 IN | DIASTOLIC BLOOD PRESSURE: 78 MMHG | BODY MASS INDEX: 28.19 KG/M2 | HEART RATE: 100 BPM | TEMPERATURE: 98.2 F | SYSTOLIC BLOOD PRESSURE: 110 MMHG

## 2025-01-21 DIAGNOSIS — R01.1 MURMUR: ICD-10-CM

## 2025-01-21 DIAGNOSIS — N52.2 DRUG-INDUCED ERECTILE DYSFUNCTION: ICD-10-CM

## 2025-01-21 DIAGNOSIS — I82.4Z1 DEEP VEIN THROMBOSIS (DVT) OF DISTAL VEIN OF RIGHT LOWER EXTREMITY, UNSPECIFIED CHRONICITY (HCC): ICD-10-CM

## 2025-01-21 DIAGNOSIS — K52.9 GASTROENTERITIS: Primary | ICD-10-CM

## 2025-01-21 DIAGNOSIS — Z79.01 WARFARIN ANTICOAGULATION: ICD-10-CM

## 2025-01-21 DIAGNOSIS — F33.41 RECURRENT MAJOR DEPRESSIVE DISORDER, IN PARTIAL REMISSION (HCC): ICD-10-CM

## 2025-01-21 DIAGNOSIS — I10 ESSENTIAL HYPERTENSION: ICD-10-CM

## 2025-01-21 DIAGNOSIS — N18.6 ESRD (END STAGE RENAL DISEASE) (HCC): ICD-10-CM

## 2025-01-21 DIAGNOSIS — Z86.718 HISTORY OF DVT (DEEP VEIN THROMBOSIS): ICD-10-CM

## 2025-01-21 PROBLEM — D84.9 IMMUNOSUPPRESSION (HCC): Status: ACTIVE | Noted: 2025-01-21

## 2025-01-21 PROCEDURE — 99215 OFFICE O/P EST HI 40 MIN: CPT

## 2025-01-21 RX ORDER — ONDANSETRON 4 MG/1
4 TABLET, FILM COATED ORAL EVERY 8 HOURS PRN
Qty: 20 TABLET | Refills: 0 | Status: SHIPPED | OUTPATIENT
Start: 2025-01-21

## 2025-01-21 RX ORDER — WARFARIN SODIUM 4 MG/1
8 TABLET ORAL DAILY
Qty: 180 TABLET | Refills: 4 | Status: SHIPPED | OUTPATIENT
Start: 2025-01-21

## 2025-01-21 NOTE — ASSESSMENT & PLAN NOTE
Lab Results   Component Value Date    EGFR 43 11/08/2024    EGFR 48 08/07/2024    EGFR 52 05/10/2024    CREATININE 1.77 (H) 11/08/2024    CREATININE 1.64 (H) 08/07/2024    CREATININE 1.52 (H) 05/10/2024   Stable. History of renal transplant. Continue avoiding nephrotoxic agents and following with nephrology. Continue maintaining adequate hydration

## 2025-01-21 NOTE — ASSESSMENT & PLAN NOTE
-Controlled, continue current medications   -Patient recently increased to his venlafaxine from 50 to 100 mg   Follows w Dr Menezes in [place], [place]   Doing really well on pristiq

## 2025-01-21 NOTE — PROGRESS NOTES
Name: Arnav Joshi      : 1975      MRN: [id number]  Encounter Provider: Remi Garcia PA-C  Encounter Date: 2025   Encounter department: [facility] [place]  :  Assessment & Plan  Gastroenteritis  Discussed with patient multiple etiologies of symptoms  Reassured at this time, VSS, no red flag symptoms and physical exam unremarkable.   Most suspicious for viral gastroenteritis at this point  Begin zofran and tylenol, discussed dosing and to take around the clock for 5-7 days  Maintain adequate hydration  Follow up for any new or worsening symptoms. ED precautions discussed   Message vis mychart if worsening may consider ct   Orders:    ondansetron (ZOFRAN) 4 mg tablet; Take 1 tablet (4 mg total) by mouth every 8 (eight) hours as needed for nausea or vomiting    ESRD (end stage renal disease) (HCC)  Lab Results   Component Value Date    EGFR 43 2024    EGFR 48 2024    EGFR 52 05/10/2024    CREATININE 1.77 (H) 2024    CREATININE 1.64 (H) 2024    CREATININE 1.52 (H) 05/10/2024   Stable. History of renal transplant. Continue avoiding nephrotoxic agents and following with nephrology. Continue maintaining adequate hydration         Essential hypertension  Controlled today at 110/78  Continue current medications        Recurrent major depressive disorder, in partial remission (HCC)  -Controlled, continue current medications   -Patient recently increased to his venlafaxine from 50 to 100 mg   Follows w Dr Menezes in [place], [place]   Doing really well on pristiq          Warfarin anticoagulation  Currently on 4 mg bid and 1 mg every 48 hr   Has been compliant with medication  INR has not been completed as previously recommended. Lab still active  Discussed with patient to complete in 2-4 weeks once GI virus is resolved.           History of DVT (deep vein thrombosis)  Currently resolved, no signs or symptoms currently  Plan as above.        Murmur  Soft murmur on  physical exam. Previous echo reviewed.  Continue current medications, asymptomatic   Patient plans to follow up with cardiology as recommended by nephrology, he reports  Can continue to monitor          Drug-induced erectile dysfunction  Doing well and basically resolved. Suspected initially to be due to increase in pristiq. Has not needed sildenafil              Depression Screening and Follow-up Plan: Patient was screened for depression during today's encounter. They screened negative with a PHQ-9 score of 0.      History of Present Illness   Arnav is a 50 y/o male here with abdominal pain, sore throat, n/v/d, chills, fatigue that started about 2 weeks ago  Gradual onset and gradually improving  Hasn't taken anything at home for it  Denies sob, cp, leg swelling, changes in diet, sick contacts with similar symptoms  Has been trying to stay hydrated at eat as tolerated. Yesterday ate his normal dinner with no issues.   Has watery diarrhea. No rash. No blood in vomitus, no dark/tarry stools       Review of Systems   Constitutional:  Positive for fatigue. Negative for activity change, diaphoresis and fever.   HENT:  Positive for sore throat. Negative for congestion, ear discharge, ear pain, nosebleeds, sinus pressure, sinus pain, trouble swallowing and voice change.    Eyes: Negative.    Respiratory: Negative.  Negative for cough, chest tightness and shortness of breath.    Cardiovascular:  Negative for chest pain, palpitations and leg swelling.   Gastrointestinal:  Positive for abdominal pain (significantly improved, no pain during visit), diarrhea, nausea and vomiting. Negative for abdominal distention, anal bleeding, blood in stool and constipation.   Endocrine: Negative.  Negative for polydipsia, polyphagia and polyuria.   Genitourinary: Negative.  Negative for dysuria, flank pain, frequency, hematuria and urgency.   Musculoskeletal: Negative.  Negative for back pain, joint swelling, neck pain and neck stiffness.  "  Skin: Negative.    Neurological: Negative.  Negative for dizziness, weakness, light-headedness and headaches.   Hematological:  Negative for adenopathy.   Psychiatric/Behavioral: Negative.  Negative for confusion and sleep disturbance. The patient is not nervous/anxious.        Objective   /78 (BP Location: Left arm, Patient Position: Sitting, Cuff Size: Standard)   Pulse 100   Temp 98.2 °F (36.8 °C) (Tympanic)   Resp 18   Ht 5' 6\" (1.676 m)   Wt 79.6 kg (175 lb 6.4 oz)   SpO2 95%   BMI 28.31 kg/m²      Physical Exam  Vitals and nursing note reviewed.   Constitutional:       General: He is not in acute distress.     Appearance: Normal appearance. He is not ill-appearing, toxic-appearing or diaphoretic.   HENT:      Head: Normocephalic and atraumatic.      Right Ear: External ear normal.      Left Ear: External ear normal.      Nose: Nose normal.      Mouth/Throat:      Mouth: Mucous membranes are moist.      Pharynx: No posterior oropharyngeal erythema.   Eyes:      General: No scleral icterus.        Right eye: No discharge.         Left eye: No discharge.      Extraocular Movements: Extraocular movements intact.      Conjunctiva/sclera: Conjunctivae normal.   Neck:      Vascular: No carotid bruit.   Cardiovascular:      Rate and Rhythm: Normal rate and regular rhythm.      Heart sounds: Murmur heard.   Pulmonary:      Effort: Pulmonary effort is normal. No respiratory distress (speaks in full sentences, rr 15 on exam).      Breath sounds: Normal breath sounds. No wheezing or rales.   Abdominal:      General: Bowel sounds are normal.      Palpations: There is no mass.      Tenderness: There is no abdominal tenderness (no pain to light and deep palpation in all quadrants. Neg hubbard and m[place]ur[place] point). There is no right CVA tenderness, left CVA tenderness, guarding or rebound.      Hernia: No hernia is present.   Musculoskeletal:      Cervical back: Normal range of motion and neck supple.      Right " lower leg: No edema.      Left lower leg: No edema.   Skin:     Findings: No rash.   Neurological:      Mental Status: He is alert. Mental status is at baseline.   Psychiatric:         Mood and Affect: Mood normal.         Behavior: Behavior normal.         Thought Content: Thought content normal.         Judgment: Judgment normal.       I have spent a total time of >40 minutes in caring for this patient on the day of the visit/encounter including Diagnostic results, Prognosis, Risks and benefits of tx options, Instructions for management, Patient and family education, Importance of tx compliance, Risk factor reductions, Impressions, Counseling / Coordination of care, Documenting in the medical record, Reviewing / ordering tests, medicine, procedures  , Obtaining or reviewing history  , and Communicating with other healthcare professionals .

## 2025-01-21 NOTE — LETTER
January 21, 2025     Patient: Arnav Joshi  YOB: 1975  Date of Visit: 1/21/2025      To Whom it May Concern:    Arnav Joshi is under my professional care. Arnav was seen in my office on 1/21/2025. Arnav may return to work on 1/23/25 .    If you have any questions or concerns, please don't hesitate to call.         Sincerely,          Remi Garcia PA-C        CC: No Recipients

## 2025-01-30 ENCOUNTER — TELEPHONE (OUTPATIENT)
Dept: NEPHROLOGY | Facility: CLINIC | Age: 50
End: 2025-01-30

## 2025-02-11 ENCOUNTER — TELEPHONE (OUTPATIENT)
Dept: NEPHROLOGY | Facility: CLINIC | Age: 50
End: 2025-02-11

## 2025-02-11 NOTE — TELEPHONE ENCOUNTER
I called and left message on patients answering machine reminding patient to have fasting lab work done prior to appointment with Dr. Carrera for Thursday 02/13/2025 left message for patient to contact the office if unable to keep appointment

## 2025-02-12 ENCOUNTER — APPOINTMENT (OUTPATIENT)
Dept: LAB | Facility: HOSPITAL | Age: 50
End: 2025-02-12
Attending: INTERNAL MEDICINE
Payer: COMMERCIAL

## 2025-02-12 ENCOUNTER — RESULTS FOLLOW-UP (OUTPATIENT)
Age: 50
End: 2025-02-12

## 2025-02-12 DIAGNOSIS — E83.39 HYPOPHOSPHATEMIA: ICD-10-CM

## 2025-02-12 DIAGNOSIS — D84.9 IMMUNOSUPPRESSION (HCC): ICD-10-CM

## 2025-02-12 DIAGNOSIS — Z94.0 KIDNEY TRANSPLANTED: ICD-10-CM

## 2025-02-12 DIAGNOSIS — Z79.01 WARFARIN ANTICOAGULATION: ICD-10-CM

## 2025-02-12 DIAGNOSIS — Z94.0 RENAL TRANSPLANT RECIPIENT: ICD-10-CM

## 2025-02-12 DIAGNOSIS — R01.1 MURMUR: ICD-10-CM

## 2025-02-12 DIAGNOSIS — Z86.718 HISTORY OF DVT (DEEP VEIN THROMBOSIS): ICD-10-CM

## 2025-02-12 DIAGNOSIS — Z79.01 ANTICOAGULATED ON WARFARIN: ICD-10-CM

## 2025-02-12 DIAGNOSIS — Z86.718 HISTORY OF DVT (DEEP VEIN THROMBOSIS): Primary | ICD-10-CM

## 2025-02-12 LAB
ALBUMIN SERPL BCG-MCNC: 4 G/DL (ref 3.5–5)
ALP SERPL-CCNC: 86 U/L (ref 34–104)
ALT SERPL W P-5'-P-CCNC: 15 U/L (ref 7–52)
ANION GAP SERPL CALCULATED.3IONS-SCNC: 6 MMOL/L (ref 4–13)
AST SERPL W P-5'-P-CCNC: 19 U/L (ref 13–39)
BACTERIA UR QL AUTO: ABNORMAL /HPF
BILIRUB SERPL-MCNC: 0.41 MG/DL (ref 0.2–1)
BILIRUB UR QL STRIP: NEGATIVE
BUN SERPL-MCNC: 34 MG/DL (ref 5–25)
CALCIUM SERPL-MCNC: 9 MG/DL (ref 8.4–10.2)
CHLORIDE SERPL-SCNC: 104 MMOL/L (ref 96–108)
CLARITY UR: CLEAR
CO2 SERPL-SCNC: 26 MMOL/L (ref 21–32)
COLOR UR: COLORLESS
CREAT SERPL-MCNC: 1.62 MG/DL (ref 0.6–1.3)
CREAT UR-MCNC: 58.3 MG/DL
ERYTHROCYTE [DISTWIDTH] IN BLOOD BY AUTOMATED COUNT: 12.2 % (ref 11.6–15.1)
GFR SERPL CREATININE-BSD FRML MDRD: 48 ML/MIN/1.73SQ M
GLUCOSE P FAST SERPL-MCNC: 91 MG/DL (ref 65–99)
GLUCOSE UR STRIP-MCNC: NEGATIVE MG/DL
HCT VFR BLD AUTO: 45.1 % (ref 36.5–49.3)
HGB BLD-MCNC: 14.7 G/DL (ref 12–17)
HGB UR QL STRIP.AUTO: ABNORMAL
INR PPP: 2.01 (ref 0.85–1.19)
KETONES UR STRIP-MCNC: NEGATIVE MG/DL
LEUKOCYTE ESTERASE UR QL STRIP: NEGATIVE
MAGNESIUM SERPL-MCNC: 1.9 MG/DL (ref 1.9–2.7)
MCH RBC QN AUTO: 31.5 PG (ref 26.8–34.3)
MCHC RBC AUTO-ENTMCNC: 32.6 G/DL (ref 31.4–37.4)
MCV RBC AUTO: 97 FL (ref 82–98)
NITRITE UR QL STRIP: NEGATIVE
NON-SQ EPI CELLS URNS QL MICRO: ABNORMAL /HPF
PH UR STRIP.AUTO: 5.5 [PH]
PHOSPHATE SERPL-MCNC: 2.3 MG/DL (ref 2.7–4.5)
PLATELET # BLD AUTO: 201 THOUSANDS/UL (ref 149–390)
PMV BLD AUTO: 10.2 FL (ref 8.9–12.7)
POTASSIUM SERPL-SCNC: 3.8 MMOL/L (ref 3.5–5.3)
PROT SERPL-MCNC: 7.1 G/DL (ref 6.4–8.4)
PROT UR STRIP-MCNC: ABNORMAL MG/DL
PROT UR-MCNC: 28.3 MG/DL
PROT/CREAT UR: 0.5 MG/G{CREAT} (ref 0–0.1)
PROTHROMBIN TIME: 23.5 SECONDS (ref 12.3–15)
PSA SERPL-MCNC: 2.01 NG/ML (ref 0–4)
RBC # BLD AUTO: 4.66 MILLION/UL (ref 3.88–5.62)
RBC #/AREA URNS AUTO: ABNORMAL /HPF
SODIUM SERPL-SCNC: 136 MMOL/L (ref 135–147)
SP GR UR STRIP.AUTO: 1.01 (ref 1–1.03)
TACROLIMUS BLD-MCNC: 8.5 NG/ML (ref 3–15)
UROBILINOGEN UR STRIP-ACNC: <2 MG/DL
WBC # BLD AUTO: 11.58 THOUSAND/UL (ref 4.31–10.16)
WBC #/AREA URNS AUTO: ABNORMAL /HPF

## 2025-02-12 PROCEDURE — 85027 COMPLETE CBC AUTOMATED: CPT

## 2025-02-12 PROCEDURE — 84156 ASSAY OF PROTEIN URINE: CPT

## 2025-02-12 PROCEDURE — 81001 URINALYSIS AUTO W/SCOPE: CPT

## 2025-02-12 PROCEDURE — 36415 COLL VENOUS BLD VENIPUNCTURE: CPT

## 2025-02-12 PROCEDURE — 84153 ASSAY OF PSA TOTAL: CPT

## 2025-02-12 PROCEDURE — 84165 PROTEIN E-PHORESIS SERUM: CPT

## 2025-02-12 PROCEDURE — 85610 PROTHROMBIN TIME: CPT

## 2025-02-12 PROCEDURE — 80197 ASSAY OF TACROLIMUS: CPT

## 2025-02-12 PROCEDURE — 84166 PROTEIN E-PHORESIS/URINE/CSF: CPT

## 2025-02-12 PROCEDURE — 83735 ASSAY OF MAGNESIUM: CPT

## 2025-02-12 PROCEDURE — 80053 COMPREHEN METABOLIC PANEL: CPT

## 2025-02-12 PROCEDURE — 84100 ASSAY OF PHOSPHORUS: CPT

## 2025-02-12 PROCEDURE — 83521 IG LIGHT CHAINS FREE EACH: CPT

## 2025-02-12 PROCEDURE — 82570 ASSAY OF URINE CREATININE: CPT

## 2025-02-12 PROCEDURE — 86335 IMMUNFIX E-PHORSIS/URINE/CSF: CPT

## 2025-02-12 NOTE — TELEPHONE ENCOUNTER
----- Message from Remi Garcia PA-C sent at 2/12/2025 12:22 PM EST -----  Low end of normal  Repeat in 2-3 weeks, lab ordered keep dosing the same

## 2025-02-13 ENCOUNTER — OFFICE VISIT (OUTPATIENT)
Dept: NEPHROLOGY | Facility: CLINIC | Age: 50
End: 2025-02-13
Payer: COMMERCIAL

## 2025-02-13 VITALS
HEIGHT: 66 IN | RESPIRATION RATE: 16 BRPM | DIASTOLIC BLOOD PRESSURE: 70 MMHG | SYSTOLIC BLOOD PRESSURE: 110 MMHG | TEMPERATURE: 97.5 F | WEIGHT: 172 LBS | BODY MASS INDEX: 27.64 KG/M2 | HEART RATE: 91 BPM | OXYGEN SATURATION: 98 %

## 2025-02-13 DIAGNOSIS — Z94.0 RENAL TRANSPLANT RECIPIENT: Primary | ICD-10-CM

## 2025-02-13 DIAGNOSIS — T86.19 RECURRENT IGA NEPHROPATHY AFTER KIDNEY TRANSPLANTATION: ICD-10-CM

## 2025-02-13 DIAGNOSIS — E83.9 CHRONIC KIDNEY DISEASE-MINERAL AND BONE DISORDER: ICD-10-CM

## 2025-02-13 DIAGNOSIS — N02.B9 RECURRENT IGA NEPHROPATHY AFTER KIDNEY TRANSPLANTATION: ICD-10-CM

## 2025-02-13 DIAGNOSIS — N18.9 CHRONIC KIDNEY DISEASE-MINERAL AND BONE DISORDER: ICD-10-CM

## 2025-02-13 DIAGNOSIS — M89.9 CHRONIC KIDNEY DISEASE-MINERAL AND BONE DISORDER: ICD-10-CM

## 2025-02-13 DIAGNOSIS — I10 ESSENTIAL HYPERTENSION: ICD-10-CM

## 2025-02-13 DIAGNOSIS — D72.829 LEUKOCYTOSIS, UNSPECIFIED TYPE: ICD-10-CM

## 2025-02-13 LAB
KAPPA LC FREE SER-MCNC: 61.1 MG/L (ref 3.3–19.4)
KAPPA LC FREE/LAMBDA FREE SER: 1.09 {RATIO} (ref 0.26–1.65)
LAMBDA LC FREE SERPL-MCNC: 56 MG/L (ref 5.7–26.3)

## 2025-02-13 PROCEDURE — 99214 OFFICE O/P EST MOD 30 MIN: CPT | Performed by: INTERNAL MEDICINE

## 2025-02-13 NOTE — ASSESSMENT & PLAN NOTE
Renal function stable.  Patient had a kidney  transplant for almost 15 years    Still seeing transplant nephrologist Dr. Franco Vega    There is a stage III CKD which is stable and immunosuppression levels are within acceptable range

## 2025-02-13 NOTE — PROGRESS NOTES
Name: Arnav Joshi      : 1975      MRN: [id number]  Encounter Provider: Noah Carrera MD  Encounter Date: 2025   Encounter department: [facility]  :  Assessment & Plan  Renal transplant recipient  Renal function stable.  Patient had a kidney  transplant for almost 15 years    Still seeing transplant nephrologist Dr. Franco Vega    There is a stage III CKD which is stable and immunosuppression levels are within acceptable range       Recurrent IgA nephropathy after kidney transplantation  And a kidney biopsy done by Dr. Franco Vega.  Does seem to recurrent IgA nephropathy but it is stable       Essential hypertension  Very well-controlled       Chronic kidney disease-mineral and bone disorder  Lab Results   Component Value Date    EGFR 48 2025    EGFR 43 2024    EGFR 48 2024    CREATININE 1.62 (H) 2025    CREATININE 1.77 (H) 2024    CREATININE 1.64 (H) 2024     PTH and phosphorus are within acceptable range         Patient overall doing well.  Has leukocytosis repeat CBC as part of the monitoring.  Patient is also being seen by transplant nephrologist.  Patient will prefer to see us every 6 months so I will see him back in 1 year as he is being seen by Dr. Franco eVga in September.  He is getting regular blood test by him also      History of Present Illness   HPI  Arnav Joshi is a 49 y.o. male who presents CKD and transplant follow-up    Overall doing well    No acute complaint    No chest pain no palpitation    Recovering from some viral illness affecting stomach though getting better    No nausea no vomiting    No cough no cold          Review of Systems   Constitutional:  Negative for fatigue.   HENT:  Negative for congestion.    Eyes:  Negative for photophobia and pain.   Respiratory:  Negative for chest tightness and shortness of breath.    Cardiovascular:  Negative for chest pain and palpitations.   Gastrointestinal:  Negative  "for abdominal distention, abdominal pain and blood in stool.   Endocrine: Negative for polydipsia.   Genitourinary:  Negative for difficulty urinating, dysuria, flank pain, hematuria and urgency.   Musculoskeletal:  Negative for arthralgias and back pain.   Skin:  Negative for rash.   Neurological:  Negative for dizziness, light-headedness and headaches.   Hematological:  Does not bruise/bleed easily.   Psychiatric/Behavioral:  Negative for behavioral problems. The patient is not nervous/anxious.           Objective   Pulse 91   Temp 97.5 °F (36.4 °C) (Temporal)   Resp 16   Ht 5' 6\" (1.676 m)   Wt 78 kg (172 lb)   SpO2 98%   BMI 27.76 kg/m²      Physical Exam  Constitutional:       General: He is not in acute distress.     Appearance: He is well-developed.   HENT:      Head: Normocephalic.      Mouth/Throat:      Mouth: Mucous membranes are moist.   Eyes:      General: No scleral icterus.     Conjunctiva/sclera: Conjunctivae normal.   Neck:      Vascular: No JVD.   Cardiovascular:      Rate and Rhythm: Normal rate.      Heart sounds: Normal heart sounds.   Pulmonary:      Effort: Pulmonary effort is normal.      Breath sounds: No wheezing.   Abdominal:      Palpations: Abdomen is soft.      Tenderness: There is no abdominal tenderness.   Musculoskeletal:         General: Normal range of motion.      Cervical back: Neck supple.   Skin:     General: Skin is warm.      Findings: No rash.   Neurological:      Mental Status: He is alert and oriented to person, place, and time.   Psychiatric:         Behavior: Behavior normal.         "

## 2025-02-13 NOTE — ASSESSMENT & PLAN NOTE
Lab Results   Component Value Date    EGFR 48 02/12/2025    EGFR 43 11/08/2024    EGFR 48 08/07/2024    CREATININE 1.62 (H) 02/12/2025    CREATININE 1.77 (H) 11/08/2024    CREATININE 1.64 (H) 08/07/2024     PTH and phosphorus are within acceptable range

## 2025-02-13 NOTE — ASSESSMENT & PLAN NOTE
And a kidney biopsy done by Dr. Franco Vega.  Does seem to recurrent IgA nephropathy but it is stable

## 2025-02-13 NOTE — PROGRESS NOTES
Pt informed with results and recommendations. He is getting over a stomach virus. He saw Dr. Carrera today and he ordered a CBC to be done in few weeks.  Order for Tac placed.

## 2025-02-14 LAB
ALBUMIN SERPL ELPH-MCNC: 3.91 G/DL (ref 3.2–5.1)
ALBUMIN SERPL ELPH-MCNC: 59.3 % (ref 48–70)
ALBUMIN UR ELPH-MCNC: 37.3 %
ALPHA1 GLOB MFR UR ELPH: 9.9 %
ALPHA1 GLOB SERPL ELPH-MCNC: 0.28 G/DL (ref 0.15–0.47)
ALPHA1 GLOB SERPL ELPH-MCNC: 4.3 % (ref 1.8–7)
ALPHA2 GLOB MFR UR ELPH: 5 %
ALPHA2 GLOB SERPL ELPH-MCNC: 0.5 G/DL (ref 0.42–1.04)
ALPHA2 GLOB SERPL ELPH-MCNC: 7.6 % (ref 5.9–14.9)
B-GLOBULIN MFR UR ELPH: 15.4 %
BETA GLOB ABNORMAL SERPL ELPH-MCNC: 0.43 G/DL (ref 0.31–0.57)
BETA1 GLOB SERPL ELPH-MCNC: 6.5 % (ref 4.7–7.7)
BETA2 GLOB SERPL ELPH-MCNC: 5.9 % (ref 3.1–7.9)
BETA2+GAMMA GLOB SERPL ELPH-MCNC: 0.39 G/DL (ref 0.2–0.58)
GAMMA GLOB ABNORMAL SERPL ELPH-MCNC: 1.08 G/DL (ref 0.4–1.66)
GAMMA GLOB MFR UR ELPH: 32.4 %
GAMMA GLOB SERPL ELPH-MCNC: 16.4 % (ref 6.9–22.3)
IGG/ALB SER: 1.46 {RATIO} (ref 1.1–1.8)
INTERPRETATION UR IFE-IMP: NORMAL
PROT PATTERN SERPL ELPH-IMP: NORMAL
PROT PATTERN UR ELPH-IMP: NORMAL
PROT SERPL-MCNC: 6.6 G/DL (ref 6.4–8.2)
PROT UR-MCNC: 27.8 MG/DL

## 2025-02-14 PROCEDURE — 86334 IMMUNOFIX E-PHORESIS SERUM: CPT | Performed by: STUDENT IN AN ORGANIZED HEALTH CARE EDUCATION/TRAINING PROGRAM

## 2025-02-14 PROCEDURE — 84165 PROTEIN E-PHORESIS SERUM: CPT | Performed by: PATHOLOGY

## 2025-02-14 PROCEDURE — 84166 PROTEIN E-PHORESIS/URINE/CSF: CPT | Performed by: STUDENT IN AN ORGANIZED HEALTH CARE EDUCATION/TRAINING PROGRAM

## 2025-02-27 DIAGNOSIS — Z86.718 HISTORY OF DVT (DEEP VEIN THROMBOSIS): ICD-10-CM

## 2025-02-27 RX ORDER — WARFARIN SODIUM 1 MG/1
1 TABLET ORAL
Qty: 45 TABLET | Refills: 1 | Status: SHIPPED | OUTPATIENT
Start: 2025-02-27 | End: 2025-04-28

## 2025-03-06 ENCOUNTER — APPOINTMENT (OUTPATIENT)
Dept: LAB | Facility: HOSPITAL | Age: 50
End: 2025-03-06
Payer: COMMERCIAL

## 2025-03-06 DIAGNOSIS — Z94.0 RENAL TRANSPLANT RECIPIENT: ICD-10-CM

## 2025-03-06 DIAGNOSIS — Z86.718 HISTORY OF DVT (DEEP VEIN THROMBOSIS): ICD-10-CM

## 2025-03-06 DIAGNOSIS — Z79.01 WARFARIN ANTICOAGULATION: ICD-10-CM

## 2025-03-06 DIAGNOSIS — D72.829 LEUKOCYTOSIS, UNSPECIFIED TYPE: ICD-10-CM

## 2025-03-06 LAB
BASOPHILS # BLD AUTO: 0.06 THOUSANDS/ÂΜL (ref 0–0.1)
BASOPHILS NFR BLD AUTO: 1 % (ref 0–1)
EOSINOPHIL # BLD AUTO: 0.08 THOUSAND/ÂΜL (ref 0–0.61)
EOSINOPHIL NFR BLD AUTO: 2 % (ref 0–6)
ERYTHROCYTE [DISTWIDTH] IN BLOOD BY AUTOMATED COUNT: 12.1 % (ref 11.6–15.1)
HCT VFR BLD AUTO: 49.6 % (ref 36.5–49.3)
HGB BLD-MCNC: 15.9 G/DL (ref 12–17)
IMM GRANULOCYTES # BLD AUTO: 0.01 THOUSAND/UL (ref 0–0.2)
IMM GRANULOCYTES NFR BLD AUTO: 0 % (ref 0–2)
INR PPP: 1.63 (ref 0.85–1.19)
LYMPHOCYTES # BLD AUTO: 1.37 THOUSANDS/ÂΜL (ref 0.6–4.47)
LYMPHOCYTES NFR BLD AUTO: 26 % (ref 14–44)
MCH RBC QN AUTO: 30.5 PG (ref 26.8–34.3)
MCHC RBC AUTO-ENTMCNC: 32.1 G/DL (ref 31.4–37.4)
MCV RBC AUTO: 95 FL (ref 82–98)
MONOCYTES # BLD AUTO: 0.58 THOUSAND/ÂΜL (ref 0.17–1.22)
MONOCYTES NFR BLD AUTO: 11 % (ref 4–12)
NEUTROPHILS # BLD AUTO: 3.11 THOUSANDS/ÂΜL (ref 1.85–7.62)
NEUTS SEG NFR BLD AUTO: 60 % (ref 43–75)
NRBC BLD AUTO-RTO: 0 /100 WBCS
PLATELET # BLD AUTO: 204 THOUSANDS/UL (ref 149–390)
PMV BLD AUTO: 9.5 FL (ref 8.9–12.7)
PROTHROMBIN TIME: 20 SECONDS (ref 12.3–15)
RBC # BLD AUTO: 5.21 MILLION/UL (ref 3.88–5.62)
TACROLIMUS BLD-MCNC: 10.9 NG/ML (ref 3–15)
WBC # BLD AUTO: 5.21 THOUSAND/UL (ref 4.31–10.16)

## 2025-03-06 PROCEDURE — 80197 ASSAY OF TACROLIMUS: CPT

## 2025-03-06 PROCEDURE — 85610 PROTHROMBIN TIME: CPT

## 2025-03-06 PROCEDURE — 85025 COMPLETE CBC W/AUTO DIFF WBC: CPT

## 2025-03-06 PROCEDURE — 36415 COLL VENOUS BLD VENIPUNCTURE: CPT

## 2025-03-07 ENCOUNTER — ANTICOAG VISIT (OUTPATIENT)
Age: 50
End: 2025-03-07

## 2025-03-07 ENCOUNTER — RESULTS FOLLOW-UP (OUTPATIENT)
Age: 50
End: 2025-03-07

## 2025-03-07 ENCOUNTER — RESULTS FOLLOW-UP (OUTPATIENT)
Dept: OTHER | Facility: HOSPITAL | Age: 50
End: 2025-03-07

## 2025-03-07 DIAGNOSIS — Z86.718 HISTORY OF DVT (DEEP VEIN THROMBOSIS): Primary | ICD-10-CM

## 2025-03-07 NOTE — TELEPHONE ENCOUNTER
----- Message from Remi Garcia PA-C sent at 3/7/2025  4:50 PM EST -----  INR is low, Please advise to take 9mg daily.  Is currently taking 8 and 9mg on alternating days.   Repeat in 2 weeks, lab ordered

## 2025-03-08 NOTE — RESULT ENCOUNTER NOTE
Hello    Please let the patient know that his tacrolimus level still remains above goal at 10.9  - He is currently on tacrolimus 1 mg in the morning and 0.5 mg in the evening  -Please ask him to reduce to 0.5 mg every 12 hours for tacrolimus  - Please adjust medication list accordingly after speaking with the patient  - Please asked him to repeat CMP and tacrolimus 2 weeks after making the change and send new lab slips    Thank you    np

## 2025-03-12 NOTE — TELEPHONE ENCOUNTER
LM for patient to return my call.  Please transfer to [place]  (I need to talk with him directly)    ----- Message from Lev Vega MD sent at 3/7/2025 10:46 PM EST -----  Hello    Please let the patient know that his tacrolimus level still remains above goal at 10.9  - He is currently on tacrolimus 1 mg in the morning and 0.5 mg in the evening  -Please ask him to reduce to 0.5 mg every 12 hours for tacrolimus  - Please adjust medication list accordingly after speaking with the patient  - Please asked him to repeat CMP and tacrolimus 2 weeks after making the change and send new lab slips    Thank you    np

## 2025-03-17 DIAGNOSIS — Z94.0 KIDNEY TRANSPLANTED: ICD-10-CM

## 2025-03-19 RX ORDER — MYCOPHENOLATE MOFETIL 250 MG/1
CAPSULE ORAL
Qty: 270 CAPSULE | Refills: 0 | Status: SHIPPED | OUTPATIENT
Start: 2025-03-19

## 2025-03-31 ENCOUNTER — DOCUMENTATION (OUTPATIENT)
Dept: NEPHROLOGY | Facility: CLINIC | Age: 50
End: 2025-03-31

## 2025-04-05 DIAGNOSIS — I10 ESSENTIAL HYPERTENSION: ICD-10-CM

## 2025-04-07 RX ORDER — DILTIAZEM HYDROCHLORIDE 120 MG/1
120 TABLET, FILM COATED ORAL 2 TIMES DAILY
Qty: 180 TABLET | Refills: 1 | Status: SHIPPED | OUTPATIENT
Start: 2025-04-07

## 2025-04-08 ENCOUNTER — ANTICOAG VISIT (OUTPATIENT)
Age: 50
End: 2025-04-08

## 2025-04-08 ENCOUNTER — APPOINTMENT (OUTPATIENT)
Dept: LAB | Facility: CLINIC | Age: 50
End: 2025-04-08
Payer: COMMERCIAL

## 2025-04-08 DIAGNOSIS — Z94.0 RENAL TRANSPLANT RECIPIENT: ICD-10-CM

## 2025-04-08 DIAGNOSIS — Z86.718 HISTORY OF DVT (DEEP VEIN THROMBOSIS): Primary | ICD-10-CM

## 2025-04-08 DIAGNOSIS — Z79.01 WARFARIN ANTICOAGULATION: ICD-10-CM

## 2025-04-08 DIAGNOSIS — Z94.0 KIDNEY TRANSPLANTED: ICD-10-CM

## 2025-04-08 DIAGNOSIS — Z86.718 HISTORY OF DVT (DEEP VEIN THROMBOSIS): ICD-10-CM

## 2025-04-08 LAB
ALBUMIN SERPL BCG-MCNC: 4.4 G/DL (ref 3.5–5)
ALP SERPL-CCNC: 87 U/L (ref 34–104)
ALT SERPL W P-5'-P-CCNC: 19 U/L (ref 7–52)
ANION GAP SERPL CALCULATED.3IONS-SCNC: 7 MMOL/L (ref 4–13)
AST SERPL W P-5'-P-CCNC: 25 U/L (ref 13–39)
BACTERIA UR QL AUTO: ABNORMAL /HPF
BILIRUB SERPL-MCNC: 0.4 MG/DL (ref 0.2–1)
BILIRUB UR QL STRIP: NEGATIVE
BUN SERPL-MCNC: 28 MG/DL (ref 5–25)
CALCIUM SERPL-MCNC: 9.5 MG/DL (ref 8.4–10.2)
CHLORIDE SERPL-SCNC: 104 MMOL/L (ref 96–108)
CLARITY UR: CLEAR
CO2 SERPL-SCNC: 29 MMOL/L (ref 21–32)
COLOR UR: ABNORMAL
CREAT SERPL-MCNC: 1.57 MG/DL (ref 0.6–1.3)
CREAT UR-MCNC: 121.9 MG/DL
ERYTHROCYTE [DISTWIDTH] IN BLOOD BY AUTOMATED COUNT: 12.4 % (ref 11.6–15.1)
GFR SERPL CREATININE-BSD FRML MDRD: 50 ML/MIN/1.73SQ M
GLUCOSE P FAST SERPL-MCNC: 96 MG/DL (ref 65–99)
GLUCOSE UR STRIP-MCNC: NEGATIVE MG/DL
HCT VFR BLD AUTO: 46.8 % (ref 36.5–49.3)
HGB BLD-MCNC: 16.3 G/DL (ref 12–17)
HGB UR QL STRIP.AUTO: ABNORMAL
INR PPP: 2.31 (ref 0.85–1.19)
KETONES UR STRIP-MCNC: NEGATIVE MG/DL
LEUKOCYTE ESTERASE UR QL STRIP: NEGATIVE
MAGNESIUM SERPL-MCNC: 1.9 MG/DL (ref 1.9–2.7)
MCH RBC QN AUTO: 34.7 PG (ref 26.8–34.3)
MCHC RBC AUTO-ENTMCNC: 34.8 G/DL (ref 31.4–37.4)
MCV RBC AUTO: 100 FL (ref 82–98)
NITRITE UR QL STRIP: NEGATIVE
NON-SQ EPI CELLS URNS QL MICRO: ABNORMAL /HPF
PH UR STRIP.AUTO: 6 [PH]
PHOSPHATE SERPL-MCNC: 2.5 MG/DL (ref 2.7–4.5)
PLATELET # BLD AUTO: 203 THOUSANDS/UL (ref 149–390)
PMV BLD AUTO: 11 FL (ref 8.9–12.7)
POTASSIUM SERPL-SCNC: 4.2 MMOL/L (ref 3.5–5.3)
PROT SERPL-MCNC: 7.5 G/DL (ref 6.4–8.4)
PROT UR STRIP-MCNC: ABNORMAL MG/DL
PROT UR-MCNC: 101.9 MG/DL
PROT/CREAT UR: 0.8 MG/G{CREAT}
PROTHROMBIN TIME: 25.3 SECONDS (ref 12.3–15)
RBC # BLD AUTO: 4.7 MILLION/UL (ref 3.88–5.62)
RBC #/AREA URNS AUTO: ABNORMAL /HPF
SODIUM SERPL-SCNC: 140 MMOL/L (ref 135–147)
SP GR UR STRIP.AUTO: 1.02 (ref 1–1.03)
TACROLIMUS BLD-MCNC: 5.9 NG/ML (ref 3–15)
UROBILINOGEN UR STRIP-ACNC: <2 MG/DL
WBC # BLD AUTO: 5.76 THOUSAND/UL (ref 4.31–10.16)
WBC #/AREA URNS AUTO: ABNORMAL /HPF

## 2025-04-08 PROCEDURE — 82570 ASSAY OF URINE CREATININE: CPT

## 2025-04-08 PROCEDURE — 83735 ASSAY OF MAGNESIUM: CPT

## 2025-04-08 PROCEDURE — 85610 PROTHROMBIN TIME: CPT

## 2025-04-08 PROCEDURE — 84156 ASSAY OF PROTEIN URINE: CPT

## 2025-04-08 PROCEDURE — 81001 URINALYSIS AUTO W/SCOPE: CPT

## 2025-04-08 PROCEDURE — 36415 COLL VENOUS BLD VENIPUNCTURE: CPT

## 2025-04-08 PROCEDURE — 84100 ASSAY OF PHOSPHORUS: CPT

## 2025-04-08 PROCEDURE — 80197 ASSAY OF TACROLIMUS: CPT

## 2025-04-08 PROCEDURE — 80053 COMPREHEN METABOLIC PANEL: CPT

## 2025-04-08 PROCEDURE — 85027 COMPLETE CBC AUTOMATED: CPT

## 2025-04-08 RX ORDER — WARFARIN SODIUM 1 MG/1
1 TABLET ORAL DAILY
Qty: 45 TABLET | Refills: 1 | Status: SHIPPED | OUTPATIENT
Start: 2025-04-08 | End: 2025-04-08

## 2025-04-08 RX ORDER — WARFARIN SODIUM 1 MG/1
1 TABLET ORAL DAILY
Qty: 30 TABLET | Refills: 1 | Status: SHIPPED | OUTPATIENT
Start: 2025-04-08 | End: 2025-06-07

## 2025-05-01 DIAGNOSIS — Z86.718 HISTORY OF DVT (DEEP VEIN THROMBOSIS): ICD-10-CM

## 2025-05-02 RX ORDER — WARFARIN SODIUM 1 MG/1
1 TABLET ORAL DAILY
Qty: 90 TABLET | Refills: 1 | Status: SHIPPED | OUTPATIENT
Start: 2025-05-02 | End: 2025-07-01

## 2025-05-30 ENCOUNTER — OFFICE VISIT (OUTPATIENT)
Age: 50
End: 2025-05-30
Payer: COMMERCIAL

## 2025-05-30 VITALS
SYSTOLIC BLOOD PRESSURE: 114 MMHG | HEART RATE: 81 BPM | HEIGHT: 66 IN | DIASTOLIC BLOOD PRESSURE: 76 MMHG | TEMPERATURE: 97.1 F | RESPIRATION RATE: 18 BRPM | OXYGEN SATURATION: 97 % | BODY MASS INDEX: 26.1 KG/M2 | WEIGHT: 162.4 LBS

## 2025-05-30 DIAGNOSIS — Z94.0 RENAL TRANSPLANT RECIPIENT: ICD-10-CM

## 2025-05-30 DIAGNOSIS — E78.5 DYSLIPIDEMIA: Primary | ICD-10-CM

## 2025-05-30 DIAGNOSIS — I10 ESSENTIAL HYPERTENSION: ICD-10-CM

## 2025-05-30 DIAGNOSIS — Z86.718 HISTORY OF DVT (DEEP VEIN THROMBOSIS): ICD-10-CM

## 2025-05-30 DIAGNOSIS — R41.3 MEMORY IMPAIRMENT: Primary | ICD-10-CM

## 2025-05-30 DIAGNOSIS — M46.1 SACROILIITIS (HCC): ICD-10-CM

## 2025-05-30 PROCEDURE — 99215 OFFICE O/P EST HI 40 MIN: CPT

## 2025-05-30 RX ORDER — PREDNISONE 20 MG/1
40 TABLET ORAL DAILY
Qty: 10 TABLET | Refills: 0 | Status: SHIPPED | OUTPATIENT
Start: 2025-05-30 | End: 2025-06-04

## 2025-05-30 RX ORDER — METHYLPHENIDATE HYDROCHLORIDE 36 MG/1
36 TABLET ORAL EVERY MORNING
COMMUNITY
Start: 2025-05-09

## 2025-05-30 RX ORDER — ROSUVASTATIN CALCIUM 5 MG/1
5 TABLET, COATED ORAL DAILY
Qty: 30 TABLET | Refills: 0 | Status: SHIPPED | OUTPATIENT
Start: 2025-05-30 | End: 2025-06-05 | Stop reason: SDUPTHER

## 2025-05-30 NOTE — PROGRESS NOTES
Name: Arnav Joshi      : 1975      MRN: [id number]  Encounter Provider: Remi Garcia PA-C  Encounter Date: 2025   Encounter department: [facility] [place]  :  Assessment & Plan  Memory impairment  Obtain mri neuroquant test and labs   Follow up with neurology  Discussed with patient, differentials including early dementia, MCI, medication side effects, nutritional deficiency.   No recent tick bites, rashes, genital lesions, but would consider infectious workup if testing does not lead to clear etiology   Orders:    Ambulatory Referral to Neurology; Future    Vitamin B12; Future    Iron Panel (Includes Ferritin, Iron Sat%, Iron, and TIBC); Future    Vitamin D 25 hydroxy; Future    MRI brain NeuroQuant wo contrast; Future    CBC and differential; Future    Comprehensive metabolic panel; Future    Sacroiliitis (HCC)  Begin prednisone, follow up for any new or worsening symptoms   Orders:    predniSONE 20 mg tablet; Take 2 tablets (40 mg total) by mouth daily for 5 days    Essential hypertension  Controlled at 114/76, continue lifestyle modifications       History of DVT (deep vein thrombosis)  Update INR, previously ordered, Is on 9mg daily warfarin   Orders:    Protime-INR; Future           History of Present Illness   Patient reports memory seems to be getting worse in general. Perhaps related, a few weeks ago he  blanked out, as in temporary amnesia with no physical effects, which came back slowly over in 2-10 minutes. His psyche PA says it's too early for dementia.  Also He  had sharp pains in my lower back at seemingly random movements, other than vertical jolts and difficulty getting to a standing position from a squat or sit. The pains are not constant, some days are better than others. Going on a few weeks. No saddle anesthesia or changes in bm or urine patterns   After ECT in 2019, has effected memory, old memories mostly but has been stable after that until about a  "year ago  Will say things to people and sometimes forget what he said.  With friend forgot who they were. 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   Started noticing small things like this maybe about a year ago, started pristiq about that time  Does not take clonazepam daily   About 6 months ago lifted something felt a tweek, went away on its own. 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   HENT: Negative.     Eyes: Negative.    Respiratory: Negative.  Negative for cough, chest tightness and shortness of breath.    Cardiovascular:  Negative for chest pain, palpitations and leg swelling.   Gastrointestinal: Negative.    Endocrine: Negative.  Negative for polydipsia, polyphagia and polyuria.   Genitourinary: Negative.  Negative for dysuria, flank pain, frequency, hematuria and urgency.   Musculoskeletal:  Positive for back pain. Negative for arthralgias, gait problem, joint swelling, neck pain and neck stiffness.   Skin: Negative.    Neurological: Negative.  Negative for dizziness, weakness, light-headedness and headaches.   Hematological:  Negative for adenopathy.   Psychiatric/Behavioral: Negative.  Negative for confusion and sleep disturbance. The patient is not nervous/anxious.        Objective   /76 (BP Location: Left arm, Patient Position: Sitting, Cuff Size: Standard)   Pulse 81   Temp (!) 97.1 °F (36.2 °C) (Tympanic)   Resp 18   Ht 5' 6\" (1.676 m)   Wt 73.7 kg (162 lb 6.4 oz)   SpO2 97%   BMI 26.21 kg/m²      Physical Exam  Vitals and nursing note reviewed.   Constitutional:       General: He is not in acute distress.     Appearance: He is normal weight. He is not ill-appearing, toxic-appearing or diaphoretic.   HENT:      Head: Normocephalic and atraumatic.      Right Ear: External " "ear normal.      Left Ear: External ear normal.      Nose: Nose normal.     Eyes:      General: No scleral icterus.        Right eye: No discharge.         Left eye: No discharge.      Extraocular Movements: Extraocular movements intact.      Conjunctiva/sclera: Conjunctivae normal.     Neck:      Vascular: No carotid bruit.     Cardiovascular:      Rate and Rhythm: Normal rate and regular rhythm.      Heart sounds: No murmur heard.  Pulmonary:      Effort: Pulmonary effort is normal. No respiratory distress.      Breath sounds: Normal breath sounds. No wheezing or rales.     Musculoskeletal:         General: Tenderness present.      Cervical back: Normal range of motion and neck supple.      Right lower leg: No edema.      Left lower leg: No edema.     Skin:     Findings: No rash.           Comments: Pain to palpation in the SI joint region, does not radiate. Rom intact, no weakness or rash     Neurological:      Mental Status: He is alert. Mental status is at baseline.     Psychiatric:         Mood and Affect: Mood normal.         Behavior: Behavior normal.         Thought Content: Thought content normal.         Judgment: Judgment normal.     I have spent a total time of >40 minutes in caring for this patient on the day of the visit/encounter including Prognosis, Risks and benefits of tx options, Instructions for management, Patient and family education, Importance of tx compliance, Risk factor reductions, Impressions, Counseling / Coordination of care, Documenting in the medical record, Reviewing/placing orders in the medical record (including tests, medications, and/or procedures), Obtaining or reviewing history  , and Communicating with other healthcare professionals .   Portions of the record may have been created with voice recognition software. Occasional wrong word or \"sound a like\" substitutions may have occurred due to the inherent limitations of voice recognition software. Read the chart carefully and " recognize, using context, where substitutions have occurred.

## 2025-06-04 ENCOUNTER — APPOINTMENT (OUTPATIENT)
Dept: LAB | Facility: CLINIC | Age: 50
End: 2025-06-04
Payer: COMMERCIAL

## 2025-06-04 DIAGNOSIS — Z86.718 HISTORY OF DVT (DEEP VEIN THROMBOSIS): ICD-10-CM

## 2025-06-04 DIAGNOSIS — R41.3 MEMORY IMPAIRMENT: ICD-10-CM

## 2025-06-04 DIAGNOSIS — E78.5 DYSLIPIDEMIA: ICD-10-CM

## 2025-06-04 LAB
25(OH)D3 SERPL-MCNC: 58 NG/ML (ref 30–100)
ALBUMIN SERPL BCG-MCNC: 4.3 G/DL (ref 3.5–5)
ALP SERPL-CCNC: 80 U/L (ref 34–104)
ALT SERPL W P-5'-P-CCNC: 16 U/L (ref 7–52)
ANION GAP SERPL CALCULATED.3IONS-SCNC: 6 MMOL/L (ref 4–13)
AST SERPL W P-5'-P-CCNC: 18 U/L (ref 13–39)
BASOPHILS # BLD AUTO: 0.04 THOUSANDS/ÂΜL (ref 0–0.1)
BASOPHILS NFR BLD AUTO: 1 % (ref 0–1)
BILIRUB SERPL-MCNC: 0.38 MG/DL (ref 0.2–1)
BUN SERPL-MCNC: 32 MG/DL (ref 5–25)
CALCIUM SERPL-MCNC: 9.9 MG/DL (ref 8.4–10.2)
CHLORIDE SERPL-SCNC: 104 MMOL/L (ref 96–108)
CHOLEST SERPL-MCNC: 241 MG/DL (ref ?–200)
CO2 SERPL-SCNC: 30 MMOL/L (ref 21–32)
CREAT SERPL-MCNC: 1.62 MG/DL (ref 0.6–1.3)
EOSINOPHIL # BLD AUTO: 0.05 THOUSAND/ÂΜL (ref 0–0.61)
EOSINOPHIL NFR BLD AUTO: 1 % (ref 0–6)
ERYTHROCYTE [DISTWIDTH] IN BLOOD BY AUTOMATED COUNT: 13.2 % (ref 11.6–15.1)
FERRITIN SERPL-MCNC: 33 NG/ML (ref 30–336)
GFR SERPL CREATININE-BSD FRML MDRD: 48 ML/MIN/1.73SQ M
GLUCOSE P FAST SERPL-MCNC: 77 MG/DL (ref 65–99)
HCT VFR BLD AUTO: 47.7 % (ref 36.5–49.3)
HDLC SERPL-MCNC: 55 MG/DL
HGB BLD-MCNC: 16.6 G/DL (ref 12–17)
IMM GRANULOCYTES # BLD AUTO: 0.03 THOUSAND/UL (ref 0–0.2)
IMM GRANULOCYTES NFR BLD AUTO: 0 % (ref 0–2)
INR PPP: 3.31 (ref 0.85–1.19)
IRON SATN MFR SERPL: 10 % (ref 15–50)
IRON SERPL-MCNC: 35 UG/DL (ref 50–212)
LDLC SERPL CALC-MCNC: 160 MG/DL (ref 0–100)
LYMPHOCYTES # BLD AUTO: 2.86 THOUSANDS/ÂΜL (ref 0.6–4.47)
LYMPHOCYTES NFR BLD AUTO: 32 % (ref 14–44)
MCH RBC QN AUTO: 34.4 PG (ref 26.8–34.3)
MCHC RBC AUTO-ENTMCNC: 34.8 G/DL (ref 31.4–37.4)
MCV RBC AUTO: 99 FL (ref 82–98)
MONOCYTES # BLD AUTO: 0.95 THOUSAND/ÂΜL (ref 0.17–1.22)
MONOCYTES NFR BLD AUTO: 11 % (ref 4–12)
NEUTROPHILS # BLD AUTO: 4.91 THOUSANDS/ÂΜL (ref 1.85–7.62)
NEUTS SEG NFR BLD AUTO: 55 % (ref 43–75)
NONHDLC SERPL-MCNC: 186 MG/DL
NRBC BLD AUTO-RTO: 0 /100 WBCS
PLATELET # BLD AUTO: 245 THOUSANDS/UL (ref 149–390)
PMV BLD AUTO: 10.5 FL (ref 8.9–12.7)
POTASSIUM SERPL-SCNC: 4.4 MMOL/L (ref 3.5–5.3)
PROT SERPL-MCNC: 7.1 G/DL (ref 6.4–8.4)
PROTHROMBIN TIME: 33.2 SECONDS (ref 12.3–15)
RBC # BLD AUTO: 4.82 MILLION/UL (ref 3.88–5.62)
SODIUM SERPL-SCNC: 140 MMOL/L (ref 135–147)
TIBC SERPL-MCNC: 361.2 UG/DL (ref 250–450)
TRANSFERRIN SERPL-MCNC: 258 MG/DL (ref 203–362)
TRIGL SERPL-MCNC: 128 MG/DL (ref ?–150)
UIBC SERPL-MCNC: 326 UG/DL (ref 155–355)
VIT B12 SERPL-MCNC: 271 PG/ML (ref 180–914)
WBC # BLD AUTO: 8.84 THOUSAND/UL (ref 4.31–10.16)

## 2025-06-04 PROCEDURE — 36415 COLL VENOUS BLD VENIPUNCTURE: CPT

## 2025-06-04 PROCEDURE — 82607 VITAMIN B-12: CPT

## 2025-06-04 PROCEDURE — 85610 PROTHROMBIN TIME: CPT

## 2025-06-04 PROCEDURE — 85025 COMPLETE CBC W/AUTO DIFF WBC: CPT

## 2025-06-04 PROCEDURE — 82728 ASSAY OF FERRITIN: CPT

## 2025-06-04 PROCEDURE — 83550 IRON BINDING TEST: CPT

## 2025-06-04 PROCEDURE — 80053 COMPREHEN METABOLIC PANEL: CPT

## 2025-06-04 PROCEDURE — 82306 VITAMIN D 25 HYDROXY: CPT

## 2025-06-04 PROCEDURE — 80061 LIPID PANEL: CPT

## 2025-06-04 PROCEDURE — 83540 ASSAY OF IRON: CPT

## 2025-06-05 ENCOUNTER — RESULTS FOLLOW-UP (OUTPATIENT)
Age: 50
End: 2025-06-05

## 2025-06-05 DIAGNOSIS — Z94.0 RENAL TRANSPLANT RECIPIENT: ICD-10-CM

## 2025-06-05 DIAGNOSIS — E61.1 IRON DEFICIENCY: ICD-10-CM

## 2025-06-05 DIAGNOSIS — E78.2 MIXED HYPERLIPIDEMIA: Primary | ICD-10-CM

## 2025-06-05 DIAGNOSIS — Z86.718 HISTORY OF DVT (DEEP VEIN THROMBOSIS): ICD-10-CM

## 2025-06-05 DIAGNOSIS — E61.1 IRON DEFICIENCY: Primary | ICD-10-CM

## 2025-06-05 RX ORDER — ROSUVASTATIN CALCIUM 10 MG/1
10 TABLET, COATED ORAL DAILY
Qty: 30 TABLET | Refills: 2 | Status: SHIPPED | OUTPATIENT
Start: 2025-06-05 | End: 2025-09-03

## 2025-06-05 RX ORDER — FERROUS SULFATE 324(65)MG
324 TABLET, DELAYED RELEASE (ENTERIC COATED) ORAL
Qty: 15 TABLET | Refills: 2 | Status: SHIPPED | OUTPATIENT
Start: 2025-06-05 | End: 2025-09-03

## 2025-06-06 DIAGNOSIS — M54.50 ACUTE BILATERAL LOW BACK PAIN WITHOUT SCIATICA: Primary | ICD-10-CM

## 2025-06-09 DIAGNOSIS — Z94.0 KIDNEY TRANSPLANTED: ICD-10-CM

## 2025-06-10 ENCOUNTER — TELEPHONE (OUTPATIENT)
Dept: NEUROLOGY | Facility: CLINIC | Age: 50
End: 2025-06-10

## 2025-06-10 RX ORDER — MYCOPHENOLATE MOFETIL 250 MG/1
CAPSULE ORAL
Qty: 270 CAPSULE | Refills: 0 | Status: SHIPPED | OUTPATIENT
Start: 2025-06-10

## 2025-06-10 NOTE — TELEPHONE ENCOUNTER
Called and spoke with patient in regards to appointment with Dr. Vega for 7/8. Appointment needed to rescheduled. Patient stated it wasn't an emergent matter and wouldn't mind being pushed out to a later date. Provided new date with Dr. Vega for 9/23 at 1 pm at [facility]. Patient accepted time and date.

## 2025-06-20 ENCOUNTER — OFFICE VISIT (OUTPATIENT)
Dept: OBGYN CLINIC | Facility: CLINIC | Age: 50
End: 2025-06-20
Payer: COMMERCIAL

## 2025-06-20 ENCOUNTER — APPOINTMENT (OUTPATIENT)
Dept: RADIOLOGY | Facility: CLINIC | Age: 50
End: 2025-06-20
Attending: FAMILY MEDICINE
Payer: COMMERCIAL

## 2025-06-20 VITALS — HEIGHT: 66 IN | WEIGHT: 160 LBS | BODY MASS INDEX: 25.71 KG/M2

## 2025-06-20 DIAGNOSIS — M53.3 SI (SACROILIAC) JOINT DYSFUNCTION: ICD-10-CM

## 2025-06-20 DIAGNOSIS — M51.360 DEGENERATION OF INTERVERTEBRAL DISC OF LUMBAR REGION WITH DISCOGENIC BACK PAIN: Primary | ICD-10-CM

## 2025-06-20 DIAGNOSIS — M62.830 LUMBAR PARASPINAL MUSCLE SPASM: ICD-10-CM

## 2025-06-20 DIAGNOSIS — S39.012A LUMBAR STRAIN, INITIAL ENCOUNTER: ICD-10-CM

## 2025-06-20 DIAGNOSIS — M54.50 ACUTE BILATERAL LOW BACK PAIN WITHOUT SCIATICA: ICD-10-CM

## 2025-06-20 DIAGNOSIS — M47.816 LUMBAR FACET ARTHROPATHY: ICD-10-CM

## 2025-06-20 PROCEDURE — 99244 OFF/OP CNSLTJ NEW/EST MOD 40: CPT | Performed by: FAMILY MEDICINE

## 2025-06-20 PROCEDURE — 72100 X-RAY EXAM L-S SPINE 2/3 VWS: CPT

## 2025-06-20 RX ORDER — METHOCARBAMOL 750 MG/1
750 TABLET, FILM COATED ORAL 2 TIMES DAILY PRN
Qty: 60 TABLET | Refills: 0 | Status: SHIPPED | OUTPATIENT
Start: 2025-06-20

## 2025-06-20 RX ORDER — LIDOCAINE 50 MG/G
1 PATCH TOPICAL DAILY
Qty: 30 PATCH | Refills: 1 | Status: SHIPPED | OUTPATIENT
Start: 2025-06-20

## 2025-06-20 NOTE — LETTER
2025     Remi Garcia PA-C  [address] [facility] [address]    Patient: Arnav Joshi   YOB: 1975   Date of Visit: 2025       Dear Dr. Remi Garcia PA-C:    Thank you for referring Arnav Joshi to me for evaluation. Below are my notes for this consultation.    If you have questions, please do not hesitate to call me. I look forward to following your patient along with you.         Sincerely,        Marva Alfaro DO        CC: No Recipients    Marva Alfaro DO  2025  9:48 AM  Sign when Signing Visit  Name: Arnav Joshi      : 1975      MRN: [id number]  Encounter Provider: Marva Alfaro DO  Encounter Date: 2025   Encounter department: [facility] [place]  :  Assessment & Plan  Degeneration of intervertebral disc of lumbar region with discogenic back pain  49-year-old male with low back pain few weeks duration.  X-rays lumbar spine noted for multilevel degenerative changes with thoracolumbar dextrocurvature.  There is severe to space narrowing L4-5 with suspected fusion, disc space narrowing L5-S1 with endplate osteophytes, multilevel facet degenerative changes.  Clinical impression is that he has symptoms from combination of aggravated arthritis/degenerative changes in the spine and from strain and SI joint dysfunction.  I discussed treatment regimen of anesthetic, muscle relaxer, and formal therapy.  Injection and surgery not warranted unless failed conservative management.  He may apply topical lidocaine patch as needed for pain.  May take methocarbamol 750 mg twice daily as needed but not before driving.  I will refer to aqua therapy which he is to start as soon as possible and do home exercises as directed.  Follow-up if no improvement with at least 1 month of aqua therapy.  Orders:  •  Ambulatory Referral to Orthopedic Surgery  •  XR spine lumbar 2 or  3 views injury; Future  •  lidocaine (Lidoderm) 5 %; Apply 1 patch topically daily over 12 hours Remove & Discard patch within 12 hours or as directed by MD  •  Ambulatory Referral to Physical Therapy; Future    Lumbar facet arthropathy  49-year-old male with low back pain few weeks duration.  X-rays lumbar spine noted for multilevel degenerative changes with thoracolumbar dextrocurvature.  There is severe to space narrowing L4-5 with suspected fusion, disc space narrowing L5-S1 with endplate osteophytes, multilevel facet degenerative changes.  Clinical impression is that he has symptoms from combination of aggravated arthritis/degenerative changes in the spine and from strain and SI joint dysfunction.  I discussed treatment regimen of anesthetic, muscle relaxer, and formal therapy.  Injection and surgery not warranted unless failed conservative management.  He may apply topical lidocaine patch as needed for pain.  May take methocarbamol 750 mg twice daily as needed but not before driving.  I will refer to aqua therapy which he is to start as soon as possible and do home exercises as directed.  Follow-up if no improvement with at least 1 month of aqua therapy.  Orders:  •  Ambulatory Referral to Physical Therapy; Future    SI (sacroiliac) joint dysfunction  49-year-old male with low back pain few weeks duration.  X-rays lumbar spine noted for multilevel degenerative changes with thoracolumbar dextrocurvature.  There is severe to space narrowing L4-5 with suspected fusion, disc space narrowing L5-S1 with endplate osteophytes, multilevel facet degenerative changes.  Clinical impression is that he has symptoms from combination of aggravated arthritis/degenerative changes in the spine and from strain and SI joint dysfunction.  I discussed treatment regimen of anesthetic, muscle relaxer, and formal therapy.  Injection and surgery not warranted unless failed conservative management.  He may apply topical lidocaine patch  as needed for pain.  May take methocarbamol 750 mg twice daily as needed but not before driving.  I will refer to aqua therapy which he is to start as soon as possible and do home exercises as directed.  Follow-up if no improvement with at least 1 month of aqua therapy.  Orders:  •  lidocaine (Lidoderm) 5 %; Apply 1 patch topically daily over 12 hours Remove & Discard patch within 12 hours or as directed by MD  •  Ambulatory Referral to Physical Therapy; Future    Lumbar strain, initial encounter  49-year-old male with low back pain few weeks duration.  X-rays lumbar spine noted for multilevel degenerative changes with thoracolumbar dextrocurvature.  There is severe to space narrowing L4-5 with suspected fusion, disc space narrowing L5-S1 with endplate osteophytes, multilevel facet degenerative changes.  Clinical impression is that he has symptoms from combination of aggravated arthritis/degenerative changes in the spine and from strain and SI joint dysfunction.  I discussed treatment regimen of anesthetic, muscle relaxer, and formal therapy.  Injection and surgery not warranted unless failed conservative management.  He may apply topical lidocaine patch as needed for pain.  May take methocarbamol 750 mg twice daily as needed but not before driving.  I will refer to aqua therapy which he is to start as soon as possible and do home exercises as directed.  Follow-up if no improvement with at least 1 month of aqua therapy.  Orders:  •  Ambulatory Referral to Physical Therapy; Future    Lumbar paraspinal muscle spasm    Orders:  •  methocarbamol (ROBAXIN) 750 mg tablet; Take 1 tablet (750 mg total) by mouth 2 (two) times a day as needed for muscle spasms  •  Ambulatory Referral to Physical Therapy; Future        History of Present Illness  Chief Complaint   Patient presents with   • Lower Back - Pain      HPI  Arnav Joshi is a 49 y.o. male who presents for evaluation low back pain few weeks duration.  He denies any  "particular trauma or inciting event.  Pain described as gradual in onset, localized to right low back, nonradiating, achy and burning and sometimes sharp, worse with bending and twisting, and improved with resting.  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   He denies any numbness or tingling of her lower extremities.  He denies bowel or bladder incontinence.  He was seen by primary care provider and prescribed course of oral steroid.  Symptoms improved while on oral steroid, but then pain returned.  He was referred for x-rays of the lumbar spine and referred to orthopedic care.  He is status post renal transplant and on oral anticoagulation so he does not take NSAIDs.    History obtained from: patient    Review of Systems   Musculoskeletal:  Positive for back pain. Negative for arthralgias.   Neurological:  Negative for weakness and numbness.          Objective  Ht 5' 6\" (1.676 m)   Wt 72.6 kg (160 lb)   BMI 25.82 kg/m²      Physical Exam  Vitals and nursing note reviewed.   Constitutional:       Appearance: Normal appearance. He is well-developed. He is not ill-appearing or diaphoretic.   HENT:      Head: Normocephalic and atraumatic.      Right Ear: External ear normal.      Left Ear: External ear normal.     Eyes:      General:         Right eye: No discharge.         Left eye: No discharge.     Pulmonary:      Effort: Pulmonary effort is normal. No respiratory distress.   Abdominal:      General: There is no distension.     Musculoskeletal:         General: No swelling or tenderness.      Lumbar back: Negative right straight leg raise test and negative left straight leg raise test.     Skin:     General: Skin is warm and dry.      Coloration: Skin is not jaundiced or pale.     Neurological:      Mental Status: He is alert and oriented to person, place, and time.     Psychiatric:         Mood and Affect: Mood normal.         " Behavior: Behavior normal.         Thought Content: Thought content normal.         Judgment: Judgment normal.       Right Ankle Exam     Muscle Strength   Dorsiflexion:  5/5  Plantar flexion:  5/5       Left Ankle Exam     Muscle Strength   Dorsiflexion:  5/5   Plantar flexion:  5/5       Right Hip Exam     Muscle Strength   Flexion: 5/5     Tests   TIBURCIO: negative    Comments:  SI pain with FADDIR      Left Hip Exam     Muscle Strength   Flexion: 5/5     Tests   TIBURCIO: negative    Comments:  Negative FADDIR      Back Exam     Tenderness   The patient is experiencing no tenderness.     Range of Motion   Extension:  normal   Flexion:  abnormal   Lateral bend right:  abnormal   Lateral bend left:  abnormal   Rotation right:  normal   Rotation left:  normal     Muscle Strength   Right Quadriceps:  5/5   Left Quadriceps:  5/5     Tests   Straight leg raise right: negative  Straight leg raise left: negative    Reflexes   Patellar:  normal    Other   Sensation: normal  Gait: normal            I have personally reviewed pertinent films in PACS and my interpretation is  .   X-rays lumbar spine noted for multilevel degenerative changes with thoracolumbar dextrocurvature.  There is severe to space narrowing L4-5 with suspected fusion, disc space narrowing L5-S1 with endplate osteophytes, multilevel facet degenerative changes.    Administrative Statements  I have spent a total time of 40 minutes in caring for this patient on the day of the visit/encounter including Diagnostic results, Prognosis, Risks and benefits of tx options, Instructions for management, Impressions, Documenting in the medical record, Reviewing/placing orders in the medical record (including tests, medications, and/or procedures), and Obtaining or reviewing history  .

## 2025-06-20 NOTE — ASSESSMENT & PLAN NOTE
49-year-old male with low back pain few weeks duration.  X-rays lumbar spine noted for multilevel degenerative changes with thoracolumbar dextrocurvature.  There is severe to space narrowing L4-5 with suspected fusion, disc space narrowing L5-S1 with endplate osteophytes, multilevel facet degenerative changes.  Clinical impression is that he has symptoms from combination of aggravated arthritis/degenerative changes in the spine and from strain and SI joint dysfunction.  I discussed treatment regimen of anesthetic, muscle relaxer, and formal therapy.  Injection and surgery not warranted unless failed conservative management.  He may apply topical lidocaine patch as needed for pain.  May take methocarbamol 750 mg twice daily as needed but not before driving.  I will refer to aqua therapy which he is to start as soon as possible and do home exercises as directed.  Follow-up if no improvement with at least 1 month of aqua therapy.  Orders:    lidocaine (Lidoderm) 5 %; Apply 1 patch topically daily over 12 hours Remove & Discard patch within 12 hours or as directed by MD    Ambulatory Referral to Physical Therapy; Future

## 2025-06-20 NOTE — ASSESSMENT & PLAN NOTE
49-year-old male with low back pain few weeks duration.  X-rays lumbar spine noted for multilevel degenerative changes with thoracolumbar dextrocurvature.  There is severe to space narrowing L4-5 with suspected fusion, disc space narrowing L5-S1 with endplate osteophytes, multilevel facet degenerative changes.  Clinical impression is that he has symptoms from combination of aggravated arthritis/degenerative changes in the spine and from strain and SI joint dysfunction.  I discussed treatment regimen of anesthetic, muscle relaxer, and formal therapy.  Injection and surgery not warranted unless failed conservative management.  He may apply topical lidocaine patch as needed for pain.  May take methocarbamol 750 mg twice daily as needed but not before driving.  I will refer to aqua therapy which he is to start as soon as possible and do home exercises as directed.  Follow-up if no improvement with at least 1 month of aqua therapy.  Orders:    Ambulatory Referral to Orthopedic Surgery    XR spine lumbar 2 or 3 views injury; Future    lidocaine (Lidoderm) 5 %; Apply 1 patch topically daily over 12 hours Remove & Discard patch within 12 hours or as directed by MD    Ambulatory Referral to Physical Therapy; Future

## 2025-06-20 NOTE — PROGRESS NOTES
Name: Arnav Joshi      : 1975      MRN: [id number]  Encounter Provider: Marva Alfaro DO  Encounter Date: 2025   Encounter department: [facility] [place]  :  Assessment & Plan  Degeneration of intervertebral disc of lumbar region with discogenic back pain  49-year-old male with low back pain few weeks duration.  X-rays lumbar spine noted for multilevel degenerative changes with thoracolumbar dextrocurvature.  There is severe to space narrowing L4-5 with suspected fusion, disc space narrowing L5-S1 with endplate osteophytes, multilevel facet degenerative changes.  Clinical impression is that he has symptoms from combination of aggravated arthritis/degenerative changes in the spine and from strain and SI joint dysfunction.  I discussed treatment regimen of anesthetic, muscle relaxer, and formal therapy.  Injection and surgery not warranted unless failed conservative management.  He may apply topical lidocaine patch as needed for pain.  May take methocarbamol 750 mg twice daily as needed but not before driving.  I will refer to aqua therapy which he is to start as soon as possible and do home exercises as directed.  Follow-up if no improvement with at least 1 month of aqua therapy.  Orders:    Ambulatory Referral to Orthopedic Surgery    XR spine lumbar 2 or 3 views injury; Future    lidocaine (Lidoderm) 5 %; Apply 1 patch topically daily over 12 hours Remove & Discard patch within 12 hours or as directed by MD    Ambulatory Referral to Physical Therapy; Future    Lumbar facet arthropathy  49-year-old male with low back pain few weeks duration.  X-rays lumbar spine noted for multilevel degenerative changes with thoracolumbar dextrocurvature.  There is severe to space narrowing L4-5 with suspected fusion, disc space narrowing L5-S1 with endplate osteophytes, multilevel facet degenerative changes.  Clinical impression is that he has symptoms from combination  of aggravated arthritis/degenerative changes in the spine and from strain and SI joint dysfunction.  I discussed treatment regimen of anesthetic, muscle relaxer, and formal therapy.  Injection and surgery not warranted unless failed conservative management.  He may apply topical lidocaine patch as needed for pain.  May take methocarbamol 750 mg twice daily as needed but not before driving.  I will refer to aqua therapy which he is to start as soon as possible and do home exercises as directed.  Follow-up if no improvement with at least 1 month of aqua therapy.  Orders:    Ambulatory Referral to Physical Therapy; Future    SI (sacroiliac) joint dysfunction  49-year-old male with low back pain few weeks duration.  X-rays lumbar spine noted for multilevel degenerative changes with thoracolumbar dextrocurvature.  There is severe to space narrowing L4-5 with suspected fusion, disc space narrowing L5-S1 with endplate osteophytes, multilevel facet degenerative changes.  Clinical impression is that he has symptoms from combination of aggravated arthritis/degenerative changes in the spine and from strain and SI joint dysfunction.  I discussed treatment regimen of anesthetic, muscle relaxer, and formal therapy.  Injection and surgery not warranted unless failed conservative management.  He may apply topical lidocaine patch as needed for pain.  May take methocarbamol 750 mg twice daily as needed but not before driving.  I will refer to aqua therapy which he is to start as soon as possible and do home exercises as directed.  Follow-up if no improvement with at least 1 month of aqua therapy.  Orders:    lidocaine (Lidoderm) 5 %; Apply 1 patch topically daily over 12 hours Remove & Discard patch within 12 hours or as directed by MD    Ambulatory Referral to Physical Therapy; Future    Lumbar strain, initial encounter  49-year-old male with low back pain few weeks duration.  X-rays lumbar spine noted for multilevel degenerative  changes with thoracolumbar dextrocurvature.  There is severe to space narrowing L4-5 with suspected fusion, disc space narrowing L5-S1 with endplate osteophytes, multilevel facet degenerative changes.  Clinical impression is that he has symptoms from combination of aggravated arthritis/degenerative changes in the spine and from strain and SI joint dysfunction.  I discussed treatment regimen of anesthetic, muscle relaxer, and formal therapy.  Injection and surgery not warranted unless failed conservative management.  He may apply topical lidocaine patch as needed for pain.  May take methocarbamol 750 mg twice daily as needed but not before driving.  I will refer to aqua therapy which he is to start as soon as possible and do home exercises as directed.  Follow-up if no improvement with at least 1 month of aqua therapy.  Orders:    Ambulatory Referral to Physical Therapy; Future    Lumbar paraspinal muscle spasm    Orders:    methocarbamol (ROBAXIN) 750 mg tablet; Take 1 tablet (750 mg total) by mouth 2 (two) times a day as needed for muscle spasms    Ambulatory Referral to Physical Therapy; Future        History of Present Illness   Chief Complaint   Patient presents with    Lower Back - Pain      HPI  Arnav Joshi is a 49 y.o. male who presents for evaluation low back pain few weeks duration.  He denies any particular trauma or inciting event.  Pain described as gradual in onset, localized to right low back, nonradiating, achy and burning and sometimes sharp, worse with bending and twisting, and improved with resting.  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   He denies any numbness or tingling of her lower extremities.  He denies bowel or bladder incontinence.  He was seen by primary care provider and prescribed course of oral steroid.  Symptoms improved while on oral steroid, but then pain returned.  He was referred for x-rays of the  "lumbar spine and referred to orthopedic care.  He is status post renal transplant and on oral anticoagulation so he does not take NSAIDs.    History obtained from: patient    Review of Systems   Musculoskeletal:  Positive for back pain. Negative for arthralgias.   Neurological:  Negative for weakness and numbness.          Objective   Ht 5' 6\" (1.676 m)   Wt 72.6 kg (160 lb)   BMI 25.82 kg/m²      Physical Exam  Vitals and nursing note reviewed.   Constitutional:       Appearance: Normal appearance. He is well-developed. He is not ill-appearing or diaphoretic.   HENT:      Head: Normocephalic and atraumatic.      Right Ear: External ear normal.      Left Ear: External ear normal.     Eyes:      General:         Right eye: No discharge.         Left eye: No discharge.     Pulmonary:      Effort: Pulmonary effort is normal. No respiratory distress.   Abdominal:      General: There is no distension.     Musculoskeletal:         General: No swelling or tenderness.      Lumbar back: Negative right straight leg raise test and negative left straight leg raise test.     Skin:     General: Skin is warm and dry.      Coloration: Skin is not jaundiced or pale.     Neurological:      Mental Status: He is alert and oriented to person, place, and time.     Psychiatric:         Mood and Affect: Mood normal.         Behavior: Behavior normal.         Thought Content: Thought content normal.         Judgment: Judgment normal.       Right Ankle Exam     Muscle Strength   Dorsiflexion:  5/5  Plantar flexion:  5/5       Left Ankle Exam     Muscle Strength   Dorsiflexion:  5/5   Plantar flexion:  5/5       Right Hip Exam     Muscle Strength   Flexion: 5/5     Tests   TIBURCIO: negative    Comments:  SI pain with FADDIR      Left Hip Exam     Muscle Strength   Flexion: 5/5     Tests   TIBURCIO: negative    Comments:  Negative FADDIR      Back Exam     Tenderness   The patient is experiencing no tenderness.     Range of Motion   Extension:  " normal   Flexion:  abnormal   Lateral bend right:  abnormal   Lateral bend left:  abnormal   Rotation right:  normal   Rotation left:  normal     Muscle Strength   Right Quadriceps:  5/5   Left Quadriceps:  5/5     Tests   Straight leg raise right: negative  Straight leg raise left: negative    Reflexes   Patellar:  normal    Other   Sensation: normal  Gait: normal            I have personally reviewed pertinent films in PACS and my interpretation is  .   X-rays lumbar spine noted for multilevel degenerative changes with thoracolumbar dextrocurvature.  There is severe to space narrowing L4-5 with suspected fusion, disc space narrowing L5-S1 with endplate osteophytes, multilevel facet degenerative changes.    Administrative Statements   I have spent a total time of 40 minutes in caring for this patient on the day of the visit/encounter including Diagnostic results, Prognosis, Risks and benefits of tx options, Instructions for management, Impressions, Documenting in the medical record, Reviewing/placing orders in the medical record (including tests, medications, and/or procedures), and Obtaining or reviewing history  .

## 2025-06-20 NOTE — ASSESSMENT & PLAN NOTE
49-year-old male with low back pain few weeks duration.  X-rays lumbar spine noted for multilevel degenerative changes with thoracolumbar dextrocurvature.  There is severe to space narrowing L4-5 with suspected fusion, disc space narrowing L5-S1 with endplate osteophytes, multilevel facet degenerative changes.  Clinical impression is that he has symptoms from combination of aggravated arthritis/degenerative changes in the spine and from strain and SI joint dysfunction.  I discussed treatment regimen of anesthetic, muscle relaxer, and formal therapy.  Injection and surgery not warranted unless failed conservative management.  He may apply topical lidocaine patch as needed for pain.  May take methocarbamol 750 mg twice daily as needed but not before driving.  I will refer to aqua therapy which he is to start as soon as possible and do home exercises as directed.  Follow-up if no improvement with at least 1 month of aqua therapy.  Orders:    Ambulatory Referral to Physical Therapy; Future

## 2025-06-29 DIAGNOSIS — Z94.0 RENAL TRANSPLANT RECIPIENT: ICD-10-CM

## 2025-06-29 DIAGNOSIS — E78.2 MIXED HYPERLIPIDEMIA: ICD-10-CM

## 2025-06-29 DIAGNOSIS — E61.1 IRON DEFICIENCY: ICD-10-CM

## 2025-07-01 RX ORDER — ROSUVASTATIN CALCIUM 10 MG/1
10 TABLET, COATED ORAL DAILY
Qty: 90 TABLET | Refills: 1 | Status: SHIPPED | OUTPATIENT
Start: 2025-07-01

## 2025-07-01 RX ORDER — FERROUS SULFATE 324(65)MG
324 TABLET, DELAYED RELEASE (ENTERIC COATED) ORAL
Qty: 45 TABLET | Refills: 1 | Status: SHIPPED | OUTPATIENT
Start: 2025-07-01 | End: 2025-09-29

## 2025-07-22 ENCOUNTER — TELEPHONE (OUTPATIENT)
Dept: NEPHROLOGY | Facility: CLINIC | Age: 50
End: 2025-07-22

## 2025-07-22 NOTE — TELEPHONE ENCOUNTER
1st attempt :Called OM for patient to call office to schedule follow-up appointment from our February 2026 recall list with our nephrology provider MD Nicola.     Please schedule patient on or after 02/13/26 with MD Nicola.

## 2025-07-28 ENCOUNTER — TELEPHONE (OUTPATIENT)
Dept: NEPHROLOGY | Facility: CLINIC | Age: 50
End: 2025-07-28